# Patient Record
Sex: MALE | Race: BLACK OR AFRICAN AMERICAN | NOT HISPANIC OR LATINO | Employment: FULL TIME | URBAN - METROPOLITAN AREA
[De-identification: names, ages, dates, MRNs, and addresses within clinical notes are randomized per-mention and may not be internally consistent; named-entity substitution may affect disease eponyms.]

---

## 2018-01-01 ENCOUNTER — HOSPITAL ENCOUNTER (INPATIENT)
Facility: HOSPITAL | Age: 63
LOS: 16 days | DRG: 207 | End: 2018-06-26
Attending: INTERNAL MEDICINE | Admitting: INTERNAL MEDICINE
Payer: COMMERCIAL

## 2018-01-01 VITALS
WEIGHT: 171.31 LBS | HEART RATE: 93 BPM | TEMPERATURE: 98 F | BODY MASS INDEX: 25.96 KG/M2 | DIASTOLIC BLOOD PRESSURE: 59 MMHG | HEIGHT: 68 IN | RESPIRATION RATE: 9 BRPM | SYSTOLIC BLOOD PRESSURE: 115 MMHG | OXYGEN SATURATION: 76 %

## 2018-01-01 DIAGNOSIS — A41.9 SEPTIC SHOCK: ICD-10-CM

## 2018-01-01 DIAGNOSIS — R04.89 DIFFUSE PULMONARY ALVEOLAR HEMORRHAGE: ICD-10-CM

## 2018-01-01 DIAGNOSIS — N17.8 ACUTE RENAL FAILURE WITH PATHOLOGICAL LESION IN KIDNEY: ICD-10-CM

## 2018-01-01 DIAGNOSIS — R00.1 BRADYCARDIA: ICD-10-CM

## 2018-01-01 DIAGNOSIS — R07.9 CHEST PAIN: ICD-10-CM

## 2018-01-01 DIAGNOSIS — I77.82 ANCA-ASSOCIATED VASCULITIS: ICD-10-CM

## 2018-01-01 DIAGNOSIS — R65.21 SEVERE SEPSIS WITH SEPTIC SHOCK (CODE): ICD-10-CM

## 2018-01-01 DIAGNOSIS — J96.01 ACUTE HYPOXEMIC RESPIRATORY FAILURE: Primary | ICD-10-CM

## 2018-01-01 DIAGNOSIS — N17.9 AKI (ACUTE KIDNEY INJURY): ICD-10-CM

## 2018-01-01 DIAGNOSIS — M31.0: ICD-10-CM

## 2018-01-01 DIAGNOSIS — J96.90 RESPIRATORY FAILURE: ICD-10-CM

## 2018-01-01 DIAGNOSIS — J80 ARDS (ADULT RESPIRATORY DISTRESS SYNDROME): ICD-10-CM

## 2018-01-01 DIAGNOSIS — R65.21 SEPTIC SHOCK: ICD-10-CM

## 2018-01-01 DIAGNOSIS — J98.8 AIRWAY OBSTRUCTION: ICD-10-CM

## 2018-01-01 DIAGNOSIS — E11.8 TYPE 2 DIABETES MELLITUS WITH COMPLICATION, WITHOUT LONG-TERM CURRENT USE OF INSULIN: ICD-10-CM

## 2018-01-01 DIAGNOSIS — R94.31 QT PROLONGATION: ICD-10-CM

## 2018-01-01 DIAGNOSIS — Z71.89 GOALS OF CARE, COUNSELING/DISCUSSION: ICD-10-CM

## 2018-01-01 LAB
ABO + RH BLD: NORMAL
ACE SERPL-CCNC: 19 U/L
ALBUMIN SERPL BCP-MCNC: 1.8 G/DL
ALBUMIN SERPL BCP-MCNC: 1.9 G/DL
ALBUMIN SERPL BCP-MCNC: 1.9 G/DL
ALBUMIN SERPL BCP-MCNC: 2.1 G/DL
ALBUMIN SERPL BCP-MCNC: 2.2 G/DL
ALBUMIN SERPL BCP-MCNC: 2.3 G/DL
ALBUMIN SERPL BCP-MCNC: 2.3 G/DL
ALBUMIN SERPL BCP-MCNC: 2.4 G/DL
ALBUMIN SERPL BCP-MCNC: 2.5 G/DL
ALBUMIN SERPL BCP-MCNC: 2.6 G/DL
ALBUMIN SERPL BCP-MCNC: 2.7 G/DL
ALBUMIN SERPL BCP-MCNC: 2.8 G/DL
ALBUMIN SERPL BCP-MCNC: 2.9 G/DL
ALBUMIN SERPL BCP-MCNC: 3.1 G/DL
ALBUMIN SERPL BCP-MCNC: 3.1 G/DL
ALBUMIN SERPL BCP-MCNC: 3.2 G/DL
ALBUMIN SERPL BCP-MCNC: 3.3 G/DL
ALBUMIN SERPL BCP-MCNC: 3.4 G/DL
ALBUMIN SERPL BCP-MCNC: 3.5 G/DL
ALBUMIN SERPL BCP-MCNC: 3.5 G/DL
ALBUMIN SERPL BCP-MCNC: 3.6 G/DL
ALBUMIN SERPL BCP-MCNC: 3.6 G/DL
ALBUMIN SERPL BCP-MCNC: 3.8 G/DL
ALBUMIN SERPL BCP-MCNC: 4 G/DL
ALBUMIN SERPL BCP-MCNC: 4.2 G/DL
ALBUMIN SERPL BCP-MCNC: 4.3 G/DL
ALBUMIN SERPL BCP-MCNC: 4.3 G/DL
ALBUMIN SERPL BCP-MCNC: 4.4 G/DL
ALBUMIN SERPL BCP-MCNC: 4.4 G/DL
ALBUMIN SERPL ELPH-MCNC: 2.16 G/DL
ALLENS TEST: ABNORMAL
ALLENS TEST: NORMAL
ALLENS TEST: NORMAL
ALP SERPL-CCNC: 121 U/L
ALP SERPL-CCNC: 169 U/L
ALP SERPL-CCNC: 200 U/L
ALP SERPL-CCNC: 26 U/L
ALP SERPL-CCNC: 37 U/L
ALP SERPL-CCNC: 39 U/L
ALP SERPL-CCNC: 45 U/L
ALP SERPL-CCNC: 47 U/L
ALP SERPL-CCNC: 54 U/L
ALP SERPL-CCNC: 54 U/L
ALP SERPL-CCNC: 56 U/L
ALP SERPL-CCNC: 62 U/L
ALP SERPL-CCNC: 63 U/L
ALP SERPL-CCNC: 64 U/L
ALP SERPL-CCNC: 67 U/L
ALP SERPL-CCNC: 67 U/L
ALP SERPL-CCNC: 86 U/L
ALP SERPL-CCNC: 88 U/L
ALPHA1 GLOB SERPL ELPH-MCNC: 0.63 G/DL
ALPHA2 GLOB SERPL ELPH-MCNC: 0.71 G/DL
ALT SERPL W/O P-5'-P-CCNC: 14 U/L
ALT SERPL W/O P-5'-P-CCNC: 16 U/L
ALT SERPL W/O P-5'-P-CCNC: 21 U/L
ALT SERPL W/O P-5'-P-CCNC: 21 U/L
ALT SERPL W/O P-5'-P-CCNC: 22 U/L
ALT SERPL W/O P-5'-P-CCNC: 24 U/L
ALT SERPL W/O P-5'-P-CCNC: 25 U/L
ALT SERPL W/O P-5'-P-CCNC: 26 U/L
ALT SERPL W/O P-5'-P-CCNC: 26 U/L
ALT SERPL W/O P-5'-P-CCNC: 30 U/L
ALT SERPL W/O P-5'-P-CCNC: 33 U/L
ALT SERPL W/O P-5'-P-CCNC: 35 U/L
ALT SERPL W/O P-5'-P-CCNC: 39 U/L
ALT SERPL W/O P-5'-P-CCNC: 40 U/L
ALT SERPL W/O P-5'-P-CCNC: 45 U/L
ALT SERPL W/O P-5'-P-CCNC: 45 U/L
ALT SERPL W/O P-5'-P-CCNC: 56 U/L
ALT SERPL W/O P-5'-P-CCNC: 56 U/L
AMPHET+METHAMPHET UR QL: NEGATIVE
AMPHETAMINES SERPL QL: NEGATIVE
ANA SER QL IF: POSITIVE
ANA TITR SER IF: NORMAL {TITER}
ANCA AB TITR SER IF: ABNORMAL TITER
ANCA AB TITR SER IF: ABNORMAL TITER
ANION GAP SERPL CALC-SCNC: 10 MMOL/L
ANION GAP SERPL CALC-SCNC: 11 MMOL/L
ANION GAP SERPL CALC-SCNC: 11 MMOL/L
ANION GAP SERPL CALC-SCNC: 15 MMOL/L
ANION GAP SERPL CALC-SCNC: 17 MMOL/L
ANION GAP SERPL CALC-SCNC: 18 MMOL/L
ANION GAP SERPL CALC-SCNC: 18 MMOL/L
ANION GAP SERPL CALC-SCNC: 4 MMOL/L
ANION GAP SERPL CALC-SCNC: 5 MMOL/L
ANION GAP SERPL CALC-SCNC: 6 MMOL/L
ANION GAP SERPL CALC-SCNC: 7 MMOL/L
ANION GAP SERPL CALC-SCNC: 8 MMOL/L
ANION GAP SERPL CALC-SCNC: 9 MMOL/L
ANISOCYTOSIS BLD QL SMEAR: SLIGHT
ANTI SM ANTIBODY: 0.6 EU
ANTI SM/RNP ANTIBODY: 2.83 EU
ANTI-SM INTERPRETATION: NEGATIVE
ANTI-SM/RNP INTERPRETATION: NEGATIVE
ANTI-SSA ANTIBODY: 178.4 EU
ANTI-SSA INTERPRETATION: POSITIVE
ANTI-SSB ANTIBODY: 0.87 EU
ANTI-SSB INTERPRETATION: NEGATIVE
APTT BLDCRRT: 21.1 SEC
APTT BLDCRRT: 22.5 SEC
APTT BLDCRRT: 22.8 SEC
APTT BLDCRRT: 24.2 SEC
APTT BLDCRRT: 24.8 SEC
APTT BLDCRRT: 26.1 SEC
APTT BLDCRRT: 26.1 SEC
APTT BLDCRRT: 26.2 SEC
APTT BLDCRRT: 28.1 SEC
APTT BLDCRRT: 29.3 SEC
APTT BLDCRRT: 30.1 SEC
APTT BLDCRRT: 30.2 SEC
APTT BLDCRRT: 30.2 SEC
APTT BLDCRRT: 33.1 SEC
APTT BLDCRRT: 34.1 SEC
APTT BLDCRRT: 36.1 SEC
APTT BLDCRRT: 58.4 SEC
APTT BLDCRRT: <21 SEC
APTT BLDCRRT: >150 SEC
AST SERPL-CCNC: 17 U/L
AST SERPL-CCNC: 19 U/L
AST SERPL-CCNC: 21 U/L
AST SERPL-CCNC: 25 U/L
AST SERPL-CCNC: 26 U/L
AST SERPL-CCNC: 26 U/L
AST SERPL-CCNC: 29 U/L
AST SERPL-CCNC: 33 U/L
AST SERPL-CCNC: 40 U/L
AST SERPL-CCNC: 44 U/L
AST SERPL-CCNC: 54 U/L
AST SERPL-CCNC: 60 U/L
AST SERPL-CCNC: 63 U/L
AST SERPL-CCNC: 65 U/L
AST SERPL-CCNC: 74 U/L
AST SERPL-CCNC: 84 U/L
AST SERPL-CCNC: 95 U/L
AST SERPL-CCNC: 95 U/L
B-GLOBULIN SERPL ELPH-MCNC: 0.6 G/DL
B2 GLYCOPROT1 IGA SER QL: <9 SAU
B2 GLYCOPROT1 IGG SER QL: <9 SGU
B2 GLYCOPROT1 IGM SER QL: <9 SMU
BACTERIA #/AREA URNS AUTO: ABNORMAL /HPF
BACTERIA BLD CULT: NORMAL
BACTERIA SPEC AEROBE CULT: NO GROWTH
BACTERIA SPEC AEROBE CULT: NORMAL
BACTERIA UR CULT: NO GROWTH
BARBITURATES SERPL QL SCN: NEGATIVE
BARBITURATES UR QL SCN>200 NG/ML: NEGATIVE
BASO STIPL BLD QL SMEAR: ABNORMAL
BASO STIPL BLD QL SMEAR: ABNORMAL
BASOPHILS # BLD AUTO: 0 K/UL
BASOPHILS # BLD AUTO: 0.01 K/UL
BASOPHILS # BLD AUTO: 0.02 K/UL
BASOPHILS # BLD AUTO: 0.03 K/UL
BASOPHILS # BLD AUTO: 0.04 K/UL
BASOPHILS # BLD AUTO: ABNORMAL K/UL
BASOPHILS NFR BLD: 0 %
BASOPHILS NFR BLD: 0.1 %
BASOPHILS NFR BLD: 0.2 %
BENZODIAZ SERPL QL: NEGATIVE
BENZODIAZ UR QL SCN>200 NG/ML: NEGATIVE
BILIRUB SERPL-MCNC: 0.6 MG/DL
BILIRUB SERPL-MCNC: 0.7 MG/DL
BILIRUB SERPL-MCNC: 0.9 MG/DL
BILIRUB SERPL-MCNC: 1 MG/DL
BILIRUB SERPL-MCNC: 1 MG/DL
BILIRUB SERPL-MCNC: 1.1 MG/DL
BILIRUB SERPL-MCNC: 1.2 MG/DL
BILIRUB SERPL-MCNC: 1.2 MG/DL
BILIRUB SERPL-MCNC: 1.3 MG/DL
BILIRUB SERPL-MCNC: 1.5 MG/DL
BILIRUB SERPL-MCNC: 1.6 MG/DL
BILIRUB SERPL-MCNC: 1.7 MG/DL
BILIRUB SERPL-MCNC: 1.9 MG/DL
BILIRUB SERPL-MCNC: 2.1 MG/DL
BILIRUB SERPL-MCNC: 2.2 MG/DL
BILIRUB SERPL-MCNC: 2.6 MG/DL
BILIRUB UR QL STRIP: NEGATIVE
BLASTOMYCES AG INTERP: NEGATIVE
BLASTOMYCES AG RESULT: NORMAL NG/ML
BLASTOMYCES AG SPECIMEN: NORMAL
BLD GP AB SCN CELLS X3 SERPL QL: NORMAL
BLD PROD TYP BPU: NORMAL
BLOOD UNIT EXPIRATION DATE: NORMAL
BLOOD UNIT TYPE CODE: 5100
BLOOD UNIT TYPE CODE: 7300
BLOOD UNIT TYPE CODE: 9500
BLOOD UNIT TYPE: NORMAL
BM IGG SER-ACNC: <0.2 U
BNP SERPL-MCNC: 245 PG/ML
BNP SERPL-MCNC: 94 PG/ML
BUN SERPL-MCNC: 10 MG/DL
BUN SERPL-MCNC: 11 MG/DL
BUN SERPL-MCNC: 12 MG/DL
BUN SERPL-MCNC: 13 MG/DL
BUN SERPL-MCNC: 14 MG/DL
BUN SERPL-MCNC: 15 MG/DL
BUN SERPL-MCNC: 16 MG/DL
BUN SERPL-MCNC: 16 MG/DL
BUN SERPL-MCNC: 17 MG/DL
BUN SERPL-MCNC: 20 MG/DL
BUN SERPL-MCNC: 21 MG/DL
BUN SERPL-MCNC: 22 MG/DL
BUN SERPL-MCNC: 22 MG/DL
BUN SERPL-MCNC: 25 MG/DL
BUN SERPL-MCNC: 29 MG/DL
BUN SERPL-MCNC: 29 MG/DL
BUN SERPL-MCNC: 32 MG/DL
BUN SERPL-MCNC: 32 MG/DL
BUN SERPL-MCNC: 4 MG/DL
BUN SERPL-MCNC: 4 MG/DL
BUN SERPL-MCNC: 5 MG/DL
BUN SERPL-MCNC: 57 MG/DL
BUN SERPL-MCNC: 7 MG/DL
BUN SERPL-MCNC: 7 MG/DL
BUN SERPL-MCNC: 8 MG/DL
BUN SERPL-MCNC: 9 MG/DL
BUN SERPL-MCNC: 91 MG/DL
BUN SERPL-MCNC: 92 MG/DL
BUN SERPL-MCNC: 94 MG/DL
BUN SERPL-MCNC: 94 MG/DL
BUN SERPL-MCNC: <2 MG/DL
BURR CELLS BLD QL SMEAR: ABNORMAL
BURR CELLS BLD QL SMEAR: ABNORMAL
BZE UR QL SCN: NEGATIVE
C IMMITIS AB TITR SER CF: NEGATIVE {TITER}
C IMMITIS IGG SER QL: NEGATIVE
C IMMITIS IGM SER QL: NEGATIVE
C3 SERPL-MCNC: 78 MG/DL
C4 SERPL-MCNC: 13 MG/DL
CA-I BLDV-SCNC: 0.82 MMOL/L
CA-I BLDV-SCNC: 0.85 MMOL/L
CA-I BLDV-SCNC: 0.88 MMOL/L
CA-I BLDV-SCNC: 0.92 MMOL/L
CA-I BLDV-SCNC: 0.94 MMOL/L
CA-I BLDV-SCNC: 0.94 MMOL/L
CA-I BLDV-SCNC: 0.95 MMOL/L
CA-I BLDV-SCNC: 0.95 MMOL/L
CA-I BLDV-SCNC: 0.96 MMOL/L
CA-I BLDV-SCNC: 0.99 MMOL/L
CA-I BLDV-SCNC: 1 MMOL/L
CA-I BLDV-SCNC: 1.02 MMOL/L
CA-I BLDV-SCNC: 1.03 MMOL/L
CA-I BLDV-SCNC: 1.03 MMOL/L
CA-I BLDV-SCNC: 1.04 MMOL/L
CA-I BLDV-SCNC: 1.06 MMOL/L
CA-I BLDV-SCNC: 1.07 MMOL/L
CA-I BLDV-SCNC: 1.07 MMOL/L
CA-I BLDV-SCNC: 1.08 MMOL/L
CA-I BLDV-SCNC: 1.1 MMOL/L
CA-I BLDV-SCNC: 1.13 MMOL/L
CA-I BLDV-SCNC: 1.13 MMOL/L
CA-I BLDV-SCNC: 1.21 MMOL/L
CA-I BLDV-SCNC: 1.23 MMOL/L
CALCIUM SERPL-MCNC: 6.7 MG/DL
CALCIUM SERPL-MCNC: 6.7 MG/DL
CALCIUM SERPL-MCNC: 7 MG/DL
CALCIUM SERPL-MCNC: 7.1 MG/DL
CALCIUM SERPL-MCNC: 7.2 MG/DL
CALCIUM SERPL-MCNC: 7.3 MG/DL
CALCIUM SERPL-MCNC: 7.3 MG/DL
CALCIUM SERPL-MCNC: 7.4 MG/DL
CALCIUM SERPL-MCNC: 7.5 MG/DL
CALCIUM SERPL-MCNC: 7.6 MG/DL
CALCIUM SERPL-MCNC: 7.7 MG/DL
CALCIUM SERPL-MCNC: 7.8 MG/DL
CALCIUM SERPL-MCNC: 7.9 MG/DL
CALCIUM SERPL-MCNC: 8 MG/DL
CALCIUM SERPL-MCNC: 8.1 MG/DL
CANNABINOIDS SERPL QL: NEGATIVE
CANNABINOIDS UR QL SCN: NEGATIVE
CARDIOLIPIN IGG SER IA-ACNC: <9.4 GPL
CARDIOLIPIN IGM SER IA-ACNC: <9.4 MPL
CH50 SERPL-ACNC: 60 U/ML
CHLORIDE SERPL-SCNC: 100 MMOL/L
CHLORIDE SERPL-SCNC: 101 MMOL/L
CHLORIDE SERPL-SCNC: 101 MMOL/L
CHLORIDE SERPL-SCNC: 102 MMOL/L
CHLORIDE SERPL-SCNC: 102 MMOL/L
CHLORIDE SERPL-SCNC: 103 MMOL/L
CHLORIDE SERPL-SCNC: 105 MMOL/L
CHLORIDE SERPL-SCNC: 106 MMOL/L
CHLORIDE SERPL-SCNC: 107 MMOL/L
CHLORIDE SERPL-SCNC: 108 MMOL/L
CHLORIDE SERPL-SCNC: 109 MMOL/L
CHLORIDE SERPL-SCNC: 110 MMOL/L
CHLORIDE SERPL-SCNC: 111 MMOL/L
CHLORIDE SERPL-SCNC: 111 MMOL/L
CHLORIDE SERPL-SCNC: 114 MMOL/L
CHLORIDE SERPL-SCNC: 115 MMOL/L
CHLORIDE SERPL-SCNC: 116 MMOL/L
CHLORIDE SERPL-SCNC: 92 MMOL/L
CHLORIDE SERPL-SCNC: 92 MMOL/L
CHLORIDE SERPL-SCNC: 94 MMOL/L
CHLORIDE SERPL-SCNC: 96 MMOL/L
CHLORIDE SERPL-SCNC: 98 MMOL/L
CHLORIDE SERPL-SCNC: 98 MMOL/L
CHLORIDE SERPL-SCNC: 99 MMOL/L
CHLORIDE SERPL-SCNC: 99 MMOL/L
CK SERPL-CCNC: 196 U/L
CLARITY UR REFRACT.AUTO: ABNORMAL
CO2 SERPL-SCNC: 18 MMOL/L
CO2 SERPL-SCNC: 19 MMOL/L
CO2 SERPL-SCNC: 20 MMOL/L
CO2 SERPL-SCNC: 21 MMOL/L
CO2 SERPL-SCNC: 22 MMOL/L
CO2 SERPL-SCNC: 23 MMOL/L
CO2 SERPL-SCNC: 24 MMOL/L
CO2 SERPL-SCNC: 25 MMOL/L
CO2 SERPL-SCNC: 26 MMOL/L
CO2 SERPL-SCNC: 27 MMOL/L
CO2 SERPL-SCNC: 28 MMOL/L
CO2 SERPL-SCNC: 28 MMOL/L
CO2 SERPL-SCNC: 33 MMOL/L
CO2 SERPL-SCNC: 33 MMOL/L
COCAINE, BLOOD: NEGATIVE
CODING SYSTEM: NORMAL
COLOR UR AUTO: YELLOW
CREAT SERPL-MCNC: 0.3 MG/DL
CREAT SERPL-MCNC: 0.6 MG/DL
CREAT SERPL-MCNC: 0.7 MG/DL
CREAT SERPL-MCNC: 0.8 MG/DL
CREAT SERPL-MCNC: 0.9 MG/DL
CREAT SERPL-MCNC: 1 MG/DL
CREAT SERPL-MCNC: 1.1 MG/DL
CREAT SERPL-MCNC: 1.2 MG/DL
CREAT SERPL-MCNC: 1.3 MG/DL
CREAT SERPL-MCNC: 1.4 MG/DL
CREAT SERPL-MCNC: 1.4 MG/DL
CREAT SERPL-MCNC: 1.6 MG/DL
CREAT SERPL-MCNC: 1.7 MG/DL
CREAT SERPL-MCNC: 1.8 MG/DL
CREAT SERPL-MCNC: 2.2 MG/DL
CREAT SERPL-MCNC: 3.5 MG/DL
CREAT SERPL-MCNC: 3.5 MG/DL
CREAT SERPL-MCNC: 5.6 MG/DL
CREAT SERPL-MCNC: 8.3 MG/DL
CREAT SERPL-MCNC: 8.8 MG/DL
CREAT UR-MCNC: 80 MG/DL
CREAT UR-MCNC: 83 MG/DL
CRP SERPL-MCNC: 151.3 MG/L
CRP SERPL-MCNC: 262.5 MG/L
CRYOGLOB SER QL: NORMAL
DACRYOCYTES BLD QL SMEAR: ABNORMAL
DAT IGG-SP REAG RBC-IMP: NORMAL
DELSYS: ABNORMAL
DELSYS: NORMAL
DIASTOLIC DYSFUNCTION: NO
DIFFERENTIAL METHOD: ABNORMAL
DISPENSE STATUS: NORMAL
DSDNA AB SER-ACNC: ABNORMAL [IU]/ML
ENTEROVIRUS: NOT DETECTED
EOSINOPHIL # BLD AUTO: 0 K/UL
EOSINOPHIL # BLD AUTO: 0.1 K/UL
EOSINOPHIL # BLD AUTO: 0.6 K/UL
EOSINOPHIL # BLD AUTO: ABNORMAL K/UL
EOSINOPHIL NFR BLD: 0 %
EOSINOPHIL NFR BLD: 0.1 %
EOSINOPHIL NFR BLD: 0.2 %
EOSINOPHIL NFR BLD: 0.3 %
EOSINOPHIL NFR BLD: 0.4 %
EOSINOPHIL NFR BLD: 0.5 %
EOSINOPHIL NFR BLD: 0.5 %
EOSINOPHIL NFR BLD: 4.3 %
ERYTHROCYTE [DISTWIDTH] IN BLOOD BY AUTOMATED COUNT: 15.4 %
ERYTHROCYTE [DISTWIDTH] IN BLOOD BY AUTOMATED COUNT: 15.5 %
ERYTHROCYTE [DISTWIDTH] IN BLOOD BY AUTOMATED COUNT: 15.5 %
ERYTHROCYTE [DISTWIDTH] IN BLOOD BY AUTOMATED COUNT: 15.6 %
ERYTHROCYTE [DISTWIDTH] IN BLOOD BY AUTOMATED COUNT: 15.7 %
ERYTHROCYTE [DISTWIDTH] IN BLOOD BY AUTOMATED COUNT: 15.7 %
ERYTHROCYTE [DISTWIDTH] IN BLOOD BY AUTOMATED COUNT: 15.8 %
ERYTHROCYTE [DISTWIDTH] IN BLOOD BY AUTOMATED COUNT: 15.9 %
ERYTHROCYTE [DISTWIDTH] IN BLOOD BY AUTOMATED COUNT: 16 %
ERYTHROCYTE [DISTWIDTH] IN BLOOD BY AUTOMATED COUNT: 16.1 %
ERYTHROCYTE [DISTWIDTH] IN BLOOD BY AUTOMATED COUNT: 16.2 %
ERYTHROCYTE [DISTWIDTH] IN BLOOD BY AUTOMATED COUNT: 16.3 %
ERYTHROCYTE [DISTWIDTH] IN BLOOD BY AUTOMATED COUNT: 16.4 %
ERYTHROCYTE [DISTWIDTH] IN BLOOD BY AUTOMATED COUNT: 16.5 %
ERYTHROCYTE [DISTWIDTH] IN BLOOD BY AUTOMATED COUNT: 16.7 %
ERYTHROCYTE [DISTWIDTH] IN BLOOD BY AUTOMATED COUNT: 16.8 %
ERYTHROCYTE [DISTWIDTH] IN BLOOD BY AUTOMATED COUNT: 16.9 %
ERYTHROCYTE [DISTWIDTH] IN BLOOD BY AUTOMATED COUNT: 17 %
ERYTHROCYTE [DISTWIDTH] IN BLOOD BY AUTOMATED COUNT: 17 %
ERYTHROCYTE [DISTWIDTH] IN BLOOD BY AUTOMATED COUNT: 17.1 %
ERYTHROCYTE [DISTWIDTH] IN BLOOD BY AUTOMATED COUNT: 17.1 %
ERYTHROCYTE [DISTWIDTH] IN BLOOD BY AUTOMATED COUNT: 17.2 %
ERYTHROCYTE [DISTWIDTH] IN BLOOD BY AUTOMATED COUNT: 17.4 %
ERYTHROCYTE [SEDIMENTATION RATE] IN BLOOD BY WESTERGREN METHOD: 18 MM/H
ERYTHROCYTE [SEDIMENTATION RATE] IN BLOOD BY WESTERGREN METHOD: 20 MM/H
ERYTHROCYTE [SEDIMENTATION RATE] IN BLOOD BY WESTERGREN METHOD: 21 MM/H
ERYTHROCYTE [SEDIMENTATION RATE] IN BLOOD BY WESTERGREN METHOD: 22 MM/H
ERYTHROCYTE [SEDIMENTATION RATE] IN BLOOD BY WESTERGREN METHOD: 23 MM/H
ERYTHROCYTE [SEDIMENTATION RATE] IN BLOOD BY WESTERGREN METHOD: 23 MM/H
ERYTHROCYTE [SEDIMENTATION RATE] IN BLOOD BY WESTERGREN METHOD: 25 MM/H
ERYTHROCYTE [SEDIMENTATION RATE] IN BLOOD BY WESTERGREN METHOD: 28 MM/H
ERYTHROCYTE [SEDIMENTATION RATE] IN BLOOD BY WESTERGREN METHOD: 29 MM/H
ERYTHROCYTE [SEDIMENTATION RATE] IN BLOOD BY WESTERGREN METHOD: 30 MM/H
ERYTHROCYTE [SEDIMENTATION RATE] IN BLOOD BY WESTERGREN METHOD: 30 MM/HR
ERYTHROCYTE [SEDIMENTATION RATE] IN BLOOD BY WESTERGREN METHOD: 32 MM/H
ERYTHROCYTE [SEDIMENTATION RATE] IN BLOOD BY WESTERGREN METHOD: 70 MM/HR
EST. GFR  (AFRICAN AMERICAN): 11.3 ML/MIN/1.73 M^2
EST. GFR  (AFRICAN AMERICAN): 20 ML/MIN/1.73 M^2
EST. GFR  (AFRICAN AMERICAN): 20 ML/MIN/1.73 M^2
EST. GFR  (AFRICAN AMERICAN): 35 ML/MIN/1.73 M^2
EST. GFR  (AFRICAN AMERICAN): 44.7 ML/MIN/1.73 M^2
EST. GFR  (AFRICAN AMERICAN): 47.9 ML/MIN/1.73 M^2
EST. GFR  (AFRICAN AMERICAN): 51.5 ML/MIN/1.73 M^2
EST. GFR  (AFRICAN AMERICAN): 6.6 ML/MIN/1.73 M^2
EST. GFR  (AFRICAN AMERICAN): 7 ML/MIN/1.73 M^2
EST. GFR  (AFRICAN AMERICAN): >60 ML/MIN/1.73 M^2
EST. GFR  (NON AFRICAN AMERICAN): 17.3 ML/MIN/1.73 M^2
EST. GFR  (NON AFRICAN AMERICAN): 17.3 ML/MIN/1.73 M^2
EST. GFR  (NON AFRICAN AMERICAN): 30.3 ML/MIN/1.73 M^2
EST. GFR  (NON AFRICAN AMERICAN): 38.6 ML/MIN/1.73 M^2
EST. GFR  (NON AFRICAN AMERICAN): 41.4 ML/MIN/1.73 M^2
EST. GFR  (NON AFRICAN AMERICAN): 44.5 ML/MIN/1.73 M^2
EST. GFR  (NON AFRICAN AMERICAN): 5.7 ML/MIN/1.73 M^2
EST. GFR  (NON AFRICAN AMERICAN): 52.4 ML/MIN/1.73 M^2
EST. GFR  (NON AFRICAN AMERICAN): 52.4 ML/MIN/1.73 M^2
EST. GFR  (NON AFRICAN AMERICAN): 57.3 ML/MIN/1.73 M^2
EST. GFR  (NON AFRICAN AMERICAN): 6.1 ML/MIN/1.73 M^2
EST. GFR  (NON AFRICAN AMERICAN): 9.8 ML/MIN/1.73 M^2
EST. GFR  (NON AFRICAN AMERICAN): >60 ML/MIN/1.73 M^2
ESTIMATED AVG GLUCOSE: 131 MG/DL
ETHANOL SERPL-MCNC: NEGATIVE MG/DL
ETHANOL UR-MCNC: <10 MG/DL
FIBRINOGEN PPP-MCNC: 108 MG/DL
FIBRINOGEN PPP-MCNC: 111 MG/DL
FIBRINOGEN PPP-MCNC: 138 MG/DL
FIBRINOGEN PPP-MCNC: 138 MG/DL
FIBRINOGEN PPP-MCNC: 149 MG/DL
FIBRINOGEN PPP-MCNC: 166 MG/DL
FIBRINOGEN PPP-MCNC: 167 MG/DL
FIBRINOGEN PPP-MCNC: 172 MG/DL
FIBRINOGEN PPP-MCNC: 177 MG/DL
FIBRINOGEN PPP-MCNC: 195 MG/DL
FIBRINOGEN PPP-MCNC: 199 MG/DL
FIBRINOGEN PPP-MCNC: 203 MG/DL
FIBRINOGEN PPP-MCNC: 204 MG/DL
FIBRINOGEN PPP-MCNC: 206 MG/DL
FIBRINOGEN PPP-MCNC: 228 MG/DL
FIBRINOGEN PPP-MCNC: 234 MG/DL
FIBRINOGEN PPP-MCNC: 234 MG/DL
FIBRINOGEN PPP-MCNC: 235 MG/DL
FIBRINOGEN PPP-MCNC: 261 MG/DL
FIBRINOGEN PPP-MCNC: 84 MG/DL
FIBRINOGEN PPP-MCNC: 96 MG/DL
FIBRINOGEN PPP-MCNC: <70 MG/DL
FIO2: 100
FIO2: 35
FIO2: 40
FIO2: 45
FIO2: 50
FIO2: 55
FIO2: 60
FIO2: 65
FIO2: 70
FIO2: 75
FIO2: 80
FIO2: 85
FIO2: 85
FLOW: 15
GAMMA GLOB SERPL ELPH-MCNC: 0.8 G/DL
GLOBAL PERICARDIAL EFFUSION: ABNORMAL
GLOBAL PERICARDIAL EFFUSION: NORMAL
GLUCOSE SERPL-MCNC: 120 MG/DL
GLUCOSE SERPL-MCNC: 127 MG/DL
GLUCOSE SERPL-MCNC: 128 MG/DL
GLUCOSE SERPL-MCNC: 136 MG/DL
GLUCOSE SERPL-MCNC: 139 MG/DL
GLUCOSE SERPL-MCNC: 147 MG/DL
GLUCOSE SERPL-MCNC: 148 MG/DL
GLUCOSE SERPL-MCNC: 152 MG/DL
GLUCOSE SERPL-MCNC: 155 MG/DL
GLUCOSE SERPL-MCNC: 155 MG/DL
GLUCOSE SERPL-MCNC: 158 MG/DL
GLUCOSE SERPL-MCNC: 158 MG/DL
GLUCOSE SERPL-MCNC: 159 MG/DL
GLUCOSE SERPL-MCNC: 160 MG/DL
GLUCOSE SERPL-MCNC: 160 MG/DL
GLUCOSE SERPL-MCNC: 161 MG/DL
GLUCOSE SERPL-MCNC: 165 MG/DL
GLUCOSE SERPL-MCNC: 165 MG/DL
GLUCOSE SERPL-MCNC: 166 MG/DL
GLUCOSE SERPL-MCNC: 167 MG/DL
GLUCOSE SERPL-MCNC: 167 MG/DL
GLUCOSE SERPL-MCNC: 168 MG/DL
GLUCOSE SERPL-MCNC: 169 MG/DL
GLUCOSE SERPL-MCNC: 170 MG/DL
GLUCOSE SERPL-MCNC: 170 MG/DL
GLUCOSE SERPL-MCNC: 174 MG/DL
GLUCOSE SERPL-MCNC: 178 MG/DL
GLUCOSE SERPL-MCNC: 179 MG/DL
GLUCOSE SERPL-MCNC: 179 MG/DL
GLUCOSE SERPL-MCNC: 180 MG/DL
GLUCOSE SERPL-MCNC: 180 MG/DL
GLUCOSE SERPL-MCNC: 183 MG/DL
GLUCOSE SERPL-MCNC: 183 MG/DL
GLUCOSE SERPL-MCNC: 187 MG/DL
GLUCOSE SERPL-MCNC: 190 MG/DL
GLUCOSE SERPL-MCNC: 190 MG/DL
GLUCOSE SERPL-MCNC: 191 MG/DL
GLUCOSE SERPL-MCNC: 191 MG/DL
GLUCOSE SERPL-MCNC: 194 MG/DL
GLUCOSE SERPL-MCNC: 194 MG/DL
GLUCOSE SERPL-MCNC: 195 MG/DL
GLUCOSE SERPL-MCNC: 195 MG/DL
GLUCOSE SERPL-MCNC: 198 MG/DL
GLUCOSE SERPL-MCNC: 198 MG/DL
GLUCOSE SERPL-MCNC: 204 MG/DL
GLUCOSE SERPL-MCNC: 204 MG/DL
GLUCOSE SERPL-MCNC: 207 MG/DL
GLUCOSE SERPL-MCNC: 207 MG/DL
GLUCOSE SERPL-MCNC: 216 MG/DL
GLUCOSE SERPL-MCNC: 223 MG/DL
GLUCOSE SERPL-MCNC: 223 MG/DL
GLUCOSE SERPL-MCNC: 226 MG/DL
GLUCOSE SERPL-MCNC: 226 MG/DL
GLUCOSE SERPL-MCNC: 233 MG/DL
GLUCOSE SERPL-MCNC: 233 MG/DL
GLUCOSE SERPL-MCNC: 235 MG/DL
GLUCOSE SERPL-MCNC: 240 MG/DL
GLUCOSE SERPL-MCNC: 249 MG/DL
GLUCOSE SERPL-MCNC: 250 MG/DL
GLUCOSE SERPL-MCNC: 288 MG/DL
GLUCOSE SERPL-MCNC: 314 MG/DL
GLUCOSE SERPL-MCNC: 342 MG/DL
GLUCOSE SERPL-MCNC: 352 MG/DL
GLUCOSE SERPL-MCNC: 352 MG/DL
GLUCOSE SERPL-MCNC: 90 MG/DL
GLUCOSE UR QL STRIP: ABNORMAL
GRAM STN SPEC: NORMAL
HAPTOGLOB SERPL-MCNC: <10 MG/DL
HAPTOGLOB SERPL-MCNC: <10 MG/DL
HBA1C MFR BLD HPLC: 6.2 %
HBV CORE AB SERPL QL IA: NEGATIVE
HBV SURFACE AB SER-ACNC: POSITIVE M[IU]/ML
HBV SURFACE AG SERPL QL IA: NEGATIVE
HCO3 UR-SCNC: 13.9 MMOL/L (ref 24–28)
HCO3 UR-SCNC: 16.9 MMOL/L (ref 24–28)
HCO3 UR-SCNC: 18.6 MMOL/L (ref 24–28)
HCO3 UR-SCNC: 18.9 MMOL/L (ref 24–28)
HCO3 UR-SCNC: 19.4 MMOL/L (ref 24–28)
HCO3 UR-SCNC: 19.4 MMOL/L (ref 24–28)
HCO3 UR-SCNC: 19.9 MMOL/L (ref 24–28)
HCO3 UR-SCNC: 20.1 MMOL/L (ref 24–28)
HCO3 UR-SCNC: 20.5 MMOL/L (ref 24–28)
HCO3 UR-SCNC: 20.6 MMOL/L (ref 24–28)
HCO3 UR-SCNC: 20.9 MMOL/L (ref 24–28)
HCO3 UR-SCNC: 21.1 MMOL/L (ref 24–28)
HCO3 UR-SCNC: 21.3 MMOL/L (ref 24–28)
HCO3 UR-SCNC: 21.3 MMOL/L (ref 24–28)
HCO3 UR-SCNC: 21.4 MMOL/L (ref 24–28)
HCO3 UR-SCNC: 21.5 MMOL/L (ref 24–28)
HCO3 UR-SCNC: 21.7 MMOL/L (ref 24–28)
HCO3 UR-SCNC: 21.7 MMOL/L (ref 24–28)
HCO3 UR-SCNC: 22 MMOL/L (ref 24–28)
HCO3 UR-SCNC: 22 MMOL/L (ref 24–28)
HCO3 UR-SCNC: 22.1 MMOL/L (ref 24–28)
HCO3 UR-SCNC: 22.2 MMOL/L (ref 24–28)
HCO3 UR-SCNC: 22.2 MMOL/L (ref 24–28)
HCO3 UR-SCNC: 22.3 MMOL/L (ref 24–28)
HCO3 UR-SCNC: 22.4 MMOL/L (ref 24–28)
HCO3 UR-SCNC: 22.5 MMOL/L (ref 24–28)
HCO3 UR-SCNC: 22.6 MMOL/L (ref 24–28)
HCO3 UR-SCNC: 22.8 MMOL/L (ref 24–28)
HCO3 UR-SCNC: 22.8 MMOL/L (ref 24–28)
HCO3 UR-SCNC: 22.9 MMOL/L (ref 24–28)
HCO3 UR-SCNC: 23 MMOL/L (ref 24–28)
HCO3 UR-SCNC: 23 MMOL/L (ref 24–28)
HCO3 UR-SCNC: 23.1 MMOL/L (ref 24–28)
HCO3 UR-SCNC: 23.2 MMOL/L (ref 24–28)
HCO3 UR-SCNC: 23.2 MMOL/L (ref 24–28)
HCO3 UR-SCNC: 23.3 MMOL/L (ref 24–28)
HCO3 UR-SCNC: 23.4 MMOL/L (ref 24–28)
HCO3 UR-SCNC: 23.4 MMOL/L (ref 24–28)
HCO3 UR-SCNC: 23.5 MMOL/L (ref 24–28)
HCO3 UR-SCNC: 23.5 MMOL/L (ref 24–28)
HCO3 UR-SCNC: 23.9 MMOL/L (ref 24–28)
HCO3 UR-SCNC: 23.9 MMOL/L (ref 24–28)
HCO3 UR-SCNC: 24 MMOL/L (ref 24–28)
HCO3 UR-SCNC: 24.1 MMOL/L (ref 24–28)
HCO3 UR-SCNC: 24.2 MMOL/L (ref 24–28)
HCO3 UR-SCNC: 24.3 MMOL/L (ref 24–28)
HCO3 UR-SCNC: 24.4 MMOL/L (ref 24–28)
HCO3 UR-SCNC: 24.4 MMOL/L (ref 24–28)
HCO3 UR-SCNC: 24.5 MMOL/L (ref 24–28)
HCO3 UR-SCNC: 24.6 MMOL/L (ref 24–28)
HCO3 UR-SCNC: 24.7 MMOL/L (ref 24–28)
HCO3 UR-SCNC: 24.9 MMOL/L (ref 24–28)
HCO3 UR-SCNC: 25.2 MMOL/L (ref 24–28)
HCO3 UR-SCNC: 25.5 MMOL/L (ref 24–28)
HCO3 UR-SCNC: 25.7 MMOL/L (ref 24–28)
HCO3 UR-SCNC: 25.7 MMOL/L (ref 24–28)
HCO3 UR-SCNC: 25.9 MMOL/L (ref 24–28)
HCO3 UR-SCNC: 26.5 MMOL/L (ref 24–28)
HCO3 UR-SCNC: 27.9 MMOL/L (ref 24–28)
HCO3 UR-SCNC: 28.3 MMOL/L (ref 24–28)
HCO3 UR-SCNC: 28.5 MMOL/L (ref 24–28)
HCO3 UR-SCNC: 28.6 MMOL/L (ref 24–28)
HCO3 UR-SCNC: 28.9 MMOL/L (ref 24–28)
HCO3 UR-SCNC: 29.2 MMOL/L (ref 24–28)
HCO3 UR-SCNC: 29.3 MMOL/L (ref 24–28)
HCO3 UR-SCNC: 29.4 MMOL/L (ref 24–28)
HCO3 UR-SCNC: 29.6 MMOL/L (ref 24–28)
HCO3 UR-SCNC: 29.7 MMOL/L (ref 24–28)
HCO3 UR-SCNC: 30.1 MMOL/L (ref 24–28)
HCO3 UR-SCNC: 30.3 MMOL/L (ref 24–28)
HCO3 UR-SCNC: 30.9 MMOL/L (ref 24–28)
HCO3 UR-SCNC: 31.1 MMOL/L (ref 24–28)
HCO3 UR-SCNC: 33.7 MMOL/L (ref 24–28)
HCO3 UR-SCNC: 34.7 MMOL/L (ref 24–28)
HCO3 UR-SCNC: 38.9 MMOL/L (ref 24–28)
HCT VFR BLD AUTO: 18.7 %
HCT VFR BLD AUTO: 19.4 %
HCT VFR BLD AUTO: 20 %
HCT VFR BLD AUTO: 20.3 %
HCT VFR BLD AUTO: 20.5 %
HCT VFR BLD AUTO: 20.9 %
HCT VFR BLD AUTO: 21.2 %
HCT VFR BLD AUTO: 21.4 %
HCT VFR BLD AUTO: 21.5 %
HCT VFR BLD AUTO: 21.7 %
HCT VFR BLD AUTO: 22.1 %
HCT VFR BLD AUTO: 22.2 %
HCT VFR BLD AUTO: 22.2 %
HCT VFR BLD AUTO: 22.4 %
HCT VFR BLD AUTO: 22.5 %
HCT VFR BLD AUTO: 22.7 %
HCT VFR BLD AUTO: 22.7 %
HCT VFR BLD AUTO: 22.8 %
HCT VFR BLD AUTO: 22.8 %
HCT VFR BLD AUTO: 23 %
HCT VFR BLD AUTO: 23.1 %
HCT VFR BLD AUTO: 23.2 %
HCT VFR BLD AUTO: 23.2 %
HCT VFR BLD AUTO: 23.3 %
HCT VFR BLD AUTO: 23.4 %
HCT VFR BLD AUTO: 23.4 %
HCT VFR BLD AUTO: 23.7 %
HCT VFR BLD AUTO: 23.7 %
HCT VFR BLD AUTO: 23.8 %
HCT VFR BLD AUTO: 23.9 %
HCT VFR BLD AUTO: 24 %
HCT VFR BLD AUTO: 24.1 %
HCT VFR BLD AUTO: 24.2 %
HCT VFR BLD AUTO: 24.3 %
HCT VFR BLD AUTO: 24.4 %
HCT VFR BLD AUTO: 24.5 %
HCT VFR BLD AUTO: 24.6 %
HCT VFR BLD AUTO: 24.7 %
HCT VFR BLD AUTO: 25 %
HCT VFR BLD AUTO: 25.1 %
HCT VFR BLD AUTO: 25.4 %
HCT VFR BLD AUTO: 25.4 %
HCT VFR BLD AUTO: 25.9 %
HCT VFR BLD AUTO: 26 %
HCT VFR BLD AUTO: 26.3 %
HCT VFR BLD AUTO: 27 %
HCT VFR BLD AUTO: 32.2 %
HCT VFR BLD CALC: 24 %PCV (ref 36–54)
HCV AB SERPL QL IA: NEGATIVE
HEPATITIS A ANTIBODY, IGG: POSITIVE
HETEROPH AB SERPL QL IA: NEGATIVE
HGB BLD-MCNC: 10.9 G/DL
HGB BLD-MCNC: 6.3 G/DL
HGB BLD-MCNC: 6.3 G/DL
HGB BLD-MCNC: 6.5 G/DL
HGB BLD-MCNC: 6.6 G/DL
HGB BLD-MCNC: 6.9 G/DL
HGB BLD-MCNC: 7.1 G/DL
HGB BLD-MCNC: 7.3 G/DL
HGB BLD-MCNC: 7.4 G/DL
HGB BLD-MCNC: 7.5 G/DL
HGB BLD-MCNC: 7.6 G/DL
HGB BLD-MCNC: 7.7 G/DL
HGB BLD-MCNC: 7.8 G/DL
HGB BLD-MCNC: 7.9 G/DL
HGB BLD-MCNC: 8 G/DL
HGB BLD-MCNC: 8 G/DL
HGB BLD-MCNC: 8.1 G/DL
HGB BLD-MCNC: 8.2 G/DL
HGB BLD-MCNC: 8.3 G/DL
HGB BLD-MCNC: 8.4 G/DL
HGB BLD-MCNC: 8.9 G/DL
HGB BLD-MCNC: 8.9 G/DL
HGB BLD-MCNC: 9.1 G/DL
HGB BLD-MCNC: 9.3 G/DL
HGB BLD-MCNC: 9.8 G/DL
HGB UR QL STRIP: ABNORMAL
HISTOPLASMA AG INTERP.: NEGATIVE
HISTOPLASMA AG VALUE: 0 NG/ML
HISTOPLASMA ANTIGEN URINE: NEGATIVE
HISTOPLASMA RESULT: NORMAL NG/ML
HIV 1+2 AB+HIV1 P24 AG SERPL QL IA: NEGATIVE
HUMAN BOCAVIRUS: NOT DETECTED
HUMAN CORONAVIRUS, COMMON COLD VIRUS: NOT DETECTED
HYALINE CASTS UR QL AUTO: 7 /LPF
HYPOCHROMIA BLD QL SMEAR: ABNORMAL
IGA SERPL-MCNC: 154 MG/DL
IGG SERPL-MCNC: 972 MG/DL
IGM SERPL-MCNC: 57 MG/DL
IMM GRANULOCYTES # BLD AUTO: 0.04 K/UL
IMM GRANULOCYTES # BLD AUTO: 0.05 K/UL
IMM GRANULOCYTES # BLD AUTO: 0.06 K/UL
IMM GRANULOCYTES # BLD AUTO: 0.07 K/UL
IMM GRANULOCYTES # BLD AUTO: 0.07 K/UL
IMM GRANULOCYTES # BLD AUTO: 0.08 K/UL
IMM GRANULOCYTES # BLD AUTO: 0.09 K/UL
IMM GRANULOCYTES # BLD AUTO: 0.11 K/UL
IMM GRANULOCYTES # BLD AUTO: 0.12 K/UL
IMM GRANULOCYTES # BLD AUTO: 0.13 K/UL
IMM GRANULOCYTES # BLD AUTO: 0.14 K/UL
IMM GRANULOCYTES # BLD AUTO: 0.16 K/UL
IMM GRANULOCYTES # BLD AUTO: 0.17 K/UL
IMM GRANULOCYTES # BLD AUTO: 0.19 K/UL
IMM GRANULOCYTES # BLD AUTO: 0.19 K/UL
IMM GRANULOCYTES # BLD AUTO: 0.2 K/UL
IMM GRANULOCYTES # BLD AUTO: 0.2 K/UL
IMM GRANULOCYTES # BLD AUTO: 0.21 K/UL
IMM GRANULOCYTES # BLD AUTO: 0.22 K/UL
IMM GRANULOCYTES # BLD AUTO: 0.25 K/UL
IMM GRANULOCYTES # BLD AUTO: 0.26 K/UL
IMM GRANULOCYTES # BLD AUTO: 0.26 K/UL
IMM GRANULOCYTES # BLD AUTO: 0.27 K/UL
IMM GRANULOCYTES # BLD AUTO: 0.27 K/UL
IMM GRANULOCYTES # BLD AUTO: 0.28 K/UL
IMM GRANULOCYTES # BLD AUTO: 0.31 K/UL
IMM GRANULOCYTES # BLD AUTO: 0.31 K/UL
IMM GRANULOCYTES # BLD AUTO: 0.32 K/UL
IMM GRANULOCYTES # BLD AUTO: 0.33 K/UL
IMM GRANULOCYTES # BLD AUTO: 0.35 K/UL
IMM GRANULOCYTES # BLD AUTO: 0.67 K/UL
IMM GRANULOCYTES # BLD AUTO: 0.81 K/UL
IMM GRANULOCYTES # BLD AUTO: ABNORMAL K/UL
IMM GRANULOCYTES NFR BLD AUTO: 0.4 %
IMM GRANULOCYTES NFR BLD AUTO: 0.5 %
IMM GRANULOCYTES NFR BLD AUTO: 0.6 %
IMM GRANULOCYTES NFR BLD AUTO: 0.7 %
IMM GRANULOCYTES NFR BLD AUTO: 0.8 %
IMM GRANULOCYTES NFR BLD AUTO: 0.9 %
IMM GRANULOCYTES NFR BLD AUTO: 1 %
IMM GRANULOCYTES NFR BLD AUTO: 1.1 %
IMM GRANULOCYTES NFR BLD AUTO: 1.2 %
IMM GRANULOCYTES NFR BLD AUTO: 1.3 %
IMM GRANULOCYTES NFR BLD AUTO: 1.3 %
IMM GRANULOCYTES NFR BLD AUTO: 1.4 %
IMM GRANULOCYTES NFR BLD AUTO: 1.4 %
IMM GRANULOCYTES NFR BLD AUTO: 1.6 %
IMM GRANULOCYTES NFR BLD AUTO: 1.7 %
IMM GRANULOCYTES NFR BLD AUTO: 2 %
IMM GRANULOCYTES NFR BLD AUTO: 2.1 %
IMM GRANULOCYTES NFR BLD AUTO: 2.2 %
IMM GRANULOCYTES NFR BLD AUTO: 3.1 %
IMM GRANULOCYTES NFR BLD AUTO: 3.4 %
IMM GRANULOCYTES NFR BLD AUTO: ABNORMAL %
INFLUENZA A - H1N1-09: NOT DETECTED
INR PPP: 1
INR PPP: 1.1
INR PPP: 1.2
INR PPP: 1.3
INR PPP: 1.3
INR PPP: 1.5
INR PPP: 1.6
INR PPP: 1.7
INR PPP: 2.5
INR PPP: 3.6
INR PPP: 3.8
INTERPRETATION SERPL IFE-IMP: NORMAL
IP: 10
IP: 14
IP: 14
IP: 8
IT: 1
IT: 8
KETONES UR QL STRIP: NEGATIVE
LA PPP-IMP: NEGATIVE
LACTATE SERPL-SCNC: 0.6 MMOL/L
LACTATE SERPL-SCNC: 1.1 MMOL/L
LACTATE SERPL-SCNC: 1.8 MMOL/L
LACTATE SERPL-SCNC: 2.7 MMOL/L
LACTATE SERPL-SCNC: 2.7 MMOL/L
LDH SERPL L TO P-CCNC: 435 U/L
LDH SERPL L TO P-CCNC: 683 U/L
LEUKOCYTE ESTERASE UR QL STRIP: ABNORMAL
LIPASE SERPL-CCNC: 87 U/L
LYMPHOCYTES # BLD AUTO: 0.2 K/UL
LYMPHOCYTES # BLD AUTO: 0.3 K/UL
LYMPHOCYTES # BLD AUTO: 0.4 K/UL
LYMPHOCYTES # BLD AUTO: 0.5 K/UL
LYMPHOCYTES # BLD AUTO: 0.6 K/UL
LYMPHOCYTES # BLD AUTO: 0.7 K/UL
LYMPHOCYTES # BLD AUTO: 0.8 K/UL
LYMPHOCYTES # BLD AUTO: 0.9 K/UL
LYMPHOCYTES # BLD AUTO: ABNORMAL K/UL
LYMPHOCYTES NFR BLD: 1.4 %
LYMPHOCYTES NFR BLD: 1.5 %
LYMPHOCYTES NFR BLD: 1.6 %
LYMPHOCYTES NFR BLD: 1.7 %
LYMPHOCYTES NFR BLD: 1.7 %
LYMPHOCYTES NFR BLD: 1.8 %
LYMPHOCYTES NFR BLD: 1.8 %
LYMPHOCYTES NFR BLD: 1.9 %
LYMPHOCYTES NFR BLD: 2.1 %
LYMPHOCYTES NFR BLD: 2.2 %
LYMPHOCYTES NFR BLD: 2.2 %
LYMPHOCYTES NFR BLD: 2.3 %
LYMPHOCYTES NFR BLD: 2.3 %
LYMPHOCYTES NFR BLD: 2.4 %
LYMPHOCYTES NFR BLD: 2.5 %
LYMPHOCYTES NFR BLD: 2.5 %
LYMPHOCYTES NFR BLD: 2.6 %
LYMPHOCYTES NFR BLD: 2.7 %
LYMPHOCYTES NFR BLD: 2.7 %
LYMPHOCYTES NFR BLD: 2.9 %
LYMPHOCYTES NFR BLD: 3 %
LYMPHOCYTES NFR BLD: 3.3 %
LYMPHOCYTES NFR BLD: 3.4 %
LYMPHOCYTES NFR BLD: 3.4 %
LYMPHOCYTES NFR BLD: 3.6 %
LYMPHOCYTES NFR BLD: 3.6 %
LYMPHOCYTES NFR BLD: 3.7 %
LYMPHOCYTES NFR BLD: 3.9 %
LYMPHOCYTES NFR BLD: 4.1 %
LYMPHOCYTES NFR BLD: 4.2 %
LYMPHOCYTES NFR BLD: 4.4 %
LYMPHOCYTES NFR BLD: 4.5 %
LYMPHOCYTES NFR BLD: 4.5 %
LYMPHOCYTES NFR BLD: 4.7 %
LYMPHOCYTES NFR BLD: 4.9 %
LYMPHOCYTES NFR BLD: 5 %
LYMPHOCYTES NFR BLD: 6.8 %
MAGNESIUM SERPL-MCNC: 1.6 MG/DL
MAGNESIUM SERPL-MCNC: 1.7 MG/DL
MAGNESIUM SERPL-MCNC: 1.8 MG/DL
MAGNESIUM SERPL-MCNC: 1.9 MG/DL
MAGNESIUM SERPL-MCNC: 2 MG/DL
MAGNESIUM SERPL-MCNC: 2.1 MG/DL
MAGNESIUM SERPL-MCNC: 2.2 MG/DL
MAGNESIUM SERPL-MCNC: 2.3 MG/DL
MAGNESIUM SERPL-MCNC: 2.4 MG/DL
MAGNESIUM SERPL-MCNC: 2.5 MG/DL
MAGNESIUM SERPL-MCNC: 2.5 MG/DL
MAGNESIUM SERPL-MCNC: 2.6 MG/DL
MAGNESIUM SERPL-MCNC: 2.7 MG/DL
MAP: 19
MAP: 20
MCH RBC QN AUTO: 27.7 PG
MCH RBC QN AUTO: 27.7 PG
MCH RBC QN AUTO: 27.8 PG
MCH RBC QN AUTO: 27.9 PG
MCH RBC QN AUTO: 27.9 PG
MCH RBC QN AUTO: 28.1 PG
MCH RBC QN AUTO: 28.1 PG
MCH RBC QN AUTO: 28.3 PG
MCH RBC QN AUTO: 28.4 PG
MCH RBC QN AUTO: 28.6 PG
MCH RBC QN AUTO: 28.7 PG
MCH RBC QN AUTO: 28.8 PG
MCH RBC QN AUTO: 28.9 PG
MCH RBC QN AUTO: 29 PG
MCH RBC QN AUTO: 29.1 PG
MCH RBC QN AUTO: 29.2 PG
MCH RBC QN AUTO: 29.3 PG
MCH RBC QN AUTO: 29.4 PG
MCH RBC QN AUTO: 29.4 PG
MCH RBC QN AUTO: 29.5 PG
MCH RBC QN AUTO: 29.6 PG
MCH RBC QN AUTO: 30.2 PG
MCH RBC QN AUTO: 31.2 PG
MCH RBC QN AUTO: 31.3 PG
MCH RBC QN AUTO: 31.8 PG
MCHC RBC AUTO-ENTMCNC: 31.9 G/DL
MCHC RBC AUTO-ENTMCNC: 31.9 G/DL
MCHC RBC AUTO-ENTMCNC: 32 G/DL
MCHC RBC AUTO-ENTMCNC: 32.1 G/DL
MCHC RBC AUTO-ENTMCNC: 32.2 G/DL
MCHC RBC AUTO-ENTMCNC: 32.2 G/DL
MCHC RBC AUTO-ENTMCNC: 32.5 G/DL
MCHC RBC AUTO-ENTMCNC: 32.6 G/DL
MCHC RBC AUTO-ENTMCNC: 32.9 G/DL
MCHC RBC AUTO-ENTMCNC: 33 G/DL
MCHC RBC AUTO-ENTMCNC: 33 G/DL
MCHC RBC AUTO-ENTMCNC: 33.1 G/DL
MCHC RBC AUTO-ENTMCNC: 33.2 G/DL
MCHC RBC AUTO-ENTMCNC: 33.3 G/DL
MCHC RBC AUTO-ENTMCNC: 33.3 G/DL
MCHC RBC AUTO-ENTMCNC: 33.5 G/DL
MCHC RBC AUTO-ENTMCNC: 33.6 G/DL
MCHC RBC AUTO-ENTMCNC: 33.7 G/DL
MCHC RBC AUTO-ENTMCNC: 33.8 G/DL
MCHC RBC AUTO-ENTMCNC: 33.9 G/DL
MCHC RBC AUTO-ENTMCNC: 33.9 G/DL
MCHC RBC AUTO-ENTMCNC: 34 G/DL
MCHC RBC AUTO-ENTMCNC: 34.1 G/DL
MCHC RBC AUTO-ENTMCNC: 34.1 G/DL
MCHC RBC AUTO-ENTMCNC: 34.2 G/DL
MCHC RBC AUTO-ENTMCNC: 34.2 G/DL
MCHC RBC AUTO-ENTMCNC: 34.4 G/DL
MCHC RBC AUTO-ENTMCNC: 34.4 G/DL
MCHC RBC AUTO-ENTMCNC: 34.6 G/DL
MCHC RBC AUTO-ENTMCNC: 34.7 G/DL
MCHC RBC AUTO-ENTMCNC: 34.9 G/DL
MCHC RBC AUTO-ENTMCNC: 35 G/DL
MCHC RBC AUTO-ENTMCNC: 35.1 G/DL
MCHC RBC AUTO-ENTMCNC: 35.2 G/DL
MCHC RBC AUTO-ENTMCNC: 35.4 G/DL
MCHC RBC AUTO-ENTMCNC: 36.3 G/DL
MCV RBC AUTO: 83 FL
MCV RBC AUTO: 84 FL
MCV RBC AUTO: 85 FL
MCV RBC AUTO: 86 FL
MCV RBC AUTO: 87 FL
MCV RBC AUTO: 88 FL
MCV RBC AUTO: 89 FL
MCV RBC AUTO: 90 FL
METHADONE SERPL QL SCN: NEGATIVE
METHADONE UR QL SCN>300 NG/ML: NEGATIVE
METHEMOGLOBIN: 1 % (ref 0–3)
METHEMOGLOBIN: 1.2 % (ref 0–3)
METHEMOGLOBIN: 1.2 % (ref 0–3)
METHEMOGLOBIN: 1.3 % (ref 0–3)
METHEMOGLOBIN: 1.3 % (ref 0–3)
METHEMOGLOBIN: 1.4 % (ref 0–3)
METHEMOGLOBIN: 1.7 % (ref 0–3)
MICROSCOPIC COMMENT: ABNORMAL
MIN VOL: 11
MIN VOL: 11.2
MIN VOL: 11.7
MIN VOL: 11.8
MIN VOL: 11.9
MIN VOL: 12
MIN VOL: 12.1
MIN VOL: 12.1
MIN VOL: 12.3
MIN VOL: 13
MIN VOL: 13.5
MIN VOL: 13.5
MIN VOL: 14
MIN VOL: 14
MIN VOL: 15.3
MIN VOL: 16
MIN VOL: 16
MIN VOL: 17
MIN VOL: 19
MIN VOL: 9.1
MIN VOL: 9.72
MITOGEN NIL: 0.01 IU/ML
MITRAL VALVE MOBILITY: NORMAL
MODE: ABNORMAL
MODE: NORMAL
MONOCYTES # BLD AUTO: 0.1 K/UL
MONOCYTES # BLD AUTO: 0.2 K/UL
MONOCYTES # BLD AUTO: 0.3 K/UL
MONOCYTES # BLD AUTO: 0.4 K/UL
MONOCYTES # BLD AUTO: 0.4 K/UL
MONOCYTES # BLD AUTO: 0.5 K/UL
MONOCYTES # BLD AUTO: 0.6 K/UL
MONOCYTES # BLD AUTO: 0.7 K/UL
MONOCYTES # BLD AUTO: 0.8 K/UL
MONOCYTES # BLD AUTO: 0.9 K/UL
MONOCYTES # BLD AUTO: 1 K/UL
MONOCYTES # BLD AUTO: 1.1 K/UL
MONOCYTES # BLD AUTO: 1.2 K/UL
MONOCYTES # BLD AUTO: 1.3 K/UL
MONOCYTES # BLD AUTO: ABNORMAL K/UL
MONOCYTES NFR BLD: 0.8 %
MONOCYTES NFR BLD: 1 %
MONOCYTES NFR BLD: 1 %
MONOCYTES NFR BLD: 1.2 %
MONOCYTES NFR BLD: 1.3 %
MONOCYTES NFR BLD: 1.4 %
MONOCYTES NFR BLD: 1.5 %
MONOCYTES NFR BLD: 1.6 %
MONOCYTES NFR BLD: 2 %
MONOCYTES NFR BLD: 2.3 %
MONOCYTES NFR BLD: 2.3 %
MONOCYTES NFR BLD: 2.5 %
MONOCYTES NFR BLD: 3.1 %
MONOCYTES NFR BLD: 3.1 %
MONOCYTES NFR BLD: 3.5 %
MONOCYTES NFR BLD: 3.8 %
MONOCYTES NFR BLD: 4 %
MONOCYTES NFR BLD: 4 %
MONOCYTES NFR BLD: 4.3 %
MONOCYTES NFR BLD: 4.5 %
MONOCYTES NFR BLD: 4.7 %
MONOCYTES NFR BLD: 4.9 %
MONOCYTES NFR BLD: 5 %
MONOCYTES NFR BLD: 5.1 %
MONOCYTES NFR BLD: 5.2 %
MONOCYTES NFR BLD: 5.4 %
MONOCYTES NFR BLD: 5.6 %
MONOCYTES NFR BLD: 5.7 %
MONOCYTES NFR BLD: 6 %
MONOCYTES NFR BLD: 6 %
MONOCYTES NFR BLD: 6.1 %
MONOCYTES NFR BLD: 6.2 %
MONOCYTES NFR BLD: 6.6 %
MONOCYTES NFR BLD: 6.8 %
MONOCYTES NFR BLD: 7 %
MONOCYTES NFR BLD: 8.2 %
MYELOPEROXIDASE AB SER-ACNC: 82 UNITS
NEUTROPHILS # BLD AUTO: 10.1 K/UL
NEUTROPHILS # BLD AUTO: 10.3 K/UL
NEUTROPHILS # BLD AUTO: 10.9 K/UL
NEUTROPHILS # BLD AUTO: 11 K/UL
NEUTROPHILS # BLD AUTO: 11.2 K/UL
NEUTROPHILS # BLD AUTO: 11.5 K/UL
NEUTROPHILS # BLD AUTO: 11.5 K/UL
NEUTROPHILS # BLD AUTO: 11.8 K/UL
NEUTROPHILS # BLD AUTO: 11.9 K/UL
NEUTROPHILS # BLD AUTO: 11.9 K/UL
NEUTROPHILS # BLD AUTO: 12.4 K/UL
NEUTROPHILS # BLD AUTO: 12.6 K/UL
NEUTROPHILS # BLD AUTO: 12.7 K/UL
NEUTROPHILS # BLD AUTO: 13.2 K/UL
NEUTROPHILS # BLD AUTO: 13.3 K/UL
NEUTROPHILS # BLD AUTO: 13.5 K/UL
NEUTROPHILS # BLD AUTO: 13.7 K/UL
NEUTROPHILS # BLD AUTO: 13.8 K/UL
NEUTROPHILS # BLD AUTO: 14.3 K/UL
NEUTROPHILS # BLD AUTO: 14.8 K/UL
NEUTROPHILS # BLD AUTO: 14.9 K/UL
NEUTROPHILS # BLD AUTO: 15.2 K/UL
NEUTROPHILS # BLD AUTO: 15.3 K/UL
NEUTROPHILS # BLD AUTO: 15.6 K/UL
NEUTROPHILS # BLD AUTO: 16.2 K/UL
NEUTROPHILS # BLD AUTO: 16.4 K/UL
NEUTROPHILS # BLD AUTO: 16.8 K/UL
NEUTROPHILS # BLD AUTO: 17.3 K/UL
NEUTROPHILS # BLD AUTO: 17.3 K/UL
NEUTROPHILS # BLD AUTO: 17.4 K/UL
NEUTROPHILS # BLD AUTO: 17.7 K/UL
NEUTROPHILS # BLD AUTO: 18.9 K/UL
NEUTROPHILS # BLD AUTO: 19.1 K/UL
NEUTROPHILS # BLD AUTO: 19.2 K/UL
NEUTROPHILS # BLD AUTO: 20 K/UL
NEUTROPHILS # BLD AUTO: 20.5 K/UL
NEUTROPHILS # BLD AUTO: 20.7 K/UL
NEUTROPHILS # BLD AUTO: 20.9 K/UL
NEUTROPHILS # BLD AUTO: 21.1 K/UL
NEUTROPHILS # BLD AUTO: 22.4 K/UL
NEUTROPHILS # BLD AUTO: 24.5 K/UL
NEUTROPHILS # BLD AUTO: 24.6 K/UL
NEUTROPHILS # BLD AUTO: 25.1 K/UL
NEUTROPHILS # BLD AUTO: 6.3 K/UL
NEUTROPHILS # BLD AUTO: 6.5 K/UL
NEUTROPHILS # BLD AUTO: 7.3 K/UL
NEUTROPHILS # BLD AUTO: 7.7 K/UL
NEUTROPHILS # BLD AUTO: 8 K/UL
NEUTROPHILS # BLD AUTO: 8.9 K/UL
NEUTROPHILS # BLD AUTO: 9.7 K/UL
NEUTROPHILS # BLD AUTO: 9.8 K/UL
NEUTROPHILS NFR BLD: 84 %
NEUTROPHILS NFR BLD: 86.4 %
NEUTROPHILS NFR BLD: 88.1 %
NEUTROPHILS NFR BLD: 88.2 %
NEUTROPHILS NFR BLD: 88.2 %
NEUTROPHILS NFR BLD: 88.7 %
NEUTROPHILS NFR BLD: 88.7 %
NEUTROPHILS NFR BLD: 88.9 %
NEUTROPHILS NFR BLD: 88.9 %
NEUTROPHILS NFR BLD: 89 %
NEUTROPHILS NFR BLD: 89.3 %
NEUTROPHILS NFR BLD: 89.3 %
NEUTROPHILS NFR BLD: 89.4 %
NEUTROPHILS NFR BLD: 89.6 %
NEUTROPHILS NFR BLD: 89.6 %
NEUTROPHILS NFR BLD: 89.7 %
NEUTROPHILS NFR BLD: 90 %
NEUTROPHILS NFR BLD: 90.2 %
NEUTROPHILS NFR BLD: 90.3 %
NEUTROPHILS NFR BLD: 90.3 %
NEUTROPHILS NFR BLD: 90.5 %
NEUTROPHILS NFR BLD: 90.5 %
NEUTROPHILS NFR BLD: 90.6 %
NEUTROPHILS NFR BLD: 91.6 %
NEUTROPHILS NFR BLD: 91.9 %
NEUTROPHILS NFR BLD: 92 %
NEUTROPHILS NFR BLD: 92 %
NEUTROPHILS NFR BLD: 92.2 %
NEUTROPHILS NFR BLD: 92.6 %
NEUTROPHILS NFR BLD: 92.7 %
NEUTROPHILS NFR BLD: 92.9 %
NEUTROPHILS NFR BLD: 93.7 %
NEUTROPHILS NFR BLD: 94 %
NEUTROPHILS NFR BLD: 94.1 %
NEUTROPHILS NFR BLD: 94.1 %
NEUTROPHILS NFR BLD: 94.6 %
NEUTROPHILS NFR BLD: 94.7 %
NEUTROPHILS NFR BLD: 94.8 %
NEUTROPHILS NFR BLD: 95.1 %
NEUTROPHILS NFR BLD: 95.2 %
NEUTROPHILS NFR BLD: 95.4 %
NEUTROPHILS NFR BLD: 95.7 %
NEUTROPHILS NFR BLD: 96.1 %
NEUTROPHILS NFR BLD: 96.1 %
NEUTROPHILS NFR BLD: 96.2 %
NEUTROPHILS NFR BLD: 96.4 %
NEUTROPHILS NFR BLD: 96.5 %
NEUTROPHILS NFR BLD: 96.6 %
NIL: 0.03 IU/ML
NITRITE UR QL STRIP: NEGATIVE
NRBC BLD-RTO: 0 /100 WBC
NUDT15 GENOTYPE: NORMAL
NUDT15 PHENOTYPE: NORMAL
NUM UNITS TRANS FFP: NORMAL
NUM UNITS TRANS PACKED RBC: NORMAL
OPIATES SERPL QL SCN: NEGATIVE
OPIATES UR QL SCN: NEGATIVE
OVALOCYTES BLD QL SMEAR: ABNORMAL
P-ANCA TITR SER IF: ABNORMAL TITER
P-ANCA TITR SER IF: ABNORMAL TITER
PARAINFLUENZA: NOT DETECTED
PATH REV BLD -IMP: NORMAL
PATH REV BLD -IMP: NORMAL
PATHOLOGIST INTERPRETATION IFE: NORMAL
PATHOLOGIST INTERPRETATION SPE: NORMAL
PCO2 BLDA: 21.4 MMHG (ref 35–45)
PCO2 BLDA: 26.8 MMHG (ref 35–45)
PCO2 BLDA: 27.8 MMHG (ref 35–45)
PCO2 BLDA: 28.3 MMHG (ref 35–45)
PCO2 BLDA: 28.4 MMHG (ref 35–45)
PCO2 BLDA: 28.5 MMHG (ref 35–45)
PCO2 BLDA: 29.1 MMHG (ref 35–45)
PCO2 BLDA: 30.5 MMHG (ref 35–45)
PCO2 BLDA: 30.8 MMHG (ref 35–45)
PCO2 BLDA: 31.2 MMHG (ref 35–45)
PCO2 BLDA: 31.7 MMHG (ref 35–45)
PCO2 BLDA: 31.8 MMHG (ref 35–45)
PCO2 BLDA: 32 MMHG (ref 35–45)
PCO2 BLDA: 32.3 MMHG (ref 35–45)
PCO2 BLDA: 32.6 MMHG (ref 35–45)
PCO2 BLDA: 32.7 MMHG (ref 35–45)
PCO2 BLDA: 32.7 MMHG (ref 35–45)
PCO2 BLDA: 32.9 MMHG (ref 35–45)
PCO2 BLDA: 33.1 MMHG (ref 35–45)
PCO2 BLDA: 33.5 MMHG (ref 35–45)
PCO2 BLDA: 33.6 MMHG (ref 35–45)
PCO2 BLDA: 33.9 MMHG (ref 35–45)
PCO2 BLDA: 33.9 MMHG (ref 35–45)
PCO2 BLDA: 34.1 MMHG (ref 35–45)
PCO2 BLDA: 34.6 MMHG (ref 35–45)
PCO2 BLDA: 34.6 MMHG (ref 35–45)
PCO2 BLDA: 34.7 MMHG (ref 35–45)
PCO2 BLDA: 34.8 MMHG (ref 35–45)
PCO2 BLDA: 34.9 MMHG (ref 35–45)
PCO2 BLDA: 35.2 MMHG (ref 35–45)
PCO2 BLDA: 35.5 MMHG (ref 35–45)
PCO2 BLDA: 35.7 MMHG (ref 35–45)
PCO2 BLDA: 35.7 MMHG (ref 35–45)
PCO2 BLDA: 35.8 MMHG (ref 35–45)
PCO2 BLDA: 35.9 MMHG (ref 35–45)
PCO2 BLDA: 36 MMHG (ref 35–45)
PCO2 BLDA: 36.1 MMHG (ref 35–45)
PCO2 BLDA: 36.1 MMHG (ref 35–45)
PCO2 BLDA: 36.2 MMHG (ref 35–45)
PCO2 BLDA: 36.3 MMHG (ref 35–45)
PCO2 BLDA: 36.6 MMHG (ref 35–45)
PCO2 BLDA: 36.7 MMHG (ref 35–45)
PCO2 BLDA: 36.7 MMHG (ref 35–45)
PCO2 BLDA: 37 MMHG (ref 35–45)
PCO2 BLDA: 37.5 MMHG (ref 35–45)
PCO2 BLDA: 38.1 MMHG (ref 35–45)
PCO2 BLDA: 38.3 MMHG (ref 35–45)
PCO2 BLDA: 38.4 MMHG (ref 35–45)
PCO2 BLDA: 39 MMHG (ref 35–45)
PCO2 BLDA: 39.1 MMHG (ref 35–45)
PCO2 BLDA: 39.3 MMHG (ref 35–45)
PCO2 BLDA: 39.4 MMHG (ref 35–45)
PCO2 BLDA: 39.4 MMHG (ref 35–45)
PCO2 BLDA: 40.1 MMHG (ref 35–45)
PCO2 BLDA: 41 MMHG (ref 35–45)
PCO2 BLDA: 41.1 MMHG (ref 35–45)
PCO2 BLDA: 41.5 MMHG (ref 35–45)
PCO2 BLDA: 41.5 MMHG (ref 35–45)
PCO2 BLDA: 41.7 MMHG (ref 35–45)
PCO2 BLDA: 41.8 MMHG (ref 35–45)
PCO2 BLDA: 42.3 MMHG (ref 35–45)
PCO2 BLDA: 42.3 MMHG (ref 35–45)
PCO2 BLDA: 42.7 MMHG (ref 35–45)
PCO2 BLDA: 43.2 MMHG (ref 35–45)
PCO2 BLDA: 44.4 MMHG (ref 35–45)
PCO2 BLDA: 44.4 MMHG (ref 35–45)
PCO2 BLDA: 45 MMHG (ref 35–45)
PCO2 BLDA: 45.2 MMHG (ref 35–45)
PCO2 BLDA: 46.5 MMHG (ref 35–45)
PCO2 BLDA: 47 MMHG (ref 35–45)
PCO2 BLDA: 47 MMHG (ref 35–45)
PCO2 BLDA: 47.8 MMHG (ref 35–45)
PCO2 BLDA: 47.8 MMHG (ref 35–45)
PCO2 BLDA: 48.5 MMHG (ref 35–45)
PCO2 BLDA: 48.7 MMHG (ref 35–45)
PCO2 BLDA: 48.8 MMHG (ref 35–45)
PCO2 BLDA: 49.8 MMHG (ref 35–45)
PCO2 BLDA: 50 MMHG (ref 35–45)
PCO2 BLDA: 50.2 MMHG (ref 35–45)
PCO2 BLDA: 51.2 MMHG (ref 35–45)
PCO2 BLDA: 51.8 MMHG (ref 35–45)
PCO2 BLDA: 54.7 MMHG (ref 35–45)
PCO2 BLDA: 54.9 MMHG (ref 35–45)
PCO2 BLDA: 55.1 MMHG (ref 35–45)
PCO2 BLDA: 56.7 MMHG (ref 35–45)
PCO2 BLDA: 58.3 MMHG (ref 35–45)
PCO2 BLDA: 61.1 MMHG (ref 35–45)
PCO2 BLDA: 62.5 MMHG (ref 35–45)
PCO2 BLDA: 62.6 MMHG (ref 35–45)
PCO2 BLDA: 66.2 MMHG (ref 35–45)
PCO2 BLDA: 69.3 MMHG (ref 35–45)
PCO2 BLDA: 69.6 MMHG (ref 35–45)
PCP SERPL QL SCN: NEGATIVE
PCP UR QL SCN>25 NG/ML: NEGATIVE
PEEP: 10
PEEP: 12
PEEP: 13
PEEP: 14
PEEP: 15
PEEP: 16
PEEP: 18
PEEP: 18
PEEP: 70
PEEP: 8
PH SMN: 7.19 [PH] (ref 7.35–7.45)
PH SMN: 7.2 [PH] (ref 7.35–7.45)
PH SMN: 7.21 [PH] (ref 7.35–7.45)
PH SMN: 7.22 [PH] (ref 7.35–7.45)
PH SMN: 7.22 [PH] (ref 7.35–7.45)
PH SMN: 7.24 [PH] (ref 7.35–7.45)
PH SMN: 7.24 [PH] (ref 7.35–7.45)
PH SMN: 7.25 [PH] (ref 7.35–7.45)
PH SMN: 7.26 [PH] (ref 7.35–7.45)
PH SMN: 7.26 [PH] (ref 7.35–7.45)
PH SMN: 7.28 [PH] (ref 7.35–7.45)
PH SMN: 7.28 [PH] (ref 7.35–7.45)
PH SMN: 7.3 [PH] (ref 7.35–7.45)
PH SMN: 7.3 [PH] (ref 7.35–7.45)
PH SMN: 7.31 [PH] (ref 7.35–7.45)
PH SMN: 7.32 [PH] (ref 7.35–7.45)
PH SMN: 7.33 [PH] (ref 7.35–7.45)
PH SMN: 7.33 [PH] (ref 7.35–7.45)
PH SMN: 7.34 [PH] (ref 7.35–7.45)
PH SMN: 7.36 [PH] (ref 7.35–7.45)
PH SMN: 7.37 [PH] (ref 7.35–7.45)
PH SMN: 7.38 [PH] (ref 7.35–7.45)
PH SMN: 7.39 [PH] (ref 7.35–7.45)
PH SMN: 7.4 [PH] (ref 7.35–7.45)
PH SMN: 7.41 [PH] (ref 7.35–7.45)
PH SMN: 7.42 [PH] (ref 7.35–7.45)
PH SMN: 7.43 [PH] (ref 7.35–7.45)
PH SMN: 7.44 [PH] (ref 7.35–7.45)
PH SMN: 7.45 [PH] (ref 7.35–7.45)
PH SMN: 7.45 [PH] (ref 7.35–7.45)
PH SMN: 7.46 [PH] (ref 7.35–7.45)
PH SMN: 7.47 [PH] (ref 7.35–7.45)
PH SMN: 7.48 [PH] (ref 7.35–7.45)
PH SMN: 7.49 [PH] (ref 7.35–7.45)
PH SMN: 7.49 [PH] (ref 7.35–7.45)
PH SMN: 7.5 [PH] (ref 7.35–7.45)
PH SMN: 7.52 [PH] (ref 7.35–7.45)
PH SMN: 7.54 [PH] (ref 7.35–7.45)
PH SMN: 7.54 [PH] (ref 7.35–7.45)
PH UR STRIP: 5 [PH] (ref 5–8)
PHOSPHATE SERPL-MCNC: 1.4 MG/DL
PHOSPHATE SERPL-MCNC: 1.5 MG/DL
PHOSPHATE SERPL-MCNC: 1.5 MG/DL
PHOSPHATE SERPL-MCNC: 1.6 MG/DL
PHOSPHATE SERPL-MCNC: 1.7 MG/DL
PHOSPHATE SERPL-MCNC: 1.8 MG/DL
PHOSPHATE SERPL-MCNC: 10.3 MG/DL
PHOSPHATE SERPL-MCNC: 11 MG/DL
PHOSPHATE SERPL-MCNC: 2 MG/DL
PHOSPHATE SERPL-MCNC: 2 MG/DL
PHOSPHATE SERPL-MCNC: 2.1 MG/DL
PHOSPHATE SERPL-MCNC: 2.2 MG/DL
PHOSPHATE SERPL-MCNC: 2.4 MG/DL
PHOSPHATE SERPL-MCNC: 2.5 MG/DL
PHOSPHATE SERPL-MCNC: 2.6 MG/DL
PHOSPHATE SERPL-MCNC: 2.8 MG/DL
PHOSPHATE SERPL-MCNC: 3 MG/DL
PHOSPHATE SERPL-MCNC: 3.1 MG/DL
PHOSPHATE SERPL-MCNC: 3.3 MG/DL
PHOSPHATE SERPL-MCNC: 3.4 MG/DL
PHOSPHATE SERPL-MCNC: 3.4 MG/DL
PHOSPHATE SERPL-MCNC: 3.5 MG/DL
PHOSPHATE SERPL-MCNC: 3.8 MG/DL
PHOSPHATE SERPL-MCNC: 3.9 MG/DL
PHOSPHATE SERPL-MCNC: 4 MG/DL
PHOSPHATE SERPL-MCNC: 4 MG/DL
PHOSPHATE SERPL-MCNC: 4.2 MG/DL
PHOSPHATE SERPL-MCNC: 4.3 MG/DL
PHOSPHATE SERPL-MCNC: 4.4 MG/DL
PHOSPHATE SERPL-MCNC: 4.5 MG/DL
PHOSPHATE SERPL-MCNC: 5 MG/DL
PHOSPHATE SERPL-MCNC: 5.1 MG/DL
PHOSPHATE SERPL-MCNC: 5.1 MG/DL
PHOSPHATE SERPL-MCNC: 7.5 MG/DL
PHOSPHATE SERPL-MCNC: 9.5 MG/DL
PHOSPHATE SERPL-MCNC: 9.5 MG/DL
PHOSPHATE SERPL-MCNC: <1 MG/DL
PIP: 17
PIP: 19
PIP: 21
PIP: 21
PIP: 22
PIP: 23
PIP: 24
PIP: 25
PIP: 26
PIP: 26
PIP: 28
PIP: 31
PIP: 32
PIP: 32
PIP: 33
PIP: 34
PIP: 35
PIP: 35
PIP: 36
PIP: 37
PIP: 40
PIP: 44
PIP: 48
PIP: 53
PIP: 56
PIP: 60
PLATELET # BLD AUTO: 103 K/UL
PLATELET # BLD AUTO: 104 K/UL
PLATELET # BLD AUTO: 108 K/UL
PLATELET # BLD AUTO: 108 K/UL
PLATELET # BLD AUTO: 111 K/UL
PLATELET # BLD AUTO: 112 K/UL
PLATELET # BLD AUTO: 112 K/UL
PLATELET # BLD AUTO: 113 K/UL
PLATELET # BLD AUTO: 113 K/UL
PLATELET # BLD AUTO: 117 K/UL
PLATELET # BLD AUTO: 117 K/UL
PLATELET # BLD AUTO: 119 K/UL
PLATELET # BLD AUTO: 125 K/UL
PLATELET # BLD AUTO: 126 K/UL
PLATELET # BLD AUTO: 127 K/UL
PLATELET # BLD AUTO: 128 K/UL
PLATELET # BLD AUTO: 129 K/UL
PLATELET # BLD AUTO: 132 K/UL
PLATELET # BLD AUTO: 133 K/UL
PLATELET # BLD AUTO: 133 K/UL
PLATELET # BLD AUTO: 136 K/UL
PLATELET # BLD AUTO: 140 K/UL
PLATELET # BLD AUTO: 149 K/UL
PLATELET # BLD AUTO: 161 K/UL
PLATELET # BLD AUTO: 162 K/UL
PLATELET # BLD AUTO: 163 K/UL
PLATELET # BLD AUTO: 175 K/UL
PLATELET # BLD AUTO: 178 K/UL
PLATELET # BLD AUTO: 182 K/UL
PLATELET # BLD AUTO: 189 K/UL
PLATELET # BLD AUTO: 194 K/UL
PLATELET # BLD AUTO: 194 K/UL
PLATELET # BLD AUTO: 58 K/UL
PLATELET # BLD AUTO: 64 K/UL
PLATELET # BLD AUTO: 71 K/UL
PLATELET # BLD AUTO: 73 K/UL
PLATELET # BLD AUTO: 74 K/UL
PLATELET # BLD AUTO: 77 K/UL
PLATELET # BLD AUTO: 77 K/UL
PLATELET # BLD AUTO: 82 K/UL
PLATELET # BLD AUTO: 83 K/UL
PLATELET # BLD AUTO: 83 K/UL
PLATELET # BLD AUTO: 84 K/UL
PLATELET # BLD AUTO: 90 K/UL
PLATELET # BLD AUTO: 91 K/UL
PLATELET # BLD AUTO: 96 K/UL
PLATELET # BLD AUTO: 96 K/UL
PLATELET # BLD AUTO: 97 K/UL
PLATELET # BLD AUTO: 98 K/UL
PLATELET # BLD AUTO: 99 K/UL
PLATELET BLD QL SMEAR: ABNORMAL
PMV BLD AUTO: 10.2 FL
PMV BLD AUTO: 10.2 FL
PMV BLD AUTO: 10.4 FL
PMV BLD AUTO: 10.7 FL
PMV BLD AUTO: 10.8 FL
PMV BLD AUTO: 11.2 FL
PMV BLD AUTO: 11.3 FL
PMV BLD AUTO: 11.4 FL
PMV BLD AUTO: 11.6 FL
PMV BLD AUTO: 11.7 FL
PMV BLD AUTO: 11.8 FL
PMV BLD AUTO: 11.9 FL
PMV BLD AUTO: 12 FL
PMV BLD AUTO: 12.1 FL
PMV BLD AUTO: 12.2 FL
PMV BLD AUTO: 12.3 FL
PMV BLD AUTO: 12.3 FL
PMV BLD AUTO: 12.4 FL
PMV BLD AUTO: 12.6 FL
PMV BLD AUTO: 12.8 FL
PMV BLD AUTO: 8.9 FL
PMV BLD AUTO: 9.3 FL
PMV BLD AUTO: 9.4 FL
PMV BLD AUTO: 9.5 FL
PMV BLD AUTO: 9.6 FL
PMV BLD AUTO: 9.7 FL
PMV BLD AUTO: 9.8 FL
PMV BLD AUTO: 9.8 FL
PMV BLD AUTO: 9.9 FL
PO2 BLDA: 100 MMHG (ref 80–100)
PO2 BLDA: 117 MMHG (ref 80–100)
PO2 BLDA: 124 MMHG (ref 80–100)
PO2 BLDA: 131 MMHG (ref 80–100)
PO2 BLDA: 189 MMHG (ref 80–100)
PO2 BLDA: 260 MMHG (ref 80–100)
PO2 BLDA: 35 MMHG (ref 80–100)
PO2 BLDA: 42 MMHG (ref 80–100)
PO2 BLDA: 45 MMHG (ref 80–100)
PO2 BLDA: 48 MMHG (ref 80–100)
PO2 BLDA: 49 MMHG (ref 80–100)
PO2 BLDA: 50 MMHG (ref 80–100)
PO2 BLDA: 51 MMHG (ref 80–100)
PO2 BLDA: 51 MMHG (ref 80–100)
PO2 BLDA: 52 MMHG (ref 80–100)
PO2 BLDA: 52 MMHG (ref 80–100)
PO2 BLDA: 54 MMHG (ref 80–100)
PO2 BLDA: 54 MMHG (ref 80–100)
PO2 BLDA: 55 MMHG (ref 80–100)
PO2 BLDA: 56 MMHG (ref 80–100)
PO2 BLDA: 56 MMHG (ref 80–100)
PO2 BLDA: 57 MMHG (ref 80–100)
PO2 BLDA: 58 MMHG (ref 80–100)
PO2 BLDA: 59 MMHG (ref 80–100)
PO2 BLDA: 59 MMHG (ref 80–100)
PO2 BLDA: 60 MMHG (ref 80–100)
PO2 BLDA: 61 MMHG (ref 80–100)
PO2 BLDA: 62 MMHG (ref 80–100)
PO2 BLDA: 63 MMHG (ref 80–100)
PO2 BLDA: 64 MMHG (ref 80–100)
PO2 BLDA: 64 MMHG (ref 80–100)
PO2 BLDA: 65 MMHG (ref 80–100)
PO2 BLDA: 65 MMHG (ref 80–100)
PO2 BLDA: 66 MMHG (ref 80–100)
PO2 BLDA: 67 MMHG (ref 80–100)
PO2 BLDA: 67 MMHG (ref 80–100)
PO2 BLDA: 68 MMHG (ref 80–100)
PO2 BLDA: 69 MMHG (ref 80–100)
PO2 BLDA: 70 MMHG (ref 80–100)
PO2 BLDA: 70 MMHG (ref 80–100)
PO2 BLDA: 71 MMHG (ref 80–100)
PO2 BLDA: 72 MMHG (ref 80–100)
PO2 BLDA: 73 MMHG (ref 80–100)
PO2 BLDA: 74 MMHG (ref 80–100)
PO2 BLDA: 75 MMHG (ref 80–100)
PO2 BLDA: 75 MMHG (ref 80–100)
PO2 BLDA: 76 MMHG (ref 80–100)
PO2 BLDA: 76 MMHG (ref 80–100)
PO2 BLDA: 77 MMHG (ref 80–100)
PO2 BLDA: 79 MMHG (ref 80–100)
PO2 BLDA: 80 MMHG (ref 80–100)
PO2 BLDA: 82 MMHG (ref 80–100)
PO2 BLDA: 83 MMHG (ref 80–100)
PO2 BLDA: 85 MMHG (ref 80–100)
PO2 BLDA: 86 MMHG (ref 80–100)
PO2 BLDA: 86 MMHG (ref 80–100)
PO2 BLDA: 87 MMHG (ref 80–100)
PO2 BLDA: 87 MMHG (ref 80–100)
PO2 BLDA: 88 MMHG (ref 80–100)
PO2 BLDA: 89 MMHG (ref 80–100)
PO2 BLDA: 94 MMHG (ref 80–100)
PO2 BLDA: 95 MMHG (ref 80–100)
PO2 BLDA: 95 MMHG (ref 80–100)
PO2 BLDA: 96 MMHG (ref 80–100)
PO2 BLDA: 99 MMHG (ref 80–100)
POC BE: -1 MMOL/L
POC BE: -11 MMOL/L
POC BE: -2 MMOL/L
POC BE: -3 MMOL/L
POC BE: -4 MMOL/L
POC BE: -5 MMOL/L
POC BE: -6 MMOL/L
POC BE: -7 MMOL/L
POC BE: -7 MMOL/L
POC BE: -8 MMOL/L
POC BE: 0 MMOL/L
POC BE: 1 MMOL/L
POC BE: 10 MMOL/L
POC BE: 13 MMOL/L
POC BE: 3 MMOL/L
POC BE: 4 MMOL/L
POC BE: 5 MMOL/L
POC BE: 6 MMOL/L
POC BE: 7 MMOL/L
POC BE: 8 MMOL/L
POC BE: 8 MMOL/L
POC IONIZED CALCIUM: 1.09 MMOL/L (ref 1.06–1.42)
POC SATURATED O2: 100 % (ref 95–100)
POC SATURATED O2: 67 % (ref 95–100)
POC SATURATED O2: 78 % (ref 95–100)
POC SATURATED O2: 82 % (ref 95–100)
POC SATURATED O2: 83 % (ref 95–100)
POC SATURATED O2: 84 % (ref 95–100)
POC SATURATED O2: 86 % (ref 95–100)
POC SATURATED O2: 87 % (ref 95–100)
POC SATURATED O2: 87 % (ref 95–100)
POC SATURATED O2: 88 % (ref 95–100)
POC SATURATED O2: 89 % (ref 95–100)
POC SATURATED O2: 90 % (ref 95–100)
POC SATURATED O2: 91 % (ref 95–100)
POC SATURATED O2: 92 % (ref 95–100)
POC SATURATED O2: 93 % (ref 95–100)
POC SATURATED O2: 94 % (ref 95–100)
POC SATURATED O2: 95 % (ref 95–100)
POC SATURATED O2: 96 % (ref 95–100)
POC SATURATED O2: 97 % (ref 95–100)
POC SATURATED O2: 98 % (ref 95–100)
POC SATURATED O2: 98 % (ref 95–100)
POC SATURATED O2: 99 % (ref 95–100)
POC TCO2: 15 MMOL/L (ref 23–27)
POC TCO2: 18 MMOL/L (ref 23–27)
POC TCO2: 20 MMOL/L (ref 23–27)
POC TCO2: 21 MMOL/L (ref 23–27)
POC TCO2: 22 MMOL/L (ref 23–27)
POC TCO2: 23 MMOL/L (ref 23–27)
POC TCO2: 24 MMOL/L (ref 23–27)
POC TCO2: 25 MMOL/L (ref 23–27)
POC TCO2: 26 MMOL/L (ref 23–27)
POC TCO2: 27 MMOL/L (ref 23–27)
POC TCO2: 28 MMOL/L (ref 23–27)
POC TCO2: 29 MMOL/L (ref 23–27)
POC TCO2: 30 MMOL/L (ref 23–27)
POC TCO2: 31 MMOL/L (ref 23–27)
POC TCO2: 32 MMOL/L (ref 23–27)
POC TCO2: 32 MMOL/L (ref 23–27)
POC TCO2: 33 MMOL/L (ref 23–27)
POC TCO2: 36 MMOL/L (ref 23–27)
POC TCO2: 36 MMOL/L (ref 23–27)
POC TCO2: 41 MMOL/L (ref 23–27)
POCT GLUCOSE: 107 MG/DL (ref 70–110)
POCT GLUCOSE: 109 MG/DL (ref 70–110)
POCT GLUCOSE: 112 MG/DL (ref 70–110)
POCT GLUCOSE: 113 MG/DL (ref 70–110)
POCT GLUCOSE: 117 MG/DL (ref 70–110)
POCT GLUCOSE: 118 MG/DL (ref 70–110)
POCT GLUCOSE: 119 MG/DL (ref 70–110)
POCT GLUCOSE: 126 MG/DL (ref 70–110)
POCT GLUCOSE: 128 MG/DL (ref 70–110)
POCT GLUCOSE: 128 MG/DL (ref 70–110)
POCT GLUCOSE: 129 MG/DL (ref 70–110)
POCT GLUCOSE: 132 MG/DL (ref 70–110)
POCT GLUCOSE: 135 MG/DL (ref 70–110)
POCT GLUCOSE: 138 MG/DL (ref 70–110)
POCT GLUCOSE: 139 MG/DL (ref 70–110)
POCT GLUCOSE: 141 MG/DL (ref 70–110)
POCT GLUCOSE: 142 MG/DL (ref 70–110)
POCT GLUCOSE: 143 MG/DL (ref 70–110)
POCT GLUCOSE: 143 MG/DL (ref 70–110)
POCT GLUCOSE: 144 MG/DL (ref 70–110)
POCT GLUCOSE: 145 MG/DL (ref 70–110)
POCT GLUCOSE: 145 MG/DL (ref 70–110)
POCT GLUCOSE: 146 MG/DL (ref 70–110)
POCT GLUCOSE: 147 MG/DL (ref 70–110)
POCT GLUCOSE: 148 MG/DL (ref 70–110)
POCT GLUCOSE: 149 MG/DL (ref 70–110)
POCT GLUCOSE: 149 MG/DL (ref 70–110)
POCT GLUCOSE: 150 MG/DL (ref 70–110)
POCT GLUCOSE: 151 MG/DL (ref 70–110)
POCT GLUCOSE: 152 MG/DL (ref 70–110)
POCT GLUCOSE: 153 MG/DL (ref 70–110)
POCT GLUCOSE: 153 MG/DL (ref 70–110)
POCT GLUCOSE: 154 MG/DL (ref 70–110)
POCT GLUCOSE: 155 MG/DL (ref 70–110)
POCT GLUCOSE: 156 MG/DL (ref 70–110)
POCT GLUCOSE: 157 MG/DL (ref 70–110)
POCT GLUCOSE: 158 MG/DL (ref 70–110)
POCT GLUCOSE: 159 MG/DL (ref 70–110)
POCT GLUCOSE: 159 MG/DL (ref 70–110)
POCT GLUCOSE: 160 MG/DL (ref 70–110)
POCT GLUCOSE: 160 MG/DL (ref 70–110)
POCT GLUCOSE: 161 MG/DL (ref 70–110)
POCT GLUCOSE: 162 MG/DL (ref 70–110)
POCT GLUCOSE: 163 MG/DL (ref 70–110)
POCT GLUCOSE: 164 MG/DL (ref 70–110)
POCT GLUCOSE: 165 MG/DL (ref 70–110)
POCT GLUCOSE: 166 MG/DL (ref 70–110)
POCT GLUCOSE: 167 MG/DL (ref 70–110)
POCT GLUCOSE: 168 MG/DL (ref 70–110)
POCT GLUCOSE: 169 MG/DL (ref 70–110)
POCT GLUCOSE: 170 MG/DL (ref 70–110)
POCT GLUCOSE: 170 MG/DL (ref 70–110)
POCT GLUCOSE: 171 MG/DL (ref 70–110)
POCT GLUCOSE: 172 MG/DL (ref 70–110)
POCT GLUCOSE: 172 MG/DL (ref 70–110)
POCT GLUCOSE: 173 MG/DL (ref 70–110)
POCT GLUCOSE: 174 MG/DL (ref 70–110)
POCT GLUCOSE: 174 MG/DL (ref 70–110)
POCT GLUCOSE: 175 MG/DL (ref 70–110)
POCT GLUCOSE: 175 MG/DL (ref 70–110)
POCT GLUCOSE: 176 MG/DL (ref 70–110)
POCT GLUCOSE: 176 MG/DL (ref 70–110)
POCT GLUCOSE: 177 MG/DL (ref 70–110)
POCT GLUCOSE: 178 MG/DL (ref 70–110)
POCT GLUCOSE: 179 MG/DL (ref 70–110)
POCT GLUCOSE: 180 MG/DL (ref 70–110)
POCT GLUCOSE: 182 MG/DL (ref 70–110)
POCT GLUCOSE: 182 MG/DL (ref 70–110)
POCT GLUCOSE: 183 MG/DL (ref 70–110)
POCT GLUCOSE: 184 MG/DL (ref 70–110)
POCT GLUCOSE: 185 MG/DL (ref 70–110)
POCT GLUCOSE: 185 MG/DL (ref 70–110)
POCT GLUCOSE: 186 MG/DL (ref 70–110)
POCT GLUCOSE: 187 MG/DL (ref 70–110)
POCT GLUCOSE: 187 MG/DL (ref 70–110)
POCT GLUCOSE: 188 MG/DL (ref 70–110)
POCT GLUCOSE: 192 MG/DL (ref 70–110)
POCT GLUCOSE: 192 MG/DL (ref 70–110)
POCT GLUCOSE: 193 MG/DL (ref 70–110)
POCT GLUCOSE: 195 MG/DL (ref 70–110)
POCT GLUCOSE: 195 MG/DL (ref 70–110)
POCT GLUCOSE: 196 MG/DL (ref 70–110)
POCT GLUCOSE: 197 MG/DL (ref 70–110)
POCT GLUCOSE: 199 MG/DL (ref 70–110)
POCT GLUCOSE: 200 MG/DL (ref 70–110)
POCT GLUCOSE: 201 MG/DL (ref 70–110)
POCT GLUCOSE: 204 MG/DL (ref 70–110)
POCT GLUCOSE: 206 MG/DL (ref 70–110)
POCT GLUCOSE: 207 MG/DL (ref 70–110)
POCT GLUCOSE: 210 MG/DL (ref 70–110)
POCT GLUCOSE: 210 MG/DL (ref 70–110)
POCT GLUCOSE: 212 MG/DL (ref 70–110)
POCT GLUCOSE: 212 MG/DL (ref 70–110)
POCT GLUCOSE: 214 MG/DL (ref 70–110)
POCT GLUCOSE: 216 MG/DL (ref 70–110)
POCT GLUCOSE: 219 MG/DL (ref 70–110)
POCT GLUCOSE: 223 MG/DL (ref 70–110)
POCT GLUCOSE: 224 MG/DL (ref 70–110)
POCT GLUCOSE: 224 MG/DL (ref 70–110)
POCT GLUCOSE: 226 MG/DL (ref 70–110)
POCT GLUCOSE: 226 MG/DL (ref 70–110)
POCT GLUCOSE: 227 MG/DL (ref 70–110)
POCT GLUCOSE: 228 MG/DL (ref 70–110)
POCT GLUCOSE: 231 MG/DL (ref 70–110)
POCT GLUCOSE: 232 MG/DL (ref 70–110)
POCT GLUCOSE: 232 MG/DL (ref 70–110)
POCT GLUCOSE: 235 MG/DL (ref 70–110)
POCT GLUCOSE: 239 MG/DL (ref 70–110)
POCT GLUCOSE: 241 MG/DL (ref 70–110)
POCT GLUCOSE: 242 MG/DL (ref 70–110)
POCT GLUCOSE: 243 MG/DL (ref 70–110)
POCT GLUCOSE: 249 MG/DL (ref 70–110)
POCT GLUCOSE: 254 MG/DL (ref 70–110)
POCT GLUCOSE: 268 MG/DL (ref 70–110)
POCT GLUCOSE: 276 MG/DL (ref 70–110)
POCT GLUCOSE: 284 MG/DL (ref 70–110)
POCT GLUCOSE: 284 MG/DL (ref 70–110)
POCT GLUCOSE: 286 MG/DL (ref 70–110)
POCT GLUCOSE: 293 MG/DL (ref 70–110)
POCT GLUCOSE: 300 MG/DL (ref 70–110)
POCT GLUCOSE: 308 MG/DL (ref 70–110)
POCT GLUCOSE: 315 MG/DL (ref 70–110)
POCT GLUCOSE: 329 MG/DL (ref 70–110)
POCT GLUCOSE: 335 MG/DL (ref 70–110)
POCT GLUCOSE: 83 MG/DL (ref 70–110)
POCT GLUCOSE: 85 MG/DL (ref 70–110)
POCT GLUCOSE: 87 MG/DL (ref 70–110)
POCT GLUCOSE: 95 MG/DL (ref 70–110)
POCT GLUCOSE: 96 MG/DL (ref 70–110)
POIKILOCYTOSIS BLD QL SMEAR: SLIGHT
POLYCHROMASIA BLD QL SMEAR: ABNORMAL
POOLED CRYOPPT GVN BPU: NORMAL
POTASSIUM BLD-SCNC: 4.6 MMOL/L (ref 3.5–5.1)
POTASSIUM SERPL-SCNC: 3.7 MMOL/L
POTASSIUM SERPL-SCNC: 3.8 MMOL/L
POTASSIUM SERPL-SCNC: 4 MMOL/L
POTASSIUM SERPL-SCNC: 4 MMOL/L
POTASSIUM SERPL-SCNC: 4.1 MMOL/L
POTASSIUM SERPL-SCNC: 4.2 MMOL/L
POTASSIUM SERPL-SCNC: 4.3 MMOL/L
POTASSIUM SERPL-SCNC: 4.4 MMOL/L
POTASSIUM SERPL-SCNC: 4.5 MMOL/L
POTASSIUM SERPL-SCNC: 4.5 MMOL/L
POTASSIUM SERPL-SCNC: 4.6 MMOL/L
POTASSIUM SERPL-SCNC: 4.7 MMOL/L
POTASSIUM SERPL-SCNC: 4.8 MMOL/L
POTASSIUM SERPL-SCNC: 4.9 MMOL/L
POTASSIUM SERPL-SCNC: 5 MMOL/L
POTASSIUM SERPL-SCNC: 5.1 MMOL/L
POTASSIUM SERPL-SCNC: 5.2 MMOL/L
PROCALCITONIN SERPL IA-MCNC: >100 NG/ML
PROPOXYPH SERPL QL: NEGATIVE
PROT SERPL-MCNC: 4.5 G/DL
PROT SERPL-MCNC: 4.5 G/DL
PROT SERPL-MCNC: 4.7 G/DL
PROT SERPL-MCNC: 4.7 G/DL
PROT SERPL-MCNC: 4.8 G/DL
PROT SERPL-MCNC: 4.9 G/DL
PROT SERPL-MCNC: 5 G/DL
PROT SERPL-MCNC: 5.1 G/DL
PROT SERPL-MCNC: 5.1 G/DL
PROT SERPL-MCNC: 5.4 G/DL
PROT SERPL-MCNC: 5.5 G/DL
PROT SERPL-MCNC: 5.8 G/DL
PROT SERPL-MCNC: 6.4 G/DL
PROT UR QL STRIP: ABNORMAL
PROT UR-MCNC: 106 MG/DL
PROT/CREAT RATIO, UR: 1.28
PROTEINASE3 IGG SER-ACNC: <0.2 U
PROTHROMBIN TIME: 10.1 SEC
PROTHROMBIN TIME: 10.4 SEC
PROTHROMBIN TIME: 10.4 SEC
PROTHROMBIN TIME: 10.7 SEC
PROTHROMBIN TIME: 10.8 SEC
PROTHROMBIN TIME: 10.8 SEC
PROTHROMBIN TIME: 11 SEC
PROTHROMBIN TIME: 11.1 SEC
PROTHROMBIN TIME: 11.2 SEC
PROTHROMBIN TIME: 11.2 SEC
PROTHROMBIN TIME: 11.5 SEC
PROTHROMBIN TIME: 11.5 SEC
PROTHROMBIN TIME: 11.6 SEC
PROTHROMBIN TIME: 11.7 SEC
PROTHROMBIN TIME: 11.7 SEC
PROTHROMBIN TIME: 11.8 SEC
PROTHROMBIN TIME: 11.9 SEC
PROTHROMBIN TIME: 11.9 SEC
PROTHROMBIN TIME: 12 SEC
PROTHROMBIN TIME: 12.1 SEC
PROTHROMBIN TIME: 12.3 SEC
PROTHROMBIN TIME: 12.5 SEC
PROTHROMBIN TIME: 12.6 SEC
PROTHROMBIN TIME: 12.9 SEC
PROTHROMBIN TIME: 13.3 SEC
PROTHROMBIN TIME: 14.8 SEC
PROTHROMBIN TIME: 16.2 SEC
PROTHROMBIN TIME: 17 SEC
PROTHROMBIN TIME: 24.1 SEC
PROTHROMBIN TIME: 34.5 SEC
PROTHROMBIN TIME: 36.9 SEC
PROVIDER CREDENTIALS: ABNORMAL
PROVIDER NOTIFIED: ABNORMAL
PS: 10
PS: 12
RBC # BLD AUTO: 2.18 M/UL
RBC # BLD AUTO: 2.19 M/UL
RBC # BLD AUTO: 2.33 M/UL
RBC # BLD AUTO: 2.35 M/UL
RBC # BLD AUTO: 2.38 M/UL
RBC # BLD AUTO: 2.41 M/UL
RBC # BLD AUTO: 2.47 M/UL
RBC # BLD AUTO: 2.52 M/UL
RBC # BLD AUTO: 2.55 M/UL
RBC # BLD AUTO: 2.59 M/UL
RBC # BLD AUTO: 2.59 M/UL
RBC # BLD AUTO: 2.6 M/UL
RBC # BLD AUTO: 2.6 M/UL
RBC # BLD AUTO: 2.62 M/UL
RBC # BLD AUTO: 2.63 M/UL
RBC # BLD AUTO: 2.65 M/UL
RBC # BLD AUTO: 2.67 M/UL
RBC # BLD AUTO: 2.68 M/UL
RBC # BLD AUTO: 2.69 M/UL
RBC # BLD AUTO: 2.72 M/UL
RBC # BLD AUTO: 2.72 M/UL
RBC # BLD AUTO: 2.74 M/UL
RBC # BLD AUTO: 2.74 M/UL
RBC # BLD AUTO: 2.77 M/UL
RBC # BLD AUTO: 2.78 M/UL
RBC # BLD AUTO: 2.79 M/UL
RBC # BLD AUTO: 2.8 M/UL
RBC # BLD AUTO: 2.8 M/UL
RBC # BLD AUTO: 2.83 M/UL
RBC # BLD AUTO: 2.83 M/UL
RBC # BLD AUTO: 2.84 M/UL
RBC # BLD AUTO: 2.84 M/UL
RBC # BLD AUTO: 2.86 M/UL
RBC # BLD AUTO: 2.87 M/UL
RBC # BLD AUTO: 2.89 M/UL
RBC # BLD AUTO: 2.91 M/UL
RBC # BLD AUTO: 2.93 M/UL
RBC # BLD AUTO: 2.95 M/UL
RBC # BLD AUTO: 2.98 M/UL
RBC # BLD AUTO: 3.08 M/UL
RBC # BLD AUTO: 3.61 M/UL
RBC #/AREA URNS AUTO: 41 /HPF (ref 0–4)
RETICS/RBC NFR AUTO: 3.7 %
RETIRED EF AND QEF - SEE NOTES: 75 (ref 55–65)
RPR SER QL: NORMAL
RVP - ADENOVIRUS: NOT DETECTED
RVP - HUMAN METAPNEUMOVIRUS (HMPV): NOT DETECTED
RVP - INFLUENZA A: NOT DETECTED
RVP - INFLUENZA B: NOT DETECTED
RVP - RESPIRATORY SYNCTIAL VIRUS (RSV) A: NOT DETECTED
RVP - RESPIRATORY VIRAL PANEL, SOURCE: NORMAL
RVP - RHINOVIRUS: NOT DETECTED
SAMPLE: ABNORMAL
SAMPLE: NORMAL
SAMPLE: NORMAL
SITE: ABNORMAL
SITE: NORMAL
SITE: NORMAL
SODIUM BLD-SCNC: 138 MMOL/L (ref 136–145)
SODIUM SERPL-SCNC: 131 MMOL/L
SODIUM SERPL-SCNC: 131 MMOL/L
SODIUM SERPL-SCNC: 134 MMOL/L
SODIUM SERPL-SCNC: 135 MMOL/L
SODIUM SERPL-SCNC: 136 MMOL/L
SODIUM SERPL-SCNC: 137 MMOL/L
SODIUM SERPL-SCNC: 138 MMOL/L
SODIUM SERPL-SCNC: 139 MMOL/L
SODIUM SERPL-SCNC: 140 MMOL/L
SP GR UR STRIP: 1.01 (ref 1–1.03)
SP02: 100
SP02: 51
SP02: 88
SP02: 89
SP02: 90
SP02: 91
SP02: 92
SP02: 93
SP02: 94
SP02: 95
SP02: 96
SP02: 97
SP02: 98
SP02: 98
SP02: 99
SPHEROCYTES BLD QL SMEAR: ABNORMAL
SPONT RATE: 16
SPONT RATE: 17
SPONT RATE: 21
SPONT RATE: 24
SPONT RATE: 30
SQUAMOUS #/AREA URNS AUTO: 5 /HPF
STRONGYLOIDES ANTIBODY IGG: NEGATIVE
T-SPOT TB SCREENING TEST: NORMAL
T4 FREE SERPL-MCNC: 0.67 NG/DL
TARGETS BLD QL SMEAR: ABNORMAL
TB ANTIGEN NIL: -0.01 IU/ML
TB ANTIGEN: 0.02 IU/ML
TB GOLD: ABNORMAL
TIME NOTIFIED: 1537
TIME NOTIFIED: 2055
TIME NOTIFIED: 712
TIME NOTIFIED: 730
TIME NOTIFIED: 955
TOXICOLOGY INFORMATION: NORMAL
TPMT ADDITIONAL INFORMATION: NORMAL
TPMT DISCLAIMER: NORMAL
TPMT GENOTYPE RESULT: NORMAL
TPMT INTERPRETATION: NORMAL
TPMT METHOD: NORMAL
TPMT PHENOTYPE: NORMAL
TPMT REVIEWED BY: NORMAL
TRIGL SERPL-MCNC: 1099 MG/DL
TRIGL SERPL-MCNC: 162 MG/DL
TRIGL SERPL-MCNC: 180 MG/DL
TRIGL SERPL-MCNC: 215 MG/DL
TRIGL SERPL-MCNC: 438 MG/DL
TRIGL SERPL-MCNC: 523 MG/DL
TRIGL SERPL-MCNC: 608 MG/DL
TRIGL SERPL-MCNC: 900 MG/DL
TSH SERPL DL<=0.005 MIU/L-ACNC: 0.08 UIU/ML
UNIT NUMBER: NORMAL
UNIT NUMBER: NORMAL
URN SPEC COLLECT METH UR: ABNORMAL
UROBILINOGEN UR STRIP-ACNC: NEGATIVE EU/DL
VANCOMYCIN SERPL-MCNC: 11.2 UG/ML
VANCOMYCIN SERPL-MCNC: 12.2 UG/ML
VANCOMYCIN SERPL-MCNC: 12.4 UG/ML
VANCOMYCIN SERPL-MCNC: 12.9 UG/ML
VANCOMYCIN SERPL-MCNC: 13 UG/ML
VANCOMYCIN SERPL-MCNC: 13.3 UG/ML
VANCOMYCIN SERPL-MCNC: 14.4 UG/ML
VANCOMYCIN SERPL-MCNC: 14.7 UG/ML
VANCOMYCIN SERPL-MCNC: 15.2 UG/ML
VANCOMYCIN SERPL-MCNC: 15.5 UG/ML
VANCOMYCIN SERPL-MCNC: 15.9 UG/ML
VANCOMYCIN SERPL-MCNC: 20.5 UG/ML
VANCOMYCIN SERPL-MCNC: 40.2 UG/ML
VARICELLA INTERPRETATION: POSITIVE
VARICELLA ZOSTER IGG: 3.35 ISR
VERBAL RESULT READBACK PERFORMED: YES
VT: 350
VT: 380
VT: 400
VT: 400
VT: 410
VT: 420
VT: 437
VT: 440
VT: 440
VT: 450
VT: 480
VT: 500
VT: 507
VT: 508
VT: 510
VT: 514
VT: 528
VT: 560
VT: 580
VT: 587
VT: 610
VT: 664
VT: 700
VT: 776
VT: 776
VT: 784
VT: 800
VT: 874
VT: 911
WBC # BLD AUTO: 10.56 K/UL
WBC # BLD AUTO: 10.6 K/UL
WBC # BLD AUTO: 10.81 K/UL
WBC # BLD AUTO: 10.97 K/UL
WBC # BLD AUTO: 12.2 K/UL
WBC # BLD AUTO: 12.23 K/UL
WBC # BLD AUTO: 12.42 K/UL
WBC # BLD AUTO: 12.67 K/UL
WBC # BLD AUTO: 13 K/UL
WBC # BLD AUTO: 13.25 K/UL
WBC # BLD AUTO: 13.25 K/UL
WBC # BLD AUTO: 13.28 K/UL
WBC # BLD AUTO: 13.66 K/UL
WBC # BLD AUTO: 13.73 K/UL
WBC # BLD AUTO: 13.88 K/UL
WBC # BLD AUTO: 14.28 K/UL
WBC # BLD AUTO: 14.85 K/UL
WBC # BLD AUTO: 14.98 K/UL
WBC # BLD AUTO: 15.24 K/UL
WBC # BLD AUTO: 15.38 K/UL
WBC # BLD AUTO: 15.42 K/UL
WBC # BLD AUTO: 15.45 K/UL
WBC # BLD AUTO: 15.71 K/UL
WBC # BLD AUTO: 16.07 K/UL
WBC # BLD AUTO: 16.37 K/UL
WBC # BLD AUTO: 16.96 K/UL
WBC # BLD AUTO: 17.35 K/UL
WBC # BLD AUTO: 17.57 K/UL
WBC # BLD AUTO: 18.14 K/UL
WBC # BLD AUTO: 18.28 K/UL
WBC # BLD AUTO: 19.18 K/UL
WBC # BLD AUTO: 19.52 K/UL
WBC # BLD AUTO: 19.85 K/UL
WBC # BLD AUTO: 20.73 K/UL
WBC # BLD AUTO: 20.99 K/UL
WBC # BLD AUTO: 21.12 K/UL
WBC # BLD AUTO: 21.54 K/UL
WBC # BLD AUTO: 21.82 K/UL
WBC # BLD AUTO: 22.25 K/UL
WBC # BLD AUTO: 22.86 K/UL
WBC # BLD AUTO: 23.63 K/UL
WBC # BLD AUTO: 24.24 K/UL
WBC # BLD AUTO: 24.72 K/UL
WBC # BLD AUTO: 26.48 K/UL
WBC # BLD AUTO: 26.51 K/UL
WBC # BLD AUTO: 26.82 K/UL
WBC # BLD AUTO: 6.64 K/UL
WBC # BLD AUTO: 6.81 K/UL
WBC # BLD AUTO: 7.63 K/UL
WBC # BLD AUTO: 8.08 K/UL
WBC # BLD AUTO: 8.45 K/UL
WBC # BLD AUTO: 9.31 K/UL
WBC #/AREA URNS AUTO: 38 /HPF (ref 0–5)
YEAST UR QL AUTO: ABNORMAL

## 2018-01-01 PROCEDURE — 83605 ASSAY OF LACTIC ACID: CPT

## 2018-01-01 PROCEDURE — 80069 RENAL FUNCTION PANEL: CPT | Mod: 91

## 2018-01-01 PROCEDURE — 25000003 PHARM REV CODE 250

## 2018-01-01 PROCEDURE — 83615 LACTATE (LD) (LDH) ENZYME: CPT

## 2018-01-01 PROCEDURE — 85730 THROMBOPLASTIN TIME PARTIAL: CPT

## 2018-01-01 PROCEDURE — 84145 PROCALCITONIN (PCT): CPT

## 2018-01-01 PROCEDURE — 25000003 PHARM REV CODE 250: Performed by: STUDENT IN AN ORGANIZED HEALTH CARE EDUCATION/TRAINING PROGRAM

## 2018-01-01 PROCEDURE — 85025 COMPLETE CBC W/AUTO DIFF WBC: CPT | Mod: 91

## 2018-01-01 PROCEDURE — 99233 SBSQ HOSP IP/OBS HIGH 50: CPT | Mod: ,,, | Performed by: INTERNAL MEDICINE

## 2018-01-01 PROCEDURE — 37799 UNLISTED PX VASCULAR SURGERY: CPT

## 2018-01-01 PROCEDURE — 27200966 HC CLOSED SUCTION SYSTEM

## 2018-01-01 PROCEDURE — 90945 DIALYSIS ONE EVALUATION: CPT | Mod: ,,, | Performed by: NURSE PRACTITIONER

## 2018-01-01 PROCEDURE — 25000003 PHARM REV CODE 250: Performed by: INTERNAL MEDICINE

## 2018-01-01 PROCEDURE — 63600175 PHARM REV CODE 636 W HCPCS: Performed by: INTERNAL MEDICINE

## 2018-01-01 PROCEDURE — 90471 IMMUNIZATION ADMIN: CPT | Performed by: NURSE PRACTITIONER

## 2018-01-01 PROCEDURE — 63600175 PHARM REV CODE 636 W HCPCS: Performed by: STUDENT IN AN ORGANIZED HEALTH CARE EDUCATION/TRAINING PROGRAM

## 2018-01-01 PROCEDURE — 99291 CRITICAL CARE FIRST HOUR: CPT | Mod: ,,, | Performed by: INTERNAL MEDICINE

## 2018-01-01 PROCEDURE — 82803 BLOOD GASES ANY COMBINATION: CPT

## 2018-01-01 PROCEDURE — 86704 HEP B CORE ANTIBODY TOTAL: CPT

## 2018-01-01 PROCEDURE — 20000000 HC ICU ROOM

## 2018-01-01 PROCEDURE — 99900026 HC AIRWAY MAINTENANCE (STAT)

## 2018-01-01 PROCEDURE — 36514 APHERESIS PLASMA: CPT | Mod: ,,, | Performed by: PATHOLOGY

## 2018-01-01 PROCEDURE — P9016 RBC LEUKOCYTES REDUCED: HCPCS

## 2018-01-01 PROCEDURE — 86162 COMPLEMENT TOTAL (CH50): CPT

## 2018-01-01 PROCEDURE — 82330 ASSAY OF CALCIUM: CPT

## 2018-01-01 PROCEDURE — 27000221 HC OXYGEN, UP TO 24 HOURS

## 2018-01-01 PROCEDURE — 83735 ASSAY OF MAGNESIUM: CPT | Mod: 91

## 2018-01-01 PROCEDURE — 85045 AUTOMATED RETICULOCYTE COUNT: CPT

## 2018-01-01 PROCEDURE — 87385 HISTOPLASMA CAPSUL AG IA: CPT | Mod: 91

## 2018-01-01 PROCEDURE — 27100092 HC HIGH FLOW DELIVERY CANNULA

## 2018-01-01 PROCEDURE — 90945 DIALYSIS ONE EVALUATION: CPT

## 2018-01-01 PROCEDURE — 82550 ASSAY OF CK (CPK): CPT

## 2018-01-01 PROCEDURE — 94003 VENT MGMT INPAT SUBQ DAY: CPT

## 2018-01-01 PROCEDURE — 25000242 PHARM REV CODE 250 ALT 637 W/ HCPCS: Performed by: INTERNAL MEDICINE

## 2018-01-01 PROCEDURE — 86920 COMPATIBILITY TEST SPIN: CPT

## 2018-01-01 PROCEDURE — 36514 APHERESIS PLASMA: CPT

## 2018-01-01 PROCEDURE — 84478 ASSAY OF TRIGLYCERIDES: CPT

## 2018-01-01 PROCEDURE — 99232 SBSQ HOSP IP/OBS MODERATE 35: CPT | Mod: ,,, | Performed by: INTERNAL MEDICINE

## 2018-01-01 PROCEDURE — 86146 BETA-2 GLYCOPROTEIN ANTIBODY: CPT

## 2018-01-01 PROCEDURE — 84165 PROTEIN E-PHORESIS SERUM: CPT

## 2018-01-01 PROCEDURE — 87632 RESP VIRUS 6-11 TARGETS: CPT

## 2018-01-01 PROCEDURE — 93005 ELECTROCARDIOGRAM TRACING: CPT

## 2018-01-01 PROCEDURE — 87070 CULTURE OTHR SPECIMN AEROBIC: CPT

## 2018-01-01 PROCEDURE — 83735 ASSAY OF MAGNESIUM: CPT

## 2018-01-01 PROCEDURE — 63600175 PHARM REV CODE 636 W HCPCS: Performed by: NURSE PRACTITIONER

## 2018-01-01 PROCEDURE — S0028 INJECTION, FAMOTIDINE, 20 MG: HCPCS | Performed by: INTERNAL MEDICINE

## 2018-01-01 PROCEDURE — 80202 ASSAY OF VANCOMYCIN: CPT

## 2018-01-01 PROCEDURE — 84156 ASSAY OF PROTEIN URINE: CPT

## 2018-01-01 PROCEDURE — 86235 NUCLEAR ANTIGEN ANTIBODY: CPT | Mod: 59

## 2018-01-01 PROCEDURE — P9012 CRYOPRECIPITATE EACH UNIT: HCPCS

## 2018-01-01 PROCEDURE — 80053 COMPREHEN METABOLIC PANEL: CPT

## 2018-01-01 PROCEDURE — 27100171 HC OXYGEN HIGH FLOW UP TO 24 HOURS

## 2018-01-01 PROCEDURE — P9017 PLASMA 1 DONOR FRZ W/IN 8 HR: HCPCS

## 2018-01-01 PROCEDURE — 99900035 HC TECH TIME PER 15 MIN (STAT)

## 2018-01-01 PROCEDURE — 86308 HETEROPHILE ANTIBODY SCREEN: CPT

## 2018-01-01 PROCEDURE — 63600367 HC NITRIC OXIDE PER HOUR

## 2018-01-01 PROCEDURE — 80069 RENAL FUNCTION PANEL: CPT

## 2018-01-01 PROCEDURE — 83690 ASSAY OF LIPASE: CPT

## 2018-01-01 PROCEDURE — 85060 BLOOD SMEAR INTERPRETATION: CPT | Mod: ,,, | Performed by: PATHOLOGY

## 2018-01-01 PROCEDURE — S0028 INJECTION, FAMOTIDINE, 20 MG: HCPCS | Performed by: STUDENT IN AN ORGANIZED HEALTH CARE EDUCATION/TRAINING PROGRAM

## 2018-01-01 PROCEDURE — 80100008 HC CRRT DAILY MAINTENANCE

## 2018-01-01 PROCEDURE — 99900022

## 2018-01-01 PROCEDURE — 36415 COLL VENOUS BLD VENIPUNCTURE: CPT

## 2018-01-01 PROCEDURE — 86140 C-REACTIVE PROTEIN: CPT

## 2018-01-01 PROCEDURE — 82330 ASSAY OF CALCIUM: CPT | Mod: 91

## 2018-01-01 PROCEDURE — 93010 ELECTROCARDIOGRAM REPORT: CPT | Mod: ,,, | Performed by: INTERNAL MEDICINE

## 2018-01-01 PROCEDURE — 25000003 PHARM REV CODE 250: Performed by: PATHOLOGY

## 2018-01-01 PROCEDURE — 94761 N-INVAS EAR/PLS OXIMETRY MLT: CPT

## 2018-01-01 PROCEDURE — 85610 PROTHROMBIN TIME: CPT

## 2018-01-01 PROCEDURE — 86682 HELMINTH ANTIBODY: CPT

## 2018-01-01 PROCEDURE — 99223 1ST HOSP IP/OBS HIGH 75: CPT | Mod: ,,, | Performed by: INTERNAL MEDICINE

## 2018-01-01 PROCEDURE — 99255 IP/OBS CONSLTJ NEW/EST HI 80: CPT | Mod: ,,, | Performed by: INTERNAL MEDICINE

## 2018-01-01 PROCEDURE — 63600175 PHARM REV CODE 636 W HCPCS: Mod: JG | Performed by: PATHOLOGY

## 2018-01-01 PROCEDURE — 94640 AIRWAY INHALATION TREATMENT: CPT

## 2018-01-01 PROCEDURE — 82595 ASSAY OF CRYOGLOBULIN: CPT

## 2018-01-01 PROCEDURE — 83010 ASSAY OF HAPTOGLOBIN QUANT: CPT

## 2018-01-01 PROCEDURE — 83520 IMMUNOASSAY QUANT NOS NONAB: CPT

## 2018-01-01 PROCEDURE — 63600175 PHARM REV CODE 636 W HCPCS: Mod: JG | Performed by: INTERNAL MEDICINE

## 2018-01-01 PROCEDURE — 0B21XFZ CHANGE TRACHEOSTOMY DEVICE IN TRACHEA, EXTERNAL APPROACH: ICD-10-PCS | Performed by: INTERNAL MEDICINE

## 2018-01-01 PROCEDURE — 85610 PROTHROMBIN TIME: CPT | Mod: 91

## 2018-01-01 PROCEDURE — 63600175 PHARM REV CODE 636 W HCPCS: Performed by: PATHOLOGY

## 2018-01-01 PROCEDURE — 90945 DIALYSIS ONE EVALUATION: CPT | Mod: ,,, | Performed by: INTERNAL MEDICINE

## 2018-01-01 PROCEDURE — 83880 ASSAY OF NATRIURETIC PEPTIDE: CPT

## 2018-01-01 PROCEDURE — 25000003 PHARM REV CODE 250: Performed by: HOSPITALIST

## 2018-01-01 PROCEDURE — 93306 TTE W/DOPPLER COMPLETE: CPT | Mod: 26,,, | Performed by: INTERNAL MEDICINE

## 2018-01-01 PROCEDURE — 90472 IMMUNIZATION ADMIN EACH ADD: CPT | Performed by: NURSE PRACTITIONER

## 2018-01-01 PROCEDURE — 94002 VENT MGMT INPAT INIT DAY: CPT

## 2018-01-01 PROCEDURE — 86255 FLUORESCENT ANTIBODY SCREEN: CPT

## 2018-01-01 PROCEDURE — 87340 HEPATITIS B SURFACE AG IA: CPT

## 2018-01-01 PROCEDURE — 85025 COMPLETE CBC W/AUTO DIFF WBC: CPT

## 2018-01-01 PROCEDURE — 0BJ08ZZ INSPECTION OF TRACHEOBRONCHIAL TREE, VIA NATURAL OR ARTIFICIAL OPENING ENDOSCOPIC: ICD-10-PCS | Performed by: INTERNAL MEDICINE

## 2018-01-01 PROCEDURE — 99291 CRITICAL CARE FIRST HOUR: CPT | Mod: 25,,, | Performed by: INTERNAL MEDICINE

## 2018-01-01 PROCEDURE — 36430 TRANSFUSION BLD/BLD COMPNT: CPT

## 2018-01-01 PROCEDURE — 63600175 PHARM REV CODE 636 W HCPCS

## 2018-01-01 PROCEDURE — 86334 IMMUNOFIX E-PHORESIS SERUM: CPT

## 2018-01-01 PROCEDURE — 86787 VARICELLA-ZOSTER ANTIBODY: CPT

## 2018-01-01 PROCEDURE — 25000003 PHARM REV CODE 250: Performed by: NURSE PRACTITIONER

## 2018-01-01 PROCEDURE — 87040 BLOOD CULTURE FOR BACTERIA: CPT

## 2018-01-01 PROCEDURE — 83050 HGB METHEMOGLOBIN QUAN: CPT

## 2018-01-01 PROCEDURE — 87102 FUNGUS ISOLATION CULTURE: CPT

## 2018-01-01 PROCEDURE — 86901 BLOOD TYPING SEROLOGIC RH(D): CPT

## 2018-01-01 PROCEDURE — 85384 FIBRINOGEN ACTIVITY: CPT | Mod: 91

## 2018-01-01 PROCEDURE — 87206 SMEAR FLUORESCENT/ACID STAI: CPT

## 2018-01-01 PROCEDURE — 85384 FIBRINOGEN ACTIVITY: CPT

## 2018-01-01 PROCEDURE — 86038 ANTINUCLEAR ANTIBODIES: CPT

## 2018-01-01 PROCEDURE — 86160 COMPLEMENT ANTIGEN: CPT | Mod: 59

## 2018-01-01 PROCEDURE — 93306 TTE W/DOPPLER COMPLETE: CPT

## 2018-01-01 PROCEDURE — 31622 DX BRONCHOSCOPE/WASH: CPT | Mod: ,,, | Performed by: INTERNAL MEDICINE

## 2018-01-01 PROCEDURE — 83516 IMMUNOASSAY NONANTIBODY: CPT | Mod: 59

## 2018-01-01 PROCEDURE — 86235 NUCLEAR ANTIGEN ANTIBODY: CPT

## 2018-01-01 PROCEDURE — 83520 IMMUNOASSAY QUANT NOS NONAB: CPT | Mod: 59

## 2018-01-01 PROCEDURE — 90715 TDAP VACCINE 7 YRS/> IM: CPT | Performed by: NURSE PRACTITIONER

## 2018-01-01 PROCEDURE — 86703 HIV-1/HIV-2 1 RESULT ANTBDY: CPT

## 2018-01-01 PROCEDURE — 86850 RBC ANTIBODY SCREEN: CPT

## 2018-01-01 PROCEDURE — P9045 ALBUMIN (HUMAN), 5%, 250 ML: HCPCS | Mod: JG | Performed by: PATHOLOGY

## 2018-01-01 PROCEDURE — 5A1945Z RESPIRATORY VENTILATION, 24-96 CONSECUTIVE HOURS: ICD-10-PCS | Performed by: INTERNAL MEDICINE

## 2018-01-01 PROCEDURE — 99900025 HC BRONCHOSCOPY-ASST (STAT)

## 2018-01-01 PROCEDURE — 31500 INSERT EMERGENCY AIRWAY: CPT | Mod: 59,,, | Performed by: INTERNAL MEDICINE

## 2018-01-01 PROCEDURE — 80307 DRUG TEST PRSMV CHEM ANLYZR: CPT

## 2018-01-01 PROCEDURE — 81479 UNLISTED MOLECULAR PATHOLOGY: CPT

## 2018-01-01 PROCEDURE — 86039 ANTINUCLEAR ANTIBODIES (ANA): CPT

## 2018-01-01 PROCEDURE — 85730 THROMBOPLASTIN TIME PARTIAL: CPT | Mod: 91

## 2018-01-01 PROCEDURE — 84165 PROTEIN E-PHORESIS SERUM: CPT | Mod: 26,,, | Performed by: PATHOLOGY

## 2018-01-01 PROCEDURE — 99222 1ST HOSP IP/OBS MODERATE 55: CPT | Mod: ,,, | Performed by: INTERNAL MEDICINE

## 2018-01-01 PROCEDURE — 86160 COMPLEMENT ANTIGEN: CPT

## 2018-01-01 PROCEDURE — 84100 ASSAY OF PHOSPHORUS: CPT

## 2018-01-01 PROCEDURE — 27202055 HC BRONCHOSCOPE, DISP

## 2018-01-01 PROCEDURE — 87449 NOS EACH ORGANISM AG IA: CPT

## 2018-01-01 PROCEDURE — 6A551Z3 PHERESIS OF PLASMA, MULTIPLE: ICD-10-PCS | Performed by: PATHOLOGY

## 2018-01-01 PROCEDURE — 87015 SPECIMEN INFECT AGNT CONCNTJ: CPT

## 2018-01-01 PROCEDURE — 87205 SMEAR GRAM STAIN: CPT

## 2018-01-01 PROCEDURE — 86803 HEPATITIS C AB TEST: CPT

## 2018-01-01 PROCEDURE — 80500 PR  LAB PATHOLOGY CONSULT-LTD: CPT | Mod: ,,, | Performed by: PATHOLOGY

## 2018-01-01 PROCEDURE — 82085 ASSAY OF ALDOLASE: CPT

## 2018-01-01 PROCEDURE — 86635 COCCIDIOIDES ANTIBODY: CPT | Mod: 91

## 2018-01-01 PROCEDURE — 85014 HEMATOCRIT: CPT

## 2018-01-01 PROCEDURE — 90670 PCV13 VACCINE IM: CPT | Performed by: NURSE PRACTITIONER

## 2018-01-01 PROCEDURE — 99292 CRITICAL CARE ADDL 30 MIN: CPT | Mod: 25,,, | Performed by: NURSE PRACTITIONER

## 2018-01-01 PROCEDURE — 81001 URINALYSIS AUTO W/SCOPE: CPT

## 2018-01-01 PROCEDURE — 86480 TB TEST CELL IMMUN MEASURE: CPT

## 2018-01-01 PROCEDURE — 85613 RUSSELL VIPER VENOM DILUTED: CPT

## 2018-01-01 PROCEDURE — 82784 ASSAY IGA/IGD/IGG/IGM EACH: CPT | Mod: 59

## 2018-01-01 PROCEDURE — 27200188 HC TRANSDUCER, ART ADULT/PEDS

## 2018-01-01 PROCEDURE — 83010 ASSAY OF HAPTOGLOBIN QUANT: CPT | Mod: 91

## 2018-01-01 PROCEDURE — 87385 HISTOPLASMA CAPSUL AG IA: CPT

## 2018-01-01 PROCEDURE — 82800 BLOOD PH: CPT

## 2018-01-01 PROCEDURE — 82164 ANGIOTENSIN I ENZYME TEST: CPT

## 2018-01-01 PROCEDURE — 86965 POOLING BLOOD PLATELETS: CPT

## 2018-01-01 PROCEDURE — 83516 IMMUNOASSAY NONANTIBODY: CPT

## 2018-01-01 PROCEDURE — 85651 RBC SED RATE NONAUTOMATED: CPT

## 2018-01-01 PROCEDURE — 86147 CARDIOLIPIN ANTIBODY EA IG: CPT | Mod: 59

## 2018-01-01 PROCEDURE — 5A1955Z RESPIRATORY VENTILATION, GREATER THAN 96 CONSECUTIVE HOURS: ICD-10-PCS | Performed by: INTERNAL MEDICINE

## 2018-01-01 PROCEDURE — 87040 BLOOD CULTURE FOR BACTERIA: CPT | Mod: 59

## 2018-01-01 PROCEDURE — 86706 HEP B SURFACE ANTIBODY: CPT

## 2018-01-01 PROCEDURE — 86790 VIRUS ANTIBODY NOS: CPT

## 2018-01-01 PROCEDURE — 25000242 PHARM REV CODE 250 ALT 637 W/ HCPCS: Performed by: NURSE PRACTITIONER

## 2018-01-01 PROCEDURE — 86880 COOMBS TEST DIRECT: CPT

## 2018-01-01 PROCEDURE — 86334 IMMUNOFIX E-PHORESIS SERUM: CPT | Mod: 26,,, | Performed by: PATHOLOGY

## 2018-01-01 PROCEDURE — 25500020 PHARM REV CODE 255: Performed by: INTERNAL MEDICINE

## 2018-01-01 PROCEDURE — 84439 ASSAY OF FREE THYROXINE: CPT

## 2018-01-01 PROCEDURE — 87086 URINE CULTURE/COLONY COUNT: CPT

## 2018-01-01 PROCEDURE — 86592 SYPHILIS TEST NON-TREP QUAL: CPT

## 2018-01-01 PROCEDURE — 87116 MYCOBACTERIA CULTURE: CPT

## 2018-01-01 PROCEDURE — 86481 TB AG RESPONSE T-CELL SUSP: CPT

## 2018-01-01 PROCEDURE — 31622 DX BRONCHOSCOPE/WASH: CPT

## 2018-01-01 PROCEDURE — 84443 ASSAY THYROID STIM HORMONE: CPT

## 2018-01-01 PROCEDURE — 87106 FUNGI IDENTIFICATION YEAST: CPT

## 2018-01-01 PROCEDURE — 83036 HEMOGLOBIN GLYCOSYLATED A1C: CPT

## 2018-01-01 PROCEDURE — 84295 ASSAY OF SERUM SODIUM: CPT

## 2018-01-01 RX ORDER — HEPARIN SODIUM 5000 [USP'U]/ML
5000 INJECTION, SOLUTION INTRAVENOUS; SUBCUTANEOUS EVERY 8 HOURS
Status: DISCONTINUED | OUTPATIENT
Start: 2018-01-01 | End: 2018-01-01

## 2018-01-01 RX ORDER — HEPARIN 100 UNIT/ML
500 SYRINGE INTRAVENOUS
Status: DISCONTINUED | OUTPATIENT
Start: 2018-01-01 | End: 2018-01-01

## 2018-01-01 RX ORDER — LORAZEPAM 2 MG/ML
1 INJECTION INTRAMUSCULAR EVERY 30 MIN PRN
Status: DISCONTINUED | OUTPATIENT
Start: 2018-01-01 | End: 2018-01-01

## 2018-01-01 RX ORDER — INSULIN ASPART 100 [IU]/ML
1-10 INJECTION, SOLUTION INTRAVENOUS; SUBCUTANEOUS EVERY 4 HOURS PRN
Status: DISCONTINUED | OUTPATIENT
Start: 2018-01-01 | End: 2018-01-01

## 2018-01-01 RX ORDER — HYDROCODONE BITARTRATE AND ACETAMINOPHEN 500; 5 MG/1; MG/1
TABLET ORAL CONTINUOUS
Status: DISCONTINUED | OUTPATIENT
Start: 2018-01-01 | End: 2018-01-01

## 2018-01-01 RX ORDER — HEPARIN SODIUM 1000 [USP'U]/ML
2200 INJECTION, SOLUTION INTRAVENOUS; SUBCUTANEOUS ONCE
Status: DISCONTINUED | OUTPATIENT
Start: 2018-01-01 | End: 2018-01-01

## 2018-01-01 RX ORDER — PROPOFOL 10 MG/ML
5 INJECTION, EMULSION INTRAVENOUS CONTINUOUS
Status: DISCONTINUED | OUTPATIENT
Start: 2018-01-01 | End: 2018-01-01

## 2018-01-01 RX ORDER — MAGNESIUM SULFATE HEPTAHYDRATE 40 MG/ML
2 INJECTION, SOLUTION INTRAVENOUS
Status: DISPENSED | OUTPATIENT
Start: 2018-01-01 | End: 2018-01-01

## 2018-01-01 RX ORDER — FAMOTIDINE 10 MG/ML
20 INJECTION INTRAVENOUS DAILY
Status: DISCONTINUED | OUTPATIENT
Start: 2018-01-01 | End: 2018-01-01

## 2018-01-01 RX ORDER — MAGNESIUM SULFATE HEPTAHYDRATE 40 MG/ML
2 INJECTION, SOLUTION INTRAVENOUS
Status: DISCONTINUED | OUTPATIENT
Start: 2018-01-01 | End: 2018-01-01 | Stop reason: SDUPTHER

## 2018-01-01 RX ORDER — HYDROCODONE BITARTRATE AND ACETAMINOPHEN 500; 5 MG/1; MG/1
TABLET ORAL CONTINUOUS
Status: ACTIVE | OUTPATIENT
Start: 2018-01-01 | End: 2018-01-01

## 2018-01-01 RX ORDER — ALBUMIN HUMAN 50 G/1000ML
225 SOLUTION INTRAVENOUS ONCE
Status: COMPLETED | OUTPATIENT
Start: 2018-01-01 | End: 2018-01-01

## 2018-01-01 RX ORDER — FAMOTIDINE 20 MG/1
20 TABLET, FILM COATED ORAL 2 TIMES DAILY
Status: DISCONTINUED | OUTPATIENT
Start: 2018-01-01 | End: 2018-01-01

## 2018-01-01 RX ORDER — FAMOTIDINE 10 MG/ML
20 INJECTION INTRAVENOUS 2 TIMES DAILY
Status: DISCONTINUED | OUTPATIENT
Start: 2018-01-01 | End: 2018-01-01

## 2018-01-01 RX ORDER — CHLORHEXIDINE GLUCONATE ORAL RINSE 1.2 MG/ML
15 SOLUTION DENTAL 2 TIMES DAILY
Status: DISCONTINUED | OUTPATIENT
Start: 2018-01-01 | End: 2018-01-01

## 2018-01-01 RX ORDER — HEPARIN SODIUM 1000 [USP'U]/ML
4000 INJECTION, SOLUTION INTRAVENOUS; SUBCUTANEOUS ONCE
Status: DISCONTINUED | OUTPATIENT
Start: 2018-01-01 | End: 2018-01-01

## 2018-01-01 RX ORDER — HYDROCODONE BITARTRATE AND ACETAMINOPHEN 500; 5 MG/1; MG/1
TABLET ORAL
Status: DISCONTINUED | OUTPATIENT
Start: 2018-01-01 | End: 2018-01-01

## 2018-01-01 RX ORDER — DEXMEDETOMIDINE HYDROCHLORIDE 4 UG/ML
0.2 INJECTION, SOLUTION INTRAVENOUS CONTINUOUS
Status: DISCONTINUED | OUTPATIENT
Start: 2018-01-01 | End: 2018-01-01

## 2018-01-01 RX ORDER — VANCOMYCIN 1.75 GRAM/500 ML IN 0.9 % SODIUM CHLORIDE INTRAVENOUS
20 ONCE
Status: COMPLETED | OUTPATIENT
Start: 2018-01-01 | End: 2018-01-01

## 2018-01-01 RX ORDER — IPRATROPIUM BROMIDE AND ALBUTEROL SULFATE 2.5; .5 MG/3ML; MG/3ML
3 SOLUTION RESPIRATORY (INHALATION) EVERY 4 HOURS
Status: DISCONTINUED | OUTPATIENT
Start: 2018-01-01 | End: 2018-01-01

## 2018-01-01 RX ORDER — FLUCONAZOLE 2 MG/ML
400 INJECTION, SOLUTION INTRAVENOUS
Status: DISCONTINUED | OUTPATIENT
Start: 2018-06-27 | End: 2018-01-01

## 2018-01-01 RX ORDER — SCOLOPAMINE TRANSDERMAL SYSTEM 1 MG/1
1 PATCH, EXTENDED RELEASE TRANSDERMAL
Status: DISCONTINUED | OUTPATIENT
Start: 2018-01-01 | End: 2018-06-27 | Stop reason: HOSPADM

## 2018-01-01 RX ORDER — ACETAMINOPHEN 325 MG/1
650 TABLET ORAL
Status: CANCELLED | OUTPATIENT
Start: 2018-01-01

## 2018-01-01 RX ORDER — VANCOMYCIN 1.75 GRAM/500 ML IN 0.9 % SODIUM CHLORIDE INTRAVENOUS
1750
Status: DISCONTINUED | OUTPATIENT
Start: 2018-01-01 | End: 2018-01-01

## 2018-01-01 RX ORDER — ALBUMIN HUMAN 50 G/1000ML
SOLUTION INTRAVENOUS
Status: COMPLETED
Start: 2018-01-01 | End: 2018-01-01

## 2018-01-01 RX ORDER — FAMOTIDINE 10 MG/ML
20 INJECTION INTRAVENOUS
Status: CANCELLED | OUTPATIENT
Start: 2018-01-01

## 2018-01-01 RX ORDER — MAGNESIUM SULFATE HEPTAHYDRATE 40 MG/ML
2 INJECTION, SOLUTION INTRAVENOUS
Status: DISCONTINUED | OUTPATIENT
Start: 2018-01-01 | End: 2018-01-01

## 2018-01-01 RX ORDER — CALCIUM CHLORIDE INJECTION 100 MG/ML
1 INJECTION, SOLUTION INTRAVENOUS ONCE
Status: DISCONTINUED | OUTPATIENT
Start: 2018-01-01 | End: 2018-01-01

## 2018-01-01 RX ORDER — GLYCOPYRROLATE 0.2 MG/ML
0.1 INJECTION INTRAMUSCULAR; INTRAVENOUS EVERY 4 HOURS PRN
Status: DISCONTINUED | OUTPATIENT
Start: 2018-01-01 | End: 2018-06-27 | Stop reason: HOSPADM

## 2018-01-01 RX ORDER — ATOVAQUONE 750 MG/5ML
750 SUSPENSION ORAL EVERY 12 HOURS
Status: DISCONTINUED | OUTPATIENT
Start: 2018-01-01 | End: 2018-01-01

## 2018-01-01 RX ORDER — GLUCAGON 1 MG
1 KIT INJECTION
Status: DISCONTINUED | OUTPATIENT
Start: 2018-01-01 | End: 2018-01-01

## 2018-01-01 RX ORDER — SODIUM CHLORIDE 0.9 % (FLUSH) 0.9 %
10 SYRINGE (ML) INJECTION
Status: DISCONTINUED | OUTPATIENT
Start: 2018-01-01 | End: 2018-06-27 | Stop reason: HOSPADM

## 2018-01-01 RX ORDER — LABETALOL HYDROCHLORIDE 5 MG/ML
20 INJECTION, SOLUTION INTRAVENOUS EVERY 4 HOURS PRN
Status: DISCONTINUED | OUTPATIENT
Start: 2018-01-01 | End: 2018-01-01

## 2018-01-01 RX ORDER — SYRING-NEEDL,DISP,INSUL,0.3 ML 29 G X1/2"
296 SYRINGE, EMPTY DISPOSABLE MISCELLANEOUS
Status: COMPLETED | OUTPATIENT
Start: 2018-01-01 | End: 2018-01-01

## 2018-01-01 RX ORDER — ACETAMINOPHEN 325 MG/1
650 TABLET ORAL EVERY 6 HOURS PRN
Status: DISCONTINUED | OUTPATIENT
Start: 2018-01-01 | End: 2018-01-01

## 2018-01-01 RX ORDER — HEPARIN SODIUM 5000 [USP'U]/ML
5000 INJECTION, SOLUTION INTRAVENOUS; SUBCUTANEOUS EVERY 12 HOURS
Status: DISCONTINUED | OUTPATIENT
Start: 2018-01-01 | End: 2018-01-01

## 2018-01-01 RX ORDER — MIDAZOLAM HYDROCHLORIDE 1 MG/ML
2 INJECTION INTRAMUSCULAR; INTRAVENOUS ONCE
Status: COMPLETED | OUTPATIENT
Start: 2018-01-01 | End: 2018-01-01

## 2018-01-01 RX ORDER — METHYLPREDNISOLONE SOD SUCC 125 MG
125 VIAL (EA) INJECTION EVERY 6 HOURS
Status: DISCONTINUED | OUTPATIENT
Start: 2018-01-01 | End: 2018-01-01

## 2018-01-01 RX ORDER — PROPOFOL 10 MG/ML
40 VIAL (ML) INTRAVENOUS ONCE
Status: COMPLETED | OUTPATIENT
Start: 2018-01-01 | End: 2018-01-01

## 2018-01-01 RX ORDER — HEPARIN 100 UNIT/ML
500 SYRINGE INTRAVENOUS
Status: CANCELLED | OUTPATIENT
Start: 2018-01-01

## 2018-01-01 RX ORDER — ACETAMINOPHEN 325 MG/1
650 TABLET ORAL ONCE
Status: COMPLETED | OUTPATIENT
Start: 2018-01-01 | End: 2018-01-01

## 2018-01-01 RX ORDER — MAGNESIUM SULFATE HEPTAHYDRATE 40 MG/ML
2 INJECTION, SOLUTION INTRAVENOUS
Status: ACTIVE | OUTPATIENT
Start: 2018-01-01 | End: 2018-01-01

## 2018-01-01 RX ORDER — SODIUM CHLORIDE 0.9 % (FLUSH) 0.9 %
10 SYRINGE (ML) INJECTION
Status: CANCELLED | OUTPATIENT
Start: 2018-01-01

## 2018-01-01 RX ORDER — CEFEPIME HYDROCHLORIDE 1 G/1
1 INJECTION, POWDER, FOR SOLUTION INTRAMUSCULAR; INTRAVENOUS
Status: DISCONTINUED | OUTPATIENT
Start: 2018-01-01 | End: 2018-01-01

## 2018-01-01 RX ORDER — IPRATROPIUM BROMIDE AND ALBUTEROL SULFATE 2.5; .5 MG/3ML; MG/3ML
3 SOLUTION RESPIRATORY (INHALATION) ONCE
Status: COMPLETED | OUTPATIENT
Start: 2018-01-01 | End: 2018-01-01

## 2018-01-01 RX ORDER — ACETAMINOPHEN 325 MG/1
650 TABLET ORAL
Status: COMPLETED | OUTPATIENT
Start: 2018-01-01 | End: 2018-01-01

## 2018-01-01 RX ORDER — DEXMEDETOMIDINE HYDROCHLORIDE 4 UG/ML
0.2 INJECTION, SOLUTION INTRAVENOUS CONTINUOUS
Status: DISCONTINUED | OUTPATIENT
Start: 2018-01-01 | End: 2018-06-27 | Stop reason: HOSPADM

## 2018-01-01 RX ORDER — NICARDIPINE HYDROCHLORIDE 0.2 MG/ML
1 INJECTION INTRAVENOUS CONTINUOUS
Status: DISCONTINUED | OUTPATIENT
Start: 2018-01-01 | End: 2018-01-01

## 2018-01-01 RX ORDER — FAMOTIDINE 10 MG/ML
20 INJECTION INTRAVENOUS
Status: COMPLETED | OUTPATIENT
Start: 2018-01-01 | End: 2018-01-01

## 2018-01-01 RX ORDER — POTASSIUM CHLORIDE 7.45 MG/ML
10 INJECTION INTRAVENOUS
Status: DISCONTINUED | OUTPATIENT
Start: 2018-01-01 | End: 2018-01-01

## 2018-01-01 RX ORDER — SODIUM,POTASSIUM PHOSPHATES 280-250MG
1 POWDER IN PACKET (EA) ORAL 3 TIMES DAILY
Status: ACTIVE | OUTPATIENT
Start: 2018-01-01 | End: 2018-01-01

## 2018-01-01 RX ORDER — METOCLOPRAMIDE 5 MG/1
5 TABLET ORAL EVERY 6 HOURS
Status: DISCONTINUED | OUTPATIENT
Start: 2018-01-01 | End: 2018-01-01

## 2018-01-01 RX ORDER — MAGNESIUM SULFATE HEPTAHYDRATE 40 MG/ML
2 INJECTION, SOLUTION INTRAVENOUS ONCE
Status: COMPLETED | OUTPATIENT
Start: 2018-01-01 | End: 2018-01-01

## 2018-01-01 RX ORDER — FLUCONAZOLE 2 MG/ML
400 INJECTION, SOLUTION INTRAVENOUS
Status: DISCONTINUED | OUTPATIENT
Start: 2018-01-01 | End: 2018-01-01

## 2018-01-01 RX ORDER — VANCOMYCIN 1.75 GRAM/500 ML IN 0.9 % SODIUM CHLORIDE INTRAVENOUS
1750 ONCE
Status: COMPLETED | OUTPATIENT
Start: 2018-01-01 | End: 2018-01-01

## 2018-01-01 RX ORDER — INSULIN ASPART 100 [IU]/ML
0-5 INJECTION, SOLUTION INTRAVENOUS; SUBCUTANEOUS EVERY 6 HOURS PRN
Status: DISCONTINUED | OUTPATIENT
Start: 2018-01-01 | End: 2018-01-01

## 2018-01-01 RX ORDER — VANCOMYCIN 2 GRAM/500 ML IN 0.9 % SODIUM CHLORIDE INTRAVENOUS
2000 ONCE
Status: COMPLETED | OUTPATIENT
Start: 2018-01-01 | End: 2018-01-01

## 2018-01-01 RX ORDER — NOREPINEPHRINE BITARTRATE/D5W 4MG/250ML
PLASTIC BAG, INJECTION (ML) INTRAVENOUS
Status: COMPLETED
Start: 2018-01-01 | End: 2018-01-01

## 2018-01-01 RX ORDER — POTASSIUM CHLORIDE 29.8 MG/ML
40 INJECTION INTRAVENOUS
Status: DISCONTINUED | OUTPATIENT
Start: 2018-01-01 | End: 2018-01-01

## 2018-01-01 RX ORDER — FENTANYL CITRATE-0.9 % NACL/PF 10 MCG/ML
PLASTIC BAG, INJECTION (ML) INTRAVENOUS CONTINUOUS
Status: DISCONTINUED | OUTPATIENT
Start: 2018-01-01 | End: 2018-06-27 | Stop reason: HOSPADM

## 2018-01-01 RX ORDER — PSEUDOEPHEDRINE/ACETAMINOPHEN 30MG-500MG
100 TABLET ORAL
Status: COMPLETED | OUTPATIENT
Start: 2018-01-01 | End: 2018-01-01

## 2018-01-01 RX ORDER — HYDRALAZINE HYDROCHLORIDE 20 MG/ML
10 INJECTION INTRAMUSCULAR; INTRAVENOUS EVERY 4 HOURS PRN
Status: DISCONTINUED | OUTPATIENT
Start: 2018-01-01 | End: 2018-01-01

## 2018-01-01 RX ORDER — PROPOFOL 10 MG/ML
INJECTION, EMULSION INTRAVENOUS
Status: COMPLETED
Start: 2018-01-01 | End: 2018-01-01

## 2018-01-01 RX ORDER — CEFEPIME HYDROCHLORIDE 2 G/1
2 INJECTION, POWDER, FOR SOLUTION INTRAVENOUS
Status: DISCONTINUED | OUTPATIENT
Start: 2018-01-01 | End: 2018-01-01

## 2018-01-01 RX ORDER — NICARDIPINE HYDROCHLORIDE 0.2 MG/ML
INJECTION INTRAVENOUS
Status: COMPLETED
Start: 2018-01-01 | End: 2018-01-01

## 2018-01-01 RX ORDER — DIPHENHYDRAMINE HYDROCHLORIDE 50 MG/ML
50 INJECTION INTRAMUSCULAR; INTRAVENOUS ONCE
Status: COMPLETED | OUTPATIENT
Start: 2018-01-01 | End: 2018-01-01

## 2018-01-01 RX ORDER — LABETALOL HYDROCHLORIDE 5 MG/ML
INJECTION, SOLUTION INTRAVENOUS
Status: COMPLETED
Start: 2018-01-01 | End: 2018-01-01

## 2018-01-01 RX ORDER — PROPOFOL 10 MG/ML
5 INJECTION, EMULSION INTRAVENOUS CONTINUOUS
Status: DISCONTINUED | OUTPATIENT
Start: 2018-01-01 | End: 2018-06-27 | Stop reason: HOSPADM

## 2018-01-01 RX ORDER — METHYLPREDNISOLONE SOD SUCC 125 MG
125 VIAL (EA) INJECTION EVERY 8 HOURS
Status: DISCONTINUED | OUTPATIENT
Start: 2018-01-01 | End: 2018-01-01

## 2018-01-01 RX ORDER — NOREPINEPHRINE BITARTRATE/D5W 4MG/250ML
0.02 PLASTIC BAG, INJECTION (ML) INTRAVENOUS CONTINUOUS
Status: DISCONTINUED | OUTPATIENT
Start: 2018-01-01 | End: 2018-01-01

## 2018-01-01 RX ORDER — VANCOMYCIN 2 GRAM/500 ML IN 0.9 % SODIUM CHLORIDE INTRAVENOUS
2000 ONCE
Status: DISCONTINUED | OUTPATIENT
Start: 2018-01-01 | End: 2018-01-01

## 2018-01-01 RX ORDER — LORAZEPAM 2 MG/ML
4 INJECTION INTRAMUSCULAR
Status: DISCONTINUED | OUTPATIENT
Start: 2018-01-01 | End: 2018-06-27 | Stop reason: HOSPADM

## 2018-01-01 RX ORDER — SODIUM CHLORIDE 0.9 % (FLUSH) 0.9 %
3 SYRINGE (ML) INJECTION
Status: DISCONTINUED | OUTPATIENT
Start: 2018-01-01 | End: 2018-01-01

## 2018-01-01 RX ADMIN — INSULIN ASPART 2 UNITS: 100 INJECTION, SOLUTION INTRAVENOUS; SUBCUTANEOUS at 04:06

## 2018-01-01 RX ADMIN — ATOVAQUONE 750 MG: 750 SUSPENSION ORAL at 09:06

## 2018-01-01 RX ADMIN — SODIUM PHOSPHATE, MONOBASIC, MONOHYDRATE 39.99 MMOL: 276; 142 INJECTION, SOLUTION INTRAVENOUS at 06:06

## 2018-01-01 RX ADMIN — METHYLPREDNISOLONE SODIUM SUCCINATE 125 MG: 125 INJECTION, POWDER, FOR SOLUTION INTRAMUSCULAR; INTRAVENOUS at 10:06

## 2018-01-01 RX ADMIN — PIPERACILLIN AND TAZOBACTAM 4.5 G: 4; .5 INJECTION, POWDER, LYOPHILIZED, FOR SOLUTION INTRAVENOUS; PARENTERAL at 03:06

## 2018-01-01 RX ADMIN — Medication 0.1 MCG/KG/MIN: at 09:06

## 2018-01-01 RX ADMIN — SODIUM PHOSPHATE, MONOBASIC, MONOHYDRATE 30 MMOL: 276; 142 INJECTION, SOLUTION INTRAVENOUS at 12:06

## 2018-01-01 RX ADMIN — PROPOFOL 35 MCG/KG/MIN: 10 INJECTION, EMULSION INTRAVENOUS at 03:06

## 2018-01-01 RX ADMIN — CEFEPIME 1 G: 1 INJECTION, POWDER, FOR SOLUTION INTRAMUSCULAR; INTRAVENOUS at 01:06

## 2018-01-01 RX ADMIN — PROPOFOL 50 MCG/KG/MIN: 10 INJECTION, EMULSION INTRAVENOUS at 11:06

## 2018-01-01 RX ADMIN — Medication 300 MCG/HR: at 01:06

## 2018-01-01 RX ADMIN — DEXMEDETOMIDINE HYDROCHLORIDE 1.4 MCG/KG/HR: 100 INJECTION, SOLUTION, CONCENTRATE INTRAVENOUS at 07:06

## 2018-01-01 RX ADMIN — FAMOTIDINE 20 MG: 10 INJECTION, SOLUTION INTRAVENOUS at 08:06

## 2018-01-01 RX ADMIN — VANCOMYCIN HYDROCHLORIDE 1250 MG: 1 INJECTION, POWDER, LYOPHILIZED, FOR SOLUTION INTRAVENOUS at 08:06

## 2018-01-01 RX ADMIN — FAMOTIDINE 20 MG: 20 TABLET ORAL at 08:06

## 2018-01-01 RX ADMIN — METHYLPREDNISOLONE SODIUM SUCCINATE 125 MG: 125 INJECTION, POWDER, FOR SOLUTION INTRAMUSCULAR; INTRAVENOUS at 05:06

## 2018-01-01 RX ADMIN — CALCIUM CHLORIDE 1 G: 100 INJECTION INTRAVENOUS; INTRAVENTRICULAR at 07:06

## 2018-01-01 RX ADMIN — ALBUMIN HUMAN 225 G: 0.05 INJECTION, SOLUTION INTRAVENOUS at 10:06

## 2018-01-01 RX ADMIN — CISATRACURIUM BESYLATE 2.5 MCG/KG/MIN: 10 INJECTION INTRAVENOUS at 05:06

## 2018-01-01 RX ADMIN — HEPARIN SODIUM 5000 UNITS: 5000 INJECTION, SOLUTION INTRAVENOUS; SUBCUTANEOUS at 01:06

## 2018-01-01 RX ADMIN — CHLORHEXIDINE GLUCONATE 0.12% ORAL RINSE 15 ML: 1.2 LIQUID ORAL at 08:06

## 2018-01-01 RX ADMIN — DEXMEDETOMIDINE HYDROCHLORIDE 1.4 MCG/KG/HR: 100 INJECTION, SOLUTION, CONCENTRATE INTRAVENOUS at 11:06

## 2018-01-01 RX ADMIN — DEXMEDETOMIDINE HYDROCHLORIDE 1.4 MCG/KG/HR: 100 INJECTION, SOLUTION, CONCENTRATE INTRAVENOUS at 06:06

## 2018-01-01 RX ADMIN — PROPOFOL 40 MCG/KG/MIN: 10 INJECTION, EMULSION INTRAVENOUS at 05:06

## 2018-01-01 RX ADMIN — CHLORHEXIDINE GLUCONATE 0.12% ORAL RINSE 15 ML: 1.2 LIQUID ORAL at 09:06

## 2018-01-01 RX ADMIN — INSULIN ASPART 4 UNITS: 100 INJECTION, SOLUTION INTRAVENOUS; SUBCUTANEOUS at 12:06

## 2018-01-01 RX ADMIN — INSULIN ASPART 2 UNITS: 100 INJECTION, SOLUTION INTRAVENOUS; SUBCUTANEOUS at 05:06

## 2018-01-01 RX ADMIN — MAGNESIUM SULFATE IN WATER 2 G: 40 INJECTION, SOLUTION INTRAVENOUS at 03:06

## 2018-01-01 RX ADMIN — MINERAL OIL AND WHITE PETROLATUM: 150; 830 OINTMENT OPHTHALMIC at 09:06

## 2018-01-01 RX ADMIN — MIDAZOLAM HYDROCHLORIDE 2 MG/HR: 5 INJECTION, SOLUTION INTRAMUSCULAR; INTRAVENOUS at 09:06

## 2018-01-01 RX ADMIN — CEFEPIME HYDROCHLORIDE 2 G: 2 INJECTION, POWDER, FOR SOLUTION INTRAVENOUS at 01:06

## 2018-01-01 RX ADMIN — METHYLPREDNISOLONE SODIUM SUCCINATE 125 MG: 125 INJECTION, POWDER, FOR SOLUTION INTRAMUSCULAR; INTRAVENOUS at 12:06

## 2018-01-01 RX ADMIN — LORAZEPAM 4 MG: 2 INJECTION INTRAMUSCULAR; INTRAVENOUS at 09:06

## 2018-01-01 RX ADMIN — VANCOMYCIN 1.75 GRAM/500 ML IN 0.9 % SODIUM CHLORIDE INTRAVENOUS 1750 MG: at 02:06

## 2018-01-01 RX ADMIN — HEPARIN SODIUM 5000 UNITS: 5000 INJECTION, SOLUTION INTRAVENOUS; SUBCUTANEOUS at 10:06

## 2018-01-01 RX ADMIN — MINERAL OIL AND WHITE PETROLATUM: 150; 830 OINTMENT OPHTHALMIC at 05:06

## 2018-01-01 RX ADMIN — Medication 200 MCG/HR: at 05:06

## 2018-01-01 RX ADMIN — LORAZEPAM 4 MG: 2 INJECTION INTRAMUSCULAR; INTRAVENOUS at 01:06

## 2018-01-01 RX ADMIN — Medication 0.14 MCG/KG/MIN: at 09:06

## 2018-01-01 RX ADMIN — ATOVAQUONE 750 MG: 750 SUSPENSION ORAL at 08:06

## 2018-01-01 RX ADMIN — PIPERACILLIN AND TAZOBACTAM 4.5 G: 4; .5 INJECTION, POWDER, LYOPHILIZED, FOR SOLUTION INTRAVENOUS; PARENTERAL at 09:06

## 2018-01-01 RX ADMIN — DEXMEDETOMIDINE HYDROCHLORIDE 1.4 MCG/KG/HR: 100 INJECTION, SOLUTION, CONCENTRATE INTRAVENOUS at 05:06

## 2018-01-01 RX ADMIN — MINERAL OIL AND WHITE PETROLATUM: 150; 830 OINTMENT OPHTHALMIC at 10:06

## 2018-01-01 RX ADMIN — CEFEPIME 1 G: 1 INJECTION, POWDER, FOR SOLUTION INTRAMUSCULAR; INTRAVENOUS at 05:06

## 2018-01-01 RX ADMIN — DIPHENHYDRAMINE HYDROCHLORIDE 25 MG: 50 INJECTION, SOLUTION INTRAMUSCULAR; INTRAVENOUS at 03:06

## 2018-01-01 RX ADMIN — PIPERACILLIN AND TAZOBACTAM 4.5 G: 4; .5 INJECTION, POWDER, LYOPHILIZED, FOR SOLUTION INTRAVENOUS; PARENTERAL at 08:06

## 2018-01-01 RX ADMIN — DEXMEDETOMIDINE HYDROCHLORIDE 0.2 MCG/KG/HR: 100 INJECTION, SOLUTION, CONCENTRATE INTRAVENOUS at 09:06

## 2018-01-01 RX ADMIN — LORAZEPAM 4 MG: 2 INJECTION INTRAMUSCULAR; INTRAVENOUS at 08:06

## 2018-01-01 RX ADMIN — PROPOFOL 50 MCG/KG/MIN: 10 INJECTION, EMULSION INTRAVENOUS at 07:06

## 2018-01-01 RX ADMIN — METHYLPREDNISOLONE SODIUM SUCCINATE 125 MG: 125 INJECTION, POWDER, FOR SOLUTION INTRAMUSCULAR; INTRAVENOUS at 11:06

## 2018-01-01 RX ADMIN — IPRATROPIUM BROMIDE AND ALBUTEROL SULFATE 3 ML: .5; 3 SOLUTION RESPIRATORY (INHALATION) at 11:06

## 2018-01-01 RX ADMIN — SODIUM PHOSPHATE, MONOBASIC, MONOHYDRATE 20.01 MMOL: 276; 142 INJECTION, SOLUTION INTRAVENOUS at 07:06

## 2018-01-01 RX ADMIN — VANCOMYCIN HYDROCHLORIDE 2500 MG: 1 INJECTION, POWDER, LYOPHILIZED, FOR SOLUTION INTRAVENOUS at 09:06

## 2018-01-01 RX ADMIN — PROPOFOL 50 MCG/KG/MIN: 10 INJECTION, EMULSION INTRAVENOUS at 08:06

## 2018-01-01 RX ADMIN — DEXTROSE MONOHYDRATE, SODIUM CITRATE, AND CITRIC ACID MONOHYDRATE: 2.45; 2.2; .8 INJECTION, SOLUTION INTRAVENOUS at 05:06

## 2018-01-01 RX ADMIN — HEPARIN SODIUM 5000 UNITS: 5000 INJECTION, SOLUTION INTRAVENOUS; SUBCUTANEOUS at 02:06

## 2018-01-01 RX ADMIN — CISATRACURIUM BESYLATE 3 MCG/KG/MIN: 10 INJECTION INTRAVENOUS at 09:06

## 2018-01-01 RX ADMIN — DEXMEDETOMIDINE HYDROCHLORIDE 1.2 MCG/KG/HR: 100 INJECTION, SOLUTION, CONCENTRATE INTRAVENOUS at 02:06

## 2018-01-01 RX ADMIN — CALCIUM GLUCONATE 2000 MG: 98 INJECTION, SOLUTION INTRAVENOUS at 11:06

## 2018-01-01 RX ADMIN — CALCIUM CHLORIDE 1 G: 100 INJECTION INTRAVENOUS; INTRAVENTRICULAR at 12:06

## 2018-01-01 RX ADMIN — HEPARIN SODIUM 5000 UNITS: 5000 INJECTION, SOLUTION INTRAVENOUS; SUBCUTANEOUS at 05:06

## 2018-01-01 RX ADMIN — CALCIUM CHLORIDE 1 G: 100 INJECTION INTRAVENOUS; INTRAVENTRICULAR at 05:06

## 2018-01-01 RX ADMIN — SODIUM PHOSPHATE, MONOBASIC, MONOHYDRATE 20.01 MMOL: 276; 142 INJECTION, SOLUTION INTRAVENOUS at 02:06

## 2018-01-01 RX ADMIN — PROPOFOL 40 MCG/KG/MIN: 10 INJECTION, EMULSION INTRAVENOUS at 10:06

## 2018-01-01 RX ADMIN — CALCIUM GLUCONATE 3000 MG: 98 INJECTION, SOLUTION INTRAVENOUS at 12:06

## 2018-01-01 RX ADMIN — CALCIUM CHLORIDE 1 G: 100 INJECTION INTRAVENOUS; INTRAVENTRICULAR at 08:06

## 2018-01-01 RX ADMIN — CALCIUM GLUCONATE 3000 MG: 98 INJECTION, SOLUTION INTRAVENOUS at 04:06

## 2018-01-01 RX ADMIN — METHYLPREDNISOLONE SODIUM SUCCINATE: 1 INJECTION, POWDER, LYOPHILIZED, FOR SOLUTION INTRAMUSCULAR; INTRAVENOUS at 01:06

## 2018-01-01 RX ADMIN — Medication 0.04 MCG/KG/MIN: at 08:06

## 2018-01-01 RX ADMIN — MAGNESIUM SULFATE IN WATER 2 G: 40 INJECTION, SOLUTION INTRAVENOUS at 11:06

## 2018-01-01 RX ADMIN — IPRATROPIUM BROMIDE AND ALBUTEROL SULFATE 3 ML: .5; 3 SOLUTION RESPIRATORY (INHALATION) at 07:06

## 2018-01-01 RX ADMIN — CALCIUM GLUCONATE 2000 MG: 98 INJECTION, SOLUTION INTRAVENOUS at 05:06

## 2018-01-01 RX ADMIN — Medication 300 MCG/HR: at 09:06

## 2018-01-01 RX ADMIN — CISATRACURIUM BESYLATE 9 MCG/KG/MIN: 10 INJECTION INTRAVENOUS at 12:06

## 2018-01-01 RX ADMIN — CISATRACURIUM BESYLATE 4.5 MCG/KG/MIN: 10 INJECTION INTRAVENOUS at 07:06

## 2018-01-01 RX ADMIN — IPRATROPIUM BROMIDE AND ALBUTEROL SULFATE 3 ML: .5; 3 SOLUTION RESPIRATORY (INHALATION) at 03:06

## 2018-01-01 RX ADMIN — MIDAZOLAM HYDROCHLORIDE 2 MG: 1 INJECTION, SOLUTION INTRAMUSCULAR; INTRAVENOUS at 09:06

## 2018-01-01 RX ADMIN — PROPOFOL 30 MCG/KG/MIN: 10 INJECTION, EMULSION INTRAVENOUS at 01:06

## 2018-01-01 RX ADMIN — METHYLPREDNISOLONE SODIUM SUCCINATE 125 MG: 125 INJECTION, POWDER, FOR SOLUTION INTRAMUSCULAR; INTRAVENOUS at 02:06

## 2018-01-01 RX ADMIN — DIPHENHYDRAMINE HYDROCHLORIDE 50 MG: 50 INJECTION, SOLUTION INTRAMUSCULAR; INTRAVENOUS at 11:06

## 2018-01-01 RX ADMIN — MAGNESIUM SULFATE IN WATER 2 G: 40 INJECTION, SOLUTION INTRAVENOUS at 05:06

## 2018-01-01 RX ADMIN — INSULIN ASPART 2 UNITS: 100 INJECTION, SOLUTION INTRAVENOUS; SUBCUTANEOUS at 12:06

## 2018-01-01 RX ADMIN — SODIUM PHOSPHATE, MONOBASIC, MONOHYDRATE 20.01 MMOL: 276; 142 INJECTION, SOLUTION INTRAVENOUS at 09:06

## 2018-01-01 RX ADMIN — Medication 300 MCG/HR: at 07:06

## 2018-01-01 RX ADMIN — DEXTROSE MONOHYDRATE, SODIUM CITRATE, AND CITRIC ACID MONOHYDRATE: 2.45; 2.2; .8 INJECTION, SOLUTION INTRAVENOUS at 11:06

## 2018-01-01 RX ADMIN — CALCIUM CHLORIDE 1 G: 100 INJECTION INTRAVENOUS; INTRAVENTRICULAR at 06:06

## 2018-01-01 RX ADMIN — CEFEPIME 1 G: 1 INJECTION, POWDER, FOR SOLUTION INTRAMUSCULAR; INTRAVENOUS at 10:06

## 2018-01-01 RX ADMIN — CALCIUM CHLORIDE 1 G: 100 INJECTION INTRAVENOUS; INTRAVENTRICULAR at 02:06

## 2018-01-01 RX ADMIN — CEFEPIME 1 G: 1 INJECTION, POWDER, FOR SOLUTION INTRAMUSCULAR; INTRAVENOUS at 09:06

## 2018-01-01 RX ADMIN — Medication 0.08 MCG/KG/MIN: at 03:06

## 2018-01-01 RX ADMIN — PROPOFOL 45 MCG/KG/MIN: 10 INJECTION, EMULSION INTRAVENOUS at 03:06

## 2018-01-01 RX ADMIN — METHYLPREDNISOLONE SODIUM SUCCINATE 125 MG: 125 INJECTION, POWDER, FOR SOLUTION INTRAMUSCULAR; INTRAVENOUS at 06:06

## 2018-01-01 RX ADMIN — METOCLOPRAMIDE HYDROCHLORIDE 5 MG: 5 TABLET ORAL at 07:06

## 2018-01-01 RX ADMIN — METOCLOPRAMIDE HYDROCHLORIDE 5 MG: 5 TABLET ORAL at 01:06

## 2018-01-01 RX ADMIN — DEXMEDETOMIDINE HYDROCHLORIDE 1.2 MCG/KG/HR: 100 INJECTION, SOLUTION, CONCENTRATE INTRAVENOUS at 10:06

## 2018-01-01 RX ADMIN — INSULIN ASPART 3 UNITS: 100 INJECTION, SOLUTION INTRAVENOUS; SUBCUTANEOUS at 05:06

## 2018-01-01 RX ADMIN — PROPOFOL 50 MCG/KG/MIN: 10 INJECTION, EMULSION INTRAVENOUS at 12:06

## 2018-01-01 RX ADMIN — METHYLPREDNISOLONE SODIUM SUCCINATE 125 MG: 125 INJECTION, POWDER, FOR SOLUTION INTRAMUSCULAR; INTRAVENOUS at 01:06

## 2018-01-01 RX ADMIN — NICARDIPINE HYDROCHLORIDE 5 MG/HR: 0.2 INJECTION, SOLUTION INTRAVENOUS at 03:06

## 2018-01-01 RX ADMIN — PROPOFOL 40 MG: 10 INJECTION, EMULSION INTRAVENOUS at 02:06

## 2018-01-01 RX ADMIN — CALCIUM GLUCONATE 1 G: 94 INJECTION, SOLUTION INTRAVENOUS at 02:06

## 2018-01-01 RX ADMIN — MINERAL OIL AND WHITE PETROLATUM: 150; 830 OINTMENT OPHTHALMIC at 06:06

## 2018-01-01 RX ADMIN — FAMOTIDINE 20 MG: 10 INJECTION, SOLUTION INTRAVENOUS at 09:06

## 2018-01-01 RX ADMIN — CALCIUM CHLORIDE 1 G: 100 INJECTION INTRAVENOUS; INTRAVENTRICULAR at 04:06

## 2018-01-01 RX ADMIN — CALCIUM CHLORIDE 1 G: 100 INJECTION INTRAVENOUS; INTRAVENTRICULAR at 09:06

## 2018-01-01 RX ADMIN — CISATRACURIUM BESYLATE 3 MCG/KG/MIN: 10 INJECTION INTRAVENOUS at 05:06

## 2018-01-01 RX ADMIN — INSULIN ASPART 2 UNITS: 100 INJECTION, SOLUTION INTRAVENOUS; SUBCUTANEOUS at 08:06

## 2018-01-01 RX ADMIN — METOCLOPRAMIDE HYDROCHLORIDE 5 MG: 5 TABLET ORAL at 12:06

## 2018-01-01 RX ADMIN — METHYLPREDNISOLONE SODIUM SUCCINATE 125 MG: 125 INJECTION, POWDER, FOR SOLUTION INTRAMUSCULAR; INTRAVENOUS at 07:06

## 2018-01-01 RX ADMIN — PIPERACILLIN AND TAZOBACTAM 4.5 G: 4; .5 INJECTION, POWDER, LYOPHILIZED, FOR SOLUTION INTRAVENOUS; PARENTERAL at 05:06

## 2018-01-01 RX ADMIN — PROPOFOL 40 MCG/KG/MIN: 10 INJECTION, EMULSION INTRAVENOUS at 03:06

## 2018-01-01 RX ADMIN — METOCLOPRAMIDE HYDROCHLORIDE 5 MG: 5 TABLET ORAL at 05:06

## 2018-01-01 RX ADMIN — Medication 2500 MCG: at 09:06

## 2018-01-01 RX ADMIN — HEPARIN SODIUM 5000 UNITS: 5000 INJECTION, SOLUTION INTRAVENOUS; SUBCUTANEOUS at 09:06

## 2018-01-01 RX ADMIN — Medication 0.12 MCG/KG/MIN: at 11:06

## 2018-01-01 RX ADMIN — Medication 300 MCG/HR: at 08:06

## 2018-01-01 RX ADMIN — DEXTROSE 1 MCG/KG/MIN: 50 INJECTION, SOLUTION INTRAVENOUS at 03:06

## 2018-01-01 RX ADMIN — CEFEPIME 1 G: 1 INJECTION, POWDER, FOR SOLUTION INTRAMUSCULAR; INTRAVENOUS at 06:06

## 2018-01-01 RX ADMIN — PNEUMOCOCCAL 13-VALENT CONJUGATE VACCINE 0.5 ML: 2.2; 2.2; 2.2; 2.2; 2.2; 4.4; 2.2; 2.2; 2.2; 2.2; 2.2; 2.2; 2.2 INJECTION, SUSPENSION INTRAMUSCULAR at 11:06

## 2018-01-01 RX ADMIN — MAGNESIUM SULFATE IN WATER 2 G: 40 INJECTION, SOLUTION INTRAVENOUS at 02:06

## 2018-01-01 RX ADMIN — FAMOTIDINE 20 MG: 20 TABLET ORAL at 09:06

## 2018-01-01 RX ADMIN — MIDAZOLAM HYDROCHLORIDE 2 MG/HR: 5 INJECTION, SOLUTION INTRAMUSCULAR; INTRAVENOUS at 12:06

## 2018-01-01 RX ADMIN — INSULIN ASPART 3 UNITS: 100 INJECTION, SOLUTION INTRAVENOUS; SUBCUTANEOUS at 12:06

## 2018-01-01 RX ADMIN — CALCIUM GLUCONATE 2000 MG: 98 INJECTION, SOLUTION INTRAVENOUS at 10:06

## 2018-01-01 RX ADMIN — LORAZEPAM 2 MG: 2 INJECTION INTRAMUSCULAR; INTRAVENOUS at 05:06

## 2018-01-01 RX ADMIN — DEXMEDETOMIDINE HYDROCHLORIDE 1.4 MCG/KG/HR: 100 INJECTION, SOLUTION, CONCENTRATE INTRAVENOUS at 08:06

## 2018-01-01 RX ADMIN — LORAZEPAM 1 MG: 2 INJECTION INTRAMUSCULAR at 10:06

## 2018-01-01 RX ADMIN — VASOPRESSIN 0.04 UNITS/MIN: 20 INJECTION INTRAVENOUS at 04:06

## 2018-01-01 RX ADMIN — CALCIUM CHLORIDE 1 G: 100 INJECTION INTRAVENOUS; INTRAVENTRICULAR at 01:06

## 2018-01-01 RX ADMIN — METOCLOPRAMIDE HYDROCHLORIDE 5 MG: 5 TABLET ORAL at 06:06

## 2018-01-01 RX ADMIN — PROPOFOL 50 MCG/KG/MIN: 10 INJECTION, EMULSION INTRAVENOUS at 02:06

## 2018-01-01 RX ADMIN — LORAZEPAM 1 MG: 2 INJECTION INTRAMUSCULAR at 08:06

## 2018-01-01 RX ADMIN — METOCLOPRAMIDE HYDROCHLORIDE 5 MG: 5 TABLET ORAL at 11:06

## 2018-01-01 RX ADMIN — Medication 0.06 MCG/KG/MIN: at 09:06

## 2018-01-01 RX ADMIN — CHLORHEXIDINE GLUCONATE 0.12% ORAL RINSE 15 ML: 1.2 LIQUID ORAL at 10:06

## 2018-01-01 RX ADMIN — INSULIN ASPART 8 UNITS: 100 INJECTION, SOLUTION INTRAVENOUS; SUBCUTANEOUS at 08:06

## 2018-01-01 RX ADMIN — Medication 225 MCG/HR: at 03:06

## 2018-01-01 RX ADMIN — Medication 2500 MCG: at 05:06

## 2018-01-01 RX ADMIN — Medication 300 MCG/HR: at 04:06

## 2018-01-01 RX ADMIN — LORAZEPAM 4 MG: 2 INJECTION INTRAMUSCULAR; INTRAVENOUS at 02:06

## 2018-01-01 RX ADMIN — INSULIN ASPART 1 UNITS: 100 INJECTION, SOLUTION INTRAVENOUS; SUBCUTANEOUS at 11:06

## 2018-01-01 RX ADMIN — MINERAL OIL AND WHITE PETROLATUM: 150; 830 OINTMENT OPHTHALMIC at 02:06

## 2018-01-01 RX ADMIN — IOHEXOL 100 ML: 350 INJECTION, SOLUTION INTRAVENOUS at 05:06

## 2018-01-01 RX ADMIN — DEXTROSE MONOHYDRATE, SODIUM CITRATE, AND CITRIC ACID MONOHYDRATE: 2.45; 2.2; .8 INJECTION, SOLUTION INTRAVENOUS at 10:06

## 2018-01-01 RX ADMIN — SODIUM PHOSPHATE, MONOBASIC, MONOHYDRATE 39.99 MMOL: 276; 142 INJECTION, SOLUTION INTRAVENOUS at 10:06

## 2018-01-01 RX ADMIN — LORAZEPAM 1 MG: 2 INJECTION INTRAMUSCULAR at 03:06

## 2018-01-01 RX ADMIN — PROPOFOL 40 MCG/KG/MIN: 10 INJECTION, EMULSION INTRAVENOUS at 02:06

## 2018-01-01 RX ADMIN — LABETALOL HYDROCHLORIDE 20 MG: 5 INJECTION, SOLUTION INTRAVENOUS at 07:06

## 2018-01-01 RX ADMIN — INSULIN ASPART 4 UNITS: 100 INJECTION, SOLUTION INTRAVENOUS; SUBCUTANEOUS at 07:06

## 2018-01-01 RX ADMIN — INSULIN ASPART 2 UNITS: 100 INJECTION, SOLUTION INTRAVENOUS; SUBCUTANEOUS at 07:06

## 2018-01-01 RX ADMIN — PROPOFOL 20 MCG/KG/MIN: 10 INJECTION, EMULSION INTRAVENOUS at 01:06

## 2018-01-01 RX ADMIN — CALCIUM CHLORIDE 1 G: 100 INJECTION INTRAVENOUS; INTRAVENTRICULAR at 11:06

## 2018-01-01 RX ADMIN — SODIUM CHLORIDE: 0.9 INJECTION, SOLUTION INTRAVENOUS at 06:06

## 2018-01-01 RX ADMIN — DEXMEDETOMIDINE HYDROCHLORIDE 0.2 MCG/KG/HR: 100 INJECTION, SOLUTION, CONCENTRATE INTRAVENOUS at 02:06

## 2018-01-01 RX ADMIN — SODIUM CHLORIDE 4 UNITS/HR: 9 INJECTION, SOLUTION INTRAVENOUS at 05:06

## 2018-01-01 RX ADMIN — PROPOFOL 50 MCG/KG/MIN: 10 INJECTION, EMULSION INTRAVENOUS at 06:06

## 2018-01-01 RX ADMIN — Medication 2500 MCG: at 12:06

## 2018-01-01 RX ADMIN — INSULIN ASPART 6 UNITS: 100 INJECTION, SOLUTION INTRAVENOUS; SUBCUTANEOUS at 12:06

## 2018-01-01 RX ADMIN — NICARDIPINE HYDROCHLORIDE 2.5 MG/HR: 0.2 INJECTION, SOLUTION INTRAVENOUS at 03:06

## 2018-01-01 RX ADMIN — IPRATROPIUM BROMIDE AND ALBUTEROL SULFATE 3 ML: .5; 3 SOLUTION RESPIRATORY (INHALATION) at 04:06

## 2018-01-01 RX ADMIN — SODIUM CHLORIDE: 0.9 INJECTION, SOLUTION INTRAVENOUS at 08:06

## 2018-01-01 RX ADMIN — PROPOFOL 35 MCG/KG/MIN: 10 INJECTION, EMULSION INTRAVENOUS at 08:06

## 2018-01-01 RX ADMIN — PROPOFOL 10 MCG/KG/MIN: 10 INJECTION, EMULSION INTRAVENOUS at 05:06

## 2018-01-01 RX ADMIN — PROPOFOL 30 MCG/KG/MIN: 10 INJECTION, EMULSION INTRAVENOUS at 02:06

## 2018-01-01 RX ADMIN — PIPERACILLIN AND TAZOBACTAM 4.5 G: 4; .5 INJECTION, POWDER, LYOPHILIZED, FOR SOLUTION INTRAVENOUS; PARENTERAL at 10:06

## 2018-01-01 RX ADMIN — INSULIN ASPART 2 UNITS: 100 INJECTION, SOLUTION INTRAVENOUS; SUBCUTANEOUS at 11:06

## 2018-01-01 RX ADMIN — CHLORHEXIDINE GLUCONATE 0.12% ORAL RINSE 15 ML: 1.2 LIQUID ORAL at 11:06

## 2018-01-01 RX ADMIN — Medication 300 MCG/HR: at 03:06

## 2018-01-01 RX ADMIN — SODIUM CHLORIDE: 0.9 INJECTION, SOLUTION INTRAVENOUS at 04:06

## 2018-01-01 RX ADMIN — CISATRACURIUM BESYLATE 2 MCG/KG/MIN: 10 INJECTION INTRAVENOUS at 11:06

## 2018-01-01 RX ADMIN — INSULIN ASPART 4 UNITS: 100 INJECTION, SOLUTION INTRAVENOUS; SUBCUTANEOUS at 04:06

## 2018-01-01 RX ADMIN — ACETAMINOPHEN 650 MG: 325 TABLET ORAL at 02:06

## 2018-01-01 RX ADMIN — LORAZEPAM 4 MG: 2 INJECTION INTRAMUSCULAR; INTRAVENOUS at 03:06

## 2018-01-01 RX ADMIN — MAGNESIUM SULFATE IN WATER 2 G: 40 INJECTION, SOLUTION INTRAVENOUS at 01:06

## 2018-01-01 RX ADMIN — Medication 100 MCG/HR: at 04:06

## 2018-01-01 RX ADMIN — DEXTROSE MONOHYDRATE, SODIUM CITRATE, AND CITRIC ACID MONOHYDRATE: 2.45; 2.2; .8 INJECTION, SOLUTION INTRAVENOUS at 03:06

## 2018-01-01 RX ADMIN — LORAZEPAM 2 MG: 2 INJECTION INTRAMUSCULAR; INTRAVENOUS at 08:06

## 2018-01-01 RX ADMIN — PROPOFOL 30 MCG/KG/MIN: 10 INJECTION, EMULSION INTRAVENOUS at 05:06

## 2018-01-01 RX ADMIN — PROPOFOL 45 MCG/KG/MIN: 10 INJECTION, EMULSION INTRAVENOUS at 01:06

## 2018-01-01 RX ADMIN — METHYLPREDNISOLONE SODIUM SUCCINATE: 1 INJECTION, POWDER, LYOPHILIZED, FOR SOLUTION INTRAMUSCULAR; INTRAVENOUS at 08:06

## 2018-01-01 RX ADMIN — DEXMEDETOMIDINE HYDROCHLORIDE 1 MCG/KG/HR: 100 INJECTION, SOLUTION, CONCENTRATE INTRAVENOUS at 07:06

## 2018-01-01 RX ADMIN — SODIUM CHLORIDE 500 ML: 0.9 INJECTION, SOLUTION INTRAVENOUS at 04:06

## 2018-01-01 RX ADMIN — DEXMEDETOMIDINE HYDROCHLORIDE 1 MCG/KG/HR: 100 INJECTION, SOLUTION, CONCENTRATE INTRAVENOUS at 11:06

## 2018-01-01 RX ADMIN — SODIUM PHOSPHATE, MONOBASIC, MONOHYDRATE 30 MMOL: 276; 142 INJECTION, SOLUTION INTRAVENOUS at 03:06

## 2018-01-01 RX ADMIN — LABETALOL HYDROCHLORIDE 10 MG: 5 INJECTION, SOLUTION INTRAVENOUS at 08:06

## 2018-01-01 RX ADMIN — HEPARIN SODIUM 5000 UNITS: 5000 INJECTION, SOLUTION INTRAVENOUS; SUBCUTANEOUS at 06:06

## 2018-01-01 RX ADMIN — DEXMEDETOMIDINE HYDROCHLORIDE 1.4 MCG/KG/HR: 100 INJECTION, SOLUTION, CONCENTRATE INTRAVENOUS at 10:06

## 2018-01-01 RX ADMIN — CISATRACURIUM BESYLATE 3.5 MCG/KG/MIN: 10 INJECTION INTRAVENOUS at 09:06

## 2018-01-01 RX ADMIN — Medication 300 MCG/HR: at 05:06

## 2018-01-01 RX ADMIN — SODIUM PHOSPHATE, MONOBASIC, MONOHYDRATE 30 MMOL: 276; 142 INJECTION, SOLUTION INTRAVENOUS at 05:06

## 2018-01-01 RX ADMIN — CALCIUM CHLORIDE 1 G: 100 INJECTION INTRAVENOUS; INTRAVENTRICULAR at 03:06

## 2018-01-01 RX ADMIN — METHYLPREDNISOLONE SODIUM SUCCINATE: 1 INJECTION, POWDER, LYOPHILIZED, FOR SOLUTION INTRAMUSCULAR; INTRAVENOUS at 09:06

## 2018-01-01 RX ADMIN — DEXMEDETOMIDINE HYDROCHLORIDE 0.7 MCG/KG/HR: 100 INJECTION, SOLUTION, CONCENTRATE INTRAVENOUS at 07:06

## 2018-01-01 RX ADMIN — DEXMEDETOMIDINE HYDROCHLORIDE 1.4 MCG/KG/HR: 100 INJECTION, SOLUTION, CONCENTRATE INTRAVENOUS at 12:06

## 2018-01-01 RX ADMIN — LABETALOL HYDROCHLORIDE 20 MG: 5 INJECTION, SOLUTION INTRAVENOUS at 03:06

## 2018-01-01 RX ADMIN — Medication 0.02 MCG/KG/MIN: at 04:06

## 2018-01-01 RX ADMIN — PROPOFOL 50 MCG/KG/MIN: 10 INJECTION, EMULSION INTRAVENOUS at 04:06

## 2018-01-01 RX ADMIN — Medication 150 MCG/HR: at 01:06

## 2018-01-01 RX ADMIN — MINERAL OIL AND WHITE PETROLATUM: 150; 830 OINTMENT OPHTHALMIC at 01:06

## 2018-01-01 RX ADMIN — NICARDIPINE HYDROCHLORIDE 1 MG/HR: 0.2 INJECTION, SOLUTION INTRAVENOUS at 05:06

## 2018-01-01 RX ADMIN — SODIUM PHOSPHATE, MONOBASIC, MONOHYDRATE 30 MMOL: 276; 142 INJECTION, SOLUTION INTRAVENOUS at 06:06

## 2018-01-01 RX ADMIN — Medication 300 MCG/HR: at 02:06

## 2018-01-01 RX ADMIN — PROPOFOL 50 MCG/KG/MIN: 10 INJECTION, EMULSION INTRAVENOUS at 03:06

## 2018-01-01 RX ADMIN — SCOPALAMINE 1 PATCH: 1 PATCH, EXTENDED RELEASE TRANSDERMAL at 02:06

## 2018-01-01 RX ADMIN — DEXMEDETOMIDINE HYDROCHLORIDE 1 MCG/KG/HR: 100 INJECTION, SOLUTION, CONCENTRATE INTRAVENOUS at 02:06

## 2018-01-01 RX ADMIN — Medication 250 MCG/HR: at 12:06

## 2018-01-01 RX ADMIN — DEXTROSE MONOHYDRATE, SODIUM CITRATE, AND CITRIC ACID MONOHYDRATE: 2.45; 2.2; .8 INJECTION, SOLUTION INTRAVENOUS at 06:06

## 2018-01-01 RX ADMIN — CISATRACURIUM BESYLATE 3 MCG/KG/MIN: 10 INJECTION INTRAVENOUS at 12:06

## 2018-01-01 RX ADMIN — ATOVAQUONE 750 MG: 750 SUSPENSION ORAL at 10:06

## 2018-01-01 RX ADMIN — MAGNESIUM SULFATE IN WATER 2 G: 40 INJECTION, SOLUTION INTRAVENOUS at 04:06

## 2018-01-01 RX ADMIN — SODIUM CHLORIDE 3.8 UNITS/HR: 9 INJECTION, SOLUTION INTRAVENOUS at 12:06

## 2018-01-01 RX ADMIN — FLUCONAZOLE IN SODIUM CHLORIDE 800 MG: 2 INJECTION, SOLUTION INTRAVENOUS at 12:06

## 2018-01-01 RX ADMIN — Medication 300 MCG/HR: at 12:06

## 2018-01-01 RX ADMIN — NICARDIPINE HYDROCHLORIDE 5 MG/HR: 0.2 INJECTION, SOLUTION INTRAVENOUS at 12:06

## 2018-01-01 RX ADMIN — PROPOFOL 40 MCG/KG/MIN: 10 INJECTION, EMULSION INTRAVENOUS at 01:06

## 2018-01-01 RX ADMIN — GLYCOPYRROLATE 0.1 MG: 0.2 INJECTION INTRAMUSCULAR; INTRAVENOUS at 02:06

## 2018-01-01 RX ADMIN — Medication 0.02 MCG/KG/MIN: at 01:06

## 2018-01-01 RX ADMIN — CLOSTRIDIUM TETANI TOXOID ANTIGEN (FORMALDEHYDE INACTIVATED), CORYNEBACTERIUM DIPHTHERIAE TOXOID ANTIGEN (FORMALDEHYDE INACTIVATED), BORDETELLA PERTUSSIS TOXOID ANTIGEN (GLUTARALDEHYDE INACTIVATED), BORDETELLA PERTUSSIS FILAMENTOUS HEMAGGLUTININ ANTIGEN (FORMALDEHYDE INACTIVATED), BORDETELLA PERTUSSIS PERTACTIN ANTIGEN, AND BORDETELLA PERTUSSIS FIMBRIAE 2/3 ANTIGEN 0.5 ML: 5; 2; 2.5; 5; 3; 5 INJECTION, SUSPENSION INTRAMUSCULAR at 04:06

## 2018-01-01 RX ADMIN — CISATRACURIUM BESYLATE 3 MCG/KG/MIN: 10 INJECTION INTRAVENOUS at 06:06

## 2018-01-01 RX ADMIN — HYDRALAZINE HYDROCHLORIDE 10 MG: 20 INJECTION INTRAMUSCULAR; INTRAVENOUS at 06:06

## 2018-01-01 RX ADMIN — Medication 2500 MCG: at 08:06

## 2018-01-01 RX ADMIN — DEXMEDETOMIDINE HYDROCHLORIDE 1.4 MCG/KG/HR: 100 INJECTION, SOLUTION, CONCENTRATE INTRAVENOUS at 02:06

## 2018-01-01 RX ADMIN — NOREPINEPHRINE BITARTRATE 0.1 MCG/KG/MIN: 1 INJECTION, SOLUTION, CONCENTRATE INTRAVENOUS at 04:06

## 2018-01-01 RX ADMIN — Medication 2500 MCG: at 01:06

## 2018-01-01 RX ADMIN — INSULIN ASPART 2 UNITS: 100 INJECTION, SOLUTION INTRAVENOUS; SUBCUTANEOUS at 09:06

## 2018-01-01 RX ADMIN — SODIUM PHOSPHATE, MONOBASIC, MONOHYDRATE 20.01 MMOL: 276; 142 INJECTION, SOLUTION INTRAVENOUS at 11:06

## 2018-01-01 RX ADMIN — PROPOFOL 15 MCG/KG/MIN: 10 INJECTION, EMULSION INTRAVENOUS at 07:06

## 2018-01-01 RX ADMIN — FAMOTIDINE 20 MG: 10 INJECTION INTRAVENOUS at 02:06

## 2018-01-01 RX ADMIN — SODIUM PHOSPHATE, MONOBASIC, MONOHYDRATE AND SODIUM PHOSPHATE, DIBASIC, ANHYDROUS 30 MMOL: 276; 142 INJECTION, SOLUTION INTRAVENOUS at 05:06

## 2018-01-01 RX ADMIN — PROPOFOL 40 MCG/KG/MIN: 10 INJECTION, EMULSION INTRAVENOUS at 08:06

## 2018-01-01 RX ADMIN — Medication 150 MCG: at 04:06

## 2018-01-01 RX ADMIN — CISATRACURIUM BESYLATE 3 MCG/KG/MIN: 10 INJECTION INTRAVENOUS at 10:06

## 2018-01-01 RX ADMIN — VANCOMYCIN HYDROCHLORIDE 3000 MG: 1 INJECTION, POWDER, LYOPHILIZED, FOR SOLUTION INTRAVENOUS at 12:06

## 2018-01-01 RX ADMIN — CISATRACURIUM BESYLATE 1 MCG/KG/MIN: 10 INJECTION INTRAVENOUS at 11:06

## 2018-01-01 RX ADMIN — LORAZEPAM 4 MG: 2 INJECTION INTRAMUSCULAR; INTRAVENOUS at 12:06

## 2018-01-01 RX ADMIN — PIPERACILLIN AND TAZOBACTAM 4.5 G: 4; .5 INJECTION, POWDER, LYOPHILIZED, FOR SOLUTION INTRAVENOUS; PARENTERAL at 04:06

## 2018-01-01 RX ADMIN — INSULIN ASPART 2 UNITS: 100 INJECTION, SOLUTION INTRAVENOUS; SUBCUTANEOUS at 03:06

## 2018-01-01 RX ADMIN — DEXMEDETOMIDINE HYDROCHLORIDE 1.4 MCG/KG/HR: 100 INJECTION, SOLUTION, CONCENTRATE INTRAVENOUS at 03:06

## 2018-01-01 RX ADMIN — Medication 300 MCG/HR: at 06:06

## 2018-01-01 RX ADMIN — PIPERACILLIN AND TAZOBACTAM 4.5 G: 4; .5 INJECTION, POWDER, LYOPHILIZED, FOR SOLUTION INTRAVENOUS; PARENTERAL at 01:06

## 2018-01-01 RX ADMIN — PROPOFOL 40 MCG/KG/MIN: 10 INJECTION, EMULSION INTRAVENOUS at 04:06

## 2018-01-01 RX ADMIN — LABETALOL HYDROCHLORIDE 20 MG: 5 INJECTION, SOLUTION INTRAVENOUS at 02:06

## 2018-01-01 RX ADMIN — VANCOMYCIN HYDROCHLORIDE 3250 MG: 1 INJECTION, POWDER, LYOPHILIZED, FOR SOLUTION INTRAVENOUS at 12:06

## 2018-01-01 RX ADMIN — LORAZEPAM 1 MG: 2 INJECTION INTRAMUSCULAR at 12:06

## 2018-01-01 RX ADMIN — PIPERACILLIN AND TAZOBACTAM 4.5 G: 4; .5 INJECTION, POWDER, LYOPHILIZED, FOR SOLUTION INTRAVENOUS; PARENTERAL at 02:06

## 2018-01-01 RX ADMIN — Medication 0.02 MCG/KG/MIN: at 09:06

## 2018-01-01 RX ADMIN — DEXMEDETOMIDINE HYDROCHLORIDE 1.4 MCG/KG/HR: 100 INJECTION, SOLUTION, CONCENTRATE INTRAVENOUS at 09:06

## 2018-01-01 RX ADMIN — INSULIN ASPART 4 UNITS: 100 INJECTION, SOLUTION INTRAVENOUS; SUBCUTANEOUS at 08:06

## 2018-01-01 RX ADMIN — SODIUM PHOSPHATE, MONOBASIC, MONOHYDRATE AND SODIUM PHOSPHATE, DIBASIC, ANHYDROUS 20.01 MMOL: 276; 142 INJECTION, SOLUTION INTRAVENOUS at 01:06

## 2018-01-01 RX ADMIN — CEFEPIME 1 G: 1 INJECTION, POWDER, FOR SOLUTION INTRAMUSCULAR; INTRAVENOUS at 02:06

## 2018-01-01 RX ADMIN — FAMOTIDINE 20 MG: 20 TABLET ORAL at 10:06

## 2018-01-01 RX ADMIN — Medication 300 MCG/HR: at 10:06

## 2018-01-01 RX ADMIN — Medication 125 MCG/HR: at 02:06

## 2018-01-01 RX ADMIN — IPRATROPIUM BROMIDE AND ALBUTEROL SULFATE 3 ML: .5; 3 SOLUTION RESPIRATORY (INHALATION) at 12:06

## 2018-01-01 RX ADMIN — CALCIUM GLUCONATE 3000 MG: 98 INJECTION, SOLUTION INTRAVENOUS at 11:06

## 2018-01-01 RX ADMIN — MORPHINE SULFATE 10 MG/HR: 10 INJECTION INTRAMUSCULAR; INTRAVENOUS; SUBCUTANEOUS at 02:06

## 2018-01-01 RX ADMIN — PROPOFOL 50 MCG/KG/MIN: 10 INJECTION, EMULSION INTRAVENOUS at 01:06

## 2018-01-01 RX ADMIN — INSULIN ASPART 3 UNITS: 100 INJECTION, SOLUTION INTRAVENOUS; SUBCUTANEOUS at 06:06

## 2018-01-01 RX ADMIN — CALCIUM GLUCONATE 1 G: 94 INJECTION, SOLUTION INTRAVENOUS at 03:06

## 2018-01-01 RX ADMIN — PROPOFOL 50 MCG/KG/MIN: 10 INJECTION, EMULSION INTRAVENOUS at 10:06

## 2018-01-01 RX ADMIN — PROPOFOL 50 MCG/KG/MIN: 10 INJECTION, EMULSION INTRAVENOUS at 09:06

## 2018-01-01 RX ADMIN — DEXTROSE: 50 INJECTION, SOLUTION INTRAVENOUS at 03:06

## 2018-01-01 RX ADMIN — PIPERACILLIN AND TAZOBACTAM 4.5 G: 4; .5 INJECTION, POWDER, LYOPHILIZED, FOR SOLUTION INTRAVENOUS; PARENTERAL at 12:06

## 2018-01-01 RX ADMIN — SODIUM CHLORIDE 6 UNITS/HR: 9 INJECTION, SOLUTION INTRAVENOUS at 08:06

## 2018-01-01 RX ADMIN — MIDAZOLAM 0.5 MG/HR: 5 INJECTION INTRAMUSCULAR; INTRAVENOUS at 12:06

## 2018-01-01 RX ADMIN — Medication 100 ML: at 04:06

## 2018-01-01 RX ADMIN — DIPHENHYDRAMINE HYDROCHLORIDE 50 MG: 50 INJECTION, SOLUTION INTRAMUSCULAR; INTRAVENOUS at 09:06

## 2018-01-01 RX ADMIN — PROPOFOL 30 MCG/KG/MIN: 10 INJECTION, EMULSION INTRAVENOUS at 06:06

## 2018-01-01 RX ADMIN — DEXMEDETOMIDINE HYDROCHLORIDE 0.4 MCG/KG/HR: 100 INJECTION, SOLUTION, CONCENTRATE INTRAVENOUS at 08:06

## 2018-01-01 RX ADMIN — METHYLPREDNISOLONE SODIUM SUCCINATE 125 MG: 125 INJECTION, POWDER, FOR SOLUTION INTRAMUSCULAR; INTRAVENOUS at 09:06

## 2018-01-01 RX ADMIN — HYDRALAZINE HYDROCHLORIDE 10 MG: 20 INJECTION INTRAMUSCULAR; INTRAVENOUS at 07:06

## 2018-01-01 RX ADMIN — CALCIUM CHLORIDE 1 G: 100 INJECTION INTRAVENOUS; INTRAVENTRICULAR at 10:06

## 2018-01-01 RX ADMIN — MINERAL OIL AND WHITE PETROLATUM: 150; 830 OINTMENT OPHTHALMIC at 03:06

## 2018-01-01 RX ADMIN — DEXMEDETOMIDINE HYDROCHLORIDE 1.4 MCG/KG/HR: 100 INJECTION, SOLUTION, CONCENTRATE INTRAVENOUS at 01:06

## 2018-01-01 RX ADMIN — CISATRACURIUM BESYLATE 7.5 MCG/KG/MIN: 10 INJECTION INTRAVENOUS at 10:06

## 2018-01-01 RX ADMIN — SODIUM CHLORIDE 1.7 UNITS/HR: 9 INJECTION, SOLUTION INTRAVENOUS at 06:06

## 2018-01-01 RX ADMIN — ALTEPLASE 2 MG: 2.2 INJECTION, POWDER, LYOPHILIZED, FOR SOLUTION INTRAVENOUS at 11:06

## 2018-01-01 RX ADMIN — NICARDIPINE HYDROCHLORIDE 5 MG/HR: 0.2 INJECTION, SOLUTION INTRAVENOUS at 11:06

## 2018-01-01 RX ADMIN — DEXMEDETOMIDINE HYDROCHLORIDE 1 MCG/KG/HR: 100 INJECTION, SOLUTION, CONCENTRATE INTRAVENOUS at 03:06

## 2018-01-01 RX ADMIN — SODIUM PHOSPHATE, MONOBASIC, MONOHYDRATE 39.99 MMOL: 276; 142 INJECTION, SOLUTION INTRAVENOUS at 04:06

## 2018-01-01 RX ADMIN — LABETALOL HYDROCHLORIDE 20 MG: 5 INJECTION, SOLUTION INTRAVENOUS at 10:06

## 2018-01-01 RX ADMIN — CALCIUM GLUCONATE 1000 MG: 98 INJECTION, SOLUTION INTRAVENOUS at 04:06

## 2018-01-01 RX ADMIN — PIPERACILLIN AND TAZOBACTAM 4.5 G: 4; .5 INJECTION, POWDER, LYOPHILIZED, FOR SOLUTION INTRAVENOUS; PARENTERAL at 06:06

## 2018-01-01 RX ADMIN — VANCOMYCIN HYDROCHLORIDE 1750 MG: 10 INJECTION, POWDER, LYOPHILIZED, FOR SOLUTION INTRAVENOUS at 12:06

## 2018-01-01 RX ADMIN — ACETAMINOPHEN 650 MG: 325 TABLET ORAL at 04:06

## 2018-01-01 RX ADMIN — Medication 40 MG: at 02:06

## 2018-01-01 RX ADMIN — DEXTROSE MONOHYDRATE, SODIUM CITRATE, AND CITRIC ACID MONOHYDRATE: 2.45; 2.2; .8 INJECTION, SOLUTION INTRAVENOUS at 02:06

## 2018-01-01 RX ADMIN — LORAZEPAM 1 MG: 2 INJECTION INTRAMUSCULAR at 11:06

## 2018-01-01 RX ADMIN — NICARDIPINE HYDROCHLORIDE 1 MG/HR: 0.2 INJECTION, SOLUTION INTRAVENOUS at 07:06

## 2018-01-01 RX ADMIN — VANCOMYCIN 1.75 GRAM/500 ML IN 0.9 % SODIUM CHLORIDE INTRAVENOUS 1750 MG: at 10:06

## 2018-01-01 RX ADMIN — MAGNESIUM SULFATE IN WATER 2 G: 40 INJECTION, SOLUTION INTRAVENOUS at 06:06

## 2018-01-01 RX ADMIN — HEPARIN SODIUM 5000 UNITS: 5000 INJECTION, SOLUTION INTRAVENOUS; SUBCUTANEOUS at 08:06

## 2018-01-01 RX ADMIN — LORAZEPAM 1 MG: 2 INJECTION INTRAMUSCULAR at 09:06

## 2018-01-01 RX ADMIN — ALBUMIN HUMAN 225 G: 0.05 INJECTION, SOLUTION INTRAVENOUS at 05:06

## 2018-01-01 RX ADMIN — INSULIN ASPART 1 UNITS: 100 INJECTION, SOLUTION INTRAVENOUS; SUBCUTANEOUS at 12:06

## 2018-01-01 RX ADMIN — DEXMEDETOMIDINE HYDROCHLORIDE 1.4 MCG/KG/HR: 100 INJECTION, SOLUTION, CONCENTRATE INTRAVENOUS at 04:06

## 2018-01-01 RX ADMIN — ACETAMINOPHEN 650 MG: 325 TABLET ORAL at 09:06

## 2018-01-01 RX ADMIN — DEXMEDETOMIDINE HYDROCHLORIDE 0.7 MCG/KG/HR: 100 INJECTION, SOLUTION, CONCENTRATE INTRAVENOUS at 02:06

## 2018-01-01 RX ADMIN — Medication 2000 MG: at 08:06

## 2018-01-01 RX ADMIN — PROPOFOL 40 MCG/KG/MIN: 10 INJECTION, EMULSION INTRAVENOUS at 09:06

## 2018-01-01 RX ADMIN — FAMOTIDINE 20 MG: 10 INJECTION INTRAVENOUS at 11:06

## 2018-01-01 RX ADMIN — DEXMEDETOMIDINE HYDROCHLORIDE 1 MCG/KG/HR: 100 INJECTION, SOLUTION, CONCENTRATE INTRAVENOUS at 06:06

## 2018-01-01 RX ADMIN — PROPOFOL 30 MCG/KG/MIN: 10 INJECTION, EMULSION INTRAVENOUS at 10:06

## 2018-01-01 RX ADMIN — PROPOFOL 35 MCG/KG/MIN: 10 INJECTION, EMULSION INTRAVENOUS at 09:06

## 2018-01-01 RX ADMIN — SODIUM CHLORIDE 500 ML: 0.9 INJECTION, SOLUTION INTRAVENOUS at 09:06

## 2018-01-01 RX ADMIN — IPRATROPIUM BROMIDE AND ALBUTEROL SULFATE 3 ML: .5; 3 SOLUTION RESPIRATORY (INHALATION) at 09:06

## 2018-01-01 RX ADMIN — VANCOMYCIN HYDROCHLORIDE 2500 MG: 1 INJECTION, POWDER, LYOPHILIZED, FOR SOLUTION INTRAVENOUS at 10:06

## 2018-01-01 RX ADMIN — Medication 250 MCG/HR: at 08:06

## 2018-01-01 RX ADMIN — CALCIUM GLUCONATE 3000 MG: 98 INJECTION, SOLUTION INTRAVENOUS at 10:06

## 2018-01-01 RX ADMIN — CALCIUM GLUCONATE 1 G: 94 INJECTION, SOLUTION INTRAVENOUS at 04:06

## 2018-01-01 RX ADMIN — VASOPRESSIN 0.04 UNITS/MIN: 20 INJECTION INTRAVENOUS at 09:06

## 2018-01-01 RX ADMIN — DEXTROSE MONOHYDRATE, SODIUM CITRATE, AND CITRIC ACID MONOHYDRATE: 2.45; 2.2; .8 INJECTION, SOLUTION INTRAVENOUS at 04:06

## 2018-01-01 RX ADMIN — Medication 0.02 MCG/KG/MIN: at 12:06

## 2018-01-01 RX ADMIN — PROPOFOL 45 MCG/KG/MIN: 10 INJECTION, EMULSION INTRAVENOUS at 04:06

## 2018-01-01 RX ADMIN — RITUXIMAB 1000 MG: 10 INJECTION, SOLUTION INTRAVENOUS at 04:06

## 2018-01-01 RX ADMIN — VANCOMYCIN 1.75 GRAM/500 ML IN 0.9 % SODIUM CHLORIDE INTRAVENOUS 1750 MG: at 11:06

## 2018-01-01 RX ADMIN — DEXTROSE 2 MCG/KG/MIN: 50 INJECTION, SOLUTION INTRAVENOUS at 10:06

## 2018-01-01 RX ADMIN — MAGESIUM CITRATE 296 ML: 1.75 LIQUID ORAL at 04:06

## 2018-01-01 RX ADMIN — ATOVAQUONE 750 MG: 750 SUSPENSION ORAL at 04:06

## 2018-01-01 RX ADMIN — SODIUM PHOSPHATE, MONOBASIC, MONOHYDRATE 20.01 MMOL: 276; 142 INJECTION, SOLUTION INTRAVENOUS at 01:06

## 2018-01-01 RX ADMIN — VASOPRESSIN 0.04 UNITS/MIN: 20 INJECTION INTRAVENOUS at 01:06

## 2018-01-01 RX ADMIN — Medication 300 MCG: at 03:06

## 2018-01-01 RX ADMIN — NICARDIPINE HYDROCHLORIDE 2.5 MG/HR: 0.2 INJECTION, SOLUTION INTRAVENOUS at 08:06

## 2018-01-01 RX ADMIN — MAGNESIUM SULFATE IN WATER 2 G: 40 INJECTION, SOLUTION INTRAVENOUS at 10:06

## 2018-01-01 RX ADMIN — CISATRACURIUM BESYLATE 3 MCG/KG/MIN: 10 INJECTION INTRAVENOUS at 01:06

## 2018-01-01 RX ADMIN — IPRATROPIUM BROMIDE AND ALBUTEROL SULFATE 3 ML: .5; 3 SOLUTION RESPIRATORY (INHALATION) at 08:06

## 2018-01-01 RX ADMIN — LORAZEPAM 1 MG: 2 INJECTION INTRAMUSCULAR at 07:06

## 2018-01-01 RX ADMIN — Medication 2500 MCG: at 11:06

## 2018-01-01 RX ADMIN — Medication 1250 MG: at 03:06

## 2018-01-01 RX ADMIN — CISATRACURIUM BESYLATE 4 MCG/KG/MIN: 10 INJECTION INTRAVENOUS at 02:06

## 2018-01-01 RX ADMIN — DEXMEDETOMIDINE HYDROCHLORIDE 0.9 MCG/KG/HR: 100 INJECTION, SOLUTION, CONCENTRATE INTRAVENOUS at 08:06

## 2018-01-01 RX ADMIN — Medication 0.22 MCG/KG/MIN: at 04:06

## 2018-01-01 RX ADMIN — SODIUM PHOSPHATE, MONOBASIC, MONOHYDRATE 39.99 MMOL: 276; 142 INJECTION, SOLUTION INTRAVENOUS at 08:06

## 2018-06-10 PROBLEM — J80 ARDS (ADULT RESPIRATORY DISTRESS SYNDROME): Status: ACTIVE | Noted: 2018-01-01

## 2018-06-10 PROBLEM — N17.9 AKI (ACUTE KIDNEY INJURY): Status: ACTIVE | Noted: 2018-01-01

## 2018-06-10 PROBLEM — R65.21 SEPTIC SHOCK: Status: ACTIVE | Noted: 2018-01-01

## 2018-06-10 PROBLEM — A41.9 SEPTIC SHOCK: Status: ACTIVE | Noted: 2018-01-01

## 2018-06-10 PROBLEM — E11.8 DIABETES MELLITUS TYPE 2 WITH COMPLICATIONS: Status: ACTIVE | Noted: 2018-01-01

## 2018-06-10 PROBLEM — M31.0: Status: ACTIVE | Noted: 2018-01-01

## 2018-06-10 PROBLEM — J96.01 ACUTE HYPOXEMIC RESPIRATORY FAILURE: Status: ACTIVE | Noted: 2018-01-01

## 2018-06-10 PROBLEM — R04.89 DIFFUSE PULMONARY ALVEOLAR HEMORRHAGE: Status: ACTIVE | Noted: 2018-01-01

## 2018-06-11 PROBLEM — D62 ACUTE POSTHEMORRHAGIC ANEMIA: Status: ACTIVE | Noted: 2018-01-01

## 2018-06-11 PROBLEM — J96.01 ACUTE HYPOXEMIC RESPIRATORY FAILURE: Status: ACTIVE | Noted: 2018-01-01

## 2018-06-11 NOTE — CONSULTS
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 ANCA-associated RPGN  Assessment/Recommendations: Daily to every other day TPE with FFP until resolution of DAH, treatment continuation afterwards dependant on immunologic/biopsy results.        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 The patient was  transferred to [facility] for therapeutic plasma exchange and is currently intubated for respiratory failure. No ANCA or anti-GBM antibody results available for review.       PMH and PSH reviewed 06/11/2018 and relevant items addressed in HPI.    Review of patient's allergies indicates:   Allergen Reactions    Sulfa (sulfonamide antibiotics)        All medications reviewed 06/11/2018 and ace inhibitors not identified.    Family History     None        Social History Main Topics    Smoking status: Not on file    Smokeless tobacco: Not on file    Alcohol use Not on file    Drug use: Unknown    Sexual activity: Not on file     Review of Systems  Objective:     Vital Signs (Most Recent):  Temp: 97.7 °F (36.5 °C) (06/11/18 0705)  Pulse: 95 (06/11/18 0800)  Resp: (!) 32 (06/11/18 0800)  BP: 133/62 (06/11/18 0800)  SpO2: 97 % (06/11/18 0800) Vital Signs (24h Range):  Temp:  [97.5 °F (36.4 °C)-98.3 °F (36.8 °C)] 97.7 °F (36.5 °C)  Pulse:  [] 95  Resp:   [8-34] 32  SpO2:  [93 %-100 %] 97 %  BP: ()/(50-77) 133/62  Arterial Line BP: ()/(48-90) 123/56     Weight: 86.4 kg (190 lb 7.6 oz)    Physical Exam   Nursing note and vitals reviewed.      Significant Labs:   BMP:   Recent Labs  Lab 06/11/18  0351   *   *   K 3.7   CL 94*   CO2 23   BUN 92*   CREATININE 8.8*   CALCIUM 7.0*   MG 2.0     CBC:   Recent Labs  Lab 06/11/18  0012 06/11/18  0351 06/11/18  0601   WBC 12.20 10.81 10.56   HGB 8.1* 8.1* 7.9*   HCT 23.8* 23.0* 23.1*    175 178     All pertinent labs within the past 24 hours have been reviewed.    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 Journal of Clinical Apheresis 2016; 31:149-162.) This diagnosis supports daily to every other day TPE with FFP until resolution of DAH, then TPE with albumin replacement every 2-3 days for a total of 6-9 procedures.    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    The TPE plan is as follows:     * Diffuse pulmonary alveolar hemorrhage    Access: RIJ   Number of Procedures: tbd based upon lab results.  Schedule: today (6/11) then tbd  Volume: 4.5L  Replacement Fluid: Fresh Frozen Plasma  Recommended Laboratory Studies: Complete Blood Count            Yeni Whaley MD, PhD  Section of Transfusion Medicine & Histocompatibility  Department of Pathology and Laboratory Medicine  [facility]  [phone] (HLA & Blood Bank  Offices)  06/11/2018

## 2018-06-11 NOTE — CONSULTS
[facility]  Rheumatology  Consult Note    Patient Name: Jasmeet Raymond  MRN: [id number]  Admission Date: 6/10/2018  Hospital Length of Stay: 1 days  Code Status: Full Code   Attending Provider: Everette Hdz*  Primary Care Physician: Provider Notinsystem  Principal Problem:Diffuse pulmonary alveolar hemorrhage    Inpatient consult to Rheumatology  Consult performed by: SHANNA OTTO  Consult ordered by: SNOW ARRIAZA        Subjective:     HPI:  Mr. Jasmeet Raymond, 63 year old male with PMHx significant for HTN, DM and arthirtis presented to [facility] and [facility] with symptoms of pneumonia and shortness of breath. On 5/29/2018, he presented with symptoms of bronchitis with denies chills, ear pain, sore throat, chest pain, shortness of breath at that time, received antibiotics. Then on 6/8/2018 he presented again to [facility] and [facility] with shortness of breath and cough, and his CXR showed concern for airway disease. However on the first day of admission at [facility] and [facility], his respiratory symptoms worsen and had acute hypoxic respiratory failure that required intubation. Pulmonary consulted and he underwent Bronchoscopy and  revealed alveolar hemorrhage, with the setting of having hematuria and diffuse alveloar hemorrhage the concern was for presence of pulmonary renal syndrome and he received pulsed dose of Methylprednisolone and nephrology consulted for plasmapheresis. However at [facility], plasmapheresis machine is broken and they recommend to transfer to [facility] [place] for plasmapheresis. Admitted here 6/11 for ARDS.     On arrival he was on Nibmex for paralytics, Propofol for sedation, Levophed (eventaully turned off) for pressure support, Flolan (Epoprostenol sodium) inhaled which was switched to Inhaled Nitro. His mechanical ventilation initially was on FiO2 100%, PEEP  16, then weaned his FiO2 with high requirement of oxygenation. He had an signifiacnt drop in his Hb (baseline ~ 12) dated one week prior presentation to 9 and 8, required one pRBC prior arrival. Repeated CXR showed extensive bilateral infiltrates. Important laboratory investigation showed His , , pro calcitonin 4.4, C3 and C4 complement normal., Trop negative, C3 and C4 showed normal , Hep test is negative, Lactic acid 1.8 > 1.0, HIV negative, Procal 4.4, UA shoowed heamturia and trace leukocytes, Urine culture from 5/29 showed almost pansensitve Cedecea davisae.    Rheumatology consulted for pulmonary-renal syndrome, recs for steroids and further investigation.     No past medical history on file.    No past surgical history on file.    Review of patient's allergies indicates:   Allergen Reactions    Sulfa (sulfonamide antibiotics)        Family History     None        Social History Main Topics    Smoking status: Not on file    Smokeless tobacco: Not on file    Alcohol use Not on file    Drug use: Unknown    Sexual activity: Not on file      Review of Systems   Unable to perform ROS: Intubated     Objective:     Vital Signs (Most Recent):  Temp: 97.6 °F (36.4 °C) (06/11/18 0915)  Pulse: 92 (06/11/18 0915)  Resp: (!) 32 (06/11/18 0915)  BP: 133/62 (06/11/18 0800)  SpO2: 96 % (06/11/18 0915) Vital Signs (24h Range):  Temp:  [97.5 °F (36.4 °C)-98.3 °F (36.8 °C)] 97.6 °F (36.4 °C)  Pulse:  [] 92  Resp:  [8-34] 32  SpO2:  [93 %-100 %] 96 %  BP: ()/(50-77) 133/62  Arterial Line BP: ()/(48-90) 130/57   Weight: 86.4 kg (190 lb 7.6 oz)  Body mass index is 28.96 kg/m².      Intake/Output Summary (Last 24 hours) at 06/11/18 0923  Last data filed at 06/11/18 0800   Gross per 24 hour   Intake             1139 ml   Output              135 ml   Net             1004 ml       Physical Exam   Constitutional: He appears well-developed. No distress.   HENT:   Head: Normocephalic and  atraumatic.   Right Ear: External ear normal.   Left Ear: External ear normal.   Eyes: Pupils are equal, round, and reactive to light. Right eye exhibits no discharge. Left eye exhibits no discharge.   No corneal reflex per primary team   Neck: Neck supple. No JVD present.   Cardiovascular: Normal rate, regular rhythm and intact distal pulses.    No murmur heard.  Pulmonary/Chest: No respiratory distress. He has no wheezes. He has rales (b/l).   Intubated. Vent Mode: A/C  Oxygen Concentration (%):  () 51  Resp Rate Total:  (28 br/min-34 br/min) 32 br/min  Vt Set:  (350 mL-430 mL) 350 mL  PEEP/CPAP:  (12 tuG44-07 cmH20) 15 cmH20  Mean Airway Pressure:  (21 qoD45-85 cmH20) 22 cmH20   Abdominal: Soft. He exhibits no distension. There is no tenderness.   no BS appreciated   Musculoskeletal: He exhibits edema (2+ b/l).   Neurological:   Sedated   Skin: Skin is warm and dry. No rash noted. He is not diaphoretic. No erythema.   Psychiatric:   Unable to assess   Vitals reviewed.      Vents:  Vent Mode: A/C (06/11/18 0745)  Ventilator Initiated: Yes (06/10/18 2217)  Set Rate: 32 bmp (06/11/18 0745)  Vt Set: 350 mL (06/11/18 0745)  PEEP/CPAP: 16 cmH20 (06/11/18 0745)  Oxygen Concentration (%): 50 (06/11/18 0915)  Peak Airway Pressure: 40 cmH2O (06/11/18 0745)  Plateau Pressure: 31 cmH20 (06/11/18 0745)  Total Ve: 12.2 mL (06/11/18 0745)  F/VT Ratio<105 (RSBI): (!) 83.55 (06/11/18 0745)  Lines/Drains/Airways     Central Venous Catheter Line                 Hemodialysis Catheter 06/10/18 2307 right internal jugular less than 1 day         Percutaneous Central Line Insertion/Assessment - triple lumen  06/10/18 2305 left internal jugular less than 1 day          Drain                 NG/OG Tube 06/10/18 2309 [place] sump 18 Fr. Left nostril less than 1 day         Urethral Catheter 06/10/18 2737 Latex 16 Fr. less than 1 day          Airway                 Airway - Non-Surgical Endotracheal Tube -- days          Arterial Line                  Arterial Line 06/10/18 2304 Right Radial less than 1 day          Peripheral Intravenous Line                 Peripheral IV - Single Lumen 06/10/18 2308 Right Hand less than 1 day              Significant Labs:    CBC/Anemia Profile:    Recent Labs  Lab 06/11/18  0012 06/11/18  0351 06/11/18  0601   WBC 12.20 10.81 10.56   HGB 8.1* 8.1* 7.9*   HCT 23.8* 23.0* 23.1*    175 178   MCV 84 83 84   RDW 15.6* 15.4* 15.8*        Chemistries:    Recent Labs  Lab 06/10/18  2226 06/11/18  0351   *  134* 134*   K 3.8  3.8 3.7   CL 92*  92* 94*   CO2 24  24 23   BUN 94*  94* 92*   CREATININE 8.8*  8.8* 8.8*   CALCIUM 7.4*  7.4* 7.0*   ALBUMIN 1.9*  1.9* 1.8*   PROT 5.5*  5.5* 5.0*   BILITOT 0.6  0.6 0.6   ALKPHOS 67  67 62   ALT 45*  45* 39   AST 26  26 25   MG 2.2  2.2 2.0   PHOS 9.5*  9.5* 10.3*     Coagulation:     Recent Labs  Lab 06/11/18  0351   INR 1.1   APTT 26.2     Lactic Acid:     Recent Labs  Lab 06/10/18  2226 06/11/18  0351   LACTATE 2.7*  2.7* 1.8     Significant Imaging: I have reviewed all pertinent imaging results/findings within the past 24 hours.  I have reviewed and interpreted all pertinent imaging results/findings within the past 24 hours.    Assessment/Plan:     Pulmonary-renal syndrome    64 yo w/PMH of HTN and NIDDM, arthritis with a 2 wk history of SOB being treated for URI and UTI, admitted for acute hypoxic respiratory failure, ALEX with serum Cr 8.8 (prior Cr 2.5 on 5/29). He received one dose solumedrol and RRT X 1 at OSH. Diffuse bilateral pulmonary infiltrates seen on CXR, requiring intubation. UA with proteinuria, hematuria and wbc's, Bronchoscopy revealed hemorrahage. Transferred  to [facility] for plasmapheresis.  Rheumatology consulted for pulmonary renal syndrome, recs on steroids and further investigation.   - In the setting of diffuse alveolar hemorrhage and glomerulonephritis possible etiology is underlying autoimmune disorder  - unable to perform ROS  2/2 intubation/sedation. Per chart review, family hx negative for autoimmune disorders. Family reports left leg numbness and shuffling gait. Pets-bulldog  - Given methylprednisolone 1000 mg IV daily x 2 doses (x1 at OSH x1 here)   - Transfusion medicine was consulted for plasma exchange  - Nephrology consulted: they agree with plasmapheresis, pulse steroids, and rheumatology for consideration of cytoxan/immunosupressant. Would like a tissue diagnosis. If/when hemodynamcially stable would like a renal biopsy. Repeat Renal U/S. CRRT to begin after plasmapheresis for clearance/  and volume management     Plan:   - OSH labs: C3 wnl, C4 wnl, elevated UPCR 1.14, Hep B neg, Hep C neg, HIV neg  - elevated ESR 70, .5, procal >100, lactate 2.7 > 1.8  - Will f/u pending rheum labs: DENIZ, Antiglomerular basement membrane, dsDNA, ANCA, MPO, PR3, protein electrophoresis, immunofixation electrophoresis, immunoglobulins quant (IgG, A, M), TB quant gold, Hep B, Hep C, HIV, UPCR, cryo, APL labs, ACE, thyroid fxn  - Concern for vasculitis given DAH and hematuria. The nini-typical pulmonary-renal syndromes are GPA/MPA, Goodpasture disease, SLE. Immunofluorescence studies can help differentiate  - agree with nephrology for renal bx  - transfusion medicine: Daily to every other day TPE with FFP until resolution of DAH, treatment continuation afterwards dependant on immunologic/biopsy results  - f/u infectious work up (blood, urine, resp cx in process). Recommend ID consult for clearance from infection stand point prior to starting immunosuppressant therapy  - Cont pulse dose steroids IV methylprednisolone 1 g x 5 days (day 2/3). Cont PLEX  - Will consider steroid-sparing therapies including cyclophosphamide or rituximab, will discuss with staff about starting immunosuppressants with active infection. PCP prophylaxis will be needed if immunosuppression initiated.          Discussed with fellow. Staff attestation to follow.    Thank  you for your consult. I will follow-up with patient. Please contact us if you have any additional questions.    Kaelyn Birch MD  Rheumatology  [facility]    Patient seen and examined with resident.  All elements of history, physical exam and medical decision making independently confirmed by me.  63-year-old male with past medical history of diabetes, hypertension and osteoarthritis transferred for management of diffuse alveolar hemorrhage as well as renal failure.  Patient initially thought to have had pneumonia after developing shortness of breath.  He was treated with antibiotics and discharge.  He traveled to [place] but symptoms worsened.  He presented back to his local hospital with worsening shortness of breath and was found to have pulmonary infiltrates.  He developed acute hypoxic respiratory failure was intubated.  Bronchoscopy showed alveolar hemorrhage.  He also was found to have an elevated creatinine and hematuria.  Rheumatology consult for possible rheumatologic cause of pulmonary renal disorder.  Await laboratory studies.  Recommend continuing pulse dose steroids for a total of 5 days.  Recommend ordering above-listed labs if not already done.  Renal biopsy when possible.  Will consider other treatment options based on laboratory results.  Agree with the use of PLEX for diffuse alveolar hemorrhage.  Will follow with you.  See note for details.

## 2018-06-11 NOTE — HPI
This is Mr. Jasmeet Raymond, 63 year old male with PMHx significant for HTN, DM and arthirtis presented to [facility] and [facility] with symptoms of pneumonia and shortness of breath. On 5/29/2018, he presented with symptoms of bronchitis with denies chills, ear pain, sore throat, chest pain, shortness of breath at that time, received antibiotics. Then on 6/8/2018 he presented again to [facility] and [facility] with shortness of breath and cough, and his CXR showed concern for airway disease. However on the first day of admission at [facility] and [facility], his respiratory symptoms worsen and had acute hypoxic respiratory failure that required intubation. Pulmonary consulted and he underwent Bronchoscopy and  revealed alveolar hemorrhage, with the setting of having hematuria and diffuse alveloar hemorrhage the concern was for presence of pulmonary renal syndrome and he received pulsed dose of Methylprednisolone and nephrology consulted for plasmapheresis. However at [facility], plasmapheresis machine is broken and they recommend to transfer to [facility] for plasmapheresis.     On arrival he was on Nibmex for paralytics, Propofol for sedation, Levophed (eventaully turned off) for pressure support, Flolan (Epoprostenol sodium) inhaled which was switched to Inhaled Nitro. His mechanical ventilation initially was on FiO2 100%, PEEP 16, then weaned his FiO2 with high requirement of oxygenation. He had an signifiacnt drop in his Hb (baseline ~ 12) dated one week prior presentation to 9 and 8, required one pRBC prior arrival. Repeated CXR showed extensive bilateral infiltrates. Important laboratory investigation showed His , , pro calcitonin 4.4, C3 and C4 complement normal., Trop negative, C3 and C4 showed normal , Hep test is negative, Lactic acid 1.8 > 1.0, HIV negative, Procal 4.4, UA shoowed heamturia and trace  leukocytes, Urine culture from 5/29 showed almost pansensitve Cedecea davisae.

## 2018-06-11 NOTE — PROCEDURES
[facility]  Transfusion Medicine  Procedure Note    SUMMARY   Therapeutic Plasma Exchange (Apheresis)  Date/Time: 6/11/2018 12:47 PM  Performed by: YENI CERRATO  Authorized by: YENI CERRATO         Date of Procedure: 6/11/2018     Procedure: Plasma Exchange    Provider: Yeni Cerrato MD     Pre-Procedure Diagnosis: Diffuse pulmonary alveolar hemorrhage  Post-Procedure Diagnosis: Diffuse pulmonary alveolar hemorrhage    Follow-up Assessment: 63 year old male presents with bronchoscopy-proven diffuse alevolar hemorrhage with accompanying hematuria and renal failure (Cr 8.8) 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 The patient was  transferred to [facility] for therapeutic plasma exchange and is currently intubated for respiratory failure. No ANCA or anti-GBM antibody results are pending. Mr. Raymond's wife has signed the consent for treatment.    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 Journal of Clinical Apheresis 2016; 31:149-162.) This diagnosis supports daily to every other day TPE with FFP until resolution of DAH, then TPE with albumin replacement every 2-3 days for a total of 6-9 procedures.     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    Today's procedure was well tolerated and without complication. We will plan for the next TPE to tentatively occur on Wednesday with FFP, further procedures will require  re-evaluation of DAH symptoms/hemoptysis and lab findings. Recent xray findings support possible pneumonia or aspiration (6/10/18 @ 2238) rather than a DAH pattern.    Pertinent Laboratory Data:   Complete Blood Count:   Lab Results   Component Value Date    HGB 7.1 (L) 06/11/2018    HCT 20.3 (L) 06/11/2018     (L) 06/11/2018    WBC 10.60 06/11/2018     Basic Metabolic Panel:   Lab Results   Component Value Date     (L) 06/11/2018    K 3.7 06/11/2018    CL 94 (L) 06/11/2018    CO2 23 06/11/2018     (H) 06/11/2018    BUN 92 (H) 06/11/2018    CREATININE 8.8 (H) 06/11/2018    CALCIUM 7.0 (L) 06/11/2018    ANIONGAP 17 (H) 06/11/2018    ESTGFRAFRICA 6.6 (A) 06/11/2018    EGFRNONAA 5.7 (A) 06/11/2018       Pertinent Medications: None contraindicated    Review of patient's allergies indicates:   Allergen Reactions    Sulfa (sulfonamide antibiotics)        Anesthesia: None     Technical Procedures Used: Plasma Exchange: Volume exchanged - 4.5L; Replacement fluid - Fresh Frozen Plasma; Number of procedures 1 of tbd; Date of next procedure tentatively scheduled for 6/13/18 (Wed).    Description of the Findings of the Procedure:   Please see Apheresis Nurse flowsheet for details.    The patient was evaluated and all clinical and laboratory data relevant to the treatment was reviewed, and a decision was made to proceed with the Apheresis procedure.    I was available to the clinical staff throughout the procedure.    Significant Surgical Tasks Conducted by the Assistant(s): Not applicable  Complications: None  Estimated Blood Loss (EBL): None  Implants: None   Specimens: None      Yeni Whaley MD, PhD  Section of Transfusion Medicine & Histocompatibility  Department of Pathology and Laboratory Medicine  [facility]  [phone] (HLA & Blood Bank Offices)  06/11/2018

## 2018-06-11 NOTE — PLAN OF CARE
Patient admitted to SICU from [facility]. Connected to ICU monitor and ventilator. Dr Kuhn at bedside. Transitioned from inhaled Flolan to nitric. Propofol, fentanyl, nimbex, and levo infusing. Vitals stable. Family updated on plan of care. Will continue to monitor closely.

## 2018-06-11 NOTE — HPI
62 yo AAm with HTN, NIDDM, arthritis on metformin and meloxicam  admitted to [facility] for worsening SOB and ALEX.   Patiend had been treated as an out;patient for 2 weeks for Shortness of breath and bronchitis and UTI. In the hospital he was found to be acidotic, hyperkalemic, serum creatinine 8, and diffuse bilateral pulmonary infiltrates, requiring intubation. UA with proteinuria, hematuria and wbc's, Bronchoscopy revealed hemorrahage. Patient recieved one dialysis as per notes and 1 gm solumedrol, and transferred  to [facility] for plasmapheresis  And further evaluation.  Family is not available at the time of exam and patient is intubated.   HIV, Hepatitis B, C, complements  All within normal limits. DENIZ, ANCA and AntiGBM, results Nnot available

## 2018-06-11 NOTE — HPI
63 year old male presents with bronchoscopy-proven and xray imaging-supported diffuse alevolar hemorrhage with accompanying hematuria and renal failure (Cr 8.8) 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 The patient was  transferred to [facility] for therapeutic plasma exchange and is currently intubated for respiratory failure. No ANCA or anti-GBM antibody results available for review.

## 2018-06-11 NOTE — PLAN OF CARE
Problem: Patient Care Overview  Goal: Plan of Care Review  Outcome: Ongoing (interventions implemented as appropriate)  Pt remains on ventilator. Ventilator weaned throughout shift and currently on A/C 50% FiO2 and 13 PEEP, RR of 32. Nitric weaned from 40 PPM to 20 PPM. Plasmapheresis done this AM and pt tez well. CRRT started this afternoon and pt at goal UF of 300. Levo restarted this AM and is currently at 0.05 mcg/kg/min for goal of MAP >65. Pt remains sedated and paralyzed on Propofol, Fentanyl, and Nimbex. Insulin gtt titrated throughout shift with Q1h blood glucose checks. Insulin gtt currently off. UOP 70 ml total through shift. Pt family at bedside throughout shift and updated frequently on plan of care and pt condition by ICU RN, critical care team, rheumatology team, and nephrology team.

## 2018-06-11 NOTE — CONSULTS
"  [facility]  Adult Nutrition  Consult Note    SUMMARY     Recommendations  Recommendation/Intervention:   1. When medically able, recommend initiating TF of Peptamen Intense VHP at a goal rate of 50 mL/hr - to provide 1200 kcal/day (1746 kcal w/propofol), 110 g protein/day, and 1008 mL free fluid/day.   -If no longer on propofol, recommend Impact Peptide 1.5 at 50 mL/hr - to provide 1800 kcal/day, 113 g protein/day, and 924 mL free fluid/day.   2. If unable to start TF, recommend Custom TPN 115g AA and 275g Dextrose - to provide 1395 kcal/day (1941 kcal w/propofol) and 115 g protein/day.    -Recommend adding IV lipids daily if no longer on propofol.   RD to monitor.    Goals: Patient to receive nutrition by RD follow-up  Nutrition Goal Status: new  Communication of RD Recs:  (POC)    Reason for Assessment  Reason for Assessment: consult  Diagnosis:  (diffuse pulmonary alveolar hemorrhage)  Relevant Medical History: DM2, HTN  General Information Comments: Transferred from OSH. Patient intubated, sedated, paralyzed.  Nutrition Discharge Planning: Unable to determine at this time.    Nutrition/Diet History  Food Preferences: [facility]  Factors Affecting Nutritional Intake: NPO, on mechanical ventilation    Anthropometrics  Temp: 98.3 °F (36.8 °C)  Height: 5' 8" (172.7 cm)  Height (inches): 68 in  Weight Method: Bed Scale  Weight: 86.2 kg (190 lb)  Weight (lb): 190 lb  Ideal Body Weight (IBW), Male: 154 lb  % Ideal Body Weight, Male (lb): 123.38 lb  BMI (Calculated): 28.9  BMI Grade: 25 - 29.9 - overweight    Lab/Procedures/Meds  Pertinent Labs Reviewed: reviewed  Pertinent Labs Comments: Na 134, Cl 94, BUN 92, Creat 8.8, Glu 314, POCT Glu 268-315, HgbA1c 6.2, Ca 7.0, Phos 10.3, Alb 1.8  Pertinent Medications Reviewed: reviewed  Pertinent Medications Comments: famotidine, nimbex, fentanyl, insulin drip, levophed, propofol    Physical Findings/Assessment  Overall Physical Appearance: on ventilator " support  Tubes: nasogastric tube (LIWS)  Oral/Mouth Cavity: tooth/teeth missing  Skin: edema    Estimated/Assessed Needs  Weight Used For Calorie Calculations: 86.2 kg (190 lb 0.6 oz)  Energy Calorie Requirements (kcal): 1896 kcal/day  [facility]: [facility]  Protein Requirements: 104-130 g/day (1.2-1.5 g/kg)  Weight Used For Protein Calculations: 86.2 kg (190 lb 0.6 oz)  Fluid Requirements (mL): 1 mL/kcal or per MD  Fluid Need Method: RDA Method  RDA Method (mL): 1896    Nutrition Prescription Ordered  Current Diet Order: NPO    Evaluation of Received Nutrient/Fluid Intake  I/O: +1L since admit  Comments: No BM recorded  % Intake of Estimated Energy Needs: 0 - 25 %  % Meal Intake: NPO    Nutrition Risk  Level of Risk/Frequency of Follow-up: high (2x/week)     Assessment and Plan  * Diffuse pulmonary alveolar hemorrhage    Contributing Nutrition Diagnosis  Inadequate energy intake    Related to (etiology):  Intubated, NPO    Signs and Symptoms (as evidenced by):   Currently meeting < 85% EEN and EPN     Nutrition Diagnosis Status:   New          Monitor and Evaluation  Food and Nutrient Intake: energy intake  Food and Nutrient Adminstration: enteral and parenteral nutrition administration  Anthropometric Measurements: weight, weight change  Biochemical Data, Medical Tests and Procedures: electrolyte and renal panel, gastrointestinal profile, inflammatory profile  Nutrition-Focused Physical Findings: overall appearance     Nutrition Follow-Up  RD Follow-up?: Yes

## 2018-06-11 NOTE — ASSESSMENT & PLAN NOTE
- Most likely the diagnosis in the setting of diffuse alveolar hemorrhage and glomerulonephritis with most likely etiology is underlying autoimmune disorder  - Send for serum antibody testing: DENIZ, Antiglomerular basement membrane, Antibodies to double-stranded DNA, c-ANCA, p-ANCA  - Will proceed with methylprednisolone 1000 mg IV daily and Transfusion medicine consulted for plasma exchange  - Rheumatology consult for possible started Cyclophosphamide

## 2018-06-11 NOTE — ASSESSMENT & PLAN NOTE
- Baseline Creatinine is 2.5, 5/2018, current Creatinine is 8.8  - Differential Diagnoses include Pulmonary renal syndrome.  - Underwent one CRRT, Nephrology consulted for possible CRRT  - Underwent US of kidney and showed Right simple cyst (1.4 cm), large Left cyst (2.6 cm), No solid mass or nephrosis.  - CT abdomen/pelvis, mild interstitial changes in lung bases, 2 stones in the left kidney, 4 stones in the right kidney, no hydronephrosis, post cholecystectomy. No acute abnormality in the liver, spleen, pancreas or adrenals.   - Will continue trending urine output and renal function, avoid NSAIDs, contrast, nephrotoxins    - Monitor strict I/Os, daily weights, renally dose medications to current GFR

## 2018-06-11 NOTE — H&P
[facility]  Critical Care Medicine  History & Physical    Patient Name: Jasmeet Raymond  MRN: [id number]  Admission Date: 6/10/2018  Hospital Length of Stay: 1 days  Code Status: Full Code  Attending Physician: Susie Forde MD   Primary Care Provider: Provider Notinsystem   Principal Problem: Diffuse pulmonary alveolar hemorrhage    Subjective:     HPI:  This is Mr. Jasmeet Raymond, 63 year old male with PMHx significant for HTN, DM and arthirtis presented to [facility] with symptoms of pneumonia and shortness of breath. On 5/29/2018, he presented with symptoms of bronchitis with denies chills, ear pain, sore throat, chest pain, shortness of breath at that time, received antibiotics. Then on 6/8/2018 he presented again to [facility] with shortness of breath and cough, and his CXR showed concern for airway disease. However on the first day of admission at [facility], his respiratory symptoms worsen and had acute hypoxic respiratory failure that required intubation. Pulmonary consulted and he underwent Bronchoscopy and  revealed alveolar hemorrhage, with the setting of having hematuria and diffuse alveloar hemorrhage the concern was for presence of pulmonary renal syndrome and he received pulsed dose of Methylprednisolone and nephrology consulted for plasmapheresis. However at [facility], plasmapheresis machine is broken and they recommend to transfer to [facility] main [place] for plasmapheresis.     On arrival he was on Nibmex for paralytics, Propofol for sedation, Levophed (eventaully turned off) for pressure support, Flolan (Epoprostenol sodium) inhaled which was switched to Inhaled Nitro. His mechanical ventilation initially was on FiO2 100%, PEEP 16, then weaned his FiO2 with high requirement of oxygenation. He had an signifiacnt drop in his Hb (baseline ~ 12) dated one week  prior presentation to 9 and 8, required one pRBC prior arrival. Repeated CXR showed extensive bilateral infiltrates. Important laboratory investigation showed His , , pro calcitonin 4.4, C3 and C4 complement normal., Trop negative, C3 and C4 showed normal , Hep test is negative, Lactic acid 1.8 > 1.0, HIV negative, Procal 4.4, UA shoowed heamturia and trace leukocytes, Urine culture from 5/29 showed almost pansensitve Cedecea davisae.     Hospital/ICU Course:  06/11/2018 admitted to MICU from [facility] with ARDS     No past medical history on file.    No past surgical history on file.    Review of patient's allergies indicates:   Allergen Reactions    Sulfa (sulfonamide antibiotics)        Family History     None        Social History Main Topics    Smoking status: Not on file    Smokeless tobacco: Not on file    Alcohol use Not on file    Drug use: Unknown    Sexual activity: Not on file      Review of Systems   Unable to perform ROS: Intubated     Objective:     Vital Signs (Most Recent):  Temp: 98.3 °F (36.8 °C) (06/10/18 2300)  Pulse: 96 (06/11/18 0000)  Resp: (!) 30 (06/11/18 0000)  BP: (!) 103/57 (06/11/18 0000)  SpO2: 96 % (06/11/18 0000) Vital Signs (24h Range):  Temp:  [97.7 °F (36.5 °C)-98.3 °F (36.8 °C)] 98.3 °F (36.8 °C)  Pulse:  [] 96  Resp:  [8-34] 30  SpO2:  [94 %-100 %] 96 %  BP: ()/(50-77) 103/57  Arterial Line BP: ()/(51-90) 98/51   Weight: 86.4 kg (190 lb 7.6 oz)  Body mass index is 28.96 kg/m².    No intake or output data in the 24 hours ending 06/11/18 0029    Physical Exam   Constitutional: He appears well-developed. No distress.   HENT:   Head: Normocephalic.   Mouth/Throat: No oropharyngeal exudate.   Eyes: Pupils are equal, round, and reactive to light. Right eye exhibits no discharge. Left eye exhibits no discharge.   No corneal reflex   Neck: Normal range of motion. No JVD present.   Cardiovascular: Normal  rate and regular rhythm.    No murmur heard.  Pulmonary/Chest: Effort normal. No respiratory distress. He has no wheezes. He has rales.   Abdominal: Soft. He exhibits no distension. There is no tenderness.   Genitourinary: Rectal exam shows guaiac negative stool.   Musculoskeletal: He exhibits no edema.   Neurological:   Sedated RASS -2    Skin: Skin is warm. No rash noted. No erythema.       Vents:  Vent Mode: A/C (06/10/18 2337)  Ventilator Initiated: Yes (06/10/18 2217)  Set Rate: 34 bmp (06/10/18 2337)  Vt Set: 350 mL (06/10/18 2337)  PEEP/CPAP: 16 cmH20 (06/10/18 2337)  Oxygen Concentration (%): 72 (06/11/18 0000)  Peak Airway Pressure: 38 cmH2O (06/10/18 2337)  Plateau Pressure: 31 cmH20 (06/10/18 2337)  Total Ve: 13 mL (06/10/18 2337)  F/VT Ratio<105 (RSBI): (!) 21.23 (06/10/18 2337)  Lines/Drains/Airways     Central Venous Catheter Line                 Hemodialysis Catheter 06/10/18 2307 right internal jugular less than 1 day         Percutaneous Central Line Insertion/Assessment - triple lumen  06/10/18 2305 left internal jugular less than 1 day          Drain                 NG/OG Tube 06/10/18 2309 Speer sump 18 Fr. Left nostril less than 1 day         Urethral Catheter 06/10/18 2302 Latex 16 Fr. less than 1 day          Airway                 Airway - Non-Surgical Endotracheal Tube -- days          Arterial Line                 Arterial Line 06/10/18 2304 Right Radial less than 1 day          Peripheral Intravenous Line                 Peripheral IV - Single Lumen 06/10/18 2308 Right Hand less than 1 day              Significant Labs:    CBC/Anemia Profile:    Recent Labs  Lab 06/10/18  2226   WBC 13.25*  13.25*   HGB 8.9*  8.9*   HCT 25.4*  25.4*     194   MCV 83  83   RDW 15.5*  15.5*        Chemistries:    Recent Labs  Lab 06/10/18  2226   *  134*   K 3.8  3.8   CL 92*  92*   CO2 24  24   BUN 94*  94*   CREATININE 8.8*  8.8*   CALCIUM 7.4*  7.4*   ALBUMIN 1.9*  1.9*   PROT  5.5*  5.5*   BILITOT 0.6  0.6   ALKPHOS 67  67   ALT 45*  45*   AST 26  26   MG 2.2  2.2   PHOS 9.5*  9.5*     Coagulation:   Recent Labs  Lab 06/10/18  2226   INR 1.1  1.1   APTT 26.1  26.1     Lactic Acid:   Recent Labs  Lab 06/10/18  2226   LACTATE 2.7*  2.7*     Significant Imaging: I have reviewed all pertinent imaging results/findings within the past 24 hours.  I have reviewed and interpreted all pertinent imaging results/findings within the past 24 hours.    Assessment/Plan:     Pulmonary   * Diffuse pulmonary alveolar hemorrhage    - Given his presentation with two weeks of progressive worsening of his SOB and cough, and associated with hematuria and CXR finding of bilateral infiltrate raised the concern for DAH  - On 6/10 he underwent Flexible Bronchoscopy and showed no endobronchial lesion, significant signs of hemorrhage on all of the airways, confirming findings of pulmonary hemorrhage.   - On high oxygenation setting with FiO2 of 60% (was on FiO2 100%) and PEEP 16, and his PO2 is improving to 72   - Typed and matched, frequent CBC and transfuse pRBC PRN         ARDS (adult respiratory distress syndrome)    - Most likely etiology is Diffuse pulmonary alveolar hemorrhage, however severe pneumonia in the setting of leukocytosis and fever and presentation of cough and SOB can not be excluded  - On conservative tidal volume (6 ml/kg predicted body weight), balanced respiratory rate, and high oxygenation setting in the setting of DAH PEEP is 16 and FiO2 is 60%  - On continue to assess his volume status and evaluate the need of diuresis        Renal/   ALEX (acute kidney injury)    - Baseline Creatinine is 2.5, 5/2018, current Creatinine is 8.8  - Differential Diagnoses include Pulmonary renal syndrome.  - Underwent one CRRT, Nephrology consulted for possible CRRT  - Underwent US of kidney and showed Right simple cyst (1.4 cm), large Left cyst (2.6 cm), No solid mass or nephrosis.  - CT  abdomen/pelvis, mild interstitial changes in lung bases, 2 stones in the left kidney, 4 stones in the right kidney, no hydronephrosis, post cholecystectomy. No acute abnormality in the liver, spleen, pancreas or adrenals.   - Will continue trending urine output and renal function, avoid NSAIDs, contrast, nephrotoxins    - Monitor strict I/Os, daily weights, renally dose medications to current GFR        ID   Septic shock    - See principal problem for more elaboration.   - Currently on low need for pressors, will continue to monitor and ensure MAP > 65 mmHg  - On broad spectrum antibiotics with Cefepime (renally dose) and Vancomycin         Immunology/Multi System   Pulmonary-renal syndrome    - Most likely the diagnosis in the setting of diffuse alveolar hemorrhage and glomerulonephritis with most likely etiology is underlying autoimmune disorder  - Send for serum antibody testing: DENIZ, Antiglomerular basement membrane, Antibodies to double-stranded DNA, c-ANCA, p-ANCA  - Will proceed with methylprednisolone 1000 mg IV daily and Transfusion medicine consulted for plasma exchange  - Rheumatology consult for possible started Cyclophosphamide        Oncology   Acute posthemorrhagic anemia    - Most likely is diffuse pulmonary alveolar hemorrhage  - Frequent check in his CBC, transfuse when Hb < 7        Endocrine   Diabetes mellitus type 2 with complications    - Will keep his blood glucose on the target 140-180            Critical Care Daily Checklist:    A: Awake: RASS Goal/Actual Goal:    Actual: Conteh Agitation Sedation Scale (RASS): Unarousable   B: Spontaneous Breathing Trial Performed?     C: SAT & SBT Coordinated?  No                      D: Delirium: CAM-ICU Overall CAM-ICU: Negative   E: Early Mobility Performed? No   F: Feeding Goal:    Status:     Current Diet Order   Procedures    Diet NPO      AS: Analgesia/Sedation Yes   T: Thromboembolic Prophylaxis No   H: HOB > 300 Yes   U: Stress Ulcer  Prophylaxis (if needed) Yes   G: Glucose Control Yes   B: Bowel Function     I: Indwelling Catheter (Lines & Liu) Necessity Yes   D: De-escalation of Antimicrobials/Pharmacotherapies Yes    Plan for the day/ETD No    Code Status:  Family/Goals of Care: Full Code  Ongoing       Critical secondary to Patient has a condition that poses threat to life and bodily function: Severe Respiratory Distress     Critical care was time spent personally by me on the following activities: development of treatment plan with patient or surrogate and bedside caregivers, discussions with consultants, evaluation of patient's response to treatment, examination of patient, ordering and performing treatments and interventions, ordering and review of laboratory studies, ordering and review of radiographic studies, pulse oximetry, re-evaluation of patient's condition. This critical care time did not overlap with that of any other provider or involve time for any procedures.     Tommie Kuhn MD  Critical Care Medicine  [facility]

## 2018-06-11 NOTE — SUBJECTIVE & OBJECTIVE
No past medical history on file.    No past surgical history on file.    Review of patient's allergies indicates:   Allergen Reactions    Sulfa (sulfonamide antibiotics)      Current Facility-Administered Medications   Medication Frequency    ceFEPIme injection 2 g Q24H    chlorhexidine 0.12 % solution 15 mL BID    cisatracurium (NIMBEX) 100 mg in dextrose 5 % 100 mL infusion Continuous    dextrose 50% injection 12.5 g PRN    diphenhydrAMINE injection 50 mg Once    famotidine (PF) injection 20 mg Daily    fentaNYL 2500 mcg in 0.9% sodium chloride 250 mL infusion premix (titrating) Continuous    glucagon (human recombinant) injection 1 mg PRN    heparin (porcine) injection 4,000 Units Once    insulin aspart U-100 pen 0-5 Units Q6H PRN    insulin regular (Humulin R) 100 Units in sodium chloride 0.9% 100 mL infusion Continuous    nitric oxide gas Gas 40 ppm Continuous    norepinephrine 4 mg in dextrose 5% 250 mL infusion (premix) (titrating) Continuous    potassium chloride 10 mEq in 100 mL IVPB PRN    potassium chloride 40 mEq in 100 mL IVPB (FOR CENTRAL LINE ADMINISTRATION ONLY) PRN    propofol (DIPRIVAN) 10 mg/mL infusion Continuous    sodium chloride 0.9% flush 3 mL PRN    white petrolatum-mineral oil (LUBIFRESH P.M.) ophthalmic ointment Q8H     Family History     None        Social History Main Topics    Smoking status: Not on file    Smokeless tobacco: Not on file    Alcohol use Not on file    Drug use: Unknown    Sexual activity: Not on file     Review of Systems   Constitutional: Positive for fatigue and fever. Negative for activity change, appetite change, chills, diaphoresis and unexpected weight change.   HENT: Negative for congestion, ear discharge, ear pain, facial swelling, hearing loss, nosebleeds, sinus pressure, sore throat and trouble swallowing.    Eyes: Negative for photophobia, pain, discharge, redness, itching and visual disturbance.   Respiratory: Positive for shortness of  breath. Negative for apnea, cough, chest tightness and wheezing.    Cardiovascular: Negative for chest pain, palpitations and leg swelling.   Gastrointestinal: Negative for abdominal distention, abdominal pain, constipation, diarrhea and vomiting.   Endocrine: Negative for cold intolerance, heat intolerance, polydipsia and polyuria.   Genitourinary: Positive for hematuria. Negative for decreased urine volume, difficulty urinating, dysuria, flank pain, frequency, scrotal swelling, testicular pain and urgency.   Musculoskeletal: Positive for arthralgias. Negative for back pain, gait problem, joint swelling, myalgias, neck pain and neck stiffness.   Skin: Negative for color change, pallor, rash and wound.   Allergic/Immunologic: Negative for environmental allergies and food allergies.   Neurological: Negative for dizziness, tremors, seizures, syncope, facial asymmetry, speech difficulty, weakness, light-headedness, numbness and headaches.   Hematological: Negative for adenopathy. Does not bruise/bleed easily.   Psychiatric/Behavioral: Negative for agitation, behavioral problems, dysphoric mood and sleep disturbance. The patient is not nervous/anxious.      Objective:     Vital Signs (Most Recent):  Temp: 97.7 °F (36.5 °C) (06/11/18 0705)  Pulse: 95 (06/11/18 0800)  Resp: (!) 32 (06/11/18 0800)  BP: 133/62 (06/11/18 0800)  SpO2: 97 % (06/11/18 0800)  O2 Device (Oxygen Therapy): ventilator (06/11/18 0745) Vital Signs (24h Range):  Temp:  [97.5 °F (36.4 °C)-98.3 °F (36.8 °C)] 97.7 °F (36.5 °C)  Pulse:  [] 95  Resp:  [8-34] 32  SpO2:  [93 %-100 %] 97 %  BP: ()/(50-77) 133/62  Arterial Line BP: ()/(48-90) 123/56     Weight: 86.4 kg (190 lb 7.6 oz) (06/10/18 2300)  Body mass index is 28.96 kg/m².  Body surface area is 2.04 meters squared.    I/O last 3 completed shifts:  In: 1139 [I.V.:1119; NG/GT:20]  Out: 120 [Urine:70; Drains:50]    Physical Exam   Constitutional: He appears well-developed and  well-nourished. No distress.   Intubated FI)2 50%, epe 15, NO, sedated, currently off levophed   HENT:   Head: Normocephalic and atraumatic.   Mouth/Throat: Oropharynx is clear and moist. No oropharyngeal exudate.   wnwd    Eyes: Conjunctivae are normal. Right eye exhibits no discharge. Left eye exhibits no discharge. No scleral icterus.   Pupils fixed, no EOm   Neck: No JVD present. No tracheal deviation present. No thyromegaly present.   intubation precludes full exam.  No swelling, tachea midline   Cardiovascular: Normal heart sounds.  Exam reveals no gallop and no friction rub.    No murmur heard.  Tachycardic, no murmur appreciated, + 2 lower extremity and pedal edema,   No JVD     Pulmonary/Chest: No stridor. No respiratory distress. He has no wheezes. He has no rales. He exhibits no tenderness.   On respirator, decreased bs at bases, crackels bilateral anterior   Abdominal: He exhibits distension. He exhibits no mass. There is no tenderness. There is no rebound and no guarding. No hernia.   No Bowel sounds,    Musculoskeletal: Normal range of motion. He exhibits no edema or tenderness.   Lymphadenopathy:     He has no cervical adenopathy.   Neurological: No cranial nerve deficit. He exhibits normal muscle tone. Coordination normal.   Sedated on respirator   Skin: Skin is warm and dry. No rash noted. He is not diaphoretic. No erythema. No pallor.   Psychiatric:   unable to assess   Nursing note and vitals reviewed.      Significant Labs:  ABGs:   Recent Labs  Lab 06/11/18  0752   PH 7.327*   PCO2 48.8*   HCO3 25.5   POCSATURATED 94*   BE 0     BMP:   Recent Labs  Lab 06/11/18  0351   *   CL 94*   CO2 23   BUN 92*   CREATININE 8.8*   CALCIUM 7.0*   MG 2.0     CBC:   Recent Labs  Lab 06/11/18  0601   WBC 10.56   RBC 2.74*   HGB 7.9*   HCT 23.1*      MCV 84   MCH 28.8   MCHC 34.2     CMP:   Recent Labs  Lab 06/11/18  0351   *   CALCIUM 7.0*   ALBUMIN 1.8*   PROT 5.0*   *   K 3.7   CO2  23   CL 94*   BUN 92*   CREATININE 8.8*   ALKPHOS 62   ALT 39   AST 25   BILITOT 0.6     Microbiology Results (last 7 days)     Procedure Component Value Units Date/Time    Blood Culture #1 **CANNOT BE ORDERED STAT** [[id number]] Collected:  06/10/18 2300    Order Status:  Completed Specimen:  Blood from Peripheral, Antecubital, Right Updated:  06/11/18 0915     Blood Culture, Routine No Growth to date    Blood Culture #1 **CANNOT BE ORDERED STAT** [[id number]] Collected:  06/10/18 2249    Order Status:  Completed Specimen:  Blood from Peripheral, Hand, Left Updated:  06/11/18 0915     Blood Culture, Routine No Growth to date    Culture, Respiratory with Gram Stain [[id number]] Collected:  06/10/18 2345    Order Status:  Completed Specimen:  Respiratory from Endotracheal Aspirate Updated:  06/11/18 0623     Gram Stain (Respiratory) <10 epithelial cells per low power field.     Gram Stain (Respiratory) No WBC's or organisms seen    Urine culture [[id number]] Collected:  06/11/18 0101    Order Status:  Sent Specimen:  Urine from Urine, Catheterized Updated:  06/11/18 0329          Recent Labs  Lab 06/11/18  0100   COLORU Yellow   SPECGRAV 1.010   PHUR 5.0   PROTEINUA 2+*   BACTERIA Many*   NITRITE Negative   LEUKOCYTESUR 3+*   UROBILINOGEN Negative   HYALINECASTS 7*     All labs within the past 24 hours have been reviewed.    Significant Imaging:  Labs: Reviewed  X-Ray: Reviewed  US: Reviewed  CT: Reviewed   All hospital notes reviewed from outside hospital

## 2018-06-11 NOTE — PROGRESS NOTES
Pt arrived to room intubated with a 8.0 ETT secured at 26 at the lips. Pt was placed on vent on documented settings, and on nitric at 40ppm. Will continue to monitor.

## 2018-06-11 NOTE — SUBJECTIVE & OBJECTIVE
No past medical history on file.    No past surgical history on file.    Review of patient's allergies indicates:   Allergen Reactions    Sulfa (sulfonamide antibiotics)        Family History     None        Social History Main Topics    Smoking status: Not on file    Smokeless tobacco: Not on file    Alcohol use Not on file    Drug use: Unknown    Sexual activity: Not on file      Review of Systems   Unable to perform ROS: Intubated     Objective:     Vital Signs (Most Recent):  Temp: 98.3 °F (36.8 °C) (06/10/18 2300)  Pulse: 96 (06/11/18 0000)  Resp: (!) 30 (06/11/18 0000)  BP: (!) 103/57 (06/11/18 0000)  SpO2: 96 % (06/11/18 0000) Vital Signs (24h Range):  Temp:  [97.7 °F (36.5 °C)-98.3 °F (36.8 °C)] 98.3 °F (36.8 °C)  Pulse:  [] 96  Resp:  [8-34] 30  SpO2:  [94 %-100 %] 96 %  BP: ()/(50-77) 103/57  Arterial Line BP: ()/(51-90) 98/51   Weight: 86.4 kg (190 lb 7.6 oz)  Body mass index is 28.96 kg/m².    No intake or output data in the 24 hours ending 06/11/18 0029    Physical Exam   Constitutional: He appears well-developed. No distress.   HENT:   Head: Normocephalic.   Mouth/Throat: No oropharyngeal exudate.   Eyes: Pupils are equal, round, and reactive to light. Right eye exhibits no discharge. Left eye exhibits no discharge.   No corneal reflex   Neck: Normal range of motion. No JVD present.   Cardiovascular: Normal rate and regular rhythm.    No murmur heard.  Pulmonary/Chest: Effort normal. No respiratory distress. He has no wheezes. He has rales.   Abdominal: Soft. He exhibits no distension. There is no tenderness.   Genitourinary: Rectal exam shows guaiac negative stool.   Musculoskeletal: He exhibits no edema.   Neurological:   Sedated RASS -2    Skin: Skin is warm. No rash noted. No erythema.       Vents:  Vent Mode: A/C (06/10/18 2337)  Ventilator Initiated: Yes (06/10/18 2217)  Set Rate: 34 bmp (06/10/18 2337)  Vt Set: 350 mL (06/10/18 2337)  PEEP/CPAP: 16 cmH20 (06/10/18  2337)  Oxygen Concentration (%): 72 (06/11/18 0000)  Peak Airway Pressure: 38 cmH2O (06/10/18 2337)  Plateau Pressure: 31 cmH20 (06/10/18 2337)  Total Ve: 13 mL (06/10/18 2337)  F/VT Ratio<105 (RSBI): (!) 21.23 (06/10/18 2337)  Lines/Drains/Airways     Central Venous Catheter Line                 Hemodialysis Catheter 06/10/18 2307 right internal jugular less than 1 day         Percutaneous Central Line Insertion/Assessment - triple lumen  06/10/18 2305 left internal jugular less than 1 day          Drain                 NG/OG Tube 06/10/18 2309 [place] sump 18 Fr. Left nostril less than 1 day         Urethral Catheter 06/10/18 2302 [place] 16 Fr. less than 1 day          Airway                 Airway - Non-Surgical Endotracheal Tube -- days          Arterial Line                 Arterial Line 06/10/18 2304 Right Radial less than 1 day          Peripheral Intravenous Line                 Peripheral IV - Single Lumen 06/10/18 2308 Right Hand less than 1 day              Significant Labs:    CBC/Anemia Profile:    Recent Labs  Lab 06/10/18  2226   WBC 13.25*  13.25*   HGB 8.9*  8.9*   HCT 25.4*  25.4*     194   MCV 83  83   RDW 15.5*  15.5*        Chemistries:    Recent Labs  Lab 06/10/18  2226   *  134*   K 3.8  3.8   CL 92*  92*   CO2 24  24   BUN 94*  94*   CREATININE 8.8*  8.8*   CALCIUM 7.4*  7.4*   ALBUMIN 1.9*  1.9*   PROT 5.5*  5.5*   BILITOT 0.6  0.6   ALKPHOS 67  67   ALT 45*  45*   AST 26  26   MG 2.2  2.2   PHOS 9.5*  9.5*     Coagulation:   Recent Labs  Lab 06/10/18  2226   INR 1.1  1.1   APTT 26.1  26.1     Lactic Acid:   Recent Labs  Lab 06/10/18  2226   LACTATE 2.7*  2.7*     Significant Imaging: I have reviewed all pertinent imaging results/findings within the past 24 hours.  I have reviewed and interpreted all pertinent imaging results/findings within the past 24 hours.

## 2018-06-11 NOTE — ASSESSMENT & PLAN NOTE
- Most likely etiology is Diffuse pulmonary alveolar hemorrhage, however severe pneumonia in the setting of leukocytosis and fever and presentation of cough and SOB can not be excluded  - On conservative tidal volume (6 ml/kg predicted body weight), balanced respiratory rate, and high oxygenation setting in the setting of DAH PEEP is 16 and FiO2 is 60%  - On continue to assess his volume status and evaluate the need of diuresis

## 2018-06-11 NOTE — ASSESSMENT & PLAN NOTE
Contributing Nutrition Diagnosis  Inadequate energy intake    Related to (etiology):  Intubated, NPO    Signs and Symptoms (as evidenced by):   Currently meeting < 85% EEN and EPN     Nutrition Diagnosis Status:   New

## 2018-06-11 NOTE — PLAN OF CARE
Payor: [place] [employer] / Plan: [facility] CHOICE PLUS / Product Type: Commercial /      Provider Notinsystem     No Pharmacies Listed     Extended Emergency Contact Information  Primary Emergency Contact: Katiuska Raymond  Address: [address] [place] [address] [facility]  Home Phone: [phone]  Mobile Phone: [phone]  Relation: Spouse  Preferred language: English  Secondary Emergency Contact: Prabhjot Raymond   [facility]  Home Phone: [phone]  Mobile Phone: [phone]  Relation: Son  Preferred language: English        06/11/18 1113   Discharge Assessment   Assessment Type Discharge Planning Assessment   Confirmed/corrected address and phone number on facesheet? Yes   Assessment information obtained from? Caregiver  (spouse at bedside)   Expected Length of Stay (days) 5   Communicated expected length of stay with patient/caregiver yes   Prior to hospitilization cognitive status: Alert/Oriented   Prior to hospitalization functional status: Independent   Current cognitive status: Coma/Sedated/Intubated   Current Functional Status: Completely Dependent   Lives With spouse   Able to Return to Prior Arrangements yes   Is patient able to care for self after discharge? Unable to determine at this time (comments)   Who are your caregiver(s) and their phone number(s)? spouse Katiuska [phone]   Readmission Within The Last 30 Days no previous admission in last 30 days   Patient currently being followed by outpatient case management? No   Patient currently receives any other outside agency services? No   Equipment Currently Used at Home none   Do you have any problems affording any of your prescribed medications? No   Is the patient taking medications as prescribed? yes   Does the patient have transportation home? Yes   Transportation Available family or friend will provide   Discharge Plan A Home;Home Health   Discharge Plan B Home;Home with family   Patient/Family In  Agreement With Plan yes

## 2018-06-11 NOTE — HOSPITAL COURSE
06/11/2018 admitted to MICU from [facility] with ARDS  06/12/2018 No acute events overnight. Had PLEX with 4.5 L exchange with FFP, and CRRT started and he became hypotensive, Levophed started during the CRRT. He was weaning off Nitric Oxide and his ABGs showing worsening respi acidosis. Rheumatology, Transfusion medicine and Nephrology on board   06/13/2018 Stable on High ladder PEEP for ARDS and completely off NO, and still on Nimbex for paralytics and sedation with Propofol and Fentanyl. ID, Rheumatology and Nephrology on board.   06/15/2018, Still on high setting   06/15/2018 Started Rituxan after his PLEx session, and decrease in his Methylprednisone. Still on high PEEP ladder for ARDS, frequent clots in ETT suction   06/16/2018 Overnight, he had Rituxan induction for ANCA associated vasculitis and decrease his Methypredisone to 125 mg IV Q 6 Hours. Mild increase in his mechanical ventilation setting and he is grossly volume overload and still anuric.   06/17/2018 Palaryzed with Nimbex and better in his mechanical ventilation setting. Off Versed and on max Precedex and Profol. Increased in UF rate to 500 and better in his I/O. Repated CXR showed better air space.   06/18/2018 off paralytic. received 4th dose of plasma exchange today. Still anuric. Continue on dialysis.wean off sedation   06/19/2018 Patient remains intubated on A/C FiO2 55%/ PEEP 10. Patient on levo gtt titrated to keep MAP >65. Patient on precedex gtt at 1.34 mg/kg/hr and fentanyl gtt at 200 mcg/hr. Patient on CRRT, UF at 400mL/hr with citrate and calcium chloride infusions running per MD order. Tube feeds continue to be held, NG tube to low int. suction with roughly 200 mL of green output over the night. Patient's Tmax was 102.6 degrees F. patient placed on cooling blanket. Patient's H/H this morning is 6.5/20. One unit of pRBCs ordered.   06/20-06/22 : continues to wean vent and pressor. Continues CRRT  with increase fluid removal. He is on 500 cc/h UF. Start to follow commands and moves his head.  06/23/2018 spiked fever 102. Continues on vanc and Zosyn. Bcx NGTD. Increased Oxygen requirement to FiO2 80% and Levo increase to 0.12 mcg/kg/min. Continues on aggressive CRRT   06/24/2018 continues on Vent on same setting. Started on Propofol gtt. Continue on Fentanyl and try to wean off Precedex. Changed on pressure control last night   06/25/2018 pt desated last night. Audible cuff leak. ETT adjusted. Sat improved. Continue CRRT. Family updated about the plan. Pt started on NIMBEX. Continue Propofol, verset, precedex and Fentanyl gtt   06/26/2018 pt had eventful night with desating and increase in pressor requirement. Added vaso after max Levo. Continue NIMBEX, Propofol, verset, and Fentanyl gtt

## 2018-06-11 NOTE — CONSULTS
[facility]  Nephrology  Consult Note    Patient Name: Jasmeet Raymond  MRN: [id number]  Admission Date: 6/10/2018  Hospital Length of Stay: 1 days  Attending Provider: Everette Hdz*   Primary Care Physician: Provider Notinsystem  Principal Problem:Diffuse pulmonary alveolar hemorrhage    Consults  Subjective:     HPI: 62 yo AAm with HTN, NIDDM, arthritis on metformin and meloxicam  admitted to [facility] for worsening SOB and ALEX.   Patiend had been treated as an out;patient for 2 weeks for Shortness of breath and bronchitis and UTI. In the hospital he was found to be acidotic, hyperkalemic, serum creatinine 8, and diffuse bilateral pulmonary infiltrates, requiring intubation. UA with proteinuria, hematuria and wbc's, Bronchoscopy revealed hemorrahage. Patient recieved one dialysis as per notes and 1 gm solumedrol, and transferred  to [facility] for plasmapheresis  And further evaluation.  Family is not available at the time of exam and patient is intubated.   HIV, Hepatitis B, C, complements  All within normal limits. DENIZ, ANCA and AntiGBM, results Nnot available    No past medical history on file.    No past surgical history on file.    Review of patient's allergies indicates:   Allergen Reactions    Sulfa (sulfonamide antibiotics)      Current Facility-Administered Medications   Medication Frequency    ceFEPIme injection 2 g Q24H    chlorhexidine 0.12 % solution 15 mL BID    cisatracurium (NIMBEX) 100 mg in dextrose 5 % 100 mL infusion Continuous    dextrose 50% injection 12.5 g PRN    diphenhydrAMINE injection 50 mg Once    famotidine (PF) injection 20 mg Daily    fentaNYL 2500 mcg in 0.9% sodium chloride 250 mL infusion premix (titrating) Continuous    glucagon (human recombinant) injection 1 mg PRN    heparin (porcine) injection 4,000 Units Once    insulin aspart U-100 pen 0-5 Units Q6H PRN    insulin regular (Humulin R) 100 Units in sodium chloride 0.9% 100 mL infusion  Continuous    nitric oxide gas Gas 40 ppm Continuous    norepinephrine 4 mg in dextrose 5% 250 mL infusion (premix) (titrating) Continuous    potassium chloride 10 mEq in 100 mL IVPB PRN    potassium chloride 40 mEq in 100 mL IVPB (FOR CENTRAL LINE ADMINISTRATION ONLY) PRN    propofol (DIPRIVAN) 10 mg/mL infusion Continuous    sodium chloride 0.9% flush 3 mL PRN    white petrolatum-mineral oil (LUBIFRESH P.M.) ophthalmic ointment Q8H     Family History     None        Social History Main Topics    Smoking status: Not on file    Smokeless tobacco: Not on file    Alcohol use Not on file    Drug use: Unknown    Sexual activity: Not on file     Review of Systems   Constitutional: Positive for fatigue and fever. Negative for activity change, appetite change, chills, diaphoresis and unexpected weight change.   HENT: Negative for congestion, ear discharge, ear pain, facial swelling, hearing loss, nosebleeds, sinus pressure, sore throat and trouble swallowing.    Eyes: Negative for photophobia, pain, discharge, redness, itching and visual disturbance.   Respiratory: Positive for shortness of breath. Negative for apnea, cough, chest tightness and wheezing.    Cardiovascular: Negative for chest pain, palpitations and leg swelling.   Gastrointestinal: Negative for abdominal distention, abdominal pain, constipation, diarrhea and vomiting.   Endocrine: Negative for cold intolerance, heat intolerance, polydipsia and polyuria.   Genitourinary: Positive for hematuria. Negative for decreased urine volume, difficulty urinating, dysuria, flank pain, frequency, scrotal swelling, testicular pain and urgency.   Musculoskeletal: Positive for arthralgias. Negative for back pain, gait problem, joint swelling, myalgias, neck pain and neck stiffness.   Skin: Negative for color change, pallor, rash and wound.   Allergic/Immunologic: Negative for environmental allergies and food allergies.   Neurological: Negative for dizziness,  tremors, seizures, syncope, facial asymmetry, speech difficulty, weakness, light-headedness, numbness and headaches.   Hematological: Negative for adenopathy. Does not bruise/bleed easily.   Psychiatric/Behavioral: Negative for agitation, behavioral problems, dysphoric mood and sleep disturbance. The patient is not nervous/anxious.      Objective:     Vital Signs (Most Recent):  Temp: 97.7 °F (36.5 °C) (06/11/18 0705)  Pulse: 95 (06/11/18 0800)  Resp: (!) 32 (06/11/18 0800)  BP: 133/62 (06/11/18 0800)  SpO2: 97 % (06/11/18 0800)  O2 Device (Oxygen Therapy): ventilator (06/11/18 0745) Vital Signs (24h Range):  Temp:  [97.5 °F (36.4 °C)-98.3 °F (36.8 °C)] 97.7 °F (36.5 °C)  Pulse:  [] 95  Resp:  [8-34] 32  SpO2:  [93 %-100 %] 97 %  BP: ()/(50-77) 133/62  Arterial Line BP: ()/(48-90) 123/56     Weight: 86.4 kg (190 lb 7.6 oz) (06/10/18 2300)  Body mass index is 28.96 kg/m².  Body surface area is 2.04 meters squared.    I/O last 3 completed shifts:  In: 1139 [I.V.:1119; NG/GT:20]  Out: 120 [Urine:70; Drains:50]    Physical Exam   Constitutional: He appears well-developed and well-nourished. No distress.   Intubated FI)2 50%, epe 15, NO, sedated, currently off levophed   HENT:   Head: Normocephalic and atraumatic.   Mouth/Throat: Oropharynx is clear and moist. No oropharyngeal exudate.   wnwd    Eyes: Conjunctivae are normal. Right eye exhibits no discharge. Left eye exhibits no discharge. No scleral icterus.   Pupils fixed, no EOm   Neck: No JVD present. No tracheal deviation present. No thyromegaly present.   intubation precludes full exam.  No swelling, tachea midline   Cardiovascular: Normal heart sounds.  Exam reveals no gallop and no friction rub.    No murmur heard.  Tachycardic, no murmur appreciated, + 2 lower extremity and pedal edema,   No JVD     Pulmonary/Chest: No stridor. No respiratory distress. He has no wheezes. He has no rales. He exhibits no tenderness.   On respirator, decreased bs  at bases, crackels bilateral anterior   Abdominal: He exhibits distension. He exhibits no mass. There is no tenderness. There is no rebound and no guarding. No hernia.   No Bowel sounds,    Musculoskeletal: Normal range of motion. He exhibits no edema or tenderness.   Lymphadenopathy:     He has no cervical adenopathy.   Neurological: No cranial nerve deficit. He exhibits normal muscle tone. Coordination normal.   Sedated on respirator   Skin: Skin is warm and dry. No rash noted. He is not diaphoretic. No erythema. No pallor.   Psychiatric:   unable to assess   Nursing note and vitals reviewed.      Significant Labs:  ABGs:   Recent Labs  Lab 06/11/18  0752   PH 7.327*   PCO2 48.8*   HCO3 25.5   POCSATURATED 94*   BE 0     BMP:   Recent Labs  Lab 06/11/18  0351   *   CL 94*   CO2 23   BUN 92*   CREATININE 8.8*   CALCIUM 7.0*   MG 2.0     CBC:   Recent Labs  Lab 06/11/18  0601   WBC 10.56   RBC 2.74*   HGB 7.9*   HCT 23.1*      MCV 84   MCH 28.8   MCHC 34.2     CMP:   Recent Labs  Lab 06/11/18  0351   *   CALCIUM 7.0*   ALBUMIN 1.8*   PROT 5.0*   *   K 3.7   CO2 23   CL 94*   BUN 92*   CREATININE 8.8*   ALKPHOS 62   ALT 39   AST 25   BILITOT 0.6     Microbiology Results (last 7 days)     Procedure Component Value Units Date/Time    Blood Culture #1 **CANNOT BE ORDERED STAT** [[id number]] Collected:  06/10/18 2300    Order Status:  Completed Specimen:  Blood from Peripheral, Antecubital, Right Updated:  06/11/18 0915     Blood Culture, Routine No Growth to date    Blood Culture #1 **CANNOT BE ORDERED STAT** [[id number]] Collected:  06/10/18 2249    Order Status:  Completed Specimen:  Blood from Peripheral, Hand, Left Updated:  06/11/18 0915     Blood Culture, Routine No Growth to date    Culture, Respiratory with Gram Stain [[id number]] Collected:  06/10/18 2345    Order Status:  Completed Specimen:  Respiratory from Endotracheal Aspirate Updated:  06/11/18 0623     Gram Stain (Respiratory) <10  epithelial cells per low power field.     Gram Stain (Respiratory) No WBC's or organisms seen    Urine culture [[id number]] Collected:  06/11/18 0101    Order Status:  Sent Specimen:  Urine from Urine, Catheterized Updated:  06/11/18 0329          Recent Labs  Lab 06/11/18  0100   COLORU Yellow   SPECGRAV 1.010   PHUR 5.0   PROTEINUA 2+*   BACTERIA Many*   NITRITE Negative   LEUKOCYTESUR 3+*   UROBILINOGEN Negative   HYALINECASTS 7*     All labs within the past 24 hours have been reviewed.    Significant Imaging:  Labs: Reviewed  X-Ray: Reviewed  US: Reviewed  CT: Reviewed   All hospital notes reviewed from outside hospital    Assessment/Plan:     ALEX (acute kidney injury)    64 yo AAM with NIDDM, HTN, arthritis with a 2 week history of shortness of breath being treated for URI and UTI, admitted for worsening SOB, oliguric ALEX with proteinuria, hematuria, pyuria serum crt 8, acidotic and hyperkalemia. Unknown baseline renal function.  He received one dose solumedrol and RRT. X 1 episode  Renal US demonstrating bilateral renal cysts.    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 Unclear what his cardiac status may be.    Plan:  Agree with plasmapheresis, pulse steroids, and rheumatology for consideration of cytoxan/immunosupressant  Would like a tissue diagnosis. If/when hemodynamcially stable would like a renal biopsy.  serologies re-sent including ANCA and antiGBM  Would repeat Renal US   CRRT to begin after plasmapheresis for clearance/  and volume management      will review urine and echo when available                Thank you for your consult.     Dian Gramajo MD  Nephrology  [facility]

## 2018-06-11 NOTE — ASSESSMENT & PLAN NOTE
- See principal problem for more elaboration.   - Currently on low need for pressors, will continue to monitor and ensure MAP > 65 mmHg  - On broad spectrum antibiotics with Cefepime (renally dose) and Vancomycin

## 2018-06-11 NOTE — SUBJECTIVE & OBJECTIVE
No past medical history on file.    No past surgical history on file.    Review of patient's allergies indicates:   Allergen Reactions    Sulfa (sulfonamide antibiotics)        Family History     None        Social History Main Topics    Smoking status: Not on file    Smokeless tobacco: Not on file    Alcohol use Not on file    Drug use: Unknown    Sexual activity: Not on file      Review of Systems   Unable to perform ROS: Intubated     Objective:     Vital Signs (Most Recent):  Temp: 97.6 °F (36.4 °C) (06/11/18 0915)  Pulse: 92 (06/11/18 0915)  Resp: (!) 32 (06/11/18 0915)  BP: 133/62 (06/11/18 0800)  SpO2: 96 % (06/11/18 0915) Vital Signs (24h Range):  Temp:  [97.5 °F (36.4 °C)-98.3 °F (36.8 °C)] 97.6 °F (36.4 °C)  Pulse:  [] 92  Resp:  [8-34] 32  SpO2:  [93 %-100 %] 96 %  BP: ()/(50-77) 133/62  Arterial Line BP: ()/(48-90) 130/57   Weight: 86.4 kg (190 lb 7.6 oz)  Body mass index is 28.96 kg/m².      Intake/Output Summary (Last 24 hours) at 06/11/18 0923  Last data filed at 06/11/18 0800   Gross per 24 hour   Intake             1139 ml   Output              135 ml   Net             1004 ml       Physical Exam   Constitutional: He appears well-developed. No distress.   HENT:   Head: Normocephalic and atraumatic.   Right Ear: External ear normal.   Left Ear: External ear normal.   Eyes: Pupils are equal, round, and reactive to light. Right eye exhibits no discharge. Left eye exhibits no discharge.   No corneal reflex per primary team   Neck: Neck supple. No JVD present.   Cardiovascular: Normal rate, regular rhythm and intact distal pulses.    No murmur heard.  Pulmonary/Chest: No respiratory distress. He has no wheezes. He has rales (b/l, mechanical vent ).   Intubated. Vent Mode: A/C  Oxygen Concentration (%):  () 51  Resp Rate Total:  (28 br/min-34 br/min) 32 br/min  Vt Set:  (350 mL-430 mL) 350 mL  PEEP/CPAP:  (12 vfZ82-04 cmH20) 15 cmH20  Mean Airway Pressure:  (21 leR51-51  cmH20) 22 cmH20   Abdominal: Soft. He exhibits no distension. There is no tenderness.   BS appreciated in LUQ only   Musculoskeletal: He exhibits edema (trace pedal).   Neurological:   Sedated   Skin: Skin is warm and dry. No rash noted. He is not diaphoretic. No erythema.   Psychiatric:   Unable to assess   Vitals reviewed.      Vents:  Vent Mode: A/C (06/11/18 0745)  Ventilator Initiated: Yes (06/10/18 2217)  Set Rate: 32 bmp (06/11/18 0745)  Vt Set: 350 mL (06/11/18 0745)  PEEP/CPAP: 16 cmH20 (06/11/18 0745)  Oxygen Concentration (%): 50 (06/11/18 0915)  Peak Airway Pressure: 40 cmH2O (06/11/18 0745)  Plateau Pressure: 31 cmH20 (06/11/18 0745)  Total Ve: 12.2 mL (06/11/18 0745)  F/VT Ratio<105 (RSBI): (!) 83.55 (06/11/18 0745)  Lines/Drains/Airways     Central Venous Catheter Line                 Hemodialysis Catheter 06/10/18 2307 right internal jugular less than 1 day         Percutaneous Central Line Insertion/Assessment - triple lumen  06/10/18 2305 left internal jugular less than 1 day          Drain                 NG/OG Tube 06/10/18 2309 [place] sump 18 Fr. Left nostril less than 1 day         Urethral Catheter 06/10/18 2302 Latex 16 Fr. less than 1 day          Airway                 Airway - Non-Surgical Endotracheal Tube -- days          Arterial Line                 Arterial Line 06/10/18 2304 Right Radial less than 1 day          Peripheral Intravenous Line                 Peripheral IV - Single Lumen 06/10/18 2308 Right Hand less than 1 day              Significant Labs:    CBC/Anemia Profile:    Recent Labs  Lab 06/11/18  0012 06/11/18  0351 06/11/18  0601   WBC 12.20 10.81 10.56   HGB 8.1* 8.1* 7.9*   HCT 23.8* 23.0* 23.1*    175 178   MCV 84 83 84   RDW 15.6* 15.4* 15.8*        Chemistries:    Recent Labs  Lab 06/10/18  2226 06/11/18  0351   *  134* 134*   K 3.8  3.8 3.7   CL 92*  92* 94*   CO2 24  24 23   BUN 94*  94* 92*   CREATININE 8.8*  8.8* 8.8*   CALCIUM 7.4*  7.4* 7.0*    ALBUMIN 1.9*  1.9* 1.8*   PROT 5.5*  5.5* 5.0*   BILITOT 0.6  0.6 0.6   ALKPHOS 67  67 62   ALT 45*  45* 39   AST 26  26 25   MG 2.2  2.2 2.0   PHOS 9.5*  9.5* 10.3*     Coagulation:     Recent Labs  Lab 06/11/18  0351   INR 1.1   APTT 26.2     Lactic Acid:     Recent Labs  Lab 06/10/18  2226 06/11/18  0351   LACTATE 2.7*  2.7* 1.8     Significant Imaging: I have reviewed all pertinent imaging results/findings within the past 24 hours.  I have reviewed and interpreted all pertinent imaging results/findings within the past 24 hours.    Physical Exam   Vitals reviewed.  Constitutional: He appears well-developed. No distress.   HENT:   Head: Normocephalic and atraumatic.   Right Ear: External ear normal.   Left Ear: External ear normal.   Eyes: Pupils are equal, round, and reactive to light. Right eye exhibits no discharge. Left eye exhibits no discharge.   No corneal reflex per primary team   Neck: Neck supple. No JVD present.   Cardiovascular: Normal rate, regular rhythm and intact distal pulses.    No murmur heard.  Pulmonary/Chest: No respiratory distress. He has no wheezes. He has rales (b/l, mechanical vent ).   Intubated. Vent Mode: A/C  Oxygen Concentration (%):  () 51  Resp Rate Total:  (28 br/min-34 br/min) 32 br/min  Vt Set:  (350 mL-430 mL) 350 mL  PEEP/CPAP:  (12 ydA25-67 cmH20) 15 cmH20  Mean Airway Pressure:  (21 wwA88-78 cmH20) 22 cmH20   Abdominal: Soft. He exhibits no distension. There is no tenderness.   BS appreciated in LUQ only   Neurological:   Sedated   Skin: Skin is warm and dry. No rash noted. He is not diaphoretic. No erythema.     Psychiatric:   Unable to assess   Musculoskeletal: He exhibits edema (trace pedal).

## 2018-06-11 NOTE — ASSESSMENT & PLAN NOTE
- Given his presentation with two weeks of progressive worsening of his SOB and cough, and associated with hematuria and CXR finding of bilateral infiltrate raised the concern for DAH  - On 6/10 he underwent Flexible Bronchoscopy and showed no endobronchial lesion, significant signs of hemorrhage on all of the airways, confirming findings of pulmonary hemorrhage.   - On high oxygenation setting with FiO2 of 60% (was on FiO2 100%) and PEEP 16, and his PO2 is improving to 72   - Typed and matched, frequent CBC and transfuse pRBC PRN

## 2018-06-11 NOTE — PLAN OF CARE
Problem: Patient Care Overview  Goal: Plan of Care Review  Recommendations  Recommendation/Intervention:   1. When medically able, recommend initiating TF of Peptamen Intense VHP at a goal rate of 50 mL/hr - to provide 1200 kcal/day (1746 kcal w/propofol), 110 g protein/day, and 1008 mL free fluid/day.   -If no longer on propofol, recommend Impact Peptide 1.5 at 50 mL/hr - to provide 1800 kcal/day, 113 g protein/day, and 924 mL free fluid/day.   2. If unable to start TF, recommend Custom TPN 115g AA and 275g Dextrose - to provide 1395 kcal/day (1941 kcal w/propofol) and 115 g protein/day.    -Recommend adding IV lipids daily if no longer on propofol.   RD to monitor.    Goals: Patient to receive nutrition by RD follow-up  Nutrition Goal Status: new    Full assessment completed, see RD Note 6/11/2018.

## 2018-06-11 NOTE — ASSESSMENT & PLAN NOTE
Access: [facility]   Number of Procedures: tbd based upon lab results.  Schedule: today (6/11) then tbd  Volume: 4.5L  Replacement Fluid: Fresh Frozen Plasma  Recommended Laboratory Studies: Complete Blood Count

## 2018-06-11 NOTE — ASSESSMENT & PLAN NOTE
62 yo AAM with NIDDM, HTN, arthritis with a 2 week history of shortness of breath being treated for URI and UTI, admitted for worsening SOB, oliguric ALEX with proteinuria, hematuria, pyuria serum crt 8, acidotic and hyperkalemia. Unknown baseline renal function.  He received one dose solumedrol and RRT. X 1 episode  Renal US demonstrating bilateral renal cysts.    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 Unclear what his cardiac status may be.    Plan:  Agree with plasmapheresis, pulse steroids, and rheumatology for consideration of cytoxan/immunosupressant  Would like a tissue diagnosis. If/when hemodynamcially stable would like a renal biopsy.  serologies re-sent including ANCA and antiGBM  Would repeat Renal US   CRRT to begin after plasmapheresis for clearance/  and volume management      will review urine and echo when available

## 2018-06-11 NOTE — ASSESSMENT & PLAN NOTE
64 yo w/PMH of HTN and NIDDM, arthritis with a 2 wk history of SOB being treated for URI and UTI, admitted for acute hypoxic respiratory failure, ALEX with serum Cr 8.8 (prior Cr 2.5 on 5/29). He received one dose solumedrol and RRT X 1 at OSH. Diffuse bilateral pulmonary infiltrates seen on CXR, requiring intubation. UA with proteinuria, hematuria and wbc's, Bronchoscopy revealed hemorrahage. Transferred  to [facility] for plasmapheresis.  Rheumatology consulted for pulmonary renal syndrome, recs on steroids and further investigation.   - In the setting of diffuse alveolar hemorrhage and glomerulonephritis possible etiology is underlying autoimmune disorder  - unable to perform ROS 2/2 intubation/sedation. Per chart review, family hx negative for autoimmune disorders  - Given methylprednisolone 1000 mg IV daily x 2 doses (x1 at OSH x1 here)   - Transfusion medicine was consulted for plasma exchange  - Nephrology consulted: they agree with plasmapheresis, pulse steroids, and rheumatology for consideration of cytoxan/immunosupressant. Would like a tissue diagnosis. If/when hemodynamcially stable would like a renal biopsy. Repeat Renal U/S. CRRT to begin after plasmapheresis for clearance/  and volume management     Plan:   - OSH labs: C3 wnl, C4 wnl, elevated UPCR 1.14, Hep B neg, Hep C neg, HIV neg  - elevated ESR 70, .5, procal >100, lactate 2.7 > 1.8  - Will f/u pending rheum labs: DENIZ, Antiglomerular basement membrane, dsDNA, ANCA, MPO, PR3, protein electrophoresis, immunofixation electrophoresis, immunoglobulins quant (IgG, A, M), TB quant gold, Hep B, Hep C, HIV, UPCR  - Concern for vasculitis given DAH and hematuria. The nini-typical pulmonary-renal syndromes are GPA/MPA, Goodpasture disease, SLE. Immunofluorescence studies can help differentiate  - agree with nephrology for renal bx  - transfusion medicine: Daily to every other day TPE with FFP until resolution of DAH, treatment continuation afterwards  dependant on immunologic/biopsy results  - f/u infectious work up (blood, urine, resp cx in process). Recommend ID consult for clearance from infection stand point prior to starting immunosuppressant therapy  - Cont pulse dose steroids IV methylprednisolone 1 g x 3 days (day 2/3)  - Will consider steroid-sparing therapies including cyclophosphamide or rituximab, will discuss with staff about starting immunosuppressants with active infection. PCP prophylaxis will be needed if immunosuppression initiated.

## 2018-06-11 NOTE — SUBJECTIVE & OBJECTIVE
PMH and PSH reviewed 06/11/2018 and relevant items addressed in HPI.    Review of patient's allergies indicates:   Allergen Reactions    Sulfa (sulfonamide antibiotics)        All medications reviewed 06/11/2018 and ace inhibitors not identified.    Family History     None        Social History Main Topics    Smoking status: Not on file    Smokeless tobacco: Not on file    Alcohol use Not on file    Drug use: Unknown    Sexual activity: Not on file     Review of Systems  Objective:     Vital Signs (Most Recent):  Temp: 97.7 °F (36.5 °C) (06/11/18 0705)  Pulse: 95 (06/11/18 0800)  Resp: (!) 32 (06/11/18 0800)  BP: 133/62 (06/11/18 0800)  SpO2: 97 % (06/11/18 0800) Vital Signs (24h Range):  Temp:  [97.5 °F (36.4 °C)-98.3 °F (36.8 °C)] 97.7 °F (36.5 °C)  Pulse:  [] 95  Resp:  [8-34] 32  SpO2:  [93 %-100 %] 97 %  BP: ()/(50-77) 133/62  Arterial Line BP: ()/(48-90) 123/56     Weight: 86.4 kg (190 lb 7.6 oz)    Physical Exam   Nursing note and vitals reviewed.      Significant Labs:   BMP:   Recent Labs  Lab 06/11/18  0351   *   *   K 3.7   CL 94*   CO2 23   BUN 92*   CREATININE 8.8*   CALCIUM 7.0*   MG 2.0     CBC:   Recent Labs  Lab 06/11/18  0012 06/11/18  0351 06/11/18  0601   WBC 12.20 10.81 10.56   HGB 8.1* 8.1* 7.9*   HCT 23.8* 23.0* 23.1*    175 178     All pertinent labs within the past 24 hours have been reviewed.

## 2018-06-11 NOTE — PROGRESS NOTES
Apheresis tx #1 started at 0933 with out difficulty.  Pt intubated and sedated, therefore unable to assess pain and orientation.  4.5 Liter exchange.  Replacement fluids 4.5 Liter FFP via RIJ cath, patent, dressing CDI.  50 mg benadryl IVP prior to start. Tx ended at 1155.  Cath flushed with NS only per ICU nurse Aline.  Pt tolerated tx well.  Next tx TBD.

## 2018-06-11 NOTE — HPI
Mr. Jasmeet Raymond, 63 year old male with PMHx significant for HTN, DM and osteoarthirtis presented to [facility] with symptoms of pneumonia and shortness of breath. On 5/29/2018, he presented with symptoms of bronchitis with denies chills, ear pain, sore throat, chest pain, shortness of breath at that time, received antibiotics. Then on 6/8/2018 he presented again to [facility] with shortness of breath and cough, and his CXR showed concern for airway disease. However on the first day of admission at [facility], his respiratory symptoms worsen and had acute hypoxic respiratory failure that required intubation. Pulmonary consulted and he underwent Bronchoscopy and  revealed alveolar hemorrhage, with the setting of having hematuria and diffuse alveloar hemorrhage the concern was for presence of pulmonary renal syndrome and he received pulsed dose of Methylprednisolone and nephrology consulted for plasmapheresis. However at [facility], plasmapheresis machine is broken and they recommend to transfer to [facility] [place] for plasmapheresis. Admitted here 6/11 for ARDS.     On arrival he was on Nibmex for paralytics, Propofol for sedation, Levophed (eventaully turned off) for pressure support, Flolan (Epoprostenol sodium) inhaled which was switched to Inhaled Nitro. His mechanical ventilation initially was on FiO2 100%, PEEP 16, then weaned his FiO2 with high requirement of oxygenation. He had an signifiacnt drop in his Hb (baseline ~ 12) dated one week prior presentation to 9 and 8, required one pRBC prior arrival. Repeated CXR showed extensive bilateral infiltrates. Important laboratory investigation showed His , , pro calcitonin 4.4, C3 and C4 complement normal., Trop negative, C3 and C4 showed normal , Hep test is negative, Lactic acid 1.8 > 1.0, HIV negative, Procal 4.4, UA  shoowed heamturia and trace leukocytes, Urine culture from 5/29 showed almost pansensitve Cedecea davisae.    Rheumatology consulted for pulmonary-renal syndrome, recs for steroids and further investigation.

## 2018-06-11 NOTE — PLAN OF CARE
No acute events overnight  Vent settings weaned to 50%, 16 PEEP, 40ppm nitric, sats >95%  Currently off pressors, MAP >65  CVP 10  NSR 70s-80s  Fentanyl and propofol infusing for sedation, BIS maintained 40-60  Nimbex infusing with goal TOF 2/4  Liu exchanged, only 20 cc U/O overnight  Insulin gtt started this AM with q1 hr ACCU  CBCs monitored q 6  Plan for possible plasmapheresis and CT scan this AM  Plan of care reviewed with patient's wife, son, and daughter-in-law at bedside  Will continue to monitor

## 2018-06-12 NOTE — SUBJECTIVE & OBJECTIVE
No past medical history on file.    No past surgical history on file.    Review of patient's allergies indicates:   Allergen Reactions    Sulfa (sulfonamide antibiotics)        Medications:  No prescriptions prior to admission.     Antibiotics     Start     Stop Route Frequency Ordered    06/11/18 1000  ceFEPIme injection 1 g      -- IV Every 8 hours (non-standard times) 06/11/18 0933        Antifungals     None        Antivirals     None             There is no immunization history on file for this patient.    Family History     None        Social History     Social History    Marital status:      Spouse name: N/A    Number of children: N/A    Years of education: N/A     Social History Main Topics    Smoking status: Not on file    Smokeless tobacco: Not on file    Alcohol use Not on file    Drug use: Unknown    Sexual activity: Not on file     Other Topics Concern    Not on file     Social History Narrative    No narrative on file     Review of Systems   Unable to perform ROS: Intubated     Objective:     Vital Signs (Most Recent):  Temp: 98.7 °F (37.1 °C) (06/12/18 1600)  Pulse: 62 (06/12/18 1800)  Resp: (!) 32 (06/12/18 1800)  BP: (!) 116/56 (06/12/18 1500)  SpO2: 96 % (06/12/18 1800) Vital Signs (24h Range):  Temp:  [97.8 °F (36.6 °C)-98.9 °F (37.2 °C)] 98.7 °F (37.1 °C)  Pulse:  [62-94] 62  Resp:  [6-32] 32  SpO2:  [86 %-100 %] 96 %  BP: (101-186)/(51-84) 116/56  Arterial Line BP: ()/(44-93) 142/57     Weight: 86.2 kg (190 lb)  Body mass index is 28.89 kg/m².    Estimated Creatinine Clearance: 43.7 mL/min (A) (based on SCr of 1.8 mg/dL (H)).    Physical Exam   Constitutional:   Intubated and sedated     HENT:   Head: Normocephalic and atraumatic.   Oral ET tube   NG tube  Unable to fully assess dentition      Eyes: No scleral icterus.   Cardiovascular: Normal rate and regular rhythm.  Exam reveals no gallop and no friction rub.    No murmur heard.  Pulmonary/Chest:   Ventilated   Clear  anteriorly, decreased at bases, crackles heard    Abdominal: Soft. He exhibits no distension.   No bowel sounds appreciated     Genitourinary:   Genitourinary Comments: Liu to gravity    Musculoskeletal: He exhibits edema (mild bilateral LE edema).   Lymphadenopathy:     He has no cervical adenopathy.   Neurological:   Sedated   Skin: No rash noted.   Right IJ trialysis cath - site clear   Left triple lumen - site clear  Right radial a-line - site clear   PIV right hand - site clear   Vitals reviewed.      Significant Labs:   Blood Culture:   Recent Labs  Lab 06/10/18  2249 06/10/18  2300   LABBLOO No Growth to date  No Growth to date No Growth to date  No Growth to date     CBC:   Recent Labs  Lab 06/11/18  2323 06/12/18  0400 06/12/18  1221   WBC 18.14* 22.86* 13.88*   HGB 7.6* 7.6* 6.9*   HCT 22.2* 22.8* 20.9*    189 149*     CMP:   Recent Labs  Lab 06/10/18  2226 06/11/18  0351  06/11/18  2152 06/12/18  0400 06/12/18  1221   *  134* 134*  < > 137 138  138 137   K 3.8  3.8 3.7  < > 4.8 5.2*  5.2*  5.2* 5.0   CL 92*  92* 94*  < > 102 107  107 108   CO2 24  24 23  < > 25 21*  21* 22*   *  352* 314*  < > 159* 155*  155* 139*   BUN 94*  94* 92*  < > 57* 32*  32* 17   CREATININE 8.8*  8.8* 8.8*  < > 5.6* 3.5*  3.5* 1.8*   CALCIUM 7.4*  7.4* 7.0*  < > 7.6* 7.5*  7.5* 7.2*   PROT 5.5*  5.5* 5.0*  --   --  5.5*  --    ALBUMIN 1.9*  1.9* 1.8*  < > 2.2* 2.7*  2.7* 2.4*   BILITOT 0.6  0.6 0.6  --   --  0.7  --    ALKPHOS 67  67 62  --   --  63  --    AST 26  26 25  --   --  19  --    ALT 45*  45* 39  --   --  24  --    ANIONGAP 18*  18* 17*  < > 10 10  10 7*   EGFRNONAA 5.7*  5.7* 5.7*  < > 9.8* 17.3*  17.3* 38.6*   < > = values in this interval not displayed.  Fungus Culture (Blood or Bone Marrow): No results for input(s): FUNGUSCULTUR in the last 4320 hours.  HIV 1/2 Antibodies:   Recent Labs  Lab 06/11/18  0810   [id number] Negative     Lactic Acid:   Recent Labs  Lab  06/10/18  2226 06/11/18  0351 06/12/18  0400   LACTATE 2.7*  2.7* 1.8 0.6     Procalcitonin:   Recent Labs  Lab 06/10/18  2249   PROCAL >100.00*     Quantiferon: No results for input(s): NIL, TBAG, TBAGNIL, MITOGENNIL, TBGOLD in the last 48 hours.  Respiratory Culture:   Recent Labs  Lab 06/10/18  2345   GSRESP <10 epithelial cells per low power field.  No WBC's or organisms seen   RESPIRATORYC No Growth     Urine Culture:   Recent Labs  Lab 06/11/18  0101   LABURIN No growth     Urine Studies:   Recent Labs  Lab 06/11/18  0100   COLORU Yellow   APPEARANCEUA Cloudy*   PHUR 5.0   SPECGRAV 1.010   PROTEINUA 2+*   GLUCUA 3+*   KETONESU Negative   BILIRUBINUA Negative   OCCULTUA 3+*   NITRITE Negative   UROBILINOGEN Negative   LEUKOCYTESUR 3+*   RBCUA 41*   WBCUA 38*   BACTERIA Many*   SQUAMEPITHEL 5   HYALINECASTS 7*     Wound Culture: No results for input(s): LABAERO in the last 4320 hours.    Significant Imaging: I have reviewed all pertinent imaging results/findings within the past 24 hours.

## 2018-06-12 NOTE — PLAN OF CARE
"Problem: Patient Care Overview  Goal: Plan of Care Review  Outcome: Ongoing (interventions implemented as appropriate)  Dx: Diffuse pulmonary alveolar hemorrhage    Shift Events: 1 unit PRBC given, NO weaned off    Neuro: Unresponsive, pt remains on nimbex gtt    Vital Signs: BP (!) 116/56   Pulse 77   Temp 98.7 °F (37.1 °C) (Oral)   Resp (!) 32   Ht 5' 8" (1.727 m)   Wt 86.2 kg (190 lb)   SpO2 98%   BMI 28.89 kg/m²     Intake/Gtts/Diet: Gtts: Nimbex, Propofol, Fentanyl    Output: NGT to LIWS, no output claimed during shift. UOP 25 cc/shift. Pt remains on CRRT, UF at goal of 350 cc/hr    Pain Management: Fentanyl gtt maintained      Labs: Accuchecks hourly, pt not requiring insulin gtt. Labs q6hr, lytes replaced as ordered, 1 unit PRBC given to keep hgb > 7. ABG q4hr, vent support titrated as tolerated.    Skin: CDI - heels, elbows, and sacrum w/o redness or breakdown.          "

## 2018-06-12 NOTE — ASSESSMENT & PLAN NOTE
- Most likely the diagnosis in the setting of diffuse alveolar hemorrhage and glomerulonephritis with most likely etiology is underlying autoimmune disorder  - Send for serum antibody testing: DENIZ, Antiglomerular basement membrane, Antibodies to double-stranded DNA, c-ANCA, p-ANCA  - Will proceed with methylprednisolone 1000 mg IV daily and Transfusion medicine consulted for plasma exchange  - Rheumatology consulted and they recommend Pulsed steroids for 5 days, and consult ID for clearance prior starting Immunosuppressive agent (Cyclophosphamide)

## 2018-06-12 NOTE — ASSESSMENT & PLAN NOTE
- Most likely is diffuse pulmonary alveolar hemorrhage  - Frequent check in his CBC, transfuse when Hb < 7

## 2018-06-12 NOTE — PROGRESS NOTES
[facility]  Rheumatology  Progress Note    Patient Name: Jasmeet Raymond  MRN: [id number]  Admission Date: 6/10/2018  Hospital Length of Stay: 2 days  Code Status: Full Code   Attending Provider: Everette Hdz*  Primary Care Physician: Provider Notinsystem  Principal Problem: Diffuse pulmonary alveolar hemorrhage    Subjective:     HPI:  Mr. Jasmeet Raymond, 63 year old male with PMHx significant for HTN, DM and osteoarthirtis presented to [facility] and [facility] with symptoms of pneumonia and shortness of breath. On 5/29/2018, he presented with symptoms of bronchitis with denies chills, ear pain, sore throat, chest pain, shortness of breath at that time, received antibiotics. Then on 6/8/2018 he presented again to [facility] and [facility] with shortness of breath and cough, and his CXR showed concern for airway disease. However on the first day of admission at [facility] and [facility], his respiratory symptoms worsen and had acute hypoxic respiratory failure that required intubation. Pulmonary consulted and he underwent Bronchoscopy and  revealed alveolar hemorrhage, with the setting of having hematuria and diffuse alveloar hemorrhage the concern was for presence of pulmonary renal syndrome and he received pulsed dose of Methylprednisolone and nephrology consulted for plasmapheresis. However at [facility], plasmapheresis machine is broken and they recommend [facility]. Admitted here 6/11 for ARDS.     On arrival he was on Nibmex for paralytics, Propofol for sedation, Levophed (eventaully turned off) for pressure support, Flolan (Epoprostenol sodium) inhaled which was switched to Inhaled Nitro. His mechanical ventilation initially was on FiO2 100%, PEEP 16, then weaned his FiO2 with high requirement of oxygenation. He had an signifiacnt drop in his Hb (baseline ~ 12) dated  one week prior presentation to 9 and 8, required one pRBC prior arrival. Repeated CXR showed extensive bilateral infiltrates. Important laboratory investigation showed His , , pro calcitonin 4.4, C3 and C4 complement normal., Trop negative, C3 and C4 showed normal , Hep test is negative, Lactic acid 1.8 > 1.0, HIV negative, Procal 4.4, UA shoowed heamturia and trace leukocytes, Urine culture from 5/29 showed almost pansensitve Cedecea davisae.    Rheumatology consulted for pulmonary-renal syndrome, recs for steroids and further investigation.     No past medical history on file.    No past surgical history on file.    Review of patient's allergies indicates:   Allergen Reactions    Sulfa (sulfonamide antibiotics)        Family History     None        Social History Main Topics    Smoking status: Not on file    Smokeless tobacco: Not on file    Alcohol use Not on file    Drug use: Unknown    Sexual activity: Not on file      Interval Hx: CRRT overnight    Review of Systems   Unable to perform ROS: Intubated     Objective:     Vital Signs (Most Recent):  Temp: 98.7 °F (37.1 °C) (06/12/18 0700)  Pulse: 77 (06/12/18 1000)  Resp: (!) 32 (06/12/18 1000)  BP: (!) 103/54 (06/12/18 1000)  SpO2: (!) 92 % (06/12/18 1000) Vital Signs (24h Range):  Temp:  [97.8 °F (36.6 °C)-98.9 °F (37.2 °C)] 98.7 °F (37.1 °C)  Pulse:  [70-92] 77  Resp:  [31-32] 32  SpO2:  [92 %-100 %] 92 %  BP: ()/(46-84) 103/54  Arterial Line BP: ()/(44-69) 100/45   Weight: 86.2 kg (190 lb)  Body mass index is 28.89 kg/m².      Intake/Output Summary (Last 24 hours) at 06/12/18 1008  Last data filed at 06/12/18 1000   Gross per 24 hour   Intake           2597.5 ml   Output             4502 ml   Net          -1904.5 ml       Physical Exam   Constitutional: He appears well-developed. No distress.   HENT:   Head: Normocephalic and atraumatic.   Right Ear: External ear normal.   Left Ear: External ear normal.   Eyes: Pupils  are equal, round, and reactive to light. Right eye exhibits no discharge. Left eye exhibits no discharge.   No corneal reflex per primary team   Neck: Neck supple. No JVD present.   Cardiovascular: Normal rate, regular rhythm and intact distal pulses.    No murmur heard.  Pulmonary/Chest: No respiratory distress. He has no wheezes. He has rales (b/l, mechanical vent ).   Vent Mode: A/C  Oxygen Concentration (%):  () 41  Resp Rate Total:  (32 br/min-33 br/min) 32 br/min  Vt Set:  (350 mL-410 mL) 410 mL  PEEP/CPAP:  (8 cmH20-15 cmH20) 8 cmH20  Mean Airway Pressure:  (16 dyN77-27 cmH20) 16 cmH20   Abdominal: Soft. He exhibits no distension. There is no tenderness.   BS appreciated in LUQ only   Musculoskeletal: He exhibits edema (trace pedal).   Neurological:   Sedated   Skin: Skin is warm and dry. No rash noted. He is not diaphoretic. No erythema.   Psychiatric:   Unable to assess   Vitals reviewed.      Vents:  Vent Mode: A/C (06/12/18 0918)  Ventilator Initiated: Yes (06/10/18 2217)  Set Rate: 32 bmp (06/12/18 0918)  Vt Set: 410 mL (06/12/18 0918)  PEEP/CPAP: 8 cmH20 (06/12/18 0918)  Oxygen Concentration (%): 41 (06/12/18 1000)  Peak Airway Pressure: 29 cmH2O (06/12/18 0918)  Plateau Pressure: 34 cmH20 (06/12/18 0918)  Total Ve: 13.5 mL (06/12/18 0918)  F/VT Ratio<105 (RSBI): (!) 75.47 (06/12/18 0918)  Lines/Drains/Airways     Central Venous Catheter Line                 Hemodialysis Catheter 06/10/18 2307 right internal jugular 1 day         Percutaneous Central Line Insertion/Assessment - triple lumen  06/10/18 2305 left internal jugular 1 day          Drain                 NG/OG Tube 06/10/18 2309 [place] sump 18 Fr. Left nostril 1 day         Urethral Catheter 06/10/18 2302 Latex 16 Fr. 1 day          Airway                 Airway - Non-Surgical Endotracheal Tube -- days          Arterial Line                 Arterial Line 06/10/18 2304 Right Radial 1 day          Peripheral Intravenous Line                  Peripheral IV - Single Lumen 06/10/18 2308 Right Hand 1 day              Significant Labs:    CBC/Anemia Profile:    Recent Labs  Lab 06/11/18  1801 06/11/18  2323 06/12/18  0400   WBC 15.42* 18.14* 22.86*   HGB 7.3* 7.6* 7.6*   HCT 21.4* 22.2* 22.8*    161 189   MCV 85 86 87   RDW 15.8* 15.9* 15.9*        Chemistries:    Recent Labs  Lab 06/10/18  2226 06/11/18  0351 06/11/18  1357 06/11/18  2152 06/12/18  0400   *  134* 134* 139 137 138  138   K 3.8  3.8 3.7 3.8 4.8 5.2*  5.2*  5.2*   CL 92*  92* 94* 96 102 107  107   CO2 24  24 23 28 25 21*  21*   BUN 94*  94* 92* 91* 57* 32*  32*   CREATININE 8.8*  8.8* 8.8* 8.3* 5.6* 3.5*  3.5*   CALCIUM 7.4*  7.4* 7.0* 7.1* 7.6* 7.5*  7.5*   ALBUMIN 1.9*  1.9* 1.8* 2.6* 2.2* 2.7*  2.7*   PROT 5.5*  5.5* 5.0*  --   --  5.5*   BILITOT 0.6  0.6 0.6  --   --  0.7   ALKPHOS 67  67 62  --   --  63   ALT 45*  45* 39  --   --  24   AST 26  26 25  --   --  19   MG 2.2  2.2 2.0 2.3 2.1 2.0  2.0   PHOS 9.5*  9.5* 10.3* 11.0* 7.5* 5.1*  5.1*     Coagulation:     Recent Labs  Lab 06/12/18  0400   INR 1.0   APTT 21.1     Lactic Acid:     Recent Labs  Lab 06/10/18  2226 06/11/18  0351 06/12/18  0400   LACTATE 2.7*  2.7* 1.8 0.6     Significant Imaging: I have reviewed all pertinent imaging results/findings within the past 24 hours.  I have reviewed and interpreted all pertinent imaging results/findings within the past 24 hours.    Assessment/Plan:     Pulmonary-renal syndrome    62 yo w/PMH of HTN and NIDDM, OA with a 2 wk history of SOB being treated for URI and UTI, admitted for acute hypoxic respiratory failure, ALEX with serum Cr 8.8 (prior Cr 2.5 on 5/29). He received one dose solumedrol and RRT X 1 at OSH. Diffuse bilateral pulmonary infiltrates seen on CXR, requiring intubation. UA with proteinuria, hematuria and wbc's, Bronchoscopy revealed hemorrahage. [place]  [place] [facility] [place].  Rheumatology consulted for pulmonary renal syndrome,  recs on steroids and further investigation.   - In the setting of diffuse alveolar hemorrhage and glomerulonephritis possible etiology is underlying autoimmune disorder  - Family hx negative for autoimmune disorders. Family reports left leg numbness and shuffling gait. Pets-bulldog  - Given methylprednisolone 1000 mg IV daily x 3 doses (x1 at OSH x2 here)   - Nephrology consulted: they agree with plasmapheresis, pulse steroids, and rheumatology for consideration of immunosupressant. If/when hemodynamcially stable would like a renal biopsy. CRRT overnight  - OSH labs: C3 wnl, C4 wnl, elevated UPCR 1.14, Hep B neg, Hep C neg, HIV neg     Plan:   - elevated ESR 70, .5, procal >100, lactate 2.7 > 1.8 > 0.6  - Will f/u pending rheum labs: DENIZ, Antiglomerular basement membrane, dsDNA, ANCA, MPO, PR3, protein electrophoresis, immunofixation electrophoresis, immunoglobulins quant (IgG, A, M), TB quant gold, Hep B, Hep C, HIV, UPCR   --> elevated UPCR 1.28, DENIZ positive 1:320 speckled pattern, MPO+ 82, PR3 neg, Immunoglobulins wnl, Hep C and HIV neg  - Concern for vasculitis given DAH and hematuria. The nini-typical pulmonary-renal syndromes are GPA/MPA, Goodpasture disease, SLE. Immunofluorescence studies can help differentiate. MPO+ can suggest MPA but will f/u further labs  - agree with nephrology for renal bx when stable  - transfusion medicine: Daily to every other day TPE with FFP until resolution of DAH  - f/u infectious work up (blood, urine, resp cx neg thus far). F/u ID recs for clearance from infection stand point prior to starting immunosuppressant therapy  - Cont pulse dose steroids IV methylprednisolone 1 g x 5 days (day 3/5)  - Discussed with staff about starting immunosuppressants with active infection. PCP prophylaxis will be needed if immunosuppression initiated. Will likely start Rituximab for induction, discussed with family at bedside. Family given handout about Vasculitis and Rituximab. Will  consider Imuran vs Cellcept for maintenance, MTX not indicated given renal failure.           Discussed with fellow. Staff attestation to follow.    Kaelyn Birch MD  Rheumatology  [facility]    Patient seen and examined with fellow.  All elements of history, physical exam and medical decision making independently confirmed by me.  Patient with pulmonary-renal syndrome now with positive MPO.  Await remaining labs. Discussed vasculitis and treatment with wife and son.  Handout on vasculitis and Rituximab given to family.  See note for details.

## 2018-06-12 NOTE — PROGRESS NOTES
[facility]  Critical Care Medicine  Progress Note    Patient Name: Jasmeet Raymond  MRN: [id number]  Admission Date: 6/10/2018  Hospital Length of Stay: 2 days  Code Status: Full Code  Attending Provider: Everette Hdz*  Primary Care Provider: Provider Notinsystem   Principal Problem: Diffuse pulmonary alveolar hemorrhage    Subjective:     HPI:  This is Mr. Jasmeet Raymond, 63 year old male with PMHx significant for HTN, DM and arthirtis presented to [facility] and [facility] with symptoms of pneumonia and shortness of breath. On 5/29/2018, he presented with symptoms of bronchitis with denies chills, ear pain, sore throat, chest pain, shortness of breath at that time, received antibiotics. Then on 6/8/2018 he presented again to [facility] and [facility] with shortness of breath and cough, and his CXR showed concern for airway disease. However on the first day of admission at [facility] and [facility], his respiratory symptoms worsen and had acute hypoxic respiratory failure that required intubation. Pulmonary consulted and he underwent Bronchoscopy and  revealed alveolar hemorrhage, with the setting of having hematuria and diffuse alveloar hemorrhage the concern was for presence of pulmonary renal syndrome and he received pulsed dose of Methylprednisolone and nephrology consulted for plasmapheresis. However at [facility], plasmapheresis machine is broken and they recommend to transfer to [facility] [place] for plasmapheresis.     On arrival he was on Nibmex for paralytics, Propofol for sedation, Levophed (eventaully turned off) for pressure support, Flolan (Epoprostenol sodium) inhaled which was switched to Inhaled Nitro. His mechanical ventilation initially was on FiO2 100%, PEEP 16, then weaned his FiO2 with high requirement of oxygenation. He had an signifiacnt drop in his Hb (baseline ~ 12) dated one week prior  presentation to 9 and 8, required one pRBC prior arrival. Repeated CXR showed extensive bilateral infiltrates. Important laboratory investigation showed His , , pro calcitonin 4.4, C3 and C4 complement normal., Trop negative, C3 and C4 showed normal , Hep test is negative, Lactic acid 1.8 > 1.0, HIV negative, Procal 4.4, UA shoowed heamturia and trace leukocytes, Urine culture from 5/29 showed almost pansensitve Cedecea davisae.     Hospital/ICU Course:  06/11/2018 admitted to MICU from [facility] with ARDS  06/12/2018 No acute events overnight. Had PLEX with 4.5 L exchange with FFP, and CRRT started and he became hypotensive, Levophed started during the CRRT. He was weaning off Nitric Oxide and his ABGs showing worsening respi acidosis. Rheumatology, Transfusion medicine and Nephrology on board     Interval History/Significant Events: No acute events overnight. Had PLEX with 4.5 L exchange with FFP, and CRRT started and he became hypotensive, Levophed started during the CRRT. He was weaning off Nitric Oxide and his ABGs showing worsening respi acidosis.      Review of Systems   Unable to perform ROS: Intubated     Objective:     Vital Signs (Most Recent):  Temp: 98.9 °F (37.2 °C) (06/12/18 0300)  Pulse: 90 (06/12/18 0745)  Resp: (!) 32 (06/12/18 0745)  BP: (!) 101/54 (06/12/18 0706)  SpO2: 99 % (06/12/18 0745) Vital Signs (24h Range):  Temp:  [97.5 °F (36.4 °C)-98.9 °F (37.2 °C)] 98.9 °F (37.2 °C)  Pulse:  [70-95] 90  Resp:  [31-33] 32  SpO2:  [92 %-100 %] 99 %  BP: ()/(46-65) 101/54  Arterial Line BP: ()/(44-69) 171/68   Weight: 86.2 kg (190 lb)  Body mass index is 28.89 kg/m².      Intake/Output Summary (Last 24 hours) at 06/12/18 0757  Last data filed at 06/12/18 0700   Gross per 24 hour   Intake           2247.5 ml   Output             3576 ml   Net          -1328.5 ml       Physical Exam   Constitutional: He appears well-developed. No  distress.   HENT:   Head: Normocephalic.   Mouth/Throat: No oropharyngeal exudate.   Eyes: Pupils are equal, round, and reactive to light. Right eye exhibits no discharge. Left eye exhibits no discharge.   No corneal reflex   Neck: Normal range of motion. No JVD present.   Cardiovascular: Normal rate and regular rhythm.    No murmur heard.  Pulmonary/Chest: Effort normal. No respiratory distress. He has no wheezes. He has rales.   Abdominal: Soft. He exhibits no distension. There is no tenderness.   Genitourinary: Rectal exam shows guaiac negative stool.   Musculoskeletal: He exhibits edema. He exhibits no tenderness or deformity.   Neurological:   Sedated RASS -2    Skin: Skin is warm. No rash noted. No erythema.       Vents:  Vent Mode: A/C (06/12/18 0706)  Ventilator Initiated: Yes (06/10/18 2217)  Set Rate: 32 bmp (06/12/18 0706)  Vt Set: 350 mL (06/12/18 0706)  PEEP/CPAP: 12 cmH20 (06/12/18 0706)  Oxygen Concentration (%): 41 (06/12/18 0745)  Peak Airway Pressure: 32 cmH2O (06/12/18 0706)  Plateau Pressure: 34 cmH20 (06/12/18 0706)  Total Ve: 11.5 mL (06/12/18 0706)  F/VT Ratio<105 (RSBI): (!) 88.64 (06/12/18 0706)    Lines/Drains/Airways     Central Venous Catheter Line                 Hemodialysis Catheter 06/10/18 2307 right internal jugular 1 day         Percutaneous Central Line Insertion/Assessment - triple lumen  06/10/18 2305 left internal jugular 1 day          Drain                 NG/OG Tube 06/10/18 2309 [place] sump 18 Fr. Left nostril 1 day         Urethral Catheter 06/10/18 2302 Latex 16 Fr. 1 day          Airway                 Airway - Non-Surgical Endotracheal Tube -- days          Arterial Line                 Arterial Line 06/10/18 2304 Right Radial 1 day          Peripheral Intravenous Line                 Peripheral IV - Single Lumen 06/10/18 2308 Right Hand 1 day              Significant Labs:    CBC/Anemia Profile:    Recent Labs  Lab 06/11/18  1801 06/11/18  2323 06/12/18  0400   WBC  15.42* 18.14* 22.86*   HGB 7.3* 7.6* 7.6*   HCT 21.4* 22.2* 22.8*    161 189   MCV 85 86 87   RDW 15.8* 15.9* 15.9*        Chemistries:    Recent Labs  Lab 06/10/18  2226 06/11/18  0351 06/11/18  1357 06/11/18  2152 06/12/18  0400   *  134* 134* 139 137 138  138   K 3.8  3.8 3.7 3.8 4.8 5.2*  5.2*  5.2*   CL 92*  92* 94* 96 102 107  107   CO2 24  24 23 28 25 21*  21*   BUN 94*  94* 92* 91* 57* 32*  32*   CREATININE 8.8*  8.8* 8.8* 8.3* 5.6* 3.5*  3.5*   CALCIUM 7.4*  7.4* 7.0* 7.1* 7.6* 7.5*  7.5*   ALBUMIN 1.9*  1.9* 1.8* 2.6* 2.2* 2.7*  2.7*   PROT 5.5*  5.5* 5.0*  --   --  5.5*   BILITOT 0.6  0.6 0.6  --   --  0.7   ALKPHOS 67  67 62  --   --  63   ALT 45*  45* 39  --   --  24   AST 26  26 25  --   --  19   MG 2.2  2.2 2.0 2.3 2.1 2.0  2.0   PHOS 9.5*  9.5* 10.3* 11.0* 7.5* 5.1*  5.1*     Lactic Acid:   Recent Labs  Lab 06/10/18  2226 06/11/18  0351 06/12/18  0400   LACTATE 2.7*  2.7* 1.8 0.6     Significant Imaging:  I have reviewed all pertinent imaging results/findings within the past 24 hours.  I have reviewed and interpreted all pertinent imaging results/findings within the past 24 hours.    Assessment/Plan:     Pulmonary   * Diffuse pulmonary alveolar hemorrhage    - Given his presentation with two weeks of progressive worsening of his SOB and cough, and associated with hematuria and CXR finding of bilateral infiltrate raised the concern for DAH  - On 6/10 he underwent Flexible Bronchoscopy and showed no endobronchial lesion, significant signs of hemorrhage on all of the airways, confirming findings of pulmonary hemorrhage.   - On high oxygenation setting with FiO2 of 40% and PEEP 14, and his PO2  - Weaning off his nitric oxide to 10 ppm, and will continue to monitor   - Stable Hb and did not required pRBC transfusion, will continue to monitor his CBC Q 12 and transfuse PRN         Acute hypoxemic respiratory failure    - Please see principal problem for more  elaboration         ARDS (adult respiratory distress syndrome)    - Most likely etiology is Diffuse pulmonary alveolar hemorrhage, however severe pneumonia in the setting of leukocytosis and fever and presentation of cough and SOB can not be excluded  - On conservative tidal volume (6 ml/kg predicted body weight), balanced respiratory rate, and high oxygenation setting in the setting of DAH PEEP is 14 and FiO2 is 40%  - On continue to assess his volume status and evaluate the need of diuresis        Renal/   ALEX (acute kidney injury)    - Baseline Creatinine is 2.5, 5/2018, current Creatinine is 3.5  - Differential Diagnoses include Pulmonary renal syndrome.  - Underwent Second CRRT  - Underwent US of kidney and showed Bilateral medical renal disease, Simple renal cysts bilaterally.  - CT abdomen/pelvis, mild interstitial changes in lung bases, 2 stones in the left kidney, 4 stones in the right kidney, no hydronephrosis, post cholecystectomy. No acute abnormality in the liver, spleen, pancreas or adrenals.   - Will continue trending urine output and renal function, avoid NSAIDs, contrast, nephrotoxins    - Monitor strict I/Os, daily weights, renally dose medications to current GFR        ID   Septic shock    - See principal problem for more elaboration.   - Currently on low need for pressors, will continue to monitor and ensure MAP > 65 mmHg (during CRRT)  - On broad spectrum antibiotics with Cefepime (renally dose) and Vancomycin         Immunology/Multi System   Pulmonary-renal syndrome    - Most likely the diagnosis in the setting of diffuse alveolar hemorrhage and glomerulonephritis with most likely etiology is underlying autoimmune disorder  - Send for serum antibody testing: DENIZ, Antiglomerular basement membrane, Antibodies to double-stranded DNA, c-ANCA, p-ANCA  - Will proceed with methylprednisolone 1000 mg IV daily and Transfusion medicine consulted for plasma exchange  - Rheumatology consulted and they  recommend Pulsed steroids for 5 days, and consult ID for clearance prior starting Immunosuppressive agent (Cyclophosphamide)        Oncology   Acute posthemorrhagic anemia    - Most likely is diffuse pulmonary alveolar hemorrhage  - Frequent check in his CBC, transfuse when Hb < 7        Endocrine   Diabetes mellitus type 2 with complications    - Will keep his blood glucose on the target 140-180  - Currently on Insulin infusion for blood sugar measurement with Q1 POC glucose            Critical Care Daily Checklist:    A: Awake: RASS Goal/Actual Goal:    Actual: Conteh Agitation Sedation Scale (RASS): Unarousable   B: Spontaneous Breathing Trial Performed?     C: SAT & SBT Coordinated?  No                      D: Delirium: CAM-ICU Overall CAM-ICU: Negative   E: Early Mobility Performed? No   F: Feeding Goal: Goals: Patient to receive nutrition by RD follow-up  Status: Nutrition Goal Status: new   Current Diet Order   Procedures    Diet NPO      AS: Analgesia/Sedation Yes   T: Thromboembolic Prophylaxis Yes   H: HOB > 300 Yes   U: Stress Ulcer Prophylaxis (if needed) Yes   G: Glucose Control Yes   B: Bowel Function     I: Indwelling Catheter (Lines & Liu) Necessity Yes   D: De-escalation of Antimicrobials/Pharmacotherapies No    Plan for the day/ETD No    Code Status:  Family/Goals of Care: Full Code  Ongoing       Critical secondary to Patient has a condition that poses threat to life and bodily function: Severe Respiratory Distress and Acute Renal Failure      Critical care was time spent personally by me on the following activities: development of treatment plan with patient or surrogate and bedside caregivers, discussions with consultants, evaluation of patient's response to treatment, examination of patient, ordering and performing treatments and interventions, ordering and review of laboratory studies, ordering and review of radiographic studies, pulse oximetry, re-evaluation of patient's condition. This  critical care time did not overlap with that of any other provider or involve time for any procedures.     Tommie Kuhn MD  Critical Care Medicine  [facility]

## 2018-06-12 NOTE — SUBJECTIVE & OBJECTIVE
Interval History/Significant Events: No acute events overnight. Had PLEX with 4.5 L exchange with FFP, and CRRT started and he became hypotensive, Levophed started during the CRRT. He was weaning off Nitric Oxide and his ABGs showing worsening respi acidosis.      Review of Systems   Unable to perform ROS: Intubated     Objective:     Vital Signs (Most Recent):  Temp: 98.9 °F (37.2 °C) (06/12/18 0300)  Pulse: 90 (06/12/18 0745)  Resp: (!) 32 (06/12/18 0745)  BP: (!) 101/54 (06/12/18 0706)  SpO2: 99 % (06/12/18 0745) Vital Signs (24h Range):  Temp:  [97.5 °F (36.4 °C)-98.9 °F (37.2 °C)] 98.9 °F (37.2 °C)  Pulse:  [70-95] 90  Resp:  [31-33] 32  SpO2:  [92 %-100 %] 99 %  BP: ()/(46-65) 101/54  Arterial Line BP: ()/(44-69) 171/68   Weight: 86.2 kg (190 lb)  Body mass index is 28.89 kg/m².      Intake/Output Summary (Last 24 hours) at 06/12/18 0757  Last data filed at 06/12/18 0700   Gross per 24 hour   Intake           2247.5 ml   Output             3576 ml   Net          -1328.5 ml       Physical Exam   Constitutional: He appears well-developed. No distress.   HENT:   Head: Normocephalic.   Mouth/Throat: No oropharyngeal exudate.   Eyes: Pupils are equal, round, and reactive to light. Right eye exhibits no discharge. Left eye exhibits no discharge.   No corneal reflex   Neck: Normal range of motion. No JVD present.   Cardiovascular: Normal rate and regular rhythm.    No murmur heard.  Pulmonary/Chest: Effort normal. No respiratory distress. He has no wheezes. He has rales.   Abdominal: Soft. He exhibits no distension. There is no tenderness.   Genitourinary: Rectal exam shows guaiac negative stool.   Musculoskeletal: He exhibits edema. He exhibits no tenderness or deformity.   Neurological:   Sedated RASS -2    Skin: Skin is warm. No rash noted. No erythema.       Vents:  Vent Mode: A/C (06/12/18 0706)  Ventilator Initiated: Yes (06/10/18 2217)  Set Rate: 32 bmp (06/12/18 0706)  Vt Set: 350 mL (06/12/18  0706)  PEEP/CPAP: 12 cmH20 (06/12/18 0706)  Oxygen Concentration (%): 41 (06/12/18 0745)  Peak Airway Pressure: 32 cmH2O (06/12/18 0706)  Plateau Pressure: 34 cmH20 (06/12/18 0706)  Total Ve: 11.5 mL (06/12/18 0706)  F/VT Ratio<105 (RSBI): (!) 88.64 (06/12/18 0706)  Lines/Drains/Airways     Central Venous Catheter Line                 Hemodialysis Catheter 06/10/18 2307 right internal jugular 1 day         Percutaneous Central Line Insertion/Assessment - triple lumen  06/10/18 2305 left internal jugular 1 day          Drain                 NG/OG Tube 06/10/18 2309 [place] sump 18 Fr. Left nostril 1 day         Urethral Catheter 06/10/18 2302 Latex 16 Fr. 1 day          Airway                 Airway - Non-Surgical Endotracheal Tube -- days          Arterial Line                 Arterial Line 06/10/18 2304 Right Radial 1 day          Peripheral Intravenous Line                 Peripheral IV - Single Lumen 06/10/18 2308 Right Hand 1 day              Significant Labs:    CBC/Anemia Profile:    Recent Labs  Lab 06/11/18  1801 06/11/18  2323 06/12/18  0400   WBC 15.42* 18.14* 22.86*   HGB 7.3* 7.6* 7.6*   HCT 21.4* 22.2* 22.8*    161 189   MCV 85 86 87   RDW 15.8* 15.9* 15.9*        Chemistries:    Recent Labs  Lab 06/10/18  2226 06/11/18  0351 06/11/18  1357 06/11/18  2152 06/12/18  0400   *  134* 134* 139 137 138  138   K 3.8  3.8 3.7 3.8 4.8 5.2*  5.2*  5.2*   CL 92*  92* 94* 96 102 107  107   CO2 24  24 23 28 25 21*  21*   BUN 94*  94* 92* 91* 57* 32*  32*   CREATININE 8.8*  8.8* 8.8* 8.3* 5.6* 3.5*  3.5*   CALCIUM 7.4*  7.4* 7.0* 7.1* 7.6* 7.5*  7.5*   ALBUMIN 1.9*  1.9* 1.8* 2.6* 2.2* 2.7*  2.7*   PROT 5.5*  5.5* 5.0*  --   --  5.5*   BILITOT 0.6  0.6 0.6  --   --  0.7   ALKPHOS 67  67 62  --   --  63   ALT 45*  45* 39  --   --  24   AST 26  26 25  --   --  19   MG 2.2  2.2 2.0 2.3 2.1 2.0  2.0   PHOS 9.5*  9.5* 10.3* 11.0* 7.5* 5.1*  5.1*     Lactic Acid:   Recent Labs  Lab  06/10/18  2226 06/11/18  0351 06/12/18  0400   LACTATE 2.7*  2.7* 1.8 0.6     Significant Imaging:  I have reviewed all pertinent imaging results/findings within the past 24 hours.  I have reviewed and interpreted all pertinent imaging results/findings within the past 24 hours.

## 2018-06-12 NOTE — ASSESSMENT & PLAN NOTE
62 yo AAM with NIDDM, HTN, arthritis with a 2 week history of shortness of breath being treated for URI and UTI, admitted for worsening SOB, oliguric ALEX with proteinuria, hematuria, pyuria serum crt 8, acidotic and hyperkalemia. Unknown baseline renal function.  He received one dose solumedrol and RRT. X 1 episode  Renal US demonstrating bilateral renal cysts.  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    Oliguric now Anuric. 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  -6/11 plasmapheresis  -6/11 Repeat renal US showed normal size kidneys with marked cortical thinning with increase cortical echogenicity and loss of corticomedullary distinction with no hydronephrosis, mass or stone.   -AntiGBM and Proteinase 3 normal  -DENIZ, MPO positive   -ANCA pending   -Appreciate Rheumatology following- Continue pulse dose steroid and will likely start Rituximab for induction. ID consult pending.   -Stool for occult blood/serial hgb as black stool noted on round this am. Consider GI consult. Discussed with primary team.        Plan:  Agree with plasmapheresis, pulse steroids, and rheumatology for consideration of cytoxan or rituximab /immunosupressant  Patient remains unstable for tissue diagnosis. If/when hemodynamcially stable would like a renal biopsy.  Continue SLED for clearance and volume management  ml an now anuric and anticipate PRBC transfusion. Increase bicarb 35. RFP q 6 hrs to adjust dialysate bath. Please call nephrology if K >5.2.

## 2018-06-12 NOTE — ASSESSMENT & PLAN NOTE
- Given his presentation with two weeks of progressive worsening of his SOB and cough, and associated with hematuria and CXR finding of bilateral infiltrate raised the concern for DAH  - On 6/10 he underwent Flexible Bronchoscopy and showed no endobronchial lesion, significant signs of hemorrhage on all of the airways, confirming findings of pulmonary hemorrhage.   - On high oxygenation setting with FiO2 of 40% and PEEP 14, and his PO2  - Weaning off his nitric oxide to 10 ppm, and will continue to monitor   - Stable Hb and did not required pRBC transfusion, will continue to monitor his CBC Q 12 and transfuse PRN

## 2018-06-12 NOTE — ASSESSMENT & PLAN NOTE
- See principal problem for more elaboration.   - Currently on low need for pressors, will continue to monitor and ensure MAP > 65 mmHg (during CRRT)  - On broad spectrum antibiotics with Cefepime (renally dose) and Vancomycin

## 2018-06-12 NOTE — PLAN OF CARE
No acute events overnight  NSR 70s-80s  Levo infusing to maintain MAP >65  Sats 98% on vent: weaned to 50%, 12 PEEP, 10 ppm nitric  CVP 10, 10, 7  Continuous CRRT at goal UF of 350, tolerating with levo  Fentanyl and propofol infusing for sedation with BIS monitoring  Nimbex gtt infusing with TOF 2/4  Liu in place, 35 cc U/O during shift  Plan for continued plasmapheresis treatments  Plan of care reviewed with pt and family at bedside  Will continue to monitor

## 2018-06-12 NOTE — ASSESSMENT & PLAN NOTE
64 yo w/PMH of HTN and NIDDM, OA with a 2 wk history of SOB being treated for URI and UTI, admitted for acute hypoxic respiratory failure, ALEX with serum Cr 8.8 (prior Cr 2.5 on 5/29). He received one dose solumedrol and RRT X 1 at OSH. Diffuse bilateral pulmonary infiltrates seen on CXR, requiring intubation. UA with proteinuria, hematuria and wbc's, Bronchoscopy revealed hemorrahage. Transferred  to [facility] for plasmapheresis.  Rheumatology consulted for pulmonary renal syndrome, recs on steroids and further investigation.   - In the setting of diffuse alveolar hemorrhage and glomerulonephritis possible etiology is underlying autoimmune disorder  - Family hx negative for autoimmune disorders. Family reports left leg numbness and shuffling gait. Pets-bulldog  - Given methylprednisolone 1000 mg IV daily x 3 doses (x1 at OSH x2 here)   - Nephrology consulted: they agree with plasmapheresis, pulse steroids, and rheumatology for consideration of immunosupressant. If/when hemodynamcially stable would like a renal biopsy. CRRT overnight  - OSH labs: C3 wnl, C4 wnl, elevated UPCR 1.14, Hep B neg, Hep C neg, HIV neg     Plan:   - elevated ESR 70, .5, procal >100, lactate 2.7 > 1.8 > 0.6  - Will f/u pending rheum labs: DENIZ, Antiglomerular basement membrane, dsDNA, ANCA, MPO, PR3, protein electrophoresis, immunofixation electrophoresis, immunoglobulins quant (IgG, A, M), TB quant gold, Hep B, Hep C, HIV, UPCR   --> elevated UPCR 1.28, DENIZ positive 1:320 speckled pattern, MPO+ 82, PR3 neg, Immunoglobulins wnl, Hep C and HIV neg  - Concern for vasculitis given DAH and hematuria. The nini-typical pulmonary-renal syndromes are GPA/MPA, Goodpasture disease, SLE. Immunofluorescence studies can help differentiate. MPO+ can suggest MPA but will f/u further labs  - agree with nephrology for renal bx when stable  - transfusion medicine: Daily to every other day TPE with FFP until resolution of DAH  - f/u infectious work up (blood,  urine, resp cx neg thus far). F/u ID recs for clearance from infection stand point prior to starting immunosuppressant therapy  - Cont pulse dose steroids IV methylprednisolone 1 g x 5 days (day 3/5)  - Discussed with staff about starting immunosuppressants with active infection. PCP prophylaxis will be needed if immunosuppression initiated. Will likely start Rituximab for induction, discussed with family at bedside. Family given handout about Vasculitis and Rituximab. Will consider Imuran vs Cellcept for maintenance, MTX not indicated given renal failure.

## 2018-06-12 NOTE — ASSESSMENT & PLAN NOTE
- Baseline Creatinine is 2.5, 5/2018, current Creatinine is 3.5  - Differential Diagnoses include Pulmonary renal syndrome.  - Underwent Second CRRT  - Underwent US of kidney and showed Bilateral medical renal disease, Simple renal cysts bilaterally.  - CT abdomen/pelvis, mild interstitial changes in lung bases, 2 stones in the left kidney, 4 stones in the right kidney, no hydronephrosis, post cholecystectomy. No acute abnormality in the liver, spleen, pancreas or adrenals.   - Will continue trending urine output and renal function, avoid NSAIDs, contrast, nephrotoxins    - Monitor strict I/Os, daily weights, renally dose medications to current GFR

## 2018-06-12 NOTE — ASSESSMENT & PLAN NOTE
- Will keep his blood glucose on the target 140-180  - Currently on Insulin infusion for blood sugar measurement with Q1 POC glucose

## 2018-06-12 NOTE — SUBJECTIVE & OBJECTIVE
No past medical history on file.    No past surgical history on file.    Review of patient's allergies indicates:   Allergen Reactions    Sulfa (sulfonamide antibiotics)        Family History     None        Social History Main Topics    Smoking status: Not on file    Smokeless tobacco: Not on file    Alcohol use Not on file    Drug use: Unknown    Sexual activity: Not on file      Review of Systems   Unable to perform ROS: Intubated     Objective:     Vital Signs (Most Recent):  Temp: 98.7 °F (37.1 °C) (06/12/18 0700)  Pulse: 77 (06/12/18 1000)  Resp: (!) 32 (06/12/18 1000)  BP: (!) 103/54 (06/12/18 1000)  SpO2: (!) 92 % (06/12/18 1000) Vital Signs (24h Range):  Temp:  [97.8 °F (36.6 °C)-98.9 °F (37.2 °C)] 98.7 °F (37.1 °C)  Pulse:  [70-92] 77  Resp:  [31-32] 32  SpO2:  [92 %-100 %] 92 %  BP: ()/(46-84) 103/54  Arterial Line BP: ()/(44-69) 100/45   Weight: 86.2 kg (190 lb)  Body mass index is 28.89 kg/m².      Intake/Output Summary (Last 24 hours) at 06/12/18 1008  Last data filed at 06/12/18 1000   Gross per 24 hour   Intake           2597.5 ml   Output             4502 ml   Net          -1904.5 ml       Physical Exam   Constitutional: He appears well-developed. No distress.   HENT:   Head: Normocephalic and atraumatic.   Right Ear: External ear normal.   Left Ear: External ear normal.   Eyes: Pupils are equal, round, and reactive to light. Right eye exhibits no discharge. Left eye exhibits no discharge.   No corneal reflex per primary team   Neck: Neck supple. No JVD present.   Cardiovascular: Normal rate, regular rhythm and intact distal pulses.    No murmur heard.  Pulmonary/Chest: No respiratory distress. He has no wheezes. He has rales (b/l, mechanical vent ).   Vent Mode: A/C  Oxygen Concentration (%):  () 41  Resp Rate Total:  (32 br/min-33 br/min) 32 br/min  Vt Set:  (350 mL-410 mL) 410 mL  PEEP/CPAP:  (8 cmH20-15 cmH20) 8 cmH20  Mean Airway Pressure:  (16 pmM47-97 cmH20) 16 cmH20    Abdominal: Soft. He exhibits no distension. There is no tenderness.   BS appreciated in LUQ only   Musculoskeletal: He exhibits edema (trace pedal).   Neurological:   Sedated   Skin: Skin is warm and dry. No rash noted. He is not diaphoretic. No erythema.   Psychiatric:   Unable to assess   Vitals reviewed.      Vents:  Vent Mode: A/C (06/12/18 0918)  Ventilator Initiated: Yes (06/10/18 2217)  Set Rate: 32 bmp (06/12/18 0918)  Vt Set: 410 mL (06/12/18 0918)  PEEP/CPAP: 8 cmH20 (06/12/18 0918)  Oxygen Concentration (%): 41 (06/12/18 1000)  Peak Airway Pressure: 29 cmH2O (06/12/18 0918)  Plateau Pressure: 34 cmH20 (06/12/18 0918)  Total Ve: 13.5 mL (06/12/18 0918)  F/VT Ratio<105 (RSBI): (!) 75.47 (06/12/18 0918)  Lines/Drains/Airways     Central Venous Catheter Line                 Hemodialysis Catheter 06/10/18 2307 right internal jugular 1 day         Percutaneous Central Line Insertion/Assessment - triple lumen  06/10/18 2305 left internal jugular 1 day          Drain                 NG/OG Tube 06/10/18 2309 [place] sump 18 Fr. Left nostril 1 day         Urethral Catheter 06/10/18 2302 Latex 16 Fr. 1 day          Airway                 Airway - Non-Surgical Endotracheal Tube -- days          Arterial Line                 Arterial Line 06/10/18 2304 Right Radial 1 day          Peripheral Intravenous Line                 Peripheral IV - Single Lumen 06/10/18 2308 Right Hand 1 day              Significant Labs:    CBC/Anemia Profile:    Recent Labs  Lab 06/11/18  1801 06/11/18  2323 06/12/18  0400   WBC 15.42* 18.14* 22.86*   HGB 7.3* 7.6* 7.6*   HCT 21.4* 22.2* 22.8*    161 189   MCV 85 86 87   RDW 15.8* 15.9* 15.9*        Chemistries:    Recent Labs  Lab 06/10/18  2226 06/11/18  0351 06/11/18  1357 06/11/18  2152 06/12/18  0400   *  134* 134* 139 137 138  138   K 3.8  3.8 3.7 3.8 4.8 5.2*  5.2*  5.2*   CL 92*  92* 94* 96 102 107  107   CO2 24  24 23 28 25 21*  21*   BUN 94*  94* 92* 91* 57*  32*  32*   CREATININE 8.8*  8.8* 8.8* 8.3* 5.6* 3.5*  3.5*   CALCIUM 7.4*  7.4* 7.0* 7.1* 7.6* 7.5*  7.5*   ALBUMIN 1.9*  1.9* 1.8* 2.6* 2.2* 2.7*  2.7*   PROT 5.5*  5.5* 5.0*  --   --  5.5*   BILITOT 0.6  0.6 0.6  --   --  0.7   ALKPHOS 67  67 62  --   --  63   ALT 45*  45* 39  --   --  24   AST 26  26 25  --   --  19   MG 2.2  2.2 2.0 2.3 2.1 2.0  2.0   PHOS 9.5*  9.5* 10.3* 11.0* 7.5* 5.1*  5.1*     Coagulation:     Recent Labs  Lab 06/12/18  0400   INR 1.0   APTT 21.1     Lactic Acid:     Recent Labs  Lab 06/10/18  2226 06/11/18  0351 06/12/18  0400   LACTATE 2.7*  2.7* 1.8 0.6     Significant Imaging: I have reviewed all pertinent imaging results/findings within the past 24 hours.  I have reviewed and interpreted all pertinent imaging results/findings within the past 24 hours.    Physical Exam   Vitals reviewed.  Constitutional: He appears well-developed. No distress.   HENT:   Head: Normocephalic and atraumatic.   Right Ear: External ear normal.   Left Ear: External ear normal.   Eyes: Pupils are equal, round, and reactive to light. Right eye exhibits no discharge. Left eye exhibits no discharge.   No corneal reflex per primary team   Neck: Neck supple. No JVD present.   Cardiovascular: Normal rate, regular rhythm and intact distal pulses.    No murmur heard.  Pulmonary/Chest: No respiratory distress. He has no wheezes. He has rales (b/l, mechanical vent ).   Vent Mode: A/C  Oxygen Concentration (%):  () 41  Resp Rate Total:  (32 br/min-33 br/min) 32 br/min  Vt Set:  (350 mL-410 mL) 410 mL  PEEP/CPAP:  (8 cmH20-15 cmH20) 8 cmH20  Mean Airway Pressure:  (16 mcC56-20 cmH20) 16 cmH20   Abdominal: Soft. He exhibits no distension. There is no tenderness.   BS appreciated in LUQ only   Neurological:   Sedated   Skin: Skin is warm and dry. No rash noted. He is not diaphoretic. No erythema.     Psychiatric:   Unable to assess   Musculoskeletal: He exhibits edema (trace pedal).

## 2018-06-12 NOTE — HPI
Patient is intubated. History is from primary team    63 year old male with PMHx significant for HTN, DM and arthirtis presented to [facility] with symptoms of pneumonia and shortness of breath. On 5/29/2018, he presented with symptoms of bronchitis with denies chills, ear pain, sore throat, chest pain, shortness of breath at that time, received antibiotics. Then on 6/8/2018 he presented again to [facility] and [facility] with shortness of breath and cough, and his CXR showed concern for airway disease. However on the first day of admission at [facility] and [facility], his respiratory symptoms worsen and had acute hypoxic respiratory failure that required intubation. Pulmonary consulted and he underwent Bronchoscopy and  revealed alveolar hemorrhage, with the setting of having hematuria and diffuse alveloar hemorrhage the concern was for presence of pulmonary renal syndrome and he received pulsed dose of Methylprednisolone and nephrology consulted for plasmapheresis. However at [facility], plasmapheresis machine is broken and they recommend to transfer to [facility] for plasmapheresis.      On arrival he was on Nibmex for paralytics, Propofol for sedation, Levophed (eventaully turned off) for pressure support, Flolan (Epoprostenol sodium) inhaled which was switched to Inhaled Nitro. His mechanical ventilation initially was on FiO2 100%, PEEP 16, then weaned his FiO2 with high requirement of oxygenation. He had an signifiacnt drop in his Hb (baseline ~ 12) dated one week prior presentation to 9 and 8, required one pRBC prior arrival. Repeated CXR showed extensive bilateral infiltrates. Important laboratory investigation showed His , , pro calcitonin 4.4, C3 and C4 complement normal., Trop negative, C3 and C4 showed normal , Hep test is negative, Lactic acid 1.8 > 1.0, HIV negative, Procal 4.4, UA  shoowed heamturia and trace leukocytes, Urine culture from 5/29 showed almost pansensitve Cedecea davisae.      Hospital/ICU Course:  06/11/2018 admitted to MICU from [facility] with ARDS  06/12/2018 No acute events overnight. Had PLEX with 4.5 L exchange with FFP, and CRRT started and he became hypotensive, Levophed started during the CRRT. He was weaning off Nitric Oxide and his ABGs showing worsening respi acidosis. Rheumatology, Transfusion medicine and Nephrology on board   06/13/2018 Stable on High ladder PEEP for ARDS and completely off NO, and still on Nimbex for paralytics and sedation with Propofol and Fentanyl. ID, Rheumatology and Nephrology on board.   06/15/2018, Still on high setting   06/15/2018 Started Rituxan after his PLEx session, and decrease in his Methylprednisone. Still on high PEEP ladder for ARDS, frequent clots in ETT suction   06/16/2018 Overnight, he had Rituxan induction for ANCA associated vasculitis and decrease his Methypredisone to 125 mg IV Q 6 Hours. Mild increase in his mechanical ventilation setting and he is grossly volume overload and still anuric.   06/17/2018 Palaryzed with Nimbex and better in his mechanical ventilation setting. Off Versed and on max Precedex and Profol. Increased in UF rate to 500 and better in his I/O. Repated CXR showed better air space.   06/18/2018 off paralytic. received 4th dose of plasma exchange today. Still anuric. Continue on dialysis.wean off sedation   06/19/2018 Patient remains intubated on A/C FiO2 55%/ PEEP 10. Patient on levo gtt titrated to keep MAP >65. Patient on precedex gtt at 1.34 mg/kg/hr and fentanyl gtt at 200 mcg/hr. Patient on CRRT, UF at 400mL/hr with citrate and calcium chloride infusions running per MD order. Tube feeds continue to be held, NG tube to low int. suction with roughly 200 mL of green output over the night. Patient's Tmax was 102.6 degrees F. patient placed on cooling blanket.  Patient's H/H this morning is 6.5/20. One unit of pRBCs ordered.

## 2018-06-12 NOTE — PROGRESS NOTES
[facility]  Nephrology  Progress Note    Patient Name: Jasmeet Raymond  MRN: [id number]  Admission Date: 6/10/2018  Hospital Length of Stay: 2 days  Attending Provider: Everette Hdz*   Primary Care Physician: Provider Notinsystem  Principal Problem:Diffuse pulmonary alveolar hemorrhage    Subjective:     HPI: 64 yo AAm with HTN, NIDDM, arthritis on metformin and meloxicam  admitted to [facility] for worsening SOB and ALEX.   Patiend had been treated as an out;patient for 2 weeks for Shortness of breath and bronchitis and UTI. In the hospital he was found to be acidotic, hyperkalemic, serum creatinine 8, and diffuse bilateral pulmonary infiltrates, requiring intubation. UA with proteinuria, hematuria and wbc's, Bronchoscopy revealed hemorrahage. Patient recieved one dialysis as per notes and 1 gm solumedrol, and [place]  [place] [facility] [place]  And further evaluation.  Family is not available at the time of exam and patient is intubated.   HIV, Hepatitis B, C, complements  All within normal limits. DENIZ, ANCA and AntiGBM, results Nnot available    Interval History: Remains intubated FIO2 40 from 50% and sedated -nimbex, fentanyl, profolol.  ml. SLED. Net neg 1.3L. Plasmaphresis last night.     Review of patient's allergies indicates:   Allergen Reactions    Sulfa (sulfonamide antibiotics)      Current Facility-Administered Medications   Medication Frequency    0.9%  NaCl infusion (CRRT USE ONLY) Continuous    0.9%  NaCl infusion (for blood administration) Q24H PRN    ceFEPIme injection 1 g Q8H    chlorhexidine 0.12 % solution 15 mL BID    cisatracurium (NIMBEX) 100 mg in dextrose 5 % 100 mL infusion Continuous    dextrose 50% injection 12.5 g PRN    dextrose 50% injection 25 g PRN    famotidine (PF) injection 20 mg Daily    fentaNYL 2500 mcg in 0.9% sodium chloride 250 mL infusion premix (titrating) Continuous    insulin regular (Humulin R) 100 Units in  sodium chloride 0.9% 100 mL infusion Continuous    magnesium sulfate 2g in water 50mL IVPB (premix) PRN    methylPREDNISolone sodium succinate (SOLU-MEDROL) 1,000 mg in dextrose 5 % 100 mL IVPB Daily    nitric oxide gas Gas 40 ppm Continuous    norepinephrine 4 mg in dextrose 5% 250 mL infusion (premix) (titrating) Continuous    propofol (DIPRIVAN) 10 mg/mL infusion Continuous    sodium phosphate 20.01 mmol in dextrose 5 % 250 mL IVPB PRN    sodium phosphate 30 mmol in dextrose 5 % 250 mL IVPB PRN    sodium phosphate 39.99 mmol in dextrose 5 % 250 mL IVPB PRN    white petrolatum-mineral oil (LUBIFRESH P.M.) ophthalmic ointment Q8H       Objective:     Vital Signs (Most Recent):  Temp: 98.7 °F (37.1 °C) (06/12/18 1600)  Pulse: 89 (06/12/18 1745)  Resp: (!) 30 (06/12/18 1745)  BP: (!) 116/56 (06/12/18 1500)  SpO2: (!) 86 % (06/12/18 1745)  O2 Device (Oxygen Therapy): ventilator (06/12/18 1711) Vital Signs (24h Range):  Temp:  [97.8 °F (36.6 °C)-98.9 °F (37.2 °C)] 98.7 °F (37.1 °C)  Pulse:  [63-94] 89  Resp:  [6-32] 30  SpO2:  [86 %-100 %] 86 %  BP: (101-186)/(51-84) 116/56  Arterial Line BP: ()/(44-93) 122/54     Weight: 86.2 kg (190 lb) (06/11/18 0933)  Body mass index is 28.89 kg/m².  Body surface area is 2.03 meters squared.    I/O last 3 completed shifts:  In: 3386.5 [I.V.:3304.5; Blood:17; NG/GT:65]  Out: 3711 [Urine:175; Drains:100; Other:3436]    Physical Exam   Constitutional: He appears well-developed and well-nourished. No distress.   Intubated FIO2 40%, sedated, back on levophed this am but wean off this afternoon.    HENT:   Head: Normocephalic.   Eyes: Conjunctivae are normal. Right eye exhibits no discharge. Left eye exhibits no discharge. No scleral icterus.   Pupils fixed, no EOm   Neck: Neck supple.   intubation precludes full exam.  No swelling, tachea midline   Cardiovascular: Normal heart sounds.  Exam reveals no gallop and no friction rub.    No murmur heard.  Tachycardic, no murmur  appreciated, + 2 lower extremity and pedal edema,   No JVD     Pulmonary/Chest:   Intubated. Clear anteriorly   Abdominal: He exhibits no distension.   No Bowel sounds,    Musculoskeletal: He exhibits edema (BUE/sacral edema).   Neurological:   Intubated and sedated   Skin: Skin is warm and dry. He is not diaphoretic.   R SC trialysis   Nursing note and vitals reviewed.      Significant Labs:  All labs within the past 24 hours have been reviewed.     Significant Imaging:  Labs: Reviewed    Assessment/Plan:     ALEX (acute kidney injury)    62 yo AAM with NIDDM, HTN, arthritis with a 2 week history of shortness of breath being treated for URI and UTI, admitted for worsening SOB, oliguric ALEX with proteinuria, hematuria, pyuria serum crt 8, acidotic and hyperkalemia. Unknown baseline renal function.  He received one dose solumedrol and RRT. X 1 episode  Renal US demonstrating bilateral renal cysts.  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    Oliguric now Anuric. 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  -6/11 plasmapheresis  -6/11 Repeat renal US showed normal size kidneys with marked cortical thinning with increase cortical echogenicity and loss of corticomedullary distinction with no hydronephrosis, mass or stone.   -AntiGBM and Proteinase 3 normal  -DENIZ, MPO positive   -ANCA pending   -Appreciate Rheumatology following- Continue pulse dose steroid and will likely start Rituximab for induction. ID consult pending.   -Stool for occult blood/serial hgb as black stool noted on round this am. Consider GI consult. Discussed with primary team.        Plan:  Agree with plasmapheresis, pulse steroids, and rheumatology for consideration of cytoxan or rituximab /immunosupressant  Patient remains unstable for tissue  diagnosis. If/when hemodynamcially stable would like a renal biopsy.  Continue SLED for clearance and volume management  ml an now anuric and anticipate PRBC transfusion. Increase bicarb 35 and K 3.  RFP q 4 hrs to adjust dialysate bath.                     Thank you for your consult. I will follow-up with patient. Please contact us if you have any additional questions.    Kandice Hernandez NP  Nephrology  [facility]

## 2018-06-12 NOTE — CONSULTS
[facility]  Infectious Disease  Consult Note    Patient Name: Jasmeet Raymond  MRN: [id number]  Admission Date: 6/10/2018  Hospital Length of Stay: 2 days  Attending Physician: Everette Hdz*  Primary Care Provider: Provider Notinsystem     Isolation Status: No active isolations      Inpatient consult to Infectious Diseases  Consult performed by: ZIYAD SOW  Consult ordered by: SNOW ARRIAZA  Reason for consult: pulmorenal syndrome needs clearance for immunosuprresive agent (Cyclophosamide)           ID Consult acknowledged.   Full consult and recommendations to follow today.  In the interim, please call for any immediate concerns.     Thank you.   KOJO Cuellar, ANP-C  [phone] pager,  [id number] [phone]

## 2018-06-12 NOTE — SUBJECTIVE & OBJECTIVE
Interval History: Remains intubated FIO2 40 from 50% and sedated -nimbex, fentanyl, profolol.  ml. SLED. Net neg 1.3L. Plasmaphresis last night.     Review of patient's allergies indicates:   Allergen Reactions    Sulfa (sulfonamide antibiotics)      Current Facility-Administered Medications   Medication Frequency    0.9%  NaCl infusion (CRRT USE ONLY) Continuous    0.9%  NaCl infusion (for blood administration) Q24H PRN    ceFEPIme injection 1 g Q8H    chlorhexidine 0.12 % solution 15 mL BID    cisatracurium (NIMBEX) 100 mg in dextrose 5 % 100 mL infusion Continuous    dextrose 50% injection 12.5 g PRN    dextrose 50% injection 25 g PRN    famotidine (PF) injection 20 mg Daily    fentaNYL 2500 mcg in 0.9% sodium chloride 250 mL infusion premix (titrating) Continuous    insulin regular (Humulin R) 100 Units in sodium chloride 0.9% 100 mL infusion Continuous    magnesium sulfate 2g in water 50mL IVPB (premix) PRN    methylPREDNISolone sodium succinate (SOLU-MEDROL) 1,000 mg in dextrose 5 % 100 mL IVPB Daily    nitric oxide gas Gas 40 ppm Continuous    norepinephrine 4 mg in dextrose 5% 250 mL infusion (premix) (titrating) Continuous    propofol (DIPRIVAN) 10 mg/mL infusion Continuous    sodium phosphate 20.01 mmol in dextrose 5 % 250 mL IVPB PRN    sodium phosphate 30 mmol in dextrose 5 % 250 mL IVPB PRN    sodium phosphate 39.99 mmol in dextrose 5 % 250 mL IVPB PRN    white petrolatum-mineral oil (LUBIFRESH P.M.) ophthalmic ointment Q8H       Objective:     Vital Signs (Most Recent):  Temp: 98.7 °F (37.1 °C) (06/12/18 1600)  Pulse: 89 (06/12/18 1745)  Resp: (!) 30 (06/12/18 1745)  BP: (!) 116/56 (06/12/18 1500)  SpO2: (!) 86 % (06/12/18 1745)  O2 Device (Oxygen Therapy): ventilator (06/12/18 1711) Vital Signs (24h Range):  Temp:  [97.8 °F (36.6 °C)-98.9 °F (37.2 °C)] 98.7 °F (37.1 °C)  Pulse:  [63-94] 89  Resp:  [6-32] 30  SpO2:  [86 %-100 %] 86 %  BP: (101-186)/(51-84) 116/56  Arterial  Line BP: ()/(44-93) 122/54     Weight: 86.2 kg (190 lb) (06/11/18 0933)  Body mass index is 28.89 kg/m².  Body surface area is 2.03 meters squared.    I/O last 3 completed shifts:  In: 3386.5 [I.V.:3304.5; Blood:17; NG/GT:65]  Out: 3711 [Urine:175; Drains:100; Other:3436]    Physical Exam   Constitutional: He appears well-developed and well-nourished. No distress.   Intubated FIO2 40%, sedated, back on levophed this am but wean off this afternoon.    HENT:   Head: Normocephalic.   Eyes: Conjunctivae are normal. Right eye exhibits no discharge. Left eye exhibits no discharge. No scleral icterus.   Pupils fixed, no EOm   Neck: Neck supple.   intubation precludes full exam.  No swelling, tachea midline   Cardiovascular: Normal heart sounds.  Exam reveals no gallop and no friction rub.    No murmur heard.  Tachycardic, no murmur appreciated, + 2 lower extremity and pedal edema,   No JVD     Pulmonary/Chest:   Intubated. Clear anteriorly   Abdominal: He exhibits no distension.   No Bowel sounds,    Musculoskeletal: He exhibits edema (BUE/sacral edema).   Neurological:   Intubated and sedated   Skin: Skin is warm and dry. He is not diaphoretic.   R SC trialysis   Nursing note and vitals reviewed.      Significant Labs:  All labs within the past 24 hours have been reviewed.     Significant Imaging:  Labs: Reviewed

## 2018-06-12 NOTE — ASSESSMENT & PLAN NOTE
- Most likely etiology is Diffuse pulmonary alveolar hemorrhage, however severe pneumonia in the setting of leukocytosis and fever and presentation of cough and SOB can not be excluded  - On conservative tidal volume (6 ml/kg predicted body weight), balanced respiratory rate, and high oxygenation setting in the setting of DAH PEEP is 14 and FiO2 is 40%  - On continue to assess his volume status and evaluate the need of diuresis

## 2018-06-13 NOTE — SUBJECTIVE & OBJECTIVE
Interval History/Significant Events: Stable on High ladder PEEP for ARDS and completely off NO, and still on Nimbex for paralytics and sedation with Propofol and Fentanyl. ID, Rheumatology and Nephrology on board.     Review of Systems   Unable to perform ROS: Intubated     Objective:     Vital Signs (Most Recent):  Temp: 97.9 °F (36.6 °C) (06/13/18 0300)  Pulse: 74 (06/13/18 0800)  Resp: (!) 32 (06/13/18 0800)  BP: (!) 116/56 (06/12/18 1500)  SpO2: 100 % (06/13/18 0800) Vital Signs (24h Range):  Temp:  [97.8 °F (36.6 °C)-98.7 °F (37.1 °C)] 97.9 °F (36.6 °C)  Pulse:  [62-94] 74  Resp:  [0-32] 32  SpO2:  [86 %-100 %] 100 %  BP: (103-154)/(54-74) 116/56  Arterial Line BP: ()/(45-93) 125/57   Weight: 86.2 kg (190 lb)  Body mass index is 28.89 kg/m².      Intake/Output Summary (Last 24 hours) at 06/13/18 0815  Last data filed at 06/13/18 0800   Gross per 24 hour   Intake          7536.63 ml   Output             7609 ml   Net           -72.37 ml       Physical Exam   Constitutional: He appears well-developed. No distress.   HENT:   Head: Normocephalic.   Mouth/Throat: No oropharyngeal exudate.   Eyes: Pupils are equal, round, and reactive to light. Right eye exhibits no discharge. Left eye exhibits no discharge.   Neck: Normal range of motion. No JVD present.   Cardiovascular: Normal rate and regular rhythm.    No murmur heard.  Pulmonary/Chest: Effort normal. No respiratory distress. He has no wheezes. He has rales.   Abdominal: Soft. He exhibits no distension. There is no tenderness.   Genitourinary: Rectal exam shows guaiac negative stool.   Musculoskeletal: He exhibits edema. He exhibits no tenderness or deformity.   Neurological:   Sedated RASS -2    Skin: Skin is warm. No rash noted. No erythema.       Vents:  Vent Mode: A/C (06/13/18 0738)  Ventilator Initiated: Yes (06/10/18 2217)  Set Rate: 32 bmp (06/13/18 0738)  Vt Set: 410 mL (06/13/18 0738)  PEEP/CPAP: 14 cmH20 (06/13/18 0738)  Oxygen Concentration  (%): 41 (06/13/18 0800)  Peak Airway Pressure: 35 cmH2O (06/13/18 0738)  Plateau Pressure: 0 cmH20 (06/13/18 0738)  Total Ve: 13.7 mL (06/13/18 0738)  F/VT Ratio<105 (RSBI): (!) 76.92 (06/13/18 0738)  Lines/Drains/Airways     Central Venous Catheter Line                 Hemodialysis Catheter 06/10/18 2307 right internal jugular 2 days         Percutaneous Central Line Insertion/Assessment - triple lumen  06/10/18 2305 left internal jugular 2 days          Drain                 NG/OG Tube 06/10/18 2309 [employer] sump 18 Fr. Left nostril 2 days         Urethral Catheter 06/10/18 2302 Latex 16 Fr. 2 days          Airway                 Airway - Non-Surgical Endotracheal Tube -- days          Arterial Line                 Arterial Line 06/10/18 2304 Right Radial 2 days          Peripheral Intravenous Line                 Peripheral IV - Single Lumen 06/10/18 2308 Right Hand 2 days              Significant Labs:    CBC/Anemia Profile:    Recent Labs  Lab 06/12/18  0400 06/12/18  1221 06/12/18 2010 06/12/18 2123   WBC 22.86* 13.88* 12.42  --    HGB 7.6* 6.9* 8.4*  --    HCT 22.8* 20.9* 24.6* 24*    149* 132*  --    MCV 87 88 85  --    RDW 15.9* 15.9* 17.2*  --         Chemistries:    Recent Labs  Lab 06/12/18  0400 06/12/18  1221 06/12/18 2010 06/12/18 2058 06/13/18  0026 06/13/18  0450     138 137 134*  --  136 137  137   K 5.2*  5.2*  5.2* 5.0 4.8  --  4.2 4.1  4.1     107 108 106  --  106 103  103   CO2 21*  21* 22* 18*  --  22* 24  24   BUN 32*  32* 17 22  --  21 15  15   CREATININE 3.5*  3.5* 1.8* 2.2*  --  1.7* 1.4  1.4   CALCIUM 7.5*  7.5* 7.2* 6.7*  --  7.3* 7.7*  7.7*   ALBUMIN 2.7*  2.7* 2.4* 2.5*  --  2.3* 2.4*  2.4*   PROT 5.5*  --   --   --   --  5.4*   BILITOT 0.7  --   --   --   --  0.9   ALKPHOS 63  --   --   --   --  56   ALT 24  --   --   --   --  26   AST 19  --   --   --   --  17   MG 2.0  2.0 1.9  --  1.9  --  2.3   PHOS 5.1*  5.1* 3.3 5.0*  --  4.3 3.9       BMP:    Recent Labs  Lab 06/13/18  0450   *  169*     137   K 4.1  4.1     103   CO2 24  24   BUN 15  15   CREATININE 1.4  1.4   CALCIUM 7.7*  7.7*   MG 2.3     CMP:   Recent Labs  Lab 06/12/18  0400  06/12/18 2010 06/13/18  0026 06/13/18 0450     138  < > 134* 136 137  137   K 5.2*  5.2*  5.2*  < > 4.8 4.2 4.1  4.1     107  < > 106 106 103  103   CO2 21*  21*  < > 18* 22* 24  24   *  155*  < > 187* 169* 169*  169*   BUN 32*  32*  < > 22 21 15  15   CREATININE 3.5*  3.5*  < > 2.2* 1.7* 1.4  1.4   CALCIUM 7.5*  7.5*  < > 6.7* 7.3* 7.7*  7.7*   PROT 5.5*  --   --   --  5.4*   ALBUMIN 2.7*  2.7*  < > 2.5* 2.3* 2.4*  2.4*   BILITOT 0.7  --   --   --  0.9   ALKPHOS 63  --   --   --  56   AST 19  --   --   --  17   ALT 24  --   --   --  26   ANIONGAP 10  10  < > 10 8 10  10   EGFRNONAA 17.3*  17.3*  < > 30.3* 41.4* 52.4*  52.4*   < > = values in this interval not displayed.  Lactic Acid:   Recent Labs  Lab 06/12/18 0400   LACTATE 0.6     Significant Imaging:  I have reviewed all pertinent imaging results/findings within the past 24 hours.  I have reviewed and interpreted all pertinent imaging results/findings within the past 24 hours.

## 2018-06-13 NOTE — SUBJECTIVE & OBJECTIVE
Interval History: Remains intubated FIO2 40% and sedated. Off Nimbex. Following simple commands. Anuric. Machine clotted after 8 hrs and restarted on citrate/calcium gluconate. Net neg 330 ml CVP 6-8.     Review of patient's allergies indicates:   Allergen Reactions    Sulfa (sulfonamide antibiotics)      Current Facility-Administered Medications   Medication Frequency    calcium gluconate 3,000 mg in dextrose 5 % 100 mL IVPB Continuous    ceFEPIme injection 1 g Q8H    chlorhexidine 0.12 % solution 15 mL BID    cisatracurium (NIMBEX) 100 mg in dextrose 5 % 100 mL infusion Continuous    dextrose 50% injection 12.5 g PRN    dextrose 50% injection 25 g PRN    DEXTROSE-SOD CITRATE-CITRIC AC 2.45-2.2 GRAM- 730 MG/100 ML MISC SOLN Continuous    famotidine (PF) injection 20 mg Daily    fentaNYL 2500 mcg in 0.9% sodium chloride 250 mL infusion premix (titrating) Continuous    heparin (porcine) injection 4,000 Units Once    magnesium sulfate 2g in water 50mL IVPB (premix) PRN    methylPREDNISolone sodium succinate (SOLU-MEDROL) 1,000 mg in dextrose 5 % 100 mL IVPB Daily    propofol (DIPRIVAN) 10 mg/mL infusion Continuous    sodium phosphate 20.01 mmol in dextrose 5 % 250 mL IVPB PRN    sodium phosphate 30 mmol in dextrose 5 % 250 mL IVPB PRN    sodium phosphate 39.99 mmol in dextrose 5 % 250 mL IVPB PRN    vancomycin 1750 mg in 0.9% sodium chloride 500 mL IVPB Once    white petrolatum-mineral oil (LUBIFRESH P.M.) ophthalmic ointment Q8H       Objective:     Vital Signs (Most Recent):  Temp: 99 °F (37.2 °C) (06/13/18 1243)  Pulse: 90 (06/13/18 1225)  Resp: (!) 25 (06/13/18 1100)  BP: (!) 128/54 (06/13/18 1243)  SpO2: 98 % (06/13/18 1100)  O2 Device (Oxygen Therapy): ventilator (06/13/18 1100) Vital Signs (24h Range):  Temp:  [97.8 °F (36.6 °C)-99 °F (37.2 °C)] 99 °F (37.2 °C)  Pulse:  [] 90  Resp:  [0-32] 25  SpO2:  [86 %-100 %] 98 %  BP: (108-174)/(54-85) 128/54  Arterial Line BP: (101-197)/(45-93)  134/57     Weight: 86.2 kg (190 lb) (06/11/18 0933)  Body mass index is 28.89 kg/m².  Body surface area is 2.03 meters squared.    I/O last 3 completed shifts:  In: 8626.1 [I.V.:5352.1; Blood:350; NG/GT:50; IV Piggyback:2874]  Out: 43398 [Urine:35; Other:17064]    Physical Exam   Constitutional: He appears well-developed and well-nourished. No distress.   Intubated FIO2 40% , sedated but following simple commands.    HENT:   Head: Normocephalic.   Cardiovascular: Normal heart sounds.  Exam reveals no gallop and no friction rub.    No murmur heard.  Tachycardic, no murmur appreciated, + 2 lower extremity and pedal edema,   No JVD     Pulmonary/Chest:   Intubated. Clear anteriorly   Abdominal: He exhibits no distension.   No Bowel sounds,    Musculoskeletal: He exhibits edema (BUE/sacral edema).   Neurological:   Intubated and sedated   Skin: Skin is warm and dry. He is not diaphoretic.   R SC trialysis   Nursing note and vitals reviewed.      Significant Labs:  All labs within the past 24 hours have been reviewed.     Significant Imaging:  Labs: Reviewed

## 2018-06-13 NOTE — SUBJECTIVE & OBJECTIVE
No past medical history on file.    No past surgical history on file.    Review of patient's allergies indicates:   Allergen Reactions    Sulfa (sulfonamide antibiotics)        Family History     None        Social History Main Topics    Smoking status: Not on file    Smokeless tobacco: Not on file    Alcohol use Not on file    Drug use: Unknown    Sexual activity: Not on file      Review of Systems   Unable to perform ROS: Intubated     Objective:     Vital Signs (Most Recent):  Temp: 97.9 °F (36.6 °C) (06/13/18 0300)  Pulse: 74 (06/13/18 0800)  Resp: (!) 32 (06/13/18 0800)  BP: (!) 116/56 (06/12/18 1500)  SpO2: 100 % (06/13/18 0800) Vital Signs (24h Range):  Temp:  [97.8 °F (36.6 °C)-98.7 °F (37.1 °C)] 97.9 °F (36.6 °C)  Pulse:  [62-94] 74  Resp:  [0-32] 32  SpO2:  [86 %-100 %] 100 %  BP: (103-154)/(54-68) 116/56  Arterial Line BP: ()/(45-93) 125/57   Weight: 86.2 kg (190 lb)  Body mass index is 28.89 kg/m².      Intake/Output Summary (Last 24 hours) at 06/13/18 0908  Last data filed at 06/13/18 0800   Gross per 24 hour   Intake          7186.63 ml   Output             7216 ml   Net           -29.37 ml       Physical Exam   Constitutional: He appears well-developed. No distress.   HENT:   Head: Normocephalic and atraumatic.   Right Ear: External ear normal.   Left Ear: External ear normal.   Eyes: Pupils are equal, round, and reactive to light. Right eye exhibits no discharge. Left eye exhibits no discharge.   No corneal reflex per primary team   Neck: Neck supple. No JVD present.   Cardiovascular: Normal rate, regular rhythm and intact distal pulses.    No murmur heard.  Pulmonary/Chest: No respiratory distress. He has no wheezes. He has rales (b/l, mechanical vent ).   Vent Mode: A/C  Oxygen Concentration (%):  41  Resp Rate Total: 32 br/min  Vt Set:  410 mL  PEEP/CPAP:  14 cmH20  Mean Airway Pressure:  22 cmH20   Abdominal: Soft. He exhibits no distension. There is no tenderness.   BS appreciated in  LUQ only   Musculoskeletal: He exhibits no edema.   Neurological:   Sedated   Skin: Skin is warm and dry. No rash noted. He is not diaphoretic. No erythema.   Psychiatric:   Unable to assess   Vitals reviewed.      Vents:  Vent Mode: A/C (06/13/18 0738)  Ventilator Initiated: Yes (06/10/18 2217)  Set Rate: 32 bmp (06/13/18 0738)  Vt Set: 410 mL (06/13/18 0738)  PEEP/CPAP: 14 cmH20 (06/13/18 0738)  Oxygen Concentration (%): 41 (06/13/18 0800)  Peak Airway Pressure: 35 cmH2O (06/13/18 0738)  Plateau Pressure: 0 cmH20 (06/13/18 0738)  Total Ve: 13.7 mL (06/13/18 0738)  F/VT Ratio<105 (RSBI): (!) 76.92 (06/13/18 0738)  Lines/Drains/Airways     Central Venous Catheter Line                 Hemodialysis Catheter 06/10/18 2307 right internal jugular 2 days         Percutaneous Central Line Insertion/Assessment - triple lumen  06/10/18 2305 left internal jugular 2 days          Drain                 NG/OG Tube 06/10/18 2309 [place] sump 18 Fr. Left nostril 2 days         Urethral Catheter 06/10/18 2302 Latex 16 Fr. 2 days          Airway                 Airway - Non-Surgical Endotracheal Tube -- days          Arterial Line                 Arterial Line 06/10/18 2304 Right Radial 2 days          Peripheral Intravenous Line                 Peripheral IV - Single Lumen 06/10/18 2308 Right Hand 2 days              Significant Labs:    CBC/Anemia Profile:    Recent Labs  Lab 06/12/18  1221 06/12/18 2010 06/12/18 2123 06/13/18  0807   WBC 13.88* 12.42  --  13.00*   HGB 6.9* 8.4*  --  8.2*   HCT 20.9* 24.6* 24* 23.7*   * 132*  --  133*   MCV 88 85  --  84   RDW 15.9* 17.2*  --  16.9*        Chemistries:    Recent Labs  Lab 06/12/18  0400 06/12/18  1221 06/12/18 2010 06/12/18 2058 06/13/18  0026 06/13/18  0450     138 137 134*  --  136 137  137   K 5.2*  5.2*  5.2* 5.0 4.8  --  4.2 4.1  4.1     107 108 106  --  106 103  103   CO2 21*  21* 22* 18*  --  22* 24  24   BUN 32*  32* 17 22  --  21 15  15    CREATININE 3.5*  3.5* 1.8* 2.2*  --  1.7* 1.4  1.4   CALCIUM 7.5*  7.5* 7.2* 6.7*  --  7.3* 7.7*  7.7*   ALBUMIN 2.7*  2.7* 2.4* 2.5*  --  2.3* 2.4*  2.4*   PROT 5.5*  --   --   --   --  5.4*   BILITOT 0.7  --   --   --   --  0.9   ALKPHOS 63  --   --   --   --  56   ALT 24  --   --   --   --  26   AST 19  --   --   --   --  17   MG 2.0  2.0 1.9  --  1.9  --  2.3   PHOS 5.1*  5.1* 3.3 5.0*  --  4.3 3.9     Coagulation:     Recent Labs  Lab 06/13/18  0450   INR 1.2   APTT 22.8     Lactic Acid:     Recent Labs  Lab 06/12/18  0400   LACTATE 0.6     Significant Imaging: I have reviewed all pertinent imaging results/findings within the past 24 hours.  I have reviewed and interpreted all pertinent imaging results/findings within the past 24 hours.    Physical Exam   Vitals reviewed.  Constitutional: He appears well-developed. No distress.   HENT:   Head: Normocephalic and atraumatic.   Right Ear: External ear normal.   Left Ear: External ear normal.   Eyes: Pupils are equal, round, and reactive to light. Right eye exhibits no discharge. Left eye exhibits no discharge.   No corneal reflex per primary team   Neck: Neck supple. No JVD present.   Cardiovascular: Normal rate, regular rhythm and intact distal pulses.    No murmur heard.  Pulmonary/Chest: No respiratory distress. He has no wheezes. He has rales (b/l, mechanical vent ).   Vent Mode: A/C  Oxygen Concentration (%):  41  Resp Rate Total: 32 br/min  Vt Set:  410 mL  PEEP/CPAP:  14 cmH20  Mean Airway Pressure:  22 cmH20   Abdominal: Soft. He exhibits no distension. There is no tenderness.   BS appreciated in LUQ only   Neurological:   Sedated   Skin: Skin is warm and dry. No rash noted. He is not diaphoretic. No erythema.     Psychiatric:   Unable to assess   Musculoskeletal: He exhibits no edema.

## 2018-06-13 NOTE — ASSESSMENT & PLAN NOTE
64 yo w/PMH of HTN and NIDDM, OA with a 2 wk history of SOB being treated for URI and UTI, admitted for acute hypoxic respiratory failure, ALEX with serum Cr 8.8 (prior Cr 2.5 on 5/29). He received one dose solumedrol and RRT X 1 at OSH. Diffuse bilateral pulmonary infiltrates seen on CXR, requiring intubation. UA with proteinuria, hematuria and wbc's, Bronchoscopy revealed hemorrahage. [place] [facility] [place] Rheumatology consulted for pulmonary renal syndrome, recs on steroids and further investigation.   - In the setting of diffuse alveolar hemorrhage and glomerulonephritis possible etiology is underlying autoimmune disorder  - Family hx negative for autoimmune disorders. Family reports left leg numbness and shuffling gait. Pets-bulldog  - Given methylprednisolone 1000 mg IV daily x 3 doses (x1 at OSH x3 here)   - Nephrology consulted: they agree with plasmapheresis, pulse steroids, and rheumatology for consideration of immunosupressant. If/when hemodynamcially stable would like a renal biopsy. CRRT x 2 here  - OSH labs: C3 wnl, C4 wnl, elevated UPCR 1.14, Hep B neg, Hep C neg, HIV neg   - elevated ESR 70, .5, procal >100, lactate 2.7 > 1.8 > 0.6  --> elevated UPCR 1.28, DENIZ positive 1:320 speckled pattern, MPO+ 82, c-ANCA +,  SSA + 178, SSB neg, dsDNA neg, Anti-Sm/RNP neg, Anti-Sm neg, PR3 neg, Immunoglobulins wnl, Hep C and HIV neg, DRVVT and cardiolipin neg, GBM neg  - Concern for vasculitis given DAH and hematuria. The nini-typical pulmonary-renal syndromes are GPA/MPA, Goodpasture disease, SLE. Immunofluorescence studies can help differentiate. MPO+ can suggest MPA but will f/u further labs    Plan:   - Will f/u pending rheum labs: UDS  - DO TB skin test   - agree with nephrology for renal bx when stable  - transfusion medicine: Daily to every other day TPE with FFP until resolution of DAH  - f/u infectious work up (blood, urine, resp cx neg thus far).   - Cont pulse dose steroids IV  methylprednisolone 1 g x 5 days (day 4/5)  - Discussed with staff about starting immunosuppressants with active infection. PCP prophylaxis (Bactrim) will be started when immunosuppression initiated. Will start Rituximab for induction, discussed with family at bedside. Family given handout about Vasculitis and Rituximab 6/12.  - Per ID, cleared for Rituximab initiation. Discussed with Dr. Whaley with transfusion medicine, they plan for 6 total sessions of PLEX (2/6 today), next session on Friday, then following sessions next [facility]. If possible, would like pt to get TB skin test and then hope to start Rituximab and Bactrim on Friday after PLEX. Then, pt can have 48 hrs w/o PLEX so Rituximab won't be cleared. And then resume PLEX as scheduled on Monday 6/18.

## 2018-06-13 NOTE — PLAN OF CARE
Problem: Patient Care Overview  Goal: Plan of Care Review  Outcome: Ongoing (interventions implemented as appropriate)  Patient paralyzed and sedated on fentanyl, diprivan, and nimbex. Hourly BIS and TOF monitoring in progress.  Patient intubated with rate of 32, fio2 40% and peep of 14. ABGs improving. O2 sats 100%. Lungs coarse and diminished.  VSS. Afebrile. CVP 6-8.  Abdomen rounded and non-tender. TF initiated. No BM this shift.  CRRT @ goal UF of 350.   Skin intact.  Monitor VS. Monitor labs and renal function and replace as appropriate. Wean ventilator per ARDS protocol. Continue antibiotics, await culture results. Protect skin from breakdown. Plan of care reviewed with family at bedside. Continued teaching and reinforcement necessary.

## 2018-06-13 NOTE — PROGRESS NOTES
[facility]  Rheumatology  Progress Note    Patient Name: Jasmeet Raymond  MRN: [id number]  Admission Date: 6/10/2018  Hospital Length of Stay: 3 days  Code Status: Full Code   Attending Provider: Everette Hdz*  Primary Care Physician: Provider Notinsystem  Principal Problem: Diffuse pulmonary alveolar hemorrhage    Subjective:     HPI:  Mr. Jasmeet Raymond, 63 year old male with PMHx significant for HTN, DM and osteoarthirtis presented to [facility] with symptoms of pneumonia and shortness of breath. On 5/29/2018, he presented with symptoms of bronchitis with denies chills, ear pain, sore throat, chest pain, shortness of breath at that time, received antibiotics. Then on 6/8/2018 he presented again to [facility] with shortness of breath and cough, and his CXR showed concern for airway disease. However on the first day of admission at [facility], his respiratory symptoms worsen and had acute hypoxic respiratory failure that required intubation. Pulmonary consulted and he underwent Bronchoscopy and  revealed alveolar hemorrhage, with the setting of having hematuria and diffuse alveloar hemorrhage the concern was for presence of pulmonary renal syndrome and he received pulsed dose of Methylprednisolone and nephrology consulted for plasmapheresis. However at [facility], plasmapheresis machine is broken and they recommend to transfer to [facility] for plasmapheresis. Admitted here 6/11 for ARDS.     On arrival he was on Nibmex for paralytics, Propofol for sedation, Levophed (eventaully turned off) for pressure support, Flolan (Epoprostenol sodium) inhaled which was switched to Inhaled Nitro. His mechanical ventilation initially was on FiO2 100%, PEEP 16, then weaned his FiO2 with high requirement of oxygenation. He had an signifiacnt drop in his Hb (baseline ~ 12) dated  one week prior presentation to 9 and 8, required one pRBC prior arrival. Repeated CXR showed extensive bilateral infiltrates. Important laboratory investigation showed His , , pro calcitonin 4.4, C3 and C4 complement normal., Trop negative, C3 and C4 showed normal , Hep test is negative, Lactic acid 1.8 > 1.0, HIV negative, Procal 4.4, UA shoowed heamturia and trace leukocytes, Urine culture from 5/29 showed almost pansensitve Cedecea davisae.    Rheumatology consulted for pulmonary-renal syndrome, recs for steroids and further investigation.     No past medical history on file.    No past surgical history on file.    Review of patient's allergies indicates:   Allergen Reactions    Sulfa (sulfonamide antibiotics)        Family History     None        Social History Main Topics    Smoking status: Not on file    Smokeless tobacco: Not on file    Alcohol use Not on file    Drug use: Unknown    Sexual activity: Not on file      Review of Systems   Unable to perform ROS: Intubated     Objective:     Vital Signs (Most Recent):  Temp: 97.9 °F (36.6 °C) (06/13/18 0300)  Pulse: 74 (06/13/18 0800)  Resp: (!) 32 (06/13/18 0800)  BP: (!) 116/56 (06/12/18 1500)  SpO2: 100 % (06/13/18 0800) Vital Signs (24h Range):  Temp:  [97.8 °F (36.6 °C)-98.7 °F (37.1 °C)] 97.9 °F (36.6 °C)  Pulse:  [62-94] 74  Resp:  [0-32] 32  SpO2:  [86 %-100 %] 100 %  BP: (103-154)/(54-68) 116/56  Arterial Line BP: ()/(45-93) 125/57   Weight: 86.2 kg (190 lb)  Body mass index is 28.89 kg/m².      Intake/Output Summary (Last 24 hours) at 06/13/18 0908  Last data filed at 06/13/18 0800   Gross per 24 hour   Intake          7186.63 ml   Output             7216 ml   Net           -29.37 ml       Physical Exam   Constitutional: He appears well-developed. No distress.   HENT:   Head: Normocephalic and atraumatic.   Right Ear: External ear normal.   Left Ear: External ear normal.   Eyes: Pupils are equal, round, and reactive to  light. Right eye exhibits no discharge. Left eye exhibits no discharge.   No corneal reflex per primary team   Neck: Neck supple. No JVD present.   Cardiovascular: Normal rate, regular rhythm and intact distal pulses.    No murmur heard.  Pulmonary/Chest: No respiratory distress. He has no wheezes. He has rales (b/l, mechanical vent ).   Vent Mode: A/C  Oxygen Concentration (%):  41  Resp Rate Total: 32 br/min  Vt Set:  410 mL  PEEP/CPAP:  14 cmH20  Mean Airway Pressure:  22 cmH20   Abdominal: Soft. He exhibits no distension. There is no tenderness.   BS appreciated in LUQ only   Musculoskeletal: He exhibits no edema.   Neurological:   Sedated   Skin: Skin is warm and dry. No rash noted. He is not diaphoretic. No erythema.   Psychiatric:   Unable to assess   Vitals reviewed.      Vents:  Vent Mode: A/C (06/13/18 0738)  Ventilator Initiated: Yes (06/10/18 2217)  Set Rate: 32 bmp (06/13/18 0738)  Vt Set: 410 mL (06/13/18 0738)  PEEP/CPAP: 14 cmH20 (06/13/18 0738)  Oxygen Concentration (%): 41 (06/13/18 0800)  Peak Airway Pressure: 35 cmH2O (06/13/18 0738)  Plateau Pressure: 0 cmH20 (06/13/18 0738)  Total Ve: 13.7 mL (06/13/18 0738)  F/VT Ratio<105 (RSBI): (!) 76.92 (06/13/18 0738)  Lines/Drains/Airways     Central Venous Catheter Line                 Hemodialysis Catheter 06/10/18 2307 right internal jugular 2 days         Percutaneous Central Line Insertion/Assessment - triple lumen  06/10/18 2305 left internal jugular 2 days          Drain                 NG/OG Tube 06/10/18 2309 [place] sump 18 Fr. Left nostril 2 days         Urethral Catheter 06/10/18 2302 Latex 16 Fr. 2 days          Airway                 Airway - Non-Surgical Endotracheal Tube -- days          Arterial Line                 Arterial Line 06/10/18 2304 Right Radial 2 days          Peripheral Intravenous Line                 Peripheral IV - Single Lumen 06/10/18 2308 Right Hand 2 days              Significant Labs:    CBC/Anemia Profile:    Recent  Labs  Lab 06/12/18  1221 06/12/18 2010 06/12/18 2123 06/13/18  0807   WBC 13.88* 12.42  --  13.00*   HGB 6.9* 8.4*  --  8.2*   HCT 20.9* 24.6* 24* 23.7*   * 132*  --  133*   MCV 88 85  --  84   RDW 15.9* 17.2*  --  16.9*        Chemistries:    Recent Labs  Lab 06/12/18  0400 06/12/18  1221 06/12/18 2010 06/12/18 2058 06/13/18  0026 06/13/18  0450     138 137 134*  --  136 137  137   K 5.2*  5.2*  5.2* 5.0 4.8  --  4.2 4.1  4.1     107 108 106  --  106 103  103   CO2 21*  21* 22* 18*  --  22* 24  24   BUN 32*  32* 17 22  --  21 15  15   CREATININE 3.5*  3.5* 1.8* 2.2*  --  1.7* 1.4  1.4   CALCIUM 7.5*  7.5* 7.2* 6.7*  --  7.3* 7.7*  7.7*   ALBUMIN 2.7*  2.7* 2.4* 2.5*  --  2.3* 2.4*  2.4*   PROT 5.5*  --   --   --   --  5.4*   BILITOT 0.7  --   --   --   --  0.9   ALKPHOS 63  --   --   --   --  56   ALT 24  --   --   --   --  26   AST 19  --   --   --   --  17   MG 2.0  2.0 1.9  --  1.9  --  2.3   PHOS 5.1*  5.1* 3.3 5.0*  --  4.3 3.9     Coagulation:     Recent Labs  Lab 06/13/18  0450   INR 1.2   APTT 22.8     Lactic Acid:     Recent Labs  Lab 06/12/18  0400   LACTATE 0.6     Significant Imaging: I have reviewed all pertinent imaging results/findings within the past 24 hours.  I have reviewed and interpreted all pertinent imaging results/findings within the past 24 hours.    Assessment/Plan:     Pulmonary-renal syndrome    62 yo w/PMH of HTN and NIDDM, OA with a 2 wk history of SOB being treated for URI and UTI, admitted for acute hypoxic respiratory failure, ALEX with serum Cr 8.8 (prior Cr 2.5 on 5/29). He received one dose solumedrol and RRT X 1 at OSH. Diffuse bilateral pulmonary infiltrates seen on CXR, requiring intubation. UA with proteinuria, hematuria and wbc's, Bronchoscopy revealed hemorrahage. Transferred  to [facility] [place] for plasmapheresis. Rheumatology consulted for pulmonary renal syndrome, recs on steroids and further investigation.   - In the setting of diffuse  alveolar hemorrhage and glomerulonephritis possible etiology is underlying autoimmune disorder  - Family hx negative for autoimmune disorders. Family reports left leg numbness and shuffling gait. Pets-bulldog  - Given methylprednisolone 1000 mg IV daily x 4 doses (x1 at OSH x3 here)   - Nephrology consulted: they agree with plasmapheresis, pulse steroids, and rheumatology for consideration of immunosupressant. If/when hemodynamcially stable would like a renal biopsy. CRRT x 2 here  - OSH labs: C3 wnl, C4 wnl, elevated UPCR 1.14, Hep B neg, Hep C neg, HIV neg   - elevated ESR 70, .5, procal >100, lactate 2.7 > 1.8 > 0.6  --> elevated UPCR 1.28, DENIZ positive 1:320 speckled pattern, MPO+ 82, c-ANCA +,  SSA + 178, SSB neg, dsDNA neg, Anti-Sm/RNP neg, Anti-Sm neg, PR3 neg, Immunoglobulins wnl, Hep C and HIV neg, DRVVT and cardiolipin neg, GBM neg  - Concern for vasculitis given DAH and hematuria. The nini-typical pulmonary-renal syndromes are GPA/MPA, Goodpasture disease, SLE. Immunofluorescence studies can help differentiate. MPO+ can suggest MPA but will f/u further labs    Plan:   - Will f/u pending rheum labs  - DO TB skin test   - agree with nephrology for renal bx when stable  - transfusion medicine: Every other day TPE with FFP until resolution of DAH  - f/u infectious work up (blood, urine, resp cx neg thus far).   - Cont pulse dose steroids IV methylprednisolone 1 g x 5 days (day 4/5)  - Discussed with staff about starting immunosuppressants with active infection. PCP prophylaxis (Bactrim) will be started when immunosuppression initiated. Will start Rituximab for induction, discussed with family at bedside. Family given handout about Vasculitis and Rituximab 6/12.  - Per ID, cleared for Rituximab initiation. Discussed with Dr. Whaley with transfusion medicine, they plan for 6 total sessions of PLEX (2/6 today), next session on Friday, then following sessions next MWF. If possible, would like pt to get  TB skin test and then hope to start Rituximab and Bactrim on Friday after PLEX. Then, pt can have 48 hrs w/o PLEX so Rituximab won't be cleared. And then resume PLEX as scheduled on Monday 6/18.          Discussed with fellow. Staff attestation to follow.    Kaelyn Birch MD  Rheumatology  [facility]    Patient seen and examined with fellow.  All elements of history, physical exam and medical decision making independently confirmed by me.  Patient with pulmonary-renal syndrome now with positive MPO and +c-ANCA  Await remaining labs. Patient to have vaccinations and indeterminate TB test evaluated. Renal biopsy when stable. Discussed vasculitis and treatment with wife and need for consent.  She will review handout and be prepared tomorrow to sign consent if she agrees.  Continue pulse steroid for total of 5 days and continue PLEX as above.    See note for details.

## 2018-06-13 NOTE — PROGRESS NOTES
[facility]  Nephrology  Progress Note    Patient Name: Jasmeet Raymond  MRN: [id number]  Admission Date: 6/10/2018  Hospital Length of Stay: 3 days  Attending Provider: Everette Hdz*   Primary Care Physician: Provider Notinsystem  Principal Problem:Diffuse pulmonary alveolar hemorrhage    Subjective:     HPI: 64 yo AAm with HTN, NIDDM, arthritis on metformin and meloxicam  admitted to [facility] for worsening SOB and ALEX.   Patiend had been treated as an out;patient for 2 weeks for Shortness of breath and bronchitis and UTI. In the hospital he was found to be acidotic, hyperkalemic, serum creatinine 8, and diffuse bilateral pulmonary infiltrates, requiring intubation. UA with proteinuria, hematuria and wbc's, Bronchoscopy revealed hemorrahage. Patient recieved one dialysis as per notes and 1 gm solumedrol, and transferred  to [facility] for plasmapheresis  And further evaluation.  Family is not available at the time of exam and patient is intubated.   HIV, Hepatitis B, C, complements  All within normal limits. DENIZ, ANCA and AntiGBM, results Nnot available    Interval History: Remains intubated FIO2 40% and sedated. Off Nimbex. Following simple commands. Anuric. Machine clotted after 8 hrs and restarted on citrate/calcium gluconate. Net neg 330 ml CVP 6-8.     Review of patient's allergies indicates:   Allergen Reactions    Sulfa (sulfonamide antibiotics)      Current Facility-Administered Medications   Medication Frequency    calcium gluconate 3,000 mg in dextrose 5 % 100 mL IVPB Continuous    ceFEPIme injection 1 g Q8H    chlorhexidine 0.12 % solution 15 mL BID    cisatracurium (NIMBEX) 100 mg in dextrose 5 % 100 mL infusion Continuous    dextrose 50% injection 12.5 g PRN    dextrose 50% injection 25 g PRN    DEXTROSE-SOD CITRATE-CITRIC AC 2.45-2.2 GRAM- 730 MG/100 ML MISC SOLN Continuous    famotidine (PF) injection 20 mg Daily    fentaNYL 2500 mcg in 0.9% sodium chloride 250  mL infusion premix (titrating) Continuous    heparin (porcine) injection 4,000 Units Once    magnesium sulfate 2g in water 50mL IVPB (premix) PRN    methylPREDNISolone sodium succinate (SOLU-MEDROL) 1,000 mg in dextrose 5 % 100 mL IVPB Daily    propofol (DIPRIVAN) 10 mg/mL infusion Continuous    sodium phosphate 20.01 mmol in dextrose 5 % 250 mL IVPB PRN    sodium phosphate 30 mmol in dextrose 5 % 250 mL IVPB PRN    sodium phosphate 39.99 mmol in dextrose 5 % 250 mL IVPB PRN    vancomycin 1750 mg in 0.9% sodium chloride 500 mL IVPB Once    white petrolatum-mineral oil (LUBIFRESH P.M.) ophthalmic ointment Q8H       Objective:     Vital Signs (Most Recent):  Temp: 99 °F (37.2 °C) (06/13/18 1243)  Pulse: 90 (06/13/18 1225)  Resp: (!) 25 (06/13/18 1100)  BP: (!) 128/54 (06/13/18 1243)  SpO2: 98 % (06/13/18 1100)  O2 Device (Oxygen Therapy): ventilator (06/13/18 1100) Vital Signs (24h Range):  Temp:  [97.8 °F (36.6 °C)-99 °F (37.2 °C)] 99 °F (37.2 °C)  Pulse:  [] 90  Resp:  [0-32] 25  SpO2:  [86 %-100 %] 98 %  BP: (108-174)/(54-85) 128/54  Arterial Line BP: (101-197)/(45-93) 134/57     Weight: 86.2 kg (190 lb) (06/11/18 0933)  Body mass index is 28.89 kg/m².  Body surface area is 2.03 meters squared.    I/O last 3 completed shifts:  In: 8626.1 [I.V.:5352.1; Blood:350; NG/GT:50; IV Piggyback:2874]  Out: 56935 [Urine:35; Other:32061]    Physical Exam   Constitutional: He appears well-developed and well-nourished. No distress.   Intubated FIO2 40% , sedated but following simple commands.    HENT:   Head: Normocephalic.   Cardiovascular: Normal heart sounds.  Exam reveals no gallop and no friction rub.    No murmur heard.  Tachycardic, no murmur appreciated, + 2 lower extremity and pedal edema,   No JVD     Pulmonary/Chest:   Intubated. Clear anteriorly   Abdominal: He exhibits no distension.   No Bowel sounds,    Musculoskeletal: He exhibits edema (BUE/sacral edema).   Neurological:   Intubated and sedated    Skin: Skin is warm and dry. He is not diaphoretic.   R SC trialysis   Nursing note and vitals reviewed.      Significant Labs:  All labs within the past 24 hours have been reviewed.     Significant Imaging:  Labs: Reviewed    Assessment/Plan:     ALEX (acute kidney injury)    62 yo AAM with NIDDM, HTN, arthritis with a 2 week history of shortness of breath being treated for URI and UTI, admitted for worsening SOB, oliguric ALEX with proteinuria, hematuria, pyuria serum crt 8, acidotic and hyperkalemia. Unknown baseline renal function.  He received one dose solumedrol and RRT. X 1 episode PTT.   Renal US demonstrating bilateral renal cysts.  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    Initially oliguric now anuric. 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 ANCA pending.   -6/11  2 D echo EF 75% and normal diastolic function and small pericardial effusion.  -6/11 plasmapheresis  -6/11 Repeat renal US showed normal size kidneys with marked cortical thinning with increase cortical echogenicity and loss of corticomedullary distinction with no hydronephrosis, mass or stone.   -C3C4 , AntiGBM and Proteinase 3 normal .   -DENIZ, MPO positive   -ANCA pending   -Appreciate Rheumatology following- Continue plasmapheresis, pulse dose steroid x 5 days and will likely start Rituximab for induction.   -Infectious work up in progress.   -Patient remains unstable for tissue diagnosis. If/when hemodynamcially stable would like a renal biopsy.  -Continue SLED for clearance and volume management  ml. Switch to 4/3/140/30. Decrease  ml (total hr intake 75 cc/hr and IVBP 130 ml)                      Thank you for your consult. I will follow-up with patient. Please contact us if you have any additional questions.    Kandice Hernandez NP  Nephrology  [facility]

## 2018-06-13 NOTE — ASSESSMENT & PLAN NOTE
64 yo AAM with NIDDM, HTN, arthritis with a 2 week history of shortness of breath being treated for URI and UTI, admitted for worsening SOB, oliguric ALEX with proteinuria, hematuria, pyuria serum crt 8, acidotic and hyperkalemia. Unknown baseline renal function.  He received one dose solumedrol and RRT. X 1 episode PTT.   Renal US demonstrating bilateral renal cysts.  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    Initially oliguric now anuric. 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 ANCA pending.   -6/11  2 D echo EF 75% and normal diastolic function and small pericardial effusion.  -6/11 plasmapheresis  -6/11 Repeat renal US showed normal size kidneys with marked cortical thinning with increase cortical echogenicity and loss of corticomedullary distinction with no hydronephrosis, mass or stone.   -C3C4 , AntiGBM and Proteinase 3 normal .   -DENIZ, MPO positive   -ANCA pending   -Appreciate Rheumatology following- Continue plasmapheresis, pulse dose steroid x 5 days and will likely start Rituximab for induction.   -Infectious work up in progress.   -Patient remains unstable for tissue diagnosis. If/when hemodynamcially stable would like a renal biopsy.  -Continue SLED for clearance and volume management  ml. Switch to 4/3/140/30. Decrease  ml (total hr intake 75 cc/hr and IVBP 130 ml)

## 2018-06-13 NOTE — PROGRESS NOTES
Apheresis tx #2 started at 1110 with out difficulty.  Pt intubated and sedated, therefore unable to assess pain and orientation.  4.5 Liter exchange.  Replacement fluids 4.5 Liter FFP via RIJ cath, patent, dressing CDI.  50 mg benadryl IVP prior to start. Tx ended at 1243.  Cath flushed with NS only per ICU nurse Blayne.  Pt tolerated tx well.  Next tx TBD.

## 2018-06-13 NOTE — CONSULTS
[facility]  Infectious Disease  Consult Note    Patient Name: Jasmeet Raymond  MRN: [id number]  Admission Date: 6/10/2018  Hospital Length of Stay: 2 days  Attending Physician: Everette Hdz*  Primary Care Provider: Provider Notinsystem     Isolation Status: No active isolations    Patient information was obtained from spouse/SO and past medical records.      Consults  Assessment/Plan:     Pulmonary-renal syndrome       63 year old male with PMHx significant for HTN, DM and arthritis who presented on 5/29 to  [facility] with symptoms of pneumonia, SOB, acute renal failure. He decompensated quickly and was transferred to [facility] on 6/11 for concern for diffuse alveolar hemorrhage (DAH) and pulmonary renal syndrome.   He is currently on high dose steroids,  plasma exchange, as well as broad spectrum antibiotics.  On SLED.   Rheumatology evaluating and suspects underlying autoimmune disorder.  Planning for immunosuppressive therapy- likely Rituximab for induction.   ID is consulted for clearance for immunosuppression.       Patient with recent history prior to this admission of treatment for suspected bronchitis (tx with Augmentin 5/11/18) and UTI/suspected pyelo (course of levaquin 5/29)).  On admission 6/8 to OSH was started on broad spectrum antibiotics (vanc/cefepime, then ceftaroline) for sepsis thought to be 2/2 to PNA.       - Micro:  Blood and respiratory cultures here NGTD.  Urine culture is negative.  Blood cultures and urine cultures of 6/8 from OSH were negative.  Urine culture of 5/29 prior to admission + for julietaecae noemiae.   -  Serologies to date:  HIV, Hep B SAg, HCV negative.  Quantiferon gold is pending.   -  History of chronic infections:  Family denies history of chronic or recurring infections.   Does have dental issues/pyorrhea   -  Imaging: CXR with extensive bilateral pulmonary infiltrates.           CT of abd from OSH noted multiple  non-obstructing kidney stones.   -  Procalcitonin noted to be > 100. This may be unreliable in setting of kidney failure    -  Currently afebrile, WBC 22 >> 13 (on pulsed steroids).        Plan:   -  Continue current antibiotics for now pending further evaluation.    -  Will follow pending cultures    -  AFB, respiratory fungal culture, respiratory viral panel ordered   -  Additional serologies ordered - RPR, strongyloides, histoplasma (urine/serum), blastomyces, (urine), coccidioides, cryptococcal antigen    -  Follow up quantiferon gold.  If indeterminate- get T spot.    -  Will need immunization update, though efficacy in setting of high dose steroids may be reduced - pneumococcal (PCV 13, followed by PPSV23),  Tdap, Hep A/B series.  Hep B surface antibody here is reported as positive, but negative 2 days ago at OSH. ? Repeat.    -  Will follow up tomorrow with further recommendations   -  See Staff attestation to follow     Patient seen by, and plan discussed with, ID staff                    Thank you for your consult. I will follow-up with patient. Please contact us if you have any additional questions.     Thank you.   Please call for any questions or concerns.  Telma Zamora, APRN, ANP-C  [phone]    Subjective:     Principal Problem: Diffuse pulmonary alveolar hemorrhage    HPI:     Mr. Jasmeet Raymond is a 63 year old male with PMHx significant for HTN, DM and arthirtis who presented to on 5/29 [facility] with symptoms of pneumonia and shortness of breath. He was transferred to [facility] on 6/11 for concern for diffuse alveolar hemorrhage (DAH).  He is currently on high dose steroids and plasma exchange and plan is for possible treatment with cyclophosamide. ID is consulted for clearance for immunosuppression.      Unable to obtain history from patient.  Spoke with wife and have started review of Care Everywhere records.       5/11/18- Presented to urgent care complaining of  "cough. Dx with bronchitis and treated with Augmentin.   Noted on exam to have pyorrhea - per wife they were advised to have teeth pulled.     5/17 - CXR by PCP showed mild interstitial prominence, no consolidation or effusion    5/29/18 - presented to ED complaining of cough, fevers X 2 weeks. Myalgias for about one month.    Noted to have creatinine 2.5, U/A with hematuria  CT abd showed bilateral non-obstructing kidney stones  Urine culture - low colony count luciano avery  Dx with UTI/pyelo and discharged home on levaquin     6/7/18 - Renal US - bilateral renal cysts    6/8/18  - presented to Ed with cough, fevers, swelling bilateral feet and hands and new bilateral pulmonary infiltrated.  Decompensated quickly.  Thought to have sepsis 2/2 to PNA.  Initially on vanc/cefepime..  Then changed to ceftaroline.     Blood and urine cultures at OSH - negative.    Respiratory cultures negative  AFB stain - negative     Patient is a diabetic.  Wife denies any history of diabetic foot wounds/bone infections.   Denies splenectomy  Denies any history of chronic or recurring infections  prior to this admission.  Notes they were told in urgent care that his teeth were "infected" and should be pulled.  Note from urgent care notes gingival erythema and gingivitis but makes no mention of abscess.     Denies known exposure to TB.  Notes that wife had cousin with TB that used to visit, but ? Tested previously and was negative.     Travel:  Recent trip to [place] just prior to admission.   Pets:  One dog. 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 Wife denies exposure to chemicals/pesticides/fertilizers   Hobbies:  Hunting/fishing     Immunization:  ? Usual childhood vaccines   Denies any other vaccines - reports "we don't believe in vaccines."    Serologies to date:   Results for FELIPE BERNAL (MRN [id number]) as of 6/12/2018 19:23   Ref. Range 6/11/2018 08:10   Hep B Core Total Ab " Unknown Negative   Hep B S Ab Unknown Positive (A)   Hepatitis B Surface Ag Unknown Negative   Hepatitis C Ab Unknown Negative   HIV 1/2 Ag/Ab Latest Ref Range: Negative  Negative     Hep B surface antibody reported negative at OSH.     Micro:  Blood cultures 6/10 NGTD  Respiratory Cultures NGTD   Urine culture - No growth     Imaging:    Renal US - bilateral simple renal cysts. Medical renal disease   CXR - bilateral solid airspace infiltrates         No past medical history on file.    No past surgical history on file.    Review of patient's allergies indicates:   Allergen Reactions    Sulfa (sulfonamide antibiotics)        Medications:  No prescriptions prior to admission.     Antibiotics     Start     Stop Route Frequency Ordered    06/11/18 1000  ceFEPIme injection 1 g      -- IV Every 8 hours (non-standard times) 06/11/18 0933        Antifungals     None        Antivirals     None             There is no immunization history on file for this patient.    Family History     None        Social History     Social History    Marital status:      Spouse name: N/A    Number of children: N/A    Years of education: N/A     Social History Main Topics    Smoking status: Not on file    Smokeless tobacco: Not on file    Alcohol use Not on file    Drug use: Unknown    Sexual activity: Not on file     Other Topics Concern    Not on file     Social History Narrative    No narrative on file     Review of Systems   Unable to perform ROS: Intubated     Objective:     Vital Signs (Most Recent):  Temp: 98.7 °F (37.1 °C) (06/12/18 1600)  Pulse: 62 (06/12/18 1800)  Resp: (!) 32 (06/12/18 1800)  BP: (!) 116/56 (06/12/18 1500)  SpO2: 96 % (06/12/18 1800) Vital Signs (24h Range):  Temp:  [97.8 °F (36.6 °C)-98.9 °F (37.2 °C)] 98.7 °F (37.1 °C)  Pulse:  [62-94] 62  Resp:  [6-32] 32  SpO2:  [86 %-100 %] 96 %  BP: (101-186)/(51-84) 116/56  Arterial Line BP: ()/(44-93) 142/57     Weight: 86.2 kg (190 lb)  Body mass  index is 28.89 kg/m².    Estimated Creatinine Clearance: 43.7 mL/min (A) (based on SCr of 1.8 mg/dL (H)).    Physical Exam   Constitutional:   Intubated and sedated     HENT:   Head: Normocephalic and atraumatic.   Oral ET tube   NG tube  Unable to fully assess dentition      Eyes: No scleral icterus.   Cardiovascular: Normal rate and regular rhythm.  Exam reveals no gallop and no friction rub.    No murmur heard.  Pulmonary/Chest:   Ventilated   Clear anteriorly, decreased at bases, crackles heard    Abdominal: Soft. He exhibits no distension.   No bowel sounds appreciated     Genitourinary:   Genitourinary Comments: Liu to gravity    Musculoskeletal: He exhibits edema (mild bilateral LE edema).   Lymphadenopathy:     He has no cervical adenopathy.   Neurological:   Sedated   Skin: No rash noted.   Right IJ trialysis cath - site clear   Left triple lumen - site clear  Right radial a-line - site clear   PIV right hand - site clear   Vitals reviewed.      Significant Labs:   Blood Culture:   Recent Labs  Lab 06/10/18  2249 06/10/18  2300   LABBLOO No Growth to date  No Growth to date No Growth to date  No Growth to date     CBC:   Recent Labs  Lab 06/11/18  2323 06/12/18  0400 06/12/18  1221   WBC 18.14* 22.86* 13.88*   HGB 7.6* 7.6* 6.9*   HCT 22.2* 22.8* 20.9*    189 149*     CMP:   Recent Labs  Lab 06/10/18  2226 06/11/18  0351  06/11/18  2152 06/12/18  0400 06/12/18  1221   *  134* 134*  < > 137 138  138 137   K 3.8  3.8 3.7  < > 4.8 5.2*  5.2*  5.2* 5.0   CL 92*  92* 94*  < > 102 107  107 108   CO2 24  24 23  < > 25 21*  21* 22*   *  352* 314*  < > 159* 155*  155* 139*   BUN 94*  94* 92*  < > 57* 32*  32* 17   CREATININE 8.8*  8.8* 8.8*  < > 5.6* 3.5*  3.5* 1.8*   CALCIUM 7.4*  7.4* 7.0*  < > 7.6* 7.5*  7.5* 7.2*   PROT 5.5*  5.5* 5.0*  --   --  5.5*  --    ALBUMIN 1.9*  1.9* 1.8*  < > 2.2* 2.7*  2.7* 2.4*   BILITOT 0.6  0.6 0.6  --   --  0.7  --    ALKPHOS 67  67 62   --   --  63  --    AST 26  26 25  --   --  19  --    ALT 45*  45* 39  --   --  24  --    ANIONGAP 18*  18* 17*  < > 10 10  10 7*   EGFRNONAA 5.7*  5.7* 5.7*  < > 9.8* 17.3*  17.3* 38.6*   < > = values in this interval not displayed.  Fungus Culture (Blood or Bone Marrow): No results for input(s): FUNGUSCULTUR in the last 4320 hours.  HIV 1/2 Antibodies:   Recent Labs  Lab 06/11/18  0810   [id number] Negative     Lactic Acid:   Recent Labs  Lab 06/10/18  2226 06/11/18  0351 06/12/18  0400   LACTATE 2.7*  2.7* 1.8 0.6     Procalcitonin:   Recent Labs  Lab 06/10/18  2249   PROCAL >100.00*     Quantiferon: No results for input(s): NIL, TBAG, TBAGNIL, MITOGENNIL, TBGOLD in the last 48 hours.  Respiratory Culture:   Recent Labs  Lab 06/10/18  2345   GSRESP <10 epithelial cells per low power field.  No WBC's or organisms seen   RESPIRATORYC No Growth     Urine Culture:   Recent Labs  Lab 06/11/18  0101   LABURIN No growth     Urine Studies:   Recent Labs  Lab 06/11/18  0100   COLORU Yellow   APPEARANCEUA Cloudy*   PHUR 5.0   SPECGRAV 1.010   PROTEINUA 2+*   GLUCUA 3+*   KETONESU Negative   BILIRUBINUA Negative   OCCULTUA 3+*   NITRITE Negative   UROBILINOGEN Negative   LEUKOCYTESUR 3+*   RBCUA 41*   WBCUA 38*   BACTERIA Many*   SQUAMEPITHEL 5   HYALINECASTS 7*     Wound Culture: No results for input(s): LABAERO in the last 4320 hours.    Significant Imaging: I have reviewed all pertinent imaging results/findings within the past 24 hours.

## 2018-06-13 NOTE — PROGRESS NOTES
CRRT restarted with Citrate 200ml/hr pre filter and Calcium Gluconate 3g at 10ml/hr. Alarms set and lines secured.

## 2018-06-13 NOTE — ASSESSMENT & PLAN NOTE
- Differential Diagnoses include Pulmonary renal syndrome.  - Underwent US of kidney and showed Bilateral medical renal disease, Simple renal cysts bilaterally.  - CT abdomen/pelvis, mild interstitial changes in lung bases, 2 stones in the left kidney, 4 stones in the right kidney, no hydronephrosis, post cholecystectomy. No acute abnormality in the liver, spleen, pancreas or adrenals.   - Will continue trending urine output and renal function, avoid NSAIDs, contrast, nephrotoxins    - Monitor strict I/Os, daily weights, renally dose medications to current GFR  - ON CRRTurrent Creatinine is 1.4 post three sessions of CRRT

## 2018-06-13 NOTE — SUBJECTIVE & OBJECTIVE
Interval History:  No acute events overnight.  Weaning ventilator.  Continues with plasma exchange and steroids.     Fungal, AFB cultures pending  Respiratory culture 6/10 NGTD  Blood cultures 6/10 NGTD   Additional serologies, including QG, pending.     Spoke with patient PCP today.  She advises that prior to current issues, patient had been generally healthy with no chronic infectious issues.  She confirms that he has had no chronic infectious issues.     PCP  forwarded to me pulmonary cytology results from bronchial wash from OSH ICU - GMS stain was negative for fungal organisms.     Remains afebrile, WBC 13     Per discussions with Rheumatology - high suspicion for MPA, still with plan for rituxumab, possibly Friday after PLEX.         Review of Systems   Unable to perform ROS: Intubated     Objective:     Vital Signs (Most Recent):  Temp: 99 °F (37.2 °C) (06/13/18 1243)  Pulse: 71 (06/13/18 1316)  Resp: (!) 32 (06/13/18 1316)  BP: (!) 128/54 (06/13/18 1243)  SpO2: 97 % (06/13/18 1316) Vital Signs (24h Range):  Temp:  [97.8 °F (36.6 °C)-99 °F (37.2 °C)] 99 °F (37.2 °C)  Pulse:  [] 71  Resp:  [0-32] 32  SpO2:  [86 %-100 %] 97 %  BP: (108-174)/(54-85) 128/54  Arterial Line BP: (101-197)/(45-93) 134/57     Weight: 86.2 kg (190 lb)  Body mass index is 28.89 kg/m².    Estimated Creatinine Clearance: 65.5 mL/min (based on SCr of 1.2 mg/dL).    Physical Exam   Constitutional:   Intubated and sedated     HENT:   Head: Normocephalic and atraumatic.   Oral ET tube   NG tube  Unable to fully assess dentition      Eyes: No scleral icterus.   Cardiovascular: Normal rate and regular rhythm.  Exam reveals no gallop and no friction rub.    No murmur heard.  Pulmonary/Chest:   Ventilated   Clear anteriorly, decreased at bases   Abdominal: Soft. He exhibits no distension.   No bowel sounds appreciated     Genitourinary:   Genitourinary Comments: Liu to gravity    Musculoskeletal: He exhibits edema (mild bilateral LE  edema).   Lymphadenopathy:     He has no cervical adenopathy.   Neurological:   Sedated   Skin: No rash noted.   Right IJ trialysis cath - site clear   Left triple lumen - site clear  Right radial a-line - site clear   PIV right hand - site clear   Vitals reviewed.      Significant Labs:   Blood Culture:     Recent Labs  Lab 06/10/18  2249 06/10/18  2300   LABBLOO No Growth to date  No Growth to date  No Growth to date No Growth to date  No Growth to date  No Growth to date     CBC:     Recent Labs  Lab 06/12/18  1221 06/12/18 2010 06/12/18  2123 06/13/18  0807   WBC 13.88* 12.42  --  13.00*   HGB 6.9* 8.4*  --  8.2*   HCT 20.9* 24.6* 24* 23.7*   * 132*  --  133*     CMP:   Recent Labs  Lab 06/12/18  0400  06/13/18  0026 06/13/18  0450 06/13/18  0807     138  < > 136 137  137 135*   K 5.2*  5.2*  5.2*  < > 4.2 4.1  4.1 4.0     107  < > 106 103  103 101   CO2 21*  21*  < > 22* 24  24 24   *  155*  < > 169* 169*  169* 161*   BUN 32*  32*  < > 21 15  15 14   CREATININE 3.5*  3.5*  < > 1.7* 1.4  1.4 1.2   CALCIUM 7.5*  7.5*  < > 7.3* 7.7*  7.7* 7.7*   PROT 5.5*  --   --  5.4*  --    ALBUMIN 2.7*  2.7*  < > 2.3* 2.4*  2.4* 2.4*   BILITOT 0.7  --   --  0.9  --    ALKPHOS 63  --   --  56  --    AST 19  --   --  17  --    ALT 24  --   --  26  --    ANIONGAP 10  10  < > 8 10  10 10   EGFRNONAA 17.3*  17.3*  < > 41.4* 52.4*  52.4* >60.0   < > = values in this interval not displayed.  Fungus Culture (Blood or Bone Marrow): No results for input(s): FUNGUSCULTUR in the last 4320 hours.  HIV 1/2 Antibodies: No results for input(s): [id number] in the last 48 hours.  Procalcitonin: No results for input(s): PROCAL in the last 48 hours.  Quantiferon: No results for input(s): NIL, TBAG, TBAGNIL, MITOGENNIL, TBGOLD in the last 48 hours.  Respiratory Culture:     Recent Labs  Lab 06/10/18  2345   GSRESP <10 epithelial cells per low power field.  No WBC's or organisms seen    RESPIRATORYC No growth     Urine Culture:     Recent Labs  Lab 06/11/18  0101   LABURIN No growth     Urine Studies:     Recent Labs  Lab 06/11/18  0100   COLORU Yellow   APPEARANCEUA Cloudy*   PHUR 5.0   SPECGRAV 1.010   PROTEINUA 2+*   GLUCUA 3+*   KETONESU Negative   BILIRUBINUA Negative   OCCULTUA 3+*   NITRITE Negative   UROBILINOGEN Negative   LEUKOCYTESUR 3+*   RBCUA 41*   WBCUA 38*   BACTERIA Many*   SQUAMEPITHEL 5   HYALINECASTS 7*     Wound Culture: No results for input(s): LABAERO in the last 4320 hours.    Significant Imaging: I have reviewed all pertinent imaging results/findings within the past 24 hours.

## 2018-06-13 NOTE — ASSESSMENT & PLAN NOTE
- Will keep his blood glucose on the target 140-180  - Weaned off Insulin infusion  Recent Labs      06/12/18   2056  06/12/18   2203  06/12/18   2308  06/13/18   0017  06/13/18   0207  06/13/18   0306  06/13/18   0500  06/13/18   0638   POCTGLUCOSE  188*  171*  188*  174*  167*  168*  175*  159*

## 2018-06-13 NOTE — PROCEDURES
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 The patient was transferred to [facility] for therapeutic plasma exchange and is currently intubated for respiratory failure. Anti-GBM antibodies are negative. C-ANCA are positive (more often associated with GPA than P-ANCA).    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 Journal of Clinical Apheresis 2016; 31:149-162.) This diagnosis supports daily to every other day TPE with FFP until resolution of DAH, then TPE with albumin replacement every 2-3 days for a total of 6 procedures, planned as follows: /F/M/W/F.     Today's procedure was well tolerated and without complication. We will plan for the next TPE to occur on Friday with albumin. No mavis blood from the ET tube/suction has been noted since admission. Given active pulmonary hemorrhage resolution, this TPE series can be ended early, as needed, for initiation of rituximab therapy. Please contact the TMS team regarding any desired schedule change.    Pertinent Laboratory Data:   Results for FELIPE BERNAL (MRN [id number]) as of  6/13/2018 14:44   Ref. Range 6/10/2018 22:49   Cytoplasmic Neutrophilic Ab Latest Ref Range: <1:20 Titer 1:320 (A)   Perinuclear (P-ANCA) Latest Ref Range: <1:20 Titer <1:20   Results for FELIPE BERNAL (MRN [id number]) as of 6/13/2018 14:44   Ref. Range 6/11/2018 08:10   ANCA Proteinase 3 Latest Ref Range: <0.4 (Negative) U <0.2   MPO Latest Ref Range: <=20 UNITS 82 (H)   Results for FELIPE BERNAL (MRN [id number]) as of 6/13/2018 14:44   Ref. Range 6/11/2018 06:01   GBM Ab Latest Ref Range: <1.0 (Negative) U <0.2     Complete Blood Count:   Lab Results   Component Value Date    HGB 8.2 (L) 06/13/2018    HCT 23.7 (L) 06/13/2018     (L) 06/13/2018    WBC 13.00 (H) 06/13/2018     Basic Metabolic Panel:   Lab Results   Component Value Date     (L) 06/13/2018    K 4.0 06/13/2018     06/13/2018    CO2 24 06/13/2018     (H) 06/13/2018    BUN 14 06/13/2018    CREATININE 1.2 06/13/2018    CALCIUM 7.7 (L) 06/13/2018    ANIONGAP 10 06/13/2018    ESTGFRAFRICA >60.0 06/13/2018    EGFRNONAA >60.0 06/13/2018       Pertinent Medications: None contraindicated    Review of patient's allergies indicates:   Allergen Reactions    Sulfa (sulfonamide antibiotics)        Anesthesia: None     Technical Procedures Used: Plasma Exchange: Volume exchanged - 4.5L; Replacement fluid - Fresh Frozen Plasma; Number of procedures 2 of 6; Date of next procedure 6/15/18 (Fri).    Description of the Findings of the Procedure:   Please see Apheresis Nurse flowsheet for details.    The patient was evaluated and all clinical and laboratory data relevant to the treatment was reviewed, and a decision was made to proceed with the Apheresis procedure.    I was available to the clinical staff throughout the procedure.    Significant Surgical Tasks Conducted by the Assistant(s): Not applicable  Complications: None  Estimated Blood Loss (EBL): None  Implants: None   Specimens: None      Yeni Whaley MD, PhD  Section of  Transfusion Medicine & Histocompatibility  Department of Pathology and Laboratory Medicine  [facility]  [phone] (HLA & Blood Bank Offices)  06/13/2018

## 2018-06-13 NOTE — NURSING
Spoke to MD and communicated critical calcium. Also discussed Glucose in 180s x several hours. According to current orders insulin infusion to be restarted. Addressed the appropriateness of this order. MD to to address orders for glucose monitoring and insulin and calcium replacements.

## 2018-06-13 NOTE — NURSING
Dialysis notified of problems with TMP pressures on CRRT. Instructed to rinse patient back before machine clots off.

## 2018-06-13 NOTE — ASSESSMENT & PLAN NOTE
63 year old male with PMHx significant for HTN, DM and arthritis who presented on 5/29 to  [facility] with symptoms of pneumonia, SOB, acute renal failure. He decompensated quickly and was transferred to [facility] on 6/11 for concern for diffuse alveolar hemorrhage (DAH) and pulmonary renal syndrome.   He is currently on high dose steroids,  plasma exchange, as well as broad spectrum antibiotics.  On SLED.   Rheumatology evaluating and suspects underlying autoimmune disorder.  Planning for immunosuppressive therapy- likely Rituximab for induction.   ID is consulted for clearance for immunosuppression.       Patient with recent history prior to this admission of treatment for suspected bronchitis (tx with Augmentin 5/11/18) and UTI/suspected pyelo (course of levaquin 5/29)).  On admission 6/8 to OSH was started on broad spectrum antibiotics (vanc/cefepime, then ceftaroline) for sepsis thought to be 2/2 to PNA.       - Micro:  Blood and respiratory cultures here NGTD.  Urine culture is negative.  Blood cultures and urine cultures of 6/8 from OSH were negative.  Urine culture of 5/29 prior to admission + for luciano avery.   -  Serologies to date:  HIV, Hep B SAg, HCV negative.  Quantiferon gold is pending.   -  History of chronic infections:  Family denies history of chronic or recurring infections.   Does have dental issues/pyorrhea   -  Imaging: CXR with extensive bilateral pulmonary infiltrates.           CT of abd from OSH noted multiple non-obstructing kidney stones.   -  Procalcitonin noted to be > 100. This may be unreliable in setting of kidney failure    -  Currently afebrile, WBC 22 >> 13 (on pulsed steroids).        Plan:   -  Continue current antibiotics for now pending further evaluation.    -  Will follow pending cultures    -  AFB, respiratory fungal culture, respiratory viral panel ordered   -  Additional serologies ordered - RPR, strongyloides, histoplasma  (urine/serum), blastomyces, (urine), coccidioides, cryptococcal antigen    -  Follow up quantiferon gold.  If indeterminate- get T spot.    -  Will need immunization update, though efficacy in setting of high dose steroids may be reduced - pneumococcal (PCV 13, followed by PPSV23),  Tdap, Hep A/B series.  Hep B surface antibody here is reported as positive, but negative 2 days ago at OSH. ? Repeat.    -  Will follow up tomorrow with further recommendations   -  See Staff attestation to follow     Patient seen by, and plan discussed with, ID staff

## 2018-06-13 NOTE — ASSESSMENT & PLAN NOTE
- Most likely etiology is Diffuse pulmonary alveolar hemorrhage, however severe pneumonia in the setting of leukocytosis and fever and presentation of cough and SOB can not be excluded  - On conservative tidal volume (6 ml/kg predicted body weight), balanced respiratory rate, and high oxygenation setting in the setting of DAH PEEP is 14 and FiO2 is 40%

## 2018-06-13 NOTE — PROGRESS NOTES
[facility]  Critical Care Medicine  Progress Note    Patient Name: Jasmeet Raymond  MRN: [id number]  Admission Date: 6/10/2018  Hospital Length of Stay: 3 days  Code Status: Full Code  Attending Provider: Everette Hdz*  Primary Care Provider: Provider Notinsystem   Principal Problem: Diffuse pulmonary alveolar hemorrhage    Subjective:     HPI:  This is Mr. Jasmeet Raymond, 63 year old male with PMHx significant for HTN, DM and arthirtis presented to [facility] and [facility] with symptoms of pneumonia and shortness of breath. On 5/29/2018, he presented with symptoms of bronchitis with denies chills, ear pain, sore throat, chest pain, shortness of breath at that time, received antibiotics. Then on 6/8/2018 he presented again to [facility] and [facility] with shortness of breath and cough, and his CXR showed concern for airway disease. However on the first day of admission at [facility] and [facility], his respiratory symptoms worsen and had acute hypoxic respiratory failure that required intubation. Pulmonary consulted and he underwent Bronchoscopy and  revealed alveolar hemorrhage, with the setting of having hematuria and diffuse alveloar hemorrhage the concern was for presence of pulmonary renal syndrome and he received pulsed dose of Methylprednisolone and nephrology consulted for plasmapheresis. However at [facility], plasmapheresis machine is broken and they recommend to [place] [facility] [place]mapheresis.     On arrival he was on Nibmex for paralytics, Propofol for sedation, Levophed (eventaully turned off) for pressure support, Flolan (Epoprostenol sodium) inhaled which was switched to Inhaled Nitro. His mechanical ventilation initially was on FiO2 100%, PEEP 16, then weaned his FiO2 with high requirement of oxygenation. He had an signifiacnt drop in his Hb (baseline ~ 12) dated one week prior  presentation to 9 and 8, required one pRBC prior arrival. Repeated CXR showed extensive bilateral infiltrates. Important laboratory investigation showed His , , pro calcitonin 4.4, C3 and C4 complement normal., Trop negative, C3 and C4 showed normal , Hep test is negative, Lactic acid 1.8 > 1.0, HIV negative, Procal 4.4, UA shoowed heamturia and trace leukocytes, Urine culture from 5/29 showed almost pansensitve Cedecea davisae.     Hospital/ICU Course:  06/11/2018 admitted to MICU from [facility] with ARDS  06/12/2018 No acute events overnight. Had PLEX with 4.5 L exchange with FFP, and CRRT started and he became hypotensive, Levophed started during the CRRT. He was weaning off Nitric Oxide and his ABGs showing worsening respi acidosis. Rheumatology, Transfusion medicine and Nephrology on board   06/13/2018 Stable on High ladder PEEP for ARDS and completely off NO, and still on Nimbex for paralytics and sedation with Propofol and Fentanyl. ID, Rheumatology and Nephrology on board.     Interval History/Significant Events: Stable on High ladder PEEP for ARDS and completely off NO, and still on Nimbex for paralytics and sedation with Propofol and Fentanyl. ID, Rheumatology and Nephrology on board.     Review of Systems   Unable to perform ROS: Intubated     Objective:     Vital Signs (Most Recent):  Temp: 97.9 °F (36.6 °C) (06/13/18 0300)  Pulse: 74 (06/13/18 0800)  Resp: (!) 32 (06/13/18 0800)  BP: (!) 116/56 (06/12/18 1500)  SpO2: 100 % (06/13/18 0800) Vital Signs (24h Range):  Temp:  [97.8 °F (36.6 °C)-98.7 °F (37.1 °C)] 97.9 °F (36.6 °C)  Pulse:  [62-94] 74  Resp:  [0-32] 32  SpO2:  [86 %-100 %] 100 %  BP: (103-154)/(54-74) 116/56  Arterial Line BP: ()/(45-93) 125/57   Weight: 86.2 kg (190 lb)  Body mass index is 28.89 kg/m².      Intake/Output Summary (Last 24 hours) at 06/13/18 0815  Last data filed at 06/13/18 0800   Gross per 24 hour   Intake           7536.63 ml   Output             7609 ml   Net           -72.37 ml       Physical Exam   Constitutional: He appears well-developed. No distress.   HENT:   Head: Normocephalic.   Mouth/Throat: No oropharyngeal exudate.   Eyes: Pupils are equal, round, and reactive to light. Right eye exhibits no discharge. Left eye exhibits no discharge.   Neck: Normal range of motion. No JVD present.   Cardiovascular: Normal rate and regular rhythm.    No murmur heard.  Pulmonary/Chest: Effort normal. No respiratory distress. He has no wheezes. He has rales.   Abdominal: Soft. He exhibits no distension. There is no tenderness.   Genitourinary: Rectal exam shows guaiac negative stool.   Musculoskeletal: He exhibits edema. He exhibits no tenderness or deformity.   Neurological:   Sedated RASS -2    Skin: Skin is warm. No rash noted. No erythema.     Vents:  Vent Mode: A/C (06/13/18 0738)  Ventilator Initiated: Yes (06/10/18 2217)  Set Rate: 32 bmp (06/13/18 0738)  Vt Set: 410 mL (06/13/18 0738)  PEEP/CPAP: 14 cmH20 (06/13/18 0738)  Oxygen Concentration (%): 41 (06/13/18 0800)  Peak Airway Pressure: 35 cmH2O (06/13/18 0738)  Plateau Pressure: 0 cmH20 (06/13/18 0738)  Total Ve: 13.7 mL (06/13/18 0738)  F/VT Ratio<105 (RSBI): (!) 76.92 (06/13/18 0738)  Lines/Drains/Airways     Central Venous Catheter Line                 Hemodialysis Catheter 06/10/18 2307 right internal jugular 2 days         Percutaneous Central Line Insertion/Assessment - triple lumen  06/10/18 2305 left internal jugular 2 days          Drain                 NG/OG Tube 06/10/18 2309 [place] sump 18 Fr. Left nostril 2 days         Urethral Catheter 06/10/18 2302 Latex 16 Fr. 2 days          Airway                 Airway - Non-Surgical Endotracheal Tube -- days          Arterial Line                 Arterial Line 06/10/18 2304 Right Radial 2 days          Peripheral Intravenous Line                 Peripheral IV - Single Lumen 06/10/18 2308 Right Hand 2 days               Significant Labs:    CBC/Anemia Profile:    Recent Labs  Lab 06/12/18  0400 06/12/18  1221 06/12/18 2010 06/12/18  2123   WBC 22.86* 13.88* 12.42  --    HGB 7.6* 6.9* 8.4*  --    HCT 22.8* 20.9* 24.6* 24*    149* 132*  --    MCV 87 88 85  --    RDW 15.9* 15.9* 17.2*  --         Chemistries:    Recent Labs  Lab 06/12/18  0400 06/12/18  1221 06/12/18 2010 06/12/18 2058 06/13/18  0026 06/13/18  0450     138 137 134*  --  136 137  137   K 5.2*  5.2*  5.2* 5.0 4.8  --  4.2 4.1  4.1     107 108 106  --  106 103  103   CO2 21*  21* 22* 18*  --  22* 24  24   BUN 32*  32* 17 22  --  21 15  15   CREATININE 3.5*  3.5* 1.8* 2.2*  --  1.7* 1.4  1.4   CALCIUM 7.5*  7.5* 7.2* 6.7*  --  7.3* 7.7*  7.7*   ALBUMIN 2.7*  2.7* 2.4* 2.5*  --  2.3* 2.4*  2.4*   PROT 5.5*  --   --   --   --  5.4*   BILITOT 0.7  --   --   --   --  0.9   ALKPHOS 63  --   --   --   --  56   ALT 24  --   --   --   --  26   AST 19  --   --   --   --  17   MG 2.0  2.0 1.9  --  1.9  --  2.3   PHOS 5.1*  5.1* 3.3 5.0*  --  4.3 3.9       BMP:   Recent Labs  Lab 06/13/18  0450   *  169*     137   K 4.1  4.1     103   CO2 24  24   BUN 15  15   CREATININE 1.4  1.4   CALCIUM 7.7*  7.7*   MG 2.3     CMP:   Recent Labs  Lab 06/12/18  0400  06/12/18 2010 06/13/18  0026 06/13/18  0450     138  < > 134* 136 137  137   K 5.2*  5.2*  5.2*  < > 4.8 4.2 4.1  4.1     107  < > 106 106 103  103   CO2 21*  21*  < > 18* 22* 24  24   *  155*  < > 187* 169* 169*  169*   BUN 32*  32*  < > 22 21 15  15   CREATININE 3.5*  3.5*  < > 2.2* 1.7* 1.4  1.4   CALCIUM 7.5*  7.5*  < > 6.7* 7.3* 7.7*  7.7*   PROT 5.5*  --   --   --  5.4*   ALBUMIN 2.7*  2.7*  < > 2.5* 2.3* 2.4*  2.4*   BILITOT 0.7  --   --   --  0.9   ALKPHOS 63  --   --   --  56   AST 19  --   --   --  17   ALT 24  --   --   --  26   ANIONGAP 10  10  < > 10 8 10  10   EGFRNONAA 17.3*  17.3*  < > 30.3* 41.4* 52.4*   52.4*   < > = values in this interval not displayed.  Lactic Acid:   Recent Labs  Lab 06/12/18  0400   LACTATE 0.6     Significant Imaging:  I have reviewed all pertinent imaging results/findings within the past 24 hours.  I have reviewed and interpreted all pertinent imaging results/findings within the past 24 hours.    Assessment/Plan:     Pulmonary   * Diffuse pulmonary alveolar hemorrhage    - Given his presentation with two weeks of progressive worsening of his SOB and cough, and associated with hematuria and CXR finding of bilateral infiltrate raised the concern for DAH  - On 6/10 he underwent Flexible Bronchoscopy and showed no endobronchial lesion, significant signs of hemorrhage on all of the airways, confirming findings of pulmonary hemorrhage.   - On high oxygenation setting with FiO2 of 40% and PEEP 14, and his PO2 is ~ 100   - Off Nitric Oxide   - Requires on pRBC on 6/12, then his CBC was stable        Acute hypoxemic respiratory failure    - Please see principal problem for more elaboration         ARDS (adult respiratory distress syndrome)    - Most likely etiology is Diffuse pulmonary alveolar hemorrhage, however severe pneumonia in the setting of leukocytosis and fever and presentation of cough and SOB can not be excluded  - On conservative tidal volume (6 ml/kg predicted body weight), balanced respiratory rate, and high oxygenation setting in the setting of DAH PEEP is 14 and FiO2 is 40%        Renal/   ALEX (acute kidney injury)    - Differential Diagnoses include Pulmonary renal syndrome.  - Underwent US of kidney and showed Bilateral medical renal disease, Simple renal cysts bilaterally.  - CT abdomen/pelvis, mild interstitial changes in lung bases, 2 stones in the left kidney, 4 stones in the right kidney, no hydronephrosis, post cholecystectomy. No acute abnormality in the liver, spleen, pancreas or adrenals.   - Will continue trending urine output and renal function, avoid NSAIDs, contrast,  nephrotoxins    - Monitor strict I/Os, daily weights, renally dose medications to current GFR  - ON CRRTurrent Creatinine is 1.4 post three sessions of CRRT         ID   Septic shock    - See principal problem for more elaboration.   - Currently on low need for pressors, will continue to monitor and ensure MAP > 65 mmHg (during CRRT)  - On broad spectrum antibiotics with Cefepime (renally dose) and Vancomycin   - Blood culture and Urine Culture showed NGTD         Immunology/Multi System   Pulmonary-renal syndrome    - Most likely the diagnosis in the setting of diffuse alveolar hemorrhage and glomerulonephritis with most likely etiology is underlying autoimmune disorder  - Elevated UPCR 1.28, DENIZ positive 1:320 speckled pattern, MPO+ 82, PR3 neg, Immunoglobulins wnl, Hep C and HIV neg  - Plan for 5 days of Methylprednisolone 1000 mg IV daily  - Rheumatology on board for the plan of Immunosuppress agents   - ID consulted for pre starting Immunosuppress agents, send further labs to follow         Oncology   Acute posthemorrhagic anemia    - Most likely is diffuse pulmonary alveolar hemorrhage  - Frequent check in his CBC, transfuse when Hb < 7        Endocrine   Diabetes mellitus type 2 with complications    - Will keep his blood glucose on the target 140-180  - Weaned off Insulin infusion  Recent Labs      06/12/18   2056  06/12/18   2203  06/12/18   2308  06/13/18   0017  06/13/18   0207  06/13/18   0306  06/13/18   0500  06/13/18   0638   POCTGLUCOSE  188*  171*  188*  174*  167*  168*  175*  159*           Other   Severe sepsis with septic shock (CODE)    - See septic shock for more elaborating            Critical Care Daily Checklist:    A: Awake: RASS Goal/Actual Goal: RASS Goal: -5-->unarousable  Actual: Conteh Agitation Sedation Scale (RASS): Unarousable   B: Spontaneous Breathing Trial Performed?     C: SAT & SBT Coordinated?  No                      D: Delirium: CAM-ICU Overall CAM-ICU: Negative   E:  Early Mobility Performed? No   F: Feeding Goal: Goals: Patient to receive nutrition by RD follow-up  Status: Nutrition Goal Status: new   Current Diet Order   Procedures    Diet NPO      AS: Analgesia/Sedation Yes   T: Thromboembolic Prophylaxis No   H: HOB > 300 Yes   U: Stress Ulcer Prophylaxis (if needed) Yes   G: Glucose Control Yes   B: Bowel Function Stool Occurrence: 1   I: Indwelling Catheter (Lines & Liu) Necessity Ys   D: De-escalation of Antimicrobials/Pharmacotherapies No    Plan for the day/ETD No    Code Status:  Family/Goals of Care: Full Code  Ongoing        Critical secondary to Patient has a condition that poses threat to life and bodily function: Severe Respiratory Distress and Acute Renal Failure      Critical care was time spent personally by me on the following activities: development of treatment plan with patient or surrogate and bedside caregivers, discussions with consultants, evaluation of patient's response to treatment, examination of patient, ordering and performing treatments and interventions, ordering and review of laboratory studies, ordering and review of radiographic studies, pulse oximetry, re-evaluation of patient's condition. This critical care time did not overlap with that of any other provider or involve time for any procedures.     Tommie Kuhn MD  Critical Care Medicine  [facility]

## 2018-06-13 NOTE — PLAN OF CARE
"Problem: Patient Care Overview  Goal: Plan of Care Review  Outcome: Ongoing (interventions implemented as appropriate)  Dx: Pulmonary Renal Syndrome    Shift Events: Paralytic weaned off, plasmapheresis today    Neuro: Arouses to voice, Moves all extremities and Follows commands    Vital Signs: BP (!) 128/54   Pulse 81   Temp 99.6 °F (37.6 °C) (Oral)   Resp (!) 28   Ht 5' 8" (1.727 m)   Wt 86.2 kg (190 lb)   SpO2 (!) 93%   BMI 28.89 kg/m²     Intake/Gtts/Diet: TF increased to goal rate of 40, residuals WDL. Gtts: Fentanyl, Propofol.    Output: UOP 5 cc/shift. CRRT in use, UF at goal of 350 cc/hr.    Pain Management: Fentanyl gtt titrated as needed     Labs: Accuchecks q6hr, SSI administered as ordered. CBC [id number]. Renal panel & Mag drawn q8hr, lytes adjusted as necessary.    Skin: CDI - heels, elbows, and sacrum w/o redness or breakdown.          "

## 2018-06-13 NOTE — ASSESSMENT & PLAN NOTE
- Most likely the diagnosis in the setting of diffuse alveolar hemorrhage and glomerulonephritis with most likely etiology is underlying autoimmune disorder  - Elevated UPCR 1.28, DENIZ positive 1:320 speckled pattern, MPO+ 82, PR3 neg, Immunoglobulins wnl, Hep C and HIV neg  - Plan for 5 days of Methylprednisolone 1000 mg IV daily  - Rheumatology on board for the plan of Immunosuppress agents   - ID consulted for pre starting Immunosuppress agents, send further labs to follow

## 2018-06-13 NOTE — ASSESSMENT & PLAN NOTE
- See principal problem for more elaboration.   - Currently on low need for pressors, will continue to monitor and ensure MAP > 65 mmHg (during CRRT)  - On broad spectrum antibiotics with Cefepime (renally dose) and Vancomycin   - Blood culture and Urine Culture showed NGTD

## 2018-06-13 NOTE — ASSESSMENT & PLAN NOTE
SUBJECTIVE:                                                    Phil Hawk is a 58 year old male who presents to clinic today for the following health issues:      Hyperlipidemia Follow-Up      Rate your low fat/cholesterol diet?: good    Taking statin?  Yes, no muscle aches from statin    Other lipid medications/supplements?:  none       Amount of exercise or physical activity: 2-3 days/week for an average of 15-30 minutes    Problems taking medications regularly: No    Medication side effects: none  Diet: regular (no restrictions)    Hypertension Follow-up      Outpatient blood pressures are not being checked.    Low Salt Diet: no added salt       Depression follow-up: He is tolerating his medication and it seems to be working well. Current symptoms include:  PHQ-9 ([employer]) 2/28/2017   No Interest In Doing Things    Feeling Depressed    Trouble Sleeping    Tired / No Energy    No appetite or Over-Eating    Feeling Bad about Self    Trouble Concentrating    Moving Slow or Restless    Suicidal Thoughts    Total Score    1.  Little interest or pleasure in doing things 1   2.  Feeling down, depressed, or hopeless 0   3.  Trouble falling or staying asleep, or sleeping too much 1   4.  Feeling tired or having little energy 1   5.  Poor appetite or overeating 0   6.  Feeling bad about yourself 0   7.  Trouble concentrating 0   8.  Moving slowly or restless 0   9.  Suicidal or self-harm thoughts 0   PHQ-9 Total Score 3   Difficulty at work, home, or with people Not difficult at all     GERALDINE-7   [employer], 2002; Used with Permission) 2/28/2017   Over the last 2 weeks, how often have you been bothered by feeling nervous, anxious or on edge?    Over the last 2 weeks, how often have you been bothered by not being able to stop or control worrying?    Over the last 2 weeks, how often have you been bothered by worrying too much about different things?    Over the last 2 weeks, how often have you been bothered by trouble  - Given his presentation with two weeks of progressive worsening of his SOB and cough, and associated with hematuria and CXR finding of bilateral infiltrate raised the concern for DAH  - On 6/10 he underwent Flexible Bronchoscopy and showed no endobronchial lesion, significant signs of hemorrhage on all of the airways, confirming findings of pulmonary hemorrhage.   - On high oxygenation setting with FiO2 of 40% and PEEP 14, and his PO2 is ~ 100   - Off Nitric Oxide   - Requires on pRBC on 6/12, then his CBC was stable   "relaxing?    Over the last 2 weeks, how often have you been bothered by being so restless that it is hard to sit still?    Over the last 2 weeks, how often have you been bothered by becoming easily annoyed or irritable?    Over the last 2 weeks, how often have you been bothered by feeling afraid as if something awful might happen?    GERALDINE-7 Total Score =     1. Feeling nervous, anxious, or on edge 1   2. Not being able to stop or control worrying 1   3. Worrying too much about different things 1   4. Trouble relaxing 0   5. Being so restless that it is hard to sit still 0   6. Becoming easily annoyed or irritable 1   7. Feeling afraid, as if something awful might happen 2   GERALDINE-7 Total Score 6   If you checked any problems, how difficult have they made it for you to do your work, take care of things at home, or get along with other people? Somewhat difficult     Problem list and histories reviewed & adjusted, as indicated.  Additional history: none        Reviewed and updated as needed this visit by clinical staff  Tobacco  Allergies  Med Hx  Surg Hx  Fam Hx  Soc Hx      Reviewed and updated as needed this visit by Provider               ROS:  Constitutional, HEENT, cardiovascular, pulmonary, gi and gu systems are negative, except as otherwise noted.        OBJECTIVE:                                                    /62 (BP Location: Right arm, Patient Position: Chair, Cuff Size: Adult Regular)  Pulse 80  Ht 5' 10\" (1.778 m)  Wt 212 lb 12.8 oz (96.5 kg)  BMI 30.53 kg/m2    GENERAL: healthy, alert and no distress  EYES: Eyes grossly normal to inspection, extraocular movements - intact, and PERRL  NECK: no tenderness, no adenopathy, no asymmetry, no masses, no stiffness; thyroid- normal to palpation  RESP: lungs clear to auscultation - no rales, no rhonchi, no wheezes  CV: regular rates and rhythm, normal S1 S2, no S3 or S4 and no murmur, no click or rub -  MS: extremities- no gross deformities noted, no " edema  PSYCH: Alert and oriented times 3; coherent speech, normal rate and volume, able to articulate logical thoughts, able to abstract reason, no tangential thoughts, no hallucinations or delusions. Affect is normal.         ASSESSMENT/PLAN:                                                    ASSESSMENT:  1. Hypertension goal BP (blood pressure) < 140/90    2. Major depressive disorder, recurrent episode, moderate (H)    3. Mixed hyperlipidemia    4. Screen for colon cancer        PLAN:  Orders Placed This Encounter     Basic metabolic panel     LDL cholesterol direct     Cholesterol     Fecal colorectal cancer screen (FIT)     citalopram (CELEXA) 40 MG tablet     atorvastatin (LIPITOR) 20 MG tablet     lisinopril-hydrochlorothiazide (PRINZIDE/ZESTORETIC) 20-12.5 MG per tablet       Patient Instructions   Remember to mail in the stool test. Recheck in one year      ALEXANDRIA Guallpa  [facility]

## 2018-06-14 PROBLEM — I77.82 ANCA-ASSOCIATED VASCULITIS: Status: ACTIVE | Noted: 2018-01-01

## 2018-06-14 NOTE — ASSESSMENT & PLAN NOTE
63 year old male with PMHx significant for HTN, DM and arthritis who presented on 5/29 to  [facility] with symptoms of pneumonia, SOB, acute renal failure. He decompensated quickly and was transferred to [facility] on 6/11 for concern for diffuse alveolar hemorrhage (DAH) and pulmonary renal syndrome.   He is currently on high dose steroids,  plasma exchange, as well as broad spectrum antibiotics.  On SLED.   Rheumatology evaluating and suspects underlying autoimmune disorder.  Planning for immunosuppressive therapy- likely Rituximab for induction.   ID is consulted for clearance for immunosuppression.       Patient with recent history prior to this admission of treatment for suspected bronchitis (tx with Augmentin 5/11/18) and UTI/suspected pyelo (course of levaquin 5/29)).  On admission 6/8 to OSH was started on broad spectrum antibiotics (vanc/cefepime, then ceftaroline) for sepsis thought to be 2/2 to PNA.       - Micro:  Blood and respiratory cultures here NGTD. No AFB seen on smear.    Urine culture is negative.  RSV panel is negative.  Blood cultures from 6/8 from OSH finalized as no growth (called micro lab today), and urine culture negative.   Urine culture of 5/29 prior to admission + for cedecae davisae (pan sensitive and treated with levaquin).  Blood cultures 5/29 from OSH negative.      Bronchial wash cytology 6/10 from OSH - GMS stain negative for fungal organisms. Legionella negative, HSV negative     -  Serologies to date:  HIV, Hep B SAg, HCV, RPR negative.  Quantiferon gold is indeterminate, TST ordered by Rheum and have added Tspot.   Histoplasma, blastomyces, coccidioides pending.      -  History of chronic infections:  Family denies history of chronic or recurring infections.  PCP today confirms no known history of chronic infection.  Does have dental issues/pyorrhea     -  Imaging: CXR with extensive bilateral pulmonary infiltrates.           CT of abd from OSH  noted multiple non-obstructing kidney stones.     -  Procalcitonin noted to be > 100. This may be unreliable in setting of kidney failure      -  T max 24 100.5.  Currently afebrile.  WBC 14 (on pulsed steroids).  Remains on IV cefepime (recommend d/c vanc yesterday)    Given the information currently available at this time, there is no unusual risk of infection or absolute contraindication to immunosuppressive therapy.      Plan:              -  Recommend de-escalating antibiotics - stop vancomycin first and observe.  If no clinical decompensation, recommend discontinuing cefepime.               -  Histo, blasto, cryptococcus, coccidioides pending. Please notify ID if positive               -  QG indeterminate - TST ordered by Rheum. Have also added T-spot. Please notify ID if positive.               -  Will need PCP ppx - note that patient had reported sulfa allergy and poor renal function.  In this setting,  alternative PCP ppx  would be Mepron (Atovaquone) 750 mg twice a day.               -   Patient's family (wife and son) counseled on the general  increased risk of infection with immunosuppressive therapy. Counseled wife and son on the importance of vaccinations.  Advised we recommended vaccines now, prior to initiation of therapy, as well as the importance of completing series in the future and the importance of a yearly flu vaccine.  Stressed importance of family members keeping up to date with recommended vaccines, including flu                -  Immunization history reviewed - PCV 13 (prevnar), Tdap, Hepatitis B (high dose) ordered. Hep B surface antibody here noted to be positive, but negative at OSH.  Will vaccinate.  Hep A IgG is positive. Canceled Hepatitis A vaccine.  Will need PPSV 23 (pnemovax) 8 weeks after prevnar, and complete high dose hepatitis B series at 1 month and 6 months from first injection.              -   Kidney stones noted on CT at OSH will need to be followed up.              -    Patient noted to have poor dentition/pyorhhea by outside records.  He will need dental issues addressed/teeth fixed at some point as these will pose an increased risk of infection.               -  If recurrent fever, recommend considering line change.               -  See Staff attestation to follow    -  Will sign off.  Please call or re-consult as needed.       Patient seen by, and plan discussed with, ID staff

## 2018-06-14 NOTE — PLAN OF CARE
MAP > 65   Accu cks q 6 hr; no supplemental coverage needed  Renal panel & Mg drawn q 8 hr  CBC q 12 hr  Vent settings: A/C 40% FiO2 & 14 PEEP  Pt opens eyes to voice; follows commands; moves all extremities  MD notified for pt's increasing agitation throughout shift & SBP in 170s; ordered to initiate precedex gtt; order carried out; precedex gtt currently @ 0.4 mcg/kg/hr  Propofol gtt @ 45 mcg/kg/min; fentanyl gtt @ 100 mcg/hr; both gtts titrated as needed   Pt on CRRT w/ UF of 300; tolerating well   TF @ goal 40 cc/hr; tolerating well w/ minimal to no residuals   Pt remains oliguric; UOP 5 cc/shift  No new skin breakdown noted  Will continue to monitor closely

## 2018-06-14 NOTE — SUBJECTIVE & OBJECTIVE
Interval History: Remains intubated FIO40 %and sedated CVP 4-5 overnight.  to 300. Net pos 976 ml. Hourly intake 75 cc/hr-ca gluonate, propofol, fentanyl. iCa 1.0 with increase post filter ca gluconate.   PEX last night. Infectious work up neg so far.     Review of patient's allergies indicates:   Allergen Reactions    Sulfa (sulfonamide antibiotics)      Current Facility-Administered Medications   Medication Frequency    0.9%  NaCl infusion (CRRT USE ONLY) Continuous    calcium gluconate 3,000 mg in dextrose 5 % 100 mL IVPB Continuous    ceFEPIme injection 1 g Q8H    chlorhexidine 0.12 % solution 15 mL BID    dexmedetomidine (PRECEDEX) 400mcg/100mL 0.9% NaCL infusion Continuous    dextrose 50% injection 12.5 g PRN    dextrose 50% injection 25 g PRN    DEXTROSE-SOD CITRATE-CITRIC AC 2.45-2.2 GRAM- 730 MG/100 ML MISC SOLN Continuous    DIPH,PERTUSS(ACEL),TET VAC(PF)(ADULT)(ADACEL)(TDaP) Once    famotidine (PF) injection 20 mg BID    fentaNYL 2500 mcg in 0.9% sodium chloride 250 mL infusion premix (titrating) Continuous    heparin (porcine) injection 4,000 Units Once    hepatitis B virus vacc.rec(PF) injection 40 mcg vaccine x 1 dose    insulin aspart U-100 pen 0-5 Units Q6H PRN    lorazepam injection 1 mg Q30 Min PRN    magnesium sulfate 2g in water 50mL IVPB (premix) PRN    norepinephrine 4 mg in dextrose 5% 250 mL infusion (premix) (titrating) Continuous    potassium, sodium phosphates 280-160-250 mg packet 1 packet TID    propofol (DIPRIVAN) 10 mg/mL infusion Continuous    sodium phosphate 20.01 mmol in dextrose 5 % 250 mL IVPB PRN    sodium phosphate 30 mmol in dextrose 5 % 250 mL IVPB PRN    sodium phosphate 39.99 mmol in dextrose 5 % 250 mL IVPB PRN       Objective:     Vital Signs (Most Recent):  Temp: 98.9 °F (37.2 °C) (06/14/18 1100)  Pulse: 66 (06/14/18 1300)  Resp: 18 (06/14/18 1300)  BP: (!) 128/54 (06/13/18 1243)  SpO2: 96 % (06/14/18 1300)  O2 Device (Oxygen Therapy):  ventilator (06/14/18 1300) Vital Signs (24h Range):  Temp:  [98.9 °F (37.2 °C)-100.5 °F (38.1 °C)] 98.9 °F (37.2 °C)  Pulse:  [] 66  Resp:  [16-37] 18  SpO2:  [82 %-100 %] 96 %  Arterial Line BP: ()/(43-83) 115/53     Weight: 86.1 kg (189 lb 13.1 oz) (06/13/18 2111)  Body mass index is 28.86 kg/m².  Body surface area is 2.03 meters squared.    I/O last 3 completed shifts:  In: 9528.8 [I.V.:1580; Blood:16; NG/GT:960; IV Piggyback:6972.8]  Out: 9422 [Urine:5; Other:9417]    Physical Exam   Constitutional: He appears well-developed and well-nourished. No distress.   Intubated FIO2 40% , sedated    HENT:   Head: Normocephalic.   Cardiovascular: Normal heart sounds.  Exam reveals no gallop and no friction rub.    No murmur heard.  Tachycardic, no murmur appreciated, + 2 lower extremity and pedal edema,   No JVD     Pulmonary/Chest:   Intubated. Clear anteriorly   Abdominal: He exhibits no distension.   No Bowel sounds,    Musculoskeletal: He exhibits edema (BUE/sacral mild edema).   Neurological:   Intubated and sedated   Skin: Skin is warm and dry. He is not diaphoretic.   R SC trialysis   Nursing note and vitals reviewed.      Significant Labs:  All labs within the past 24 hours have been reviewed.     Significant Imaging:  Labs: Reviewed

## 2018-06-14 NOTE — ASSESSMENT & PLAN NOTE
- Will keep his blood glucose on the target 140-180  - Weaned off Insulin infusion  Recent Labs      06/13/18   0500  06/13/18   0638  06/13/18   0839  06/13/18   1410  06/13/18   1827  06/14/18   0012  06/14/18   0559  06/14/18   1155   POCTGLUCOSE  175*  159*  174*  293*  284*  195*  214*  227*

## 2018-06-14 NOTE — PROGRESS NOTES
[facility]  Nephrology  Progress Note    Patient Name: Jasmeet Raymodn  MRN: [id number]  Admission Date: 6/10/2018  Hospital Length of Stay: 4 days  Attending Provider: Everette Hdz*   Primary Care Physician: Provider Notinsystem  Principal Problem:Diffuse pulmonary alveolar hemorrhage    Subjective:     HPI: 64 yo AAm with HTN, NIDDM, arthritis on metformin and meloxicam  admitted to [facility] for worsening SOB and ALEX.   Patiend had been treated as an out;patient for 2 weeks for Shortness of breath and bronchitis and UTI. In the hospital he was found to be acidotic, hyperkalemic, serum creatinine 8, and diffuse bilateral pulmonary infiltrates, requiring intubation. UA with proteinuria, hematuria and wbc's, Bronchoscopy revealed hemorrahage. Patient recieved one dialysis as per notes and 1 gm solumedrol, and transferred  to [facility] for plasmapheresis  And further evaluation.  Family is not available at the time of exam and patient is intubated.   HIV, Hepatitis B, C, complements  All within normal limits. DENIZ, ANCA and AntiGBM, results Nnot available    Interval History: Remains intubated FIO40 %and sedated CVP 4-5 overnight.  to 300. Net pos 976 ml. Hourly intake 75 cc/hr-ca gluonate, propofol, fentanyl. iCa 1.0 with increase post filter ca gluconate.   PEX last night. Infectious work up neg so far.     Review of patient's allergies indicates:   Allergen Reactions    Sulfa (sulfonamide antibiotics)      Current Facility-Administered Medications   Medication Frequency    0.9%  NaCl infusion (CRRT USE ONLY) Continuous    calcium gluconate 3,000 mg in dextrose 5 % 100 mL IVPB Continuous    ceFEPIme injection 1 g Q8H    chlorhexidine 0.12 % solution 15 mL BID    dexmedetomidine (PRECEDEX) 400mcg/100mL 0.9% NaCL infusion Continuous    dextrose 50% injection 12.5 g PRN    dextrose 50% injection 25 g PRN    DEXTROSE-SOD CITRATE-CITRIC AC 2.45-2.2 GRAM- 730 MG/100 ML MISC  SOLN Continuous    DIPH,PERTUSS(ACEL),TET VAC(PF)(ADULT)(ADACEL)(TDaP) Once    famotidine (PF) injection 20 mg BID    fentaNYL 2500 mcg in 0.9% sodium chloride 250 mL infusion premix (titrating) Continuous    heparin (porcine) injection 4,000 Units Once    hepatitis B virus vacc.rec(PF) injection 40 mcg vaccine x 1 dose    insulin aspart U-100 pen 0-5 Units Q6H PRN    lorazepam injection 1 mg Q30 Min PRN    magnesium sulfate 2g in water 50mL IVPB (premix) PRN    norepinephrine 4 mg in dextrose 5% 250 mL infusion (premix) (titrating) Continuous    potassium, sodium phosphates 280-160-250 mg packet 1 packet TID    propofol (DIPRIVAN) 10 mg/mL infusion Continuous    sodium phosphate 20.01 mmol in dextrose 5 % 250 mL IVPB PRN    sodium phosphate 30 mmol in dextrose 5 % 250 mL IVPB PRN    sodium phosphate 39.99 mmol in dextrose 5 % 250 mL IVPB PRN       Objective:     Vital Signs (Most Recent):  Temp: 98.9 °F (37.2 °C) (06/14/18 1100)  Pulse: 66 (06/14/18 1300)  Resp: 18 (06/14/18 1300)  BP: (!) 128/54 (06/13/18 1243)  SpO2: 96 % (06/14/18 1300)  O2 Device (Oxygen Therapy): ventilator (06/14/18 1300) Vital Signs (24h Range):  Temp:  [98.9 °F (37.2 °C)-100.5 °F (38.1 °C)] 98.9 °F (37.2 °C)  Pulse:  [] 66  Resp:  [16-37] 18  SpO2:  [82 %-100 %] 96 %  Arterial Line BP: ()/(43-83) 115/53     Weight: 86.1 kg (189 lb 13.1 oz) (06/13/18 2111)  Body mass index is 28.86 kg/m².  Body surface area is 2.03 meters squared.    I/O last 3 completed shifts:  In: 9528.8 [I.V.:1580; Blood:16; NG/GT:960; IV Piggyback:6972.8]  Out: 9422 [Urine:5; Other:9417]    Physical Exam   Constitutional: He appears well-developed and well-nourished. No distress.   Intubated FIO2 40% , sedated    HENT:   Head: Normocephalic.   Cardiovascular: Normal heart sounds.  Exam reveals no gallop and no friction rub.    No murmur heard.  Tachycardic, no murmur appreciated, + 2 lower extremity and pedal edema,   No JVD      Pulmonary/Chest:   Intubated. Clear anteriorly   Abdominal: He exhibits no distension.   No Bowel sounds,    Musculoskeletal: He exhibits edema (BUE/sacral mild edema).   Neurological:   Intubated and sedated   Skin: Skin is warm and dry. He is not diaphoretic.   R SC trialysis   Nursing note and vitals reviewed.      Significant Labs:  All labs within the past 24 hours have been reviewed.     Significant Imaging:  Labs: Reviewed    Assessment/Plan:     ALEX (acute kidney injury)    64 yo AAM with NIDDM, HTN, arthritis with a 2 week history of shortness of breath being treated for URI and UTI, admitted for worsening SOB, oliguric ALEX with proteinuria, hematuria, pyuria serum crt 8, acidotic and hyperkalemia. Unknown baseline renal function.  He received one dose solumedrol and RRT. X 1 episode PTT.   Renal US demonstrating bilateral renal cysts.  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    MPO-ANCA positive RPGN  Initially oliguric now anuric. Pulmonary hemorrhage and pulmonary renal syndrome with RPGN. 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 ANCA pending.   -Urine sediment showed red blood cell cast.   -6/11  2 D echo EF 75% and normal diastolic function and small pericardial effusion.  -6/11 plasmapheresis  -6/11 Repeat renal US showed normal size kidneys with marked cortical thinning with increase cortical echogenicity and loss of corticomedullary distinction with no hydronephrosis, mass or stone.   -C3C4 , AntiGBM and Proteinase 3 normal .     Plan:  -Appreciate Rheumatology following- Continue plasmapheresis, pulse dose steroid x 5 days, start Rituximab for induction tomorrow.   -Infectious work up negative so far.   -Patient remains unstable for tissue diagnosis. If/when hemodynamcially stable would like a renal biopsy.  -Continue SLED for clearance and volume management  ml-net even. Switch to 3/2.5/140/25.  Citrate for anticoagulation/calcium chloride 15 cc/hr.                     Thank you for your consult. I will follow-up with patient. Please contact us if you have any additional questions.    Kandice Hernandez NP  Nephrology  [facility]

## 2018-06-14 NOTE — ASSESSMENT & PLAN NOTE
- Most likely etiology is Diffuse pulmonary alveolar hemorrhage, however severe pneumonia in the setting of leukocytosis and fever and presentation of cough and SOB can not be excluded  - On conservative tidal volume (6 ml/kg predicted body weight), balanced respiratory rate, and high oxygenation setting in the setting of DAH going with high PEEP ladder

## 2018-06-14 NOTE — PROGRESS NOTES
" [facility]  Adult Nutrition  Progress Note    SUMMARY       Recommendations  Recommendation/Intervention:   1. Continue current TF - meeting EEN and EPN.   2. When able to extubate, ADAT to Diabetic with texture per SLP.   RD to monitor.    Goals: Patient to receive nutrition by RD follow-up  Nutrition Goal Status: goal met  Communication of RD Recs:  (POC)    Reason for Assessment  Reason for Assessment: RD follow-up  Diagnosis:  (diffuse pulmonary alveolar hemorrhage)  Relevant Medical History: DM2, HTN  General Information Comments: Patient intubated, sedated. On CRRT. On TF, tolerating at goal rate.  Nutrition Discharge Planning: Unable to determine at this time.    Nutrition/Diet History  Food Preferences: ROMA  Do you have any cultural, spiritual, Christianity conflicts, given your current situation?: roma  Factors Affecting Nutritional Intake: NPO, on mechanical ventilation    Anthropometrics  Temp: 99.7 °F (37.6 °C)  Height: 5' 8" (172.7 cm)  Height (inches): 68 in  Weight Method: Bed Scale  Weight: 86.1 kg (189 lb 13.1 oz)  Weight (lb): 189.82 lb  Ideal Body Weight (IBW), Male: 154 lb  % Ideal Body Weight, Male (lb): 123.38 lb  BMI (Calculated): 28.9  BMI Grade: 25 - 29.9 - overweight    Lab/Procedures/Meds  Pertinent Labs Reviewed: reviewed  Pertinent Labs Comments: Glu 191, POCT Glu 195, HgbA1c 6.2, Ca 7.4, Phos 1.7, Alb 2.7, T. bili 1.6  Pertinent Medications Reviewed: reviewed  Pertinent Medications Comments: famotidine, precedex, fentnayl, propofol    Physical Findings/Assessment  Overall Physical Appearance: on ventilator support  Tubes: nasogastric tube  Oral/Mouth Cavity: tooth/teeth missing  Skin: edema    Estimated/Assessed Needs  Weight Used For Calorie Calculations: 86.1 kg (189 lb 13.1 oz)  Energy Calorie Requirements (kcal): 2074 kcal/day  Energy Need Method: [facility]  Protein Requirements: 104-130 g/day (1.2-1.5 g/kg)  Weight Used For Protein Calculations: 86.2 kg (190 lb " 0.6 oz)  Fluid Requirements (mL): 1 mL/kcal or per MD  Fluid Need Method: RDA Method  RDA Method (mL): 2074    Nutrition Prescription Ordered  Current Diet Order: NPO  Current Nutrition Support Formula Ordered: Novasource Renal  Current Nutrition Support Rate Ordered: 40 (ml)  Current Nutrition Support Frequency Ordered: mL/hr    Evaluation of Received Nutrient/Fluid Intake  Enteral Calories (kcal): 1970  Enteral Protein (gm): 87  Enteral (Free Water) Fluid (mL): 688  Other Calories (kcal): 412  Total Calories (kcal): 2382  % Kcal Needs: 115  % Protein Needs: 84  I/O: Noted  Energy Calories Required: exceed needs  Protein Required: meeting needs  Fluid Required:  (per MD)  Comments: LBM 6/12  Tolerance: tolerating  % Intake of Estimated Energy Needs: Other: > 100%  % Meal Intake: NPO    Nutrition Risk  Level of Risk/Frequency of Follow-up: high (2x/week)     Assessment and Plan  * Diffuse pulmonary alveolar hemorrhage    Contributing Nutrition Diagnosis  Inadequate energy intake    Related to (etiology):  Intubated, NPO    Signs and Symptoms (as evidenced by):   Currently meeting < 85% EEN and EPN     Nutrition Diagnosis Status:   Resolved          Monitor and Evaluation  Food and Nutrient Intake: energy intake, enteral nutrition intake  Food and Nutrient Adminstration: enteral and parenteral nutrition administration  Anthropometric Measurements: weight, weight change  Biochemical Data, Medical Tests and Procedures: electrolyte and renal panel, gastrointestinal profile, inflammatory profile  Nutrition-Focused Physical Findings: overall appearance     Nutrition Follow-Up  RD Follow-up?: Yes

## 2018-06-14 NOTE — ASSESSMENT & PLAN NOTE
- Most likely the diagnosis in the setting of diffuse alveolar hemorrhage and glomerulonephritis with most likely etiology is underlying autoimmune disorder  - Elevated UPCR 1.28, DENIZ positive 1:320 speckled pattern, MPO+ 82, PR3 neg, Immunoglobulins wnl, Hep C and HIV neg  - Plan for 5 days of Methylprednisolone 1000 mg IV daily  - Rheumatology on board for the plan of Immunosuppress agents   - ID will vaccinate patient today in preperation for immunosuppression

## 2018-06-14 NOTE — SUBJECTIVE & OBJECTIVE
No past medical history on file.    No past surgical history on file.    Review of patient's allergies indicates:   Allergen Reactions    Sulfa (sulfonamide antibiotics)        Family History     None        Social History Main Topics    Smoking status: Not on file    Smokeless tobacco: Not on file    Alcohol use Not on file    Drug use: Unknown    Sexual activity: Not on file      Review of Systems   Unable to perform ROS: Intubated     Objective:     Vital Signs (Most Recent):  Temp: 98.9 °F (37.2 °C) (06/14/18 1100)  Pulse: 66 (06/14/18 1300)  Resp: 18 (06/14/18 1300)  BP: (!) 128/54 (06/13/18 1243)  SpO2: 96 % (06/14/18 1300) Vital Signs (24h Range):  Temp:  [98.9 °F (37.2 °C)-100.5 °F (38.1 °C)] 98.9 °F (37.2 °C)  Pulse:  [] 66  Resp:  [16-37] 18  SpO2:  [82 %-100 %] 96 %  Arterial Line BP: ()/(43-83) 115/53   Weight: 86.1 kg (189 lb 13.1 oz)  Body mass index is 28.86 kg/m².      Intake/Output Summary (Last 24 hours) at 06/14/18 1413  Last data filed at 06/14/18 1300   Gross per 24 hour   Intake          5848.05 ml   Output             5968 ml   Net          -119.95 ml       Physical Exam   Constitutional: He appears well-developed. No distress.   HENT:   Head: Normocephalic and atraumatic.   Right Ear: External ear normal.   Left Ear: External ear normal.   Eyes: Pupils are equal, round, and reactive to light. Right eye exhibits no discharge. Left eye exhibits no discharge.   Neck: Neck supple. No JVD present.   Cardiovascular: Normal rate, regular rhythm and intact distal pulses.    No murmur heard.  Pulmonary/Chest: No respiratory distress. He has no wheezes. He has rales (b/l, mechanical vent ).   Abdominal: Soft. He exhibits no distension. There is no tenderness.   BS appreciated in LUQ only   Musculoskeletal: He exhibits no edema.   Neurological:   Sedated but follows commands intermittently   Skin: Skin is warm and dry. No rash noted. He is not diaphoretic. No erythema.   Psychiatric:    Unable to assess   Vitals reviewed.      Vents:  Vent Mode: A/C (06/14/18 1257)  Ventilator Initiated: Yes (06/10/18 2217)  Set Rate: 18 bmp (06/14/18 1257)  Vt Set: 440 mL (06/14/18 1257)  PEEP/CPAP: 16 cmH20 (06/14/18 1257)  Oxygen Concentration (%): 50 (06/14/18 1300)  Peak Airway Pressure: 29 cmH2O (06/14/18 1257)  Plateau Pressure: 29 cmH20 (06/14/18 1257)  Total Ve: 11.1 mL (06/14/18 1257)  F/VT Ratio<105 (RSBI): (!) 28.15 (06/14/18 1257)  Lines/Drains/Airways     Central Venous Catheter Line                 Hemodialysis Catheter 06/10/18 2307 right internal jugular 3 days         Percutaneous Central Line Insertion/Assessment - triple lumen  06/10/18 2305 left internal jugular 3 days          Drain                 NG/OG Tube 06/10/18 2309 [place] sump 18 Fr. Left nostril 3 days         Urethral Catheter 06/10/18 2302 Latex 16 Fr. 3 days          Airway                 Airway - Non-Surgical Endotracheal Tube -- days          Arterial Line                 Arterial Line 06/10/18 2304 Right Radial 3 days              Significant Labs:    CBC/Anemia Profile:    Recent Labs  Lab 06/13/18  1957 06/14/18  0327 06/14/18  0806   WBC 17.35* 14.85* 14.28*   HGB 8.3* 7.6* 7.8*   HCT 23.8* 22.1* 22.5*   * 97* 96*   MCV 84 85 84   RDW 16.4* 16.1* 15.9*        Chemistries:    Recent Labs  Lab 06/13/18  0450  06/13/18  1408 06/13/18  2134 06/14/18  0327     137  < > 135* 135* 138  138   K 4.1  4.1  < > 5.0 4.3 3.8  3.8     103  < > 102 100 103  103   CO2 24  24  < > 23 28 25  25   BUN 15  15  < > 22 20 15  15   CREATININE 1.4  1.4  < > 1.6* 1.3 1.1  1.1   CALCIUM 7.7*  7.7*  < > 7.6* 7.6* 7.4*  7.4*   ALBUMIN 2.4*  2.4*  < > 2.8* 2.8* 2.7*  2.7*   PROT 5.4*  --   --   --  5.5*   BILITOT 0.9  --   --   --  1.6*   ALKPHOS 56  --   --   --  64   ALT 26  --   --   --  30   AST 17  --   --   --  44*   MG 2.3  < > 2.5 2.2 1.8   PHOS 3.9  < > 4.4 2.6* 1.7*   < > = values in this interval not  displayed.  Coagulation:     Recent Labs  Lab 06/14/18  0327   INR 1.0   APTT <21.0     Lactic Acid:   No results for input(s): LACTATE in the last 48 hours.  Significant Imaging: I have reviewed all pertinent imaging results/findings within the past 24 hours.  I have reviewed and interpreted all pertinent imaging results/findings within the past 24 hours.    Physical Exam   Vitals reviewed.  Constitutional: He appears well-developed. No distress.   HENT:   Head: Normocephalic and atraumatic.   Right Ear: External ear normal.   Left Ear: External ear normal.   Eyes: Pupils are equal, round, and reactive to light. Right eye exhibits no discharge. Left eye exhibits no discharge.   Neck: Neck supple. No JVD present.   Cardiovascular: Normal rate, regular rhythm and intact distal pulses.    No murmur heard.  Pulmonary/Chest: No respiratory distress. He has no wheezes. He has rales (b/l, mechanical vent ).   Abdominal: Soft. He exhibits no distension. There is no tenderness.   BS appreciated in LUQ only   Neurological:   Sedated but follows commands intermittently   Skin: Skin is warm and dry. No rash noted. He is not diaphoretic. No erythema.     Psychiatric:   Unable to assess   Musculoskeletal: He exhibits no edema.

## 2018-06-14 NOTE — ASSESSMENT & PLAN NOTE
63 year old male with PMHx significant for HTN, DM and arthritis who presented on 5/29 to  [facility] with symptoms of pneumonia, SOB, acute renal failure. He decompensated quickly and was transferred to [facility] on 6/11 for concern for diffuse alveolar hemorrhage (DAH) and pulmonary renal syndrome.   He is currently on high dose steroids,  plasma exchange, as well as broad spectrum antibiotics.  On SLED.   Rheumatology evaluating and suspects underlying autoimmune disorder.  Planning for immunosuppressive therapy- likely Rituximab for induction.   ID is consulted for clearance for immunosuppression.       Patient with recent history prior to this admission of treatment for suspected bronchitis (tx with Augmentin 5/11/18) and UTI/suspected pyelo (course of levaquin 5/29)).  On admission 6/8 to OSH was started on broad spectrum antibiotics (vanc/cefepime, then ceftaroline) for sepsis thought to be 2/2 to PNA.       - Micro:  Blood and respiratory cultures here NGTD. No AFB seen on smear.  Urine culture is negative.    Blood cultures from OSH NGTD, and urine culture negative.   Urine culture of 5/29 prior to admission + for cedecae davisae. Blood cultures 5/29 from OSH negative.   Bronchial wash cytology 6/10 - GMS stain negative for fungal organisms. Legionella negative, HSV negative     -  Serologies to date:  HIV, Hep B SAg, HCV, RPR negative.  Quantiferon gold is indeterminate, TST ordered by Rheum.  Histoplasma, blastomyces, coccidioides pending.      -  History of chronic infections:  Family denies history of chronic or recurring infections.  PCP today confirms no known history of chronic infection.  Does have dental issues/pyorrhea     -  Imaging: CXR with extensive bilateral pulmonary infiltrates.           CT of abd from OSH noted multiple non-obstructing kidney stones.     -  Procalcitonin noted to be > 100. This may be unreliable in setting of kidney failure      -   Currently afebrile, WBC 22 >> 13 >> 17 (on pulsed steroids).  Remains on IV vancomycin and cefepime.     Given the information currently available at this time, there is no unusual risk of infection or absolute contraindication to immunosuppressive therapy.      Plan:   -  Recommend de-escalating antibiotics - stop vancomycin first and observe.  If no clinical decompensation, recommend discontinuing cefepime.    -  Will follow pending cultures and serologies     -  Will need PCP ppx - note that patient had reported sulfa allergy and poor renal function.  In this setting,  alternative PCP ppx  would be Mepron (Atovaquone) 750 mg twice a day.    -   Patient's family (wife and son) counseled on the general  increased risk of infection with immunosuppressive therapy. Counseled wife and son on the importance of vaccinations.  Advised we recommended vaccines now, prior to initiation of therapy, as well as the importance of completing series in the future and the importance of a yearly flu vaccine.  Stressed importance of family members keeping up to date with recommended vaccines, including flu     -  Immunization history reviewed - will order  PCV 13 (prevnar), Tdap, Hep A/B series. Will need PPSV 23 (pnemovax) 8 weeks after prevnar.    -  Will follow up tomorrow   -  See Staff attestation to follow     Patient seen by, and plan discussed with, ID staff  Discussed with Rheumatology

## 2018-06-14 NOTE — PROGRESS NOTES
[facility]  Critical Care Medicine  Progress Note    Patient Name: Jasmeet Raymond  MRN: [id number]  Admission Date: 6/10/2018  Hospital Length of Stay: 4 days  Code Status: Full Code  Attending Provider: Everette Hdz*  Primary Care Provider: Provider Notinsystem   Principal Problem: Diffuse pulmonary alveolar hemorrhage    Subjective:     Interval History/Significant Events:  No acute events overnight.  Patient has been bouncing between hypotensive and normoxic overnight or hypoxic and normotensive.      Review of Systems   Unable to perform ROS: Intubated     Objective:     Vital Signs (Most Recent):  Temp: 98.9 °F (37.2 °C) (06/14/18 1100)  Pulse: 66 (06/14/18 1635)  Resp: (!) 22 (06/14/18 1635)  BP: (!) 128/54 (06/13/18 1243)  SpO2: 99 % (06/14/18 1635) Vital Signs (24h Range):  Temp:  [98.9 °F (37.2 °C)-100.5 °F (38.1 °C)] 98.9 °F (37.2 °C)  Pulse:  [] 66  Resp:  [16-37] 22  SpO2:  [82 %-100 %] 99 %  Arterial Line BP: ()/(43-83) 131/60   Weight: 86.1 kg (189 lb 13.1 oz)  Body mass index is 28.86 kg/m².      Intake/Output Summary (Last 24 hours) at 06/14/18 1651  Last data filed at 06/14/18 1615   Gross per 24 hour   Intake          5848.05 ml   Output             6804 ml   Net          -955.95 ml       Physical Exam   Constitutional: He is oriented to person, place, and time. He appears well-developed. No distress. He is intubated.   Cardiovascular: Normal rate, regular rhythm and intact distal pulses.  Exam reveals no gallop and no friction rub.    No murmur heard.  Pulmonary/Chest: Effort normal. He is intubated. No respiratory distress. He has no wheezes. He has no rhonchi. He has rales.   Abdominal: Soft. Bowel sounds are normal. He exhibits no distension. There is no tenderness.   Musculoskeletal: He exhibits edema (BL LE pitting).   Neurological: He is alert and oriented to person, place, and time.   Sedated RASS -2   [id number]       Vents:  Vent Mode: A/C (06/14/18  1635)  Ventilator Initiated: Yes (06/10/18 2217)  Set Rate: 18 bmp (06/14/18 1635)  Vt Set: 440 mL (06/14/18 1635)  PEEP/CPAP: 16 cmH20 (06/14/18 1635)  Oxygen Concentration (%): 50 (06/14/18 1516)  Peak Airway Pressure: 41 cmH2O (06/14/18 1635)  Plateau Pressure: 29 cmH20 (06/14/18 1635)  Total Ve: 12.9 mL (06/14/18 1635)  F/VT Ratio<105 (RSBI): (!) 36.36 (06/14/18 1635)  Lines/Drains/Airways     Central Venous Catheter Line                 Hemodialysis Catheter 06/10/18 2307 right internal jugular 3 days         Percutaneous Central Line Insertion/Assessment - triple lumen  06/10/18 2305 left internal jugular 3 days          Drain                 NG/OG Tube 06/10/18 2309 [place] sump 18 Fr. Left nostril 3 days         Urethral Catheter 06/10/18 2302 Latex 16 Fr. 3 days          Airway                 Airway - Non-Surgical Endotracheal Tube -- days          Arterial Line                 Arterial Line 06/10/18 2304 Right Radial 3 days              Significant Labs:    CBC/Anemia Profile:    Recent Labs  Lab 06/13/18  1957 06/14/18  0327 06/14/18  0806   WBC 17.35* 14.85* 14.28*   HGB 8.3* 7.6* 7.8*   HCT 23.8* 22.1* 22.5*   * 97* 96*   MCV 84 85 84   RDW 16.4* 16.1* 15.9*        Chemistries:    Recent Labs  Lab 06/13/18  0450  06/13/18  2134 06/14/18  0327 06/14/18  1328     137  < > 135* 138  138 137   K 4.1  4.1  < > 4.3 3.8  3.8 4.4     103  < > 100 103  103 107   CO2 24  24  < > 28 25  25 23   BUN 15  15  < > 20 15  15 12   CREATININE 1.4  1.4  < > 1.3 1.1  1.1 1.0   CALCIUM 7.7*  7.7*  < > 7.6* 7.4*  7.4* 7.5*   ALBUMIN 2.4*  2.4*  < > 2.8* 2.7*  2.7* 2.6*   PROT 5.4*  --   --  5.5*  --    BILITOT 0.9  --   --  1.6*  --    ALKPHOS 56  --   --  64  --    ALT 26  --   --  30  --    AST 17  --   --  44*  --    MG 2.3  < > 2.2 1.8 1.8   PHOS 3.9  < > 2.6* 1.7* 3.3   < > = values in this interval not displayed.    Results for FELIPE BERNAL (MRN [id number]) as of 6/14/2018 16:58    6/14/2018 08:29 6/14/2018 12:52 6/14/2018 16:35   POC PH 7.520 (H) 7.415 7.404   POC PCO2 35.9 38.4 39.1   POC PO2 51 (LL) 80 79 (L)   POC BE 6 0 0   POC HCO3 29.3 (H) 24.6 24.5   POC SATURATED O2 89 (L) 96 96   POC TCO2 30 (H) 26 26       Significant Imaging:  I have reviewed all pertinent imaging results/findings within the past 24 hours.    Assessment/Plan:     Pulmonary   * Diffuse pulmonary alveolar hemorrhage    - Given his presentation with two weeks of progressive worsening of his SOB and cough, and associated with hematuria and CXR finding of bilateral infiltrate raised the concern for DAH  - On 6/10 he underwent Flexible Bronchoscopy and showed no endobronchial lesion, significant signs of hemorrhage on all of the airways, confirming findings of pulmonary hemorrhage.   - On high oxygenation setting with FiO2 of 40% and PEEP 14, and his PO2 is ~ 100   - Off Nitric Oxide   - Requires on pRBC on 6/12, then his CBC was stable        Acute hypoxemic respiratory failure    - Please see principal problem for more elaboration         ARDS (adult respiratory distress syndrome)    - Most likely etiology is Diffuse pulmonary alveolar hemorrhage, however severe pneumonia in the setting of leukocytosis and fever and presentation of cough and SOB can not be excluded  - On conservative tidal volume (6 ml/kg predicted body weight), balanced respiratory rate, and high oxygenation setting in the setting of DAH going with high PEEP ladder        Renal/   ALEX (acute kidney injury)    - Differential Diagnoses include Pulmonary renal syndrome.  - Underwent US of kidney and showed Bilateral medical renal disease, Simple renal cysts bilaterally.  - CT abdomen/pelvis, mild interstitial changes in lung bases, 2 stones in the left kidney, 4 stones in the right kidney, no hydronephrosis, post cholecystectomy. No acute abnormality in the liver, spleen, pancreas or adrenals.   - Will continue trending urine output and renal function,  avoid NSAIDs, contrast, nephrotoxins    - Monitor strict I/Os, daily weights, renally dose medications to current GFR  - Continue CRRT        ID   Septic shock    - See principal problem for more elaboration.   - Currently on low need for pressors, will continue to monitor and ensure MAP > 65 mmHg (during CRRT)  - On broad spectrum antibiotics with Cefepime (renally dose) and Vancomycin   - Blood culture and Urine Culture showed NGTD         Immunology/Multi System   Pulmonary-renal syndrome    - Most likely the diagnosis in the setting of diffuse alveolar hemorrhage and glomerulonephritis with most likely etiology is underlying autoimmune disorder  - Elevated UPCR 1.28, DENIZ positive 1:320 speckled pattern, MPO+ 82, PR3 neg, Immunoglobulins wnl, Hep C and HIV neg  - Plan for 5 days of Methylprednisolone 1000 mg IV daily  - Rheumatology on board for the plan of Immunosuppress agents   - ID will vaccinate patient today in preperation for immunosuppression        Oncology   Acute posthemorrhagic anemia    - Most likely is diffuse pulmonary alveolar hemorrhage  - Frequent check in his CBC, transfuse when Hb < 7        Endocrine   Diabetes mellitus type 2 with complications    - Will keep his blood glucose on the target 140-180  - Weaned off Insulin infusion  Recent Labs      06/13/18   0500  06/13/18   0638  06/13/18   0839  06/13/18   1410  06/13/18   1827  06/14/18   0012  06/14/18   0559  06/14/18   1155   POCTGLUCOSE  175*  159*  174*  293*  284*  195*  214*  227*           Other   Severe sepsis with septic shock (CODE)    - See septic shock for more elaborating            Critical Care Daily Checklist:    A: Awake: RASS Goal/Actual Goal: RASS Goal: 0-->alert and calm  Actual: Conteh Agitation Sedation Scale (RASS): Agitated   B: Spontaneous Breathing Trial Performed?     C: SAT & SBT Coordinated?  Yes                      D: Delirium: CAM-ICU Overall CAM-ICU: Negative   E: Early Mobility Performed? Yes   F:  Feeding Goal: Goals: Patient to receive nutrition by RD follow-up  Status: Nutrition Goal Status: goal met   Current Diet Order   Procedures    Diet NPO      AS: Analgesia/Sedation As above - Propofol, Fentanyl, Precedex   T: Thromboembolic Prophylaxis Held   H: HOB > 300 Yes   U: Stress Ulcer Prophylaxis (if needed) Yes   G: Glucose Control Yes   B: Bowel Function Stool Occurrence: 1   I: Indwelling Catheter (Lines & Liu) Necessity As above   D: De-escalation of Antimicrobials/Pharmacotherapies NA    Plan for the day/ETD Vaccinate    Code Status:  Family/Goals of Care: Full Code         Critical secondary to Patient has a condition that poses threat to life and bodily function: Severe Respiratory Distress      Critical care was time spent personally by me on the following activities: development of treatment plan with patient or surrogate and bedside caregivers, discussions with consultants, evaluation of patient's response to treatment, examination of patient, ordering and performing treatments and interventions, ordering and review of laboratory studies, ordering and review of radiographic studies, pulse oximetry, re-evaluation of patient's condition. This critical care time did not overlap with that of any other provider or involve time for any procedures.     Shemar Stoll MD  Critical Care Medicine  [facility]

## 2018-06-14 NOTE — ASSESSMENT & PLAN NOTE
Contributing Nutrition Diagnosis  Inadequate energy intake    Related to (etiology):  Intubated, NPO    Signs and Symptoms (as evidenced by):   Currently meeting < 85% EEN and EPN     Nutrition Diagnosis Status:   Resolved

## 2018-06-14 NOTE — ASSESSMENT & PLAN NOTE
- Differential Diagnoses include Pulmonary renal syndrome.  - Underwent US of kidney and showed Bilateral medical renal disease, Simple renal cysts bilaterally.  - CT abdomen/pelvis, mild interstitial changes in lung bases, 2 stones in the left kidney, 4 stones in the right kidney, no hydronephrosis, post cholecystectomy. No acute abnormality in the liver, spleen, pancreas or adrenals.   - Will continue trending urine output and renal function, avoid NSAIDs, contrast, nephrotoxins    - Monitor strict I/Os, daily weights, renally dose medications to current GFR  - Continue CRRT

## 2018-06-14 NOTE — PLAN OF CARE
Patient remains intubated and sedated, paralytics weaned off.  Nephrology following, plans to continue on CRRT.  ID and Rheumatology following, plans for continued PLEX and immunosuppression.  Patient inappropriate for disposition planning at this time.  CM will continue to follow.     06/14/18 1108   Right Care Assessment   Can the patient answer the patient profile reliably? No, cognitively impaired   How often would a person be available to care for the patient? Often   Describe the patient's ability to walk at the present time. Does not walk or unable to take any steps at all   How does the patient rate their overall health at the present time? (ROMA)   Number of comorbid conditions (as recorded on the chart) Three   During the past month, has the patient often been bothered by feeling down, depressed or hopeless? (ROMA)   During the past month, has the patient often been bothered by little interest or pleasure in doing things? (ROMA)   Zahira Nava RN, BSN, CCM  Case Management  [facility]  Ext. [phone]

## 2018-06-14 NOTE — ASSESSMENT & PLAN NOTE
62 yo w/PMH of HTN and NIDDM, OA admitted for acute hypoxic respiratory failure, renal failure, and pulmonary renal syndrome receiving plasmapheresis. Rheumatology consulted for pulmonary renal syndrome, recs on steroids and further investigation.   - Given methylprednisolone 1000 mg IV daily x 5 doses   - Nephrology consulted: renal biopsy when stable  --> elevated UPCR 1.28, DENIZ positive 1:320 speckled pattern, MPO+ 82, c-ANCA +, p-anca neg,  SSA + 178, SSB neg, dsDNA neg, Anti-Sm/RNP neg, Anti-Sm neg, PR3 neg, Immunoglobulins wnl, Hep C and HIV neg, APL labs neg, GBM neg, MARTHA neg, C3/C4 wnl, ACE neg, quant gold indeterminate  - Concern for vasculitis given DAH and hematuria. The nini-typical pulmonary-renal syndromes are GPA/MPA, Goodpasture disease, SLE. Immunofluorescence studies can help differentiate. MPO+ can suggest MPA    Plan:   - Will f/u pending rheum labs: drugs of abuse screen   - agree with nephrology for renal bx when stable  - transfusion medicine: Daily TPE with FFP until resolution of DAH (6 total sessions, currently 2/6)  - Finished pulse dose steroids IV methylprednisolone 1 g x 5 days (day 5/5) today. Tomorrow 6/15 start IV methylprednisolone 125 mg q6hrs  - Will start Rituximab for induction, discussed with family at bedside. Family given handout about Vasculitis and Rituximab 6/12.  - Per ID, cleared for Rituximab initiation. Discussed with Dr. Whaley with transfusion medicine, they plan for 6 total sessions of PLEX (2/6 today), next session on Friday, then following sessions next [facility]. Will start Rituximab and Atovaquone tomorrow 6/15 AFTER PLEX. Can resume PLEX as scheduled on Monday 6/18.  - concern for HIT? With platelets decreased by 50% since admit, recommend further investigation

## 2018-06-14 NOTE — ASSESSMENT & PLAN NOTE
64 yo AAM with NIDDM, HTN, arthritis with a 2 week history of shortness of breath being treated for URI and UTI, admitted for worsening SOB, oliguric ALEX with proteinuria, hematuria, pyuria serum crt 8, acidotic and hyperkalemia. Unknown baseline renal function.  He received one dose solumedrol and RRT. X 1 episode PTT.   Renal US demonstrating bilateral renal cysts.  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    MPO-ANCA positive RPGN  Initially oliguric now anuric. Pulmonary hemorrhage and pulmonary renal syndrome with RPGN. 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 ANCA pending.   -Urine sediment showed red blood cell cast.   -6/11  2 D echo EF 75% and normal diastolic function and small pericardial effusion.  -6/11 plasmapheresis  -6/11 Repeat renal US showed normal size kidneys with marked cortical thinning with increase cortical echogenicity and loss of corticomedullary distinction with no hydronephrosis, mass or stone.   -C3C4 , AntiGBM and Proteinase 3 normal .     Plan:  -Appreciate Rheumatology following- Continue plasmapheresis, pulse dose steroid x 5 days, start Rituximab for induction tomorrow.   -Infectious work up negative so far.   -Patient remains unstable for tissue diagnosis. If/when hemodynamcially stable would like a renal biopsy.  -Continue SLED for clearance and volume management  ml-net even. Switch to 3/2.5/140/25. Citrate for anticoagulation/calcium chloride 15 cc/hr.

## 2018-06-14 NOTE — PROGRESS NOTES
[facility]  Infectious Disease  Progress Note    Patient Name: Jasmeet Raymond  MRN: [id number]  Admission Date: 6/10/2018  Length of Stay: 4 days  Attending Physician: Everette Hdz*  Primary Care Provider: Provider Notinsystem    Isolation Status: No active isolations  Assessment/Plan:      Pulmonary-renal syndrome       63 year old male with PMHx significant for HTN, DM and arthritis who presented on 5/29 to  [facility] with symptoms of pneumonia, SOB, acute renal failure. He decompensated quickly and [place] [facility] [place] for concern for diffuse alveolar hemorrhage (DAH) and pulmonary renal syndrome.   He is currently on high dose steroids,  plasma exchange, as well as broad spectrum antibiotics.  On SLED.   Rheumatology evaluating and suspects underlying autoimmune disorder.  Planning for immunosuppressive therapy- likely Rituximab for induction.   ID is consulted for clearance for immunosuppression.       Patient with recent history prior to this admission of treatment for suspected bronchitis (tx with Augmentin 5/11/18) and UTI/suspected pyelo (course of levaquin 5/29)).  On admission 6/8 to OSH was started on broad spectrum antibiotics (vanc/cefepime, then ceftaroline) for sepsis thought to be 2/2 to PNA.       - Micro:  Blood and respiratory cultures here NGTD. No AFB seen on smear.    Urine culture is negative.  RSV panel is negative.  Blood cultures from 6/8 from OSH finalized as no growth (called micro lab today), and urine culture negative.   Urine culture of 5/29 prior to admission + for cedecae davisae (pan sensitive and treated with levaquin).  Blood cultures 5/29 from OSH negative.      Bronchial wash cytology 6/10 from OSH - GMS stain negative for fungal organisms. Legionella negative, HSV negative     -  Serologies to date:  HIV, Hep B SAg, HCV, RPR negative.  Quantiferon gold is indeterminate, TST ordered by Rheum and have  added Tspot.   Histoplasma, blastomyces, coccidioides pending.      -  History of chronic infections:  Family denies history of chronic or recurring infections.  PCP today confirms no known history of chronic infection.  Does have dental issues/pyorrhea     -  Imaging: CXR with extensive bilateral pulmonary infiltrates.           CT of abd from OSH noted multiple non-obstructing kidney stones.     -  Procalcitonin noted to be > 100. This may be unreliable in setting of kidney failure      -  T max 24 100.5.  Currently afebrile.  WBC 14 (on pulsed steroids).  Remains on IV cefepime (recommend d/c vanc yesterday)    Given the information currently available at this time, there is no unusual risk of infection or absolute contraindication to immunosuppressive therapy.      Plan:              -  Recommend de-escalating antibiotics - stop vancomycin first and observe.  If no clinical decompensation, recommend discontinuing cefepime.               -    Histo, blasto, cryptococcus, coccidioides pending. Please notify ID if positive               -  QG indeterminate - TST ordered by Rheum. Have also added T-spot. Please notify ID if positive.               -  Will need PCP ppx - note that patient had reported sulfa allergy and poor renal function.  In this setting,  alternative PCP ppx  would be Mepron (Atovaquone) 750 mg twice a day.               -   Patient's family (wife and son) counseled on the general  increased risk of infection with immunosuppressive therapy. Counseled wife and son on the importance of vaccinations.  Advised we recommended vaccines now, prior to initiation of therapy, as well as the importance of completing series in the future and the importance of a yearly flu vaccine.  Stressed importance of family members keeping up to date with recommended vaccines, including flu                -  Immunization history reviewed - PCV 13 (prevnar), Tdap, Hepatitis B (high dose) ordered. Hep B surface antibody  here noted to be positive, but negative at OSH.  Will vaccinate.  Hep A IgG is positive. Canceled Hepatitis A vaccine.  Will need PPSV 23 (pnemovax) 8 weeks after prevnar, and complete high dose hepatitis B series at 1 month and 6 months from first injection.              -   Kidney stones noted on CT at OSH will need to be followed up.              -   Patient noted to have poor dentition/pyorhhea by outside records.  He will need dental issues addressed/teeth fixed at some point as these will pose an increased risk of infection.               -    If recurrent fever, recommend considering line change.               -  See Staff attestation to follow    -  Will sign off.  Please call or re-consult as needed.       Patient seen by, and plan discussed with, ID staff                      Thank you.   Please call for any questions or concerns.  KOJO Cuellar, ANP-C  [phone]  Subjective:     Principal Problem:Diffuse pulmonary alveolar hemorrhage    HPI:     Mr. Jasmeet Raymond is a 63 year old male with PMHx significant for HTN, DM and arthirtis who presented to on 5/[facility] with symptoms of pneumonia and shortness of breath. [place] [facility] [place] [place] for concern for diffuse alveolar hemorrhage (DAH).  He is currently on high dose steroids and plasma exchange and plan is for possible treatment with cyclophosamide. ID is consulted for clearance for immunosuppression.      Unable to obtain history from patient.  Spoke with wife and have started review of Care Everywhere records.       5/11/18- Presented to urgent care complaining of cough. Dx with bronchitis and treated with Augmentin.   Noted on exam to have pyorrhea - per wife they were advised to have teeth pulled.     5/17 - CXR by PCP showed mild interstitial prominence, no consolidation or effusion    5/29/18 - presented to ED complaining of cough, fevers X 2 weeks. Myalgias for about one month.    Noted to have  "creatinine 2.5, U/A with hematuria  CT abd showed bilateral non-obstructing kidney stones  Urine culture - low colony count luciano avery  Dx with UTI/pyelo and discharged home on levaquin     6/7/18 - Renal US - bilateral renal cysts    6/8/18  - presented to Ed with cough, fevers, swelling bilateral feet and hands and new bilateral pulmonary infiltrated.  Decompensated quickly.  Thought to have sepsis 2/2 to PNA.  Initially on vanc/cefepime..  Then changed to ceftaroline.     Blood and urine cultures at OSH - negative.    Respiratory cultures negative  AFB stain - negative     Patient is a diabetic.  Wife denies any history of diabetic foot wounds/bone infections.   Denies splenectomy  Denies any history of chronic or recurring infections  prior to this admission.  Notes they were told in urgent care that his teeth were "infected" and should be pulled.  Note from urgent care notes gingival erythema and gingivitis but makes no mention of abscess.     Denies known exposure to TB.  Notes that wife had cousin with TB that used to visit, but ? Tested previously and was negative.     Travel:  Recent trip to [place] just prior to admission.   Pets:  One dog. 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 Wife denies exposure to chemicals/pesticides/fertilizers   Hobbies:  Hunting/fishing     Immunization:  ? Usual childhood vaccines   Denies any other vaccines - reports "we don't believe in vaccines."    Serologies to date:   Results for FELIPE BERNAL (MRN [id number]) as of 6/12/2018 19:23   Ref. Range 6/11/2018 08:10   Hep B Core Total Ab Unknown Negative   Hep B S Ab Unknown Positive (A)   Hepatitis B Surface Ag Unknown Negative   Hepatitis C Ab Unknown Negative   HIV 1/2 Ag/Ab Latest Ref Range: Negative  Negative     Hep B surface antibody reported negative at OSH.     Micro:  Blood cultures 6/10 NGTD  Respiratory Cultures NGTD   Urine culture - No growth "     Imaging:    Renal US - bilateral simple renal cysts. Medical renal disease   CXR - bilateral solid airspace infiltrates           Interval History:  No acute events overnight.  Weaning ventilator.  Did have temp to 100.5 last night.  Currently afebrile.     AFB stain negative. Cultures pending.   Respiratory culture 6/10 no growth  Blood cultures 6/10 NGTD   Quantiferon Gold indeterminate.  Tspot ordered        Review of Systems   Unable to perform ROS: Intubated     Objective:     Vital Signs (Most Recent):  Temp: 98.9 °F (37.2 °C) (06/14/18 1100)  Pulse: 66 (06/14/18 1300)  Resp: 18 (06/14/18 1300)  BP: (!) 128/54 (06/13/18 1243)  SpO2: 96 % (06/14/18 1300) Vital Signs (24h Range):  Temp:  [98.9 °F (37.2 °C)-100.5 °F (38.1 °C)] 98.9 °F (37.2 °C)  Pulse:  [] 66  Resp:  [16-37] 18  SpO2:  [82 %-100 %] 96 %  Arterial Line BP: ()/(43-83) 115/53     Weight: 86.1 kg (189 lb 13.1 oz)  Body mass index is 28.86 kg/m².    Estimated Creatinine Clearance: 71.5 mL/min (based on SCr of 1.1 mg/dL).    Physical Exam   Constitutional:   Intubated and sedated     HENT:   Head: Normocephalic and atraumatic.   Oral ET tube   NG tube  Unable to fully assess dentition      Eyes: No scleral icterus.   Cardiovascular: Normal rate and regular rhythm.  Exam reveals no gallop and no friction rub.    No murmur heard.  Pulmonary/Chest:   Ventilated   Clear anteriorly, decreased at bases   Abdominal: Soft. He exhibits no distension.   No bowel sounds appreciated     Genitourinary:   Genitourinary Comments: Liu to gravity    Musculoskeletal: He exhibits edema (mild bilateral LE edema).   Lymphadenopathy:     He has no cervical adenopathy.   Neurological:   Sedated   Skin: No rash noted.   Right IJ trialysis cath - site clear  Left triple lumen - site clear  Right radial a-line - site clear   PIV right hand - site clear   Vitals reviewed.      Significant Labs:   Blood Culture:     Recent Labs  Lab 06/10/18  2249 06/10/18  2300    LABBLOO No Growth to date  No Growth to date  No Growth to date  No Growth to date No Growth to date  No Growth to date  No Growth to date  No Growth to date     CBC:     Recent Labs  Lab 06/13/18  1957 06/14/18  0327 06/14/18  0806   WBC 17.35* 14.85* 14.28*   HGB 8.3* 7.6* 7.8*   HCT 23.8* 22.1* 22.5*   * 97* 96*     CMP:   Recent Labs  Lab 06/13/18  0450  06/13/18  1408 06/13/18  2134 06/14/18  0327     137  < > 135* 135* 138  138   K 4.1  4.1  < > 5.0 4.3 3.8  3.8     103  < > 102 100 103  103   CO2 24  24  < > 23 28 25  25   *  169*  < > 288* 250* 191*  191*   BUN 15  15  < > 22 20 15  15   CREATININE 1.4  1.4  < > 1.6* 1.3 1.1  1.1   CALCIUM 7.7*  7.7*  < > 7.6* 7.6* 7.4*  7.4*   PROT 5.4*  --   --   --  5.5*   ALBUMIN 2.4*  2.4*  < > 2.8* 2.8* 2.7*  2.7*   BILITOT 0.9  --   --   --  1.6*   ALKPHOS 56  --   --   --  64   AST 17  --   --   --  44*   ALT 26  --   --   --  30   ANIONGAP 10  10  < > 10 7* 10  10   EGFRNONAA 52.4*  52.4*  < > 44.5* 57.3* >60.0  >60.0   < > = values in this interval not displayed.  Fungus Culture (Blood or Bone Marrow): No results for input(s): FUNGUSCULTUR in the last 4320 hours.  HIV 1/2 Antibodies: No results for input(s): [id number] in the last 48 hours.  Procalcitonin: No results for input(s): PROCAL in the last 48 hours.  Quantiferon: No results for input(s): NIL, TBAG, TBAGNIL, MITOGENNIL, TBGOLD in the last 48 hours.  Respiratory Culture:     Recent Labs  Lab 06/10/18  2345   GSRESP <10 epithelial cells per low power field.  No WBC's or organisms seen   RESPIRATORYC No growth     Urine Culture:     Recent Labs  Lab 06/11/18  0101   LABURIN No growth     Urine Studies:     Recent Labs  Lab 06/11/18  0100   COLORU Yellow   APPEARANCEUA Cloudy*   PHUR 5.0   SPECGRAV 1.010   PROTEINUA 2+*   GLUCUA 3+*   KETONESU Negative   BILIRUBINUA Negative   OCCULTUA 3+*   NITRITE Negative   UROBILINOGEN Negative   LEUKOCYTESUR 3+*    RBCUA 41*   WBCUA 38*   BACTERIA Many*   SQUAMEPITHEL 5   HYALINECASTS 7*     Wound Culture: No results for input(s): LABAERO in the last 4320 hours.    Significant Imaging: I have reviewed all pertinent imaging results/findings within the past 24 hours.

## 2018-06-14 NOTE — SUBJECTIVE & OBJECTIVE
Interval History/Significant Events:  No acute events overnight.  Patient has been bouncing between hypotensive and normoxic overnight or hypoxic and normotensive.      Review of Systems   Unable to perform ROS: Intubated     Objective:     Vital Signs (Most Recent):  Temp: 98.9 °F (37.2 °C) (06/14/18 1100)  Pulse: 66 (06/14/18 1635)  Resp: (!) 22 (06/14/18 1635)  BP: (!) 128/54 (06/13/18 1243)  SpO2: 99 % (06/14/18 1635) Vital Signs (24h Range):  Temp:  [98.9 °F (37.2 °C)-100.5 °F (38.1 °C)] 98.9 °F (37.2 °C)  Pulse:  [] 66  Resp:  [16-37] 22  SpO2:  [82 %-100 %] 99 %  Arterial Line BP: ()/(43-83) 131/60   Weight: 86.1 kg (189 lb 13.1 oz)  Body mass index is 28.86 kg/m².      Intake/Output Summary (Last 24 hours) at 06/14/18 1651  Last data filed at 06/14/18 1615   Gross per 24 hour   Intake          5848.05 ml   Output             6804 ml   Net          -955.95 ml       Physical Exam   Constitutional: He is oriented to person, place, and time. He appears well-developed. No distress. He is intubated.   Cardiovascular: Normal rate, regular rhythm and intact distal pulses.  Exam reveals no gallop and no friction rub.    No murmur heard.  Pulmonary/Chest: Effort normal. He is intubated. No respiratory distress. He has no wheezes. He has no rhonchi. He has rales.   Abdominal: Soft. Bowel sounds are normal. He exhibits no distension. There is no tenderness.   Musculoskeletal: He exhibits edema (BL LE pitting).   Neurological: He is alert and oriented to person, place, and time.   Sedated RASS -2   [id number]       Vents:  Vent Mode: A/C (06/14/18 1635)  Ventilator Initiated: Yes (06/10/18 2217)  Set Rate: 18 bmp (06/14/18 1635)  Vt Set: 440 mL (06/14/18 1635)  PEEP/CPAP: 16 cmH20 (06/14/18 1635)  Oxygen Concentration (%): 50 (06/14/18 1516)  Peak Airway Pressure: 41 cmH2O (06/14/18 1635)  Plateau Pressure: 29 cmH20 (06/14/18 1635)  Total Ve: 12.9 mL (06/14/18 1635)  F/VT Ratio<105 (RSBI): (!) 36.36 (06/14/18  1635)  Lines/Drains/Airways     Central Venous Catheter Line                 Hemodialysis Catheter 06/10/18 2307 right internal jugular 3 days         Percutaneous Central Line Insertion/Assessment - triple lumen  06/10/18 2305 left internal jugular 3 days          Drain                 NG/OG Tube 06/10/18 2309 Andrew sump 18 Fr. Left nostril 3 days         Urethral Catheter 06/10/18 2302 Latex 16 Fr. 3 days          Airway                 Airway - Non-Surgical Endotracheal Tube -- days          Arterial Line                 Arterial Line 06/10/18 2304 Right Radial 3 days              Significant Labs:    CBC/Anemia Profile:    Recent Labs  Lab 06/13/18 1957 06/14/18 0327 06/14/18  0806   WBC 17.35* 14.85* 14.28*   HGB 8.3* 7.6* 7.8*   HCT 23.8* 22.1* 22.5*   * 97* 96*   MCV 84 85 84   RDW 16.4* 16.1* 15.9*        Chemistries:    Recent Labs  Lab 06/13/18  0450  06/13/18 2134 06/14/18 0327 06/14/18  1328     137  < > 135* 138  138 137   K 4.1  4.1  < > 4.3 3.8  3.8 4.4     103  < > 100 103  103 107   CO2 24  24  < > 28 25  25 23   BUN 15  15  < > 20 15  15 12   CREATININE 1.4  1.4  < > 1.3 1.1  1.1 1.0   CALCIUM 7.7*  7.7*  < > 7.6* 7.4*  7.4* 7.5*   ALBUMIN 2.4*  2.4*  < > 2.8* 2.7*  2.7* 2.6*   PROT 5.4*  --   --  5.5*  --    BILITOT 0.9  --   --  1.6*  --    ALKPHOS 56  --   --  64  --    ALT 26  --   --  30  --    AST 17  --   --  44*  --    MG 2.3  < > 2.2 1.8 1.8   PHOS 3.9  < > 2.6* 1.7* 3.3   < > = values in this interval not displayed.    Results for DOLORESFELIPE HARRIS (MRN [id number]) as of 6/14/2018 16:58   6/14/2018 08:29 6/14/2018 12:52 6/14/2018 16:35   POC PH 7.520 (H) 7.415 7.404   POC PCO2 35.9 38.4 39.1   POC PO2 51 (LL) 80 79 (L)   POC BE 6 0 0   POC HCO3 29.3 (H) 24.6 24.5   POC SATURATED O2 89 (L) 96 96   POC TCO2 30 (H) 26 26       Significant Imaging:  I have reviewed all pertinent imaging results/findings within the past 24 hours.

## 2018-06-14 NOTE — PROGRESS NOTES
[facility]  Rheumatology  Progress Note    Patient Name: Jasmeet Raymond  MRN: [id number]  Admission Date: 6/10/2018  Hospital Length of Stay: 4 days  Code Status: Full Code   Attending Provider: Everette Hdz*  Primary Care Physician: Provider Notinsystem  Principal Problem: Diffuse pulmonary alveolar hemorrhage    Subjective:     HPI:  Mr. Jasmeet Raymond, 63 year old male with PMHx significant for HTN, DM and osteoarthirtis presented to [facility] and [facility] with symptoms of pneumonia and shortness of breath. On 5/29/2018, he presented with symptoms of bronchitis with denies chills, ear pain, sore throat, chest pain, shortness of breath at that time, received antibiotics. Then on 6/8/2018 he presented again to [facility] and [facility] with shortness of breath and cough, and his CXR showed concern for airway disease. However on the first day of admission at [facility] and [facility], his respiratory symptoms worsen and had acute hypoxic respiratory failure that required intubation. Pulmonary consulted and he underwent Bronchoscopy and  revealed alveolar hemorrhage, with the setting of having hematuria and diffuse alveloar hemorrhage the concern was for presence of pulmonary renal syndrome and he received pulsed dose of Methylprednisolone and nephrology consulted for plasmapheresis. However at [facility], plasmapheresis machine is broken and they recommend to transfer to [facility] for plasmapheresis. Admitted here 6/11 for ARDS.     On arrival he was on Nibmex for paralytics, Propofol for sedation, Levophed (eventaully turned off) for pressure support, Flolan (Epoprostenol sodium) inhaled which was switched to Inhaled Nitro. His mechanical ventilation initially was on FiO2 100%, PEEP 16, then weaned his FiO2 with high requirement of oxygenation. He had an signifiacnt drop in his Hb (baseline ~ 12) dated  one week prior presentation to 9 and 8, required one pRBC prior arrival. Repeated CXR showed extensive bilateral infiltrates. Important laboratory investigation showed His , , pro calcitonin 4.4, C3 and C4 complement normal., Trop negative, C3 and C4 showed normal , Hep test is negative, Lactic acid 1.8 > 1.0, HIV negative, Procal 4.4, UA shoowed heamturia and trace leukocytes, Urine culture from 5/29 showed almost pansensitve Cedecea davisae.    Rheumatology consulted for pulmonary-renal syndrome, recs for steroids and further investigation.     Interval Hx: NAEON. Recieved TB skin test yesterday. Getting SLED today. Consent for Rituximab today. Plan to start Rituximab and Atovaquone tomorrow 6/15.    No past medical history on file.    No past surgical history on file.    Review of patient's allergies indicates:   Allergen Reactions    Sulfa (sulfonamide antibiotics)        Family History     None        Social History Main Topics    Smoking status: Not on file    Smokeless tobacco: Not on file    Alcohol use Not on file    Drug use: Unknown    Sexual activity: Not on file      Review of Systems   Unable to perform ROS: Intubated     Objective:     Vital Signs (Most Recent):  Temp: 98.9 °F (37.2 °C) (06/14/18 1100)  Pulse: 66 (06/14/18 1300)  Resp: 18 (06/14/18 1300)  BP: (!) 128/54 (06/13/18 1243)  SpO2: 96 % (06/14/18 1300) Vital Signs (24h Range):  Temp:  [98.9 °F (37.2 °C)-100.5 °F (38.1 °C)] 98.9 °F (37.2 °C)  Pulse:  [] 66  Resp:  [16-37] 18  SpO2:  [82 %-100 %] 96 %  Arterial Line BP: ()/(43-83) 115/53   Weight: 86.1 kg (189 lb 13.1 oz)  Body mass index is 28.86 kg/m².      Intake/Output Summary (Last 24 hours) at 06/14/18 1413  Last data filed at 06/14/18 1300   Gross per 24 hour   Intake          5848.05 ml   Output             5968 ml   Net          -119.95 ml       Physical Exam   Constitutional: He appears well-developed. No distress.   HENT:   Head: Normocephalic  and atraumatic.   Right Ear: External ear normal.   Left Ear: External ear normal.   Eyes: Pupils are equal, round, and reactive to light. Right eye exhibits no discharge. Left eye exhibits no discharge.   Neck: Neck supple. No JVD present.   Cardiovascular: Normal rate, regular rhythm and intact distal pulses.    No murmur heard.  Pulmonary/Chest: No respiratory distress. He has no wheezes. He has rales (b/l, mechanical vent ).   Abdominal: Soft. He exhibits no distension. There is no tenderness.   BS appreciated in LUQ only   Musculoskeletal: He exhibits no edema.   Neurological:   Sedated but follows commands intermittently   Skin: Skin is warm and dry. No rash noted. He is not diaphoretic. No erythema.   Psychiatric:   Unable to assess   Vitals reviewed.      Vents:  Vent Mode: A/C (06/14/18 1257)  Ventilator Initiated: Yes (06/10/18 2217)  Set Rate: 18 bmp (06/14/18 1257)  Vt Set: 440 mL (06/14/18 1257)  PEEP/CPAP: 16 cmH20 (06/14/18 1257)  Oxygen Concentration (%): 50 (06/14/18 1300)  Peak Airway Pressure: 29 cmH2O (06/14/18 1257)  Plateau Pressure: 29 cmH20 (06/14/18 1257)  Total Ve: 11.1 mL (06/14/18 1257)  F/VT Ratio<105 (RSBI): (!) 28.15 (06/14/18 1257)  Lines/Drains/Airways     Central Venous Catheter Line                 Hemodialysis Catheter 06/10/18 2307 right internal jugular 3 days         Percutaneous Central Line Insertion/Assessment - triple lumen  06/10/18 2305 left internal jugular 3 days          Drain                 NG/OG Tube 06/10/18 2309 Atlantic sump 18 Fr. Left nostril 3 days         Urethral Catheter 06/10/18 2302 Latex 16 Fr. 3 days          Airway                 Airway - Non-Surgical Endotracheal Tube -- days          Arterial Line                 Arterial Line 06/10/18 2304 Right Radial 3 days              Significant Labs:    CBC/Anemia Profile:    Recent Labs  Lab 06/13/18 1957 06/14/18  0327 06/14/18  0806   WBC 17.35* 14.85* 14.28*   HGB 8.3* 7.6* 7.8*   HCT 23.8* 22.1* 22.5*    * 97* 96*   MCV 84 85 84   RDW 16.4* 16.1* 15.9*        Chemistries:    Recent Labs  Lab 06/13/18  0450  06/13/18  1408 06/13/18  2134 06/14/18  0327     137  < > 135* 135* 138  138   K 4.1  4.1  < > 5.0 4.3 3.8  3.8     103  < > 102 100 103  103   CO2 24  24  < > 23 28 25  25   BUN 15  15  < > 22 20 15  15   CREATININE 1.4  1.4  < > 1.6* 1.3 1.1  1.1   CALCIUM 7.7*  7.7*  < > 7.6* 7.6* 7.4*  7.4*   ALBUMIN 2.4*  2.4*  < > 2.8* 2.8* 2.7*  2.7*   PROT 5.4*  --   --   --  5.5*   BILITOT 0.9  --   --   --  1.6*   ALKPHOS 56  --   --   --  64   ALT 26  --   --   --  30   AST 17  --   --   --  44*   MG 2.3  < > 2.5 2.2 1.8   PHOS 3.9  < > 4.4 2.6* 1.7*   < > = values in this interval not displayed.  Coagulation:     Recent Labs  Lab 06/14/18  0327   INR 1.0   APTT <21.0     Lactic Acid:   No results for input(s): LACTATE in the last 48 hours.  Significant Imaging: I have reviewed all pertinent imaging results/findings within the past 24 hours.  I have reviewed and interpreted all pertinent imaging results/findings within the past 24 hours.    Assessment/Plan:     Pulmonary-renal syndrome    64 yo w/PMH of HTN and NIDDM, OA admitted for acute hypoxic respiratory failure, renal failure, and pulmonary renal syndrome receiving plasmapheresis. Rheumatology consulted for pulmonary renal syndrome, recs on steroids and further investigation.   - Given methylprednisolone 1000 mg IV daily x 5 doses   - Nephrology consulted: renal biopsy when stable  --> elevated UPCR 1.28, DENIZ positive 1:320 speckled pattern, MPO+ 82, c-ANCA +, p-anca neg,  SSA + 178, SSB neg, dsDNA neg, Anti-Sm/RNP neg, Anti-Sm neg, PR3 neg, Immunoglobulins wnl, Hep C and HIV neg, APL labs neg, GBM neg, MARTHA neg, C3/C4 wnl, ACE neg, quant gold indeterminate  - Concern for vasculitis given DAH and hematuria. The nini-typical pulmonary-renal syndromes are GPA/MPA, Goodpasture disease, SLE. Immunofluorescence studies can help  differentiate. MPO+ can suggest MPA    Plan:   - Will f/u pending rheum labs: drugs of abuse screen   - agree with nephrology for renal bx when stable  - transfusion medicine: Daily TPE with FFP until resolution of DAH (6 total sessions, currently 2/6)  - Finished pulse dose steroids IV methylprednisolone 1 g x 5 days (day 5/5) today. Tomorrow 6/15 start IV methylprednisolone 125 mg q6hrs  - Will start Rituximab for induction, discussed with family at bedside. Family given handout about Vasculitis and Rituximab 6/12.  - Per ID, cleared for Rituximab initiation. Discussed with Dr. Whaley with transfusion medicine, they plan for 6 total sessions of PLEX (2/6 today), next session on Friday, then following sessions next MWF. Will start Rituximab and Atovaquone tomorrow 6/15 AFTER PLEX. Can resume PLEX as scheduled on Monday 6/18.  - concern for HIT? With platelets decreased by 50% since admit, recommend further investigation              Kaelyn Birch MD  Rheumatology  [facility]    Patient seen and examined with fellow.  All elements of history, physical exam and medical decision making independently confirmed by me.  Patient with pulmonary-renal syndrome now with positive MPO and +c-ANCA.  Patient indeterminate TB test await skin test. Renal biopsy when stable. Last day of pulse steroid.  Rituxaxn after PLEX tomorrow.  See note for details.

## 2018-06-14 NOTE — ASSESSMENT & PLAN NOTE
- Given his presentation with two weeks of progressive worsening of his SOB and cough, and associated with hematuria and CXR finding of bilateral infiltrate raised the concern for DAH  - On 6/10 he underwent Flexible Bronchoscopy and showed no endobronchial lesion, significant signs of hemorrhage on all of the airways, confirming findings of pulmonary hemorrhage.   - On high oxygenation setting with FiO2 of 40% and PEEP 14, and his PO2 is ~ 100   - Off Nitric Oxide   - Requires on pRBC on 6/12, then his CBC was stable

## 2018-06-14 NOTE — SUBJECTIVE & OBJECTIVE
Interval History:  No acute events overnight.  Weaning ventilator.  Did have temp to 100.5 last night.  Currently afebrile.     AFB stain negative. Cultures pending.   Respiratory culture 6/10 no growth  Blood cultures 6/10 NGTD   Quantiferon Gold indeterminate.  Tspot ordered        Review of Systems   Unable to perform ROS: Intubated     Objective:     Vital Signs (Most Recent):  Temp: 98.9 °F (37.2 °C) (06/14/18 1100)  Pulse: 66 (06/14/18 1300)  Resp: 18 (06/14/18 1300)  BP: (!) 128/54 (06/13/18 1243)  SpO2: 96 % (06/14/18 1300) Vital Signs (24h Range):  Temp:  [98.9 °F (37.2 °C)-100.5 °F (38.1 °C)] 98.9 °F (37.2 °C)  Pulse:  [] 66  Resp:  [16-37] 18  SpO2:  [82 %-100 %] 96 %  Arterial Line BP: ()/(43-83) 115/53     Weight: 86.1 kg (189 lb 13.1 oz)  Body mass index is 28.86 kg/m².    Estimated Creatinine Clearance: 71.5 mL/min (based on SCr of 1.1 mg/dL).    Physical Exam   Constitutional:   Intubated and sedated     HENT:   Head: Normocephalic and atraumatic.   Oral ET tube   NG tube  Unable to fully assess dentition      Eyes: No scleral icterus.   Cardiovascular: Normal rate and regular rhythm.  Exam reveals no gallop and no friction rub.    No murmur heard.  Pulmonary/Chest:   Ventilated   Clear anteriorly, decreased at bases   Abdominal: Soft. He exhibits no distension.   No bowel sounds appreciated     Genitourinary:   Genitourinary Comments: Liu to gravity    Musculoskeletal: He exhibits edema (mild bilateral LE edema).   Lymphadenopathy:     He has no cervical adenopathy.   Neurological:   Sedated   Skin: No rash noted.   Right IJ trialysis cath - site clear  Left triple lumen - site clear  Right radial a-line - site clear   PIV right hand - site clear   Vitals reviewed.      Significant Labs:   Blood Culture:     Recent Labs  Lab 06/10/18  2249 06/10/18  2300   LABBLOO No Growth to date  No Growth to date  No Growth to date  No Growth to date No Growth to date  No Growth to date   No Growth to date  No Growth to date     CBC:     Recent Labs  Lab 06/13/18 1957 06/14/18  0327 06/14/18  0806   WBC 17.35* 14.85* 14.28*   HGB 8.3* 7.6* 7.8*   HCT 23.8* 22.1* 22.5*   * 97* 96*     CMP:   Recent Labs  Lab 06/13/18  0450  06/13/18  1408 06/13/18  2134 06/14/18  0327     137  < > 135* 135* 138  138   K 4.1  4.1  < > 5.0 4.3 3.8  3.8     103  < > 102 100 103  103   CO2 24  24  < > 23 28 25  25   *  169*  < > 288* 250* 191*  191*   BUN 15  15  < > 22 20 15  15   CREATININE 1.4  1.4  < > 1.6* 1.3 1.1  1.1   CALCIUM 7.7*  7.7*  < > 7.6* 7.6* 7.4*  7.4*   PROT 5.4*  --   --   --  5.5*   ALBUMIN 2.4*  2.4*  < > 2.8* 2.8* 2.7*  2.7*   BILITOT 0.9  --   --   --  1.6*   ALKPHOS 56  --   --   --  64   AST 17  --   --   --  44*   ALT 26  --   --   --  30   ANIONGAP 10  10  < > 10 7* 10  10   EGFRNONAA 52.4*  52.4*  < > 44.5* 57.3* >60.0  >60.0   < > = values in this interval not displayed.  Fungus Culture (Blood or Bone Marrow): No results for input(s): FUNGUSCULTUR in the last 4320 hours.  HIV 1/2 Antibodies: No results for input(s): [id number] in the last 48 hours.  Procalcitonin: No results for input(s): PROCAL in the last 48 hours.  Quantiferon: No results for input(s): NIL, TBAG, TBAGNIL, MITOGENNIL, TBGOLD in the last 48 hours.  Respiratory Culture:     Recent Labs  Lab 06/10/18  2345   GSRESP <10 epithelial cells per low power field.  No WBC's or organisms seen   RESPIRATORYC No growth     Urine Culture:     Recent Labs  Lab 06/11/18  0101   LABURIN No growth     Urine Studies:     Recent Labs  Lab 06/11/18  0100   COLORU Yellow   APPEARANCEUA Cloudy*   PHUR 5.0   SPECGRAV 1.010   PROTEINUA 2+*   GLUCUA 3+*   KETONESU Negative   BILIRUBINUA Negative   OCCULTUA 3+*   NITRITE Negative   UROBILINOGEN Negative   LEUKOCYTESUR 3+*   RBCUA 41*   WBCUA 38*   BACTERIA Many*   SQUAMEPITHEL 5   HYALINECASTS 7*     Wound Culture: No results for input(s): LABAERO in  the last 4320 hours.    Significant Imaging: I have reviewed all pertinent imaging results/findings within the past 24 hours.

## 2018-06-14 NOTE — PROGRESS NOTES
[facility]  Infectious Disease  Progress Note    Patient Name: Jasmeet Raymond  MRN: [id number]  Admission Date: 6/10/2018  Length of Stay: 3 days  Attending Physician: Everette Hdz*  Primary Care Provider: Provider Notinsystem    Isolation Status: No active isolations  Assessment/Plan:      Pulmonary-renal syndrome       63 year old male with PMHx significant for HTN, DM and arthritis who presented on 5/29 to  [facility] and [facility] with symptoms of pneumonia, SOB, acute renal failure. He decompensated quickly and was transferred to [facility] on 6/11 for concern for diffuse alveolar hemorrhage (DAH) and pulmonary renal syndrome.   He is currently on high dose steroids,  plasma exchange, as well as broad spectrum antibiotics.  On SLED.   Rheumatology evaluating and suspects underlying autoimmune disorder.  Planning for immunosuppressive therapy- likely Rituximab for induction.   ID is consulted for clearance for immunosuppression.       Patient with recent history prior to this admission of treatment for suspected bronchitis (tx with Augmentin 5/11/18) and UTI/suspected pyelo (course of levaquin 5/29)).  On admission 6/8 to OSH was started on broad spectrum antibiotics (vanc/cefepime, then ceftaroline) for sepsis thought to be 2/2 to PNA.       - Micro:  Blood and respiratory cultures here NGTD. No AFB seen on smear.  Urine culture is negative.    Blood cultures from OSH NGTD, and urine culture negative.   Urine culture of 5/29 prior to admission + for cedecae davisae. Blood cultures 5/29 from OSH negative.   Bronchial wash cytology 6/10 - GMS stain negative for fungal organisms. Legionella negative, HSV negative     -  Serologies to date:  HIV, Hep B SAg, HCV, RPR negative.  Quantiferon gold is indeterminate, TST ordered by Rheum.  Histoplasma, blastomyces, coccidioides pending.      -  History of chronic infections:  Family denies history of chronic or recurring  infections.  PCP today confirms no known history of chronic infection.  Does have dental issues/pyorrhea     -  Imaging: CXR with extensive bilateral pulmonary infiltrates.           CT of abd from OSH noted multiple non-obstructing kidney stones.     -  Procalcitonin noted to be > 100. This may be unreliable in setting of kidney failure      -  Currently afebrile, WBC 22 >> 13 >> 17 (on pulsed steroids).  Remains on IV vancomycin and cefepime.     Given the information currently available at this time, there is no unusual risk of infection or absolute contraindication to immunosuppressive therapy.      Plan:   -  Recommend de-escalating antibiotics - stop vancomycin first and observe.  If no clinical decompensation, recommend discontinuing cefepime.    -  Will follow pending cultures and serologies                -  QG indeterminate - TST ordered by Rheum. Have also added T-spot.    -  Will need PCP ppx - note that patient had reported sulfa allergy and poor renal function.  In this setting,  alternative PCP ppx  would be Mepron (Atovaquone) 750 mg twice a day.    -   Patient's family (wife and son) counseled on the general  increased risk of infection with immunosuppressive therapy. Counseled wife and son on the importance of vaccinations.  Advised we recommended vaccines now, prior to initiation of therapy, as well as the importance of completing series in the future and the importance of a yearly flu vaccine.  Stressed importance of family members keeping up to date with recommended vaccines, including flu     -  Immunization history reviewed - will order  PCV 13 (prevnar), Tdap, Hep A/B series. Will need PPSV 23 (pnemovax) 8 weeks after prevnar.              -   Kidney stones noted on CT at OSH will need to be followed up.              -   Patient noted to have poor dentition/pyorhhea by outside records.  He will need dental issues addressed/teeth fixed at some point as these will pose an increased risk of  infection.    -  Will follow up tomorrow   -  See Staff attestation to follow     Patient seen by, and plan discussed with, ID staff  Discussed with Rheumatology                   Thank you.   Please call for any questions or concerns.  KOJO Cuellar, ANP-C  [phone]    Subjective:     Principal Problem:Diffuse pulmonary alveolar hemorrhage    HPI:     Mr. Jasmeet Raymond is a 63 year old male with PMHx significant for HTN, DM and arthirtis who presented to on 5/29 [facility] and [facility] with symptoms of pneumonia and shortness of breath. He was transferred to [facility] on 6/11 for concern for diffuse alveolar hemorrhage (DAH).  He is currently on high dose steroids and plasma exchange and plan is for possible treatment with cyclophosamide. ID is consulted for clearance for immunosuppression.      Unable to obtain history from patient.  Spoke with wife and have started review of Care Everywhere records.       5/11/18- Presented to urgent care complaining of cough. Dx with bronchitis and treated with Augmentin.   Noted on exam to have pyorrhea - per wife they were advised to have teeth pulled.     5/17 - CXR by PCP showed mild interstitial prominence, no consolidation or effusion    5/29/18 - presented to ED complaining of cough, fevers X 2 weeks. Myalgias for about one month.    Noted to have creatinine 2.5, U/A with hematuria  CT abd showed bilateral non-obstructing kidney stones  Urine culture - low colony count luciano bennie  Dx with UTI/pyelo and discharged home on levaquin     6/7/18 - Renal US - bilateral renal cysts    6/8/18  - presented to [facility] with cough, fevers, swelling bilateral feet and hands and new bilateral pulmonary infiltrated.  Decompensated quickly.  Thought to have sepsis 2/2 to PNA.  Initially on vanc/cefepime..  Then changed to ceftaroline.     Blood and urine cultures at OSH - negative.    Respiratory cultures negative  AFB stain - negative     Patient is a diabetic.   "Wife denies any history of diabetic foot wounds/bone infections.   Denies splenectomy  Denies any history of chronic or recurring infections  prior to this admission.  Notes they were told in urgent care that his teeth were "infected" and should be pulled.  Note from urgent care notes gingival erythema and gingivitis but makes no mention of abscess.     Denies known exposure to TB.  Notes that wife had cousin with TB that used to visit, but ? Tested previously and was negative.     Travel:  Recent trip to [place] just prior to admission.   Pets:  One dog. 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 Wife denies exposure to chemicals/pesticides/fertilizers   Hobbies:  Hunting/fishing     Immunization:  ? Usual childhood vaccines   Denies any other vaccines - reports "we don't believe in vaccines."    Serologies to date:   Results for FELIPE BERNAL (MRN [id number]) as of 6/12/2018 19:23   Ref. Range 6/11/2018 08:10   Hep B Core Total Ab Unknown Negative   Hep B S Ab Unknown Positive (A)   Hepatitis B Surface Ag Unknown Negative   Hepatitis C Ab Unknown Negative   HIV 1/2 Ag/Ab Latest Ref Range: Negative  Negative     Hep B surface antibody reported negative at OSH.     Micro:  Blood cultures 6/10 NGTD  Respiratory Cultures NGTD   Urine culture - No growth     Imaging:    Renal US - bilateral simple renal cysts. Medical renal disease   CXR - bilateral solid airspace infiltrates           Interval History:  No acute events overnight.  Weaning ventilator.  Continues with plasma exchange and steroids.     Fungal, AFB cultures pending  Respiratory culture 6/10 NGTD  Blood cultures 6/10 NGTD   Additional serologies, including QG, pending.     Spoke with patient PCP today.  She advises that prior to current issues, patient had been generally healthy with no chronic infectious issues.  She confirms that he has had no chronic infectious issues.     PCP  forwarded to me pulmonary " cytology results from bronchial wash from OSH ICU - GMS stain was negative for fungal organisms.     Remains afebrile, WBC 13     Per discussions with Rheumatology - high suspicion for MPA, still with plan for rituxumab, possibly Friday after PLEX.         Review of Systems   Unable to perform ROS: Intubated     Objective:     Vital Signs (Most Recent):  Temp: 99 °F (37.2 °C) (06/13/18 1243)  Pulse: 71 (06/13/18 1316)  Resp: (!) 32 (06/13/18 1316)  BP: (!) 128/54 (06/13/18 1243)  SpO2: 97 % (06/13/18 1316) Vital Signs (24h Range):  Temp:  [97.8 °F (36.6 °C)-99 °F (37.2 °C)] 99 °F (37.2 °C)  Pulse:  [] 71  Resp:  [0-32] 32  SpO2:  [86 %-100 %] 97 %  BP: (108-174)/(54-85) 128/54  Arterial Line BP: (101-197)/(45-93) 134/57     Weight: 86.2 kg (190 lb)  Body mass index is 28.89 kg/m².    Estimated Creatinine Clearance: 65.5 mL/min (based on SCr of 1.2 mg/dL).    Physical Exam   Constitutional:   Intubated and sedated     HENT:   Head: Normocephalic and atraumatic.   Oral ET tube   NG tube  Unable to fully assess dentition      Eyes: No scleral icterus.   Cardiovascular: Normal rate and regular rhythm.  Exam reveals no gallop and no friction rub.    No murmur heard.  Pulmonary/Chest:   Ventilated   Clear anteriorly, decreased at bases   Abdominal: Soft. He exhibits no distension.   No bowel sounds appreciated     Genitourinary:   Genitourinary Comments: Liu to gravity    Musculoskeletal: He exhibits edema (mild bilateral LE edema).   Lymphadenopathy:     He has no cervical adenopathy.   Neurological:   Sedated   Skin: No rash noted.   Right IJ trialysis cath - site clear   Left triple lumen - site clear  Right radial a-line - site clear   PIV right hand - site clear   Vitals reviewed.      Significant Labs:   Blood Culture:     Recent Labs  Lab 06/10/18  2249 06/10/18  2300   LABBLOO No Growth to date  No Growth to date  No Growth to date No Growth to date  No Growth to date  No Growth to date     CBC:      Recent Labs  Lab 06/12/18  1221 06/12/18 2010 06/12/18  2123 06/13/18  0807   WBC 13.88* 12.42  --  13.00*   HGB 6.9* 8.4*  --  8.2*   HCT 20.9* 24.6* 24* 23.7*   * 132*  --  133*     CMP:   Recent Labs  Lab 06/12/18  0400  06/13/18  0026 06/13/18  0450 06/13/18  0807     138  < > 136 137  137 135*   K 5.2*  5.2*  5.2*  < > 4.2 4.1  4.1 4.0     107  < > 106 103  103 101   CO2 21*  21*  < > 22* 24  24 24   *  155*  < > 169* 169*  169* 161*   BUN 32*  32*  < > 21 15  15 14   CREATININE 3.5*  3.5*  < > 1.7* 1.4  1.4 1.2   CALCIUM 7.5*  7.5*  < > 7.3* 7.7*  7.7* 7.7*   PROT 5.5*  --   --  5.4*  --    ALBUMIN 2.7*  2.7*  < > 2.3* 2.4*  2.4* 2.4*   BILITOT 0.7  --   --  0.9  --    ALKPHOS 63  --   --  56  --    AST 19  --   --  17  --    ALT 24  --   --  26  --    ANIONGAP 10  10  < > 8 10  10 10   EGFRNONAA 17.3*  17.3*  < > 41.4* 52.4*  52.4* >60.0   < > = values in this interval not displayed.  Fungus Culture (Blood or Bone Marrow): No results for input(s): FUNGUSCULTUR in the last 4320 hours.  HIV 1/2 Antibodies: No results for input(s): [id number] in the last 48 hours.  Procalcitonin: No results for input(s): PROCAL in the last 48 hours.  Quantiferon: No results for input(s): NIL, TBAG, TBAGNIL, MITOGENNIL, TBGOLD in the last 48 hours.  Respiratory Culture:     Recent Labs  Lab 06/10/18  2345   GSRESP <10 epithelial cells per low power field.  No WBC's or organisms seen   RESPIRATORYC No growth     Urine Culture:     Recent Labs  Lab 06/11/18  0101   LABURIN No growth     Urine Studies:     Recent Labs  Lab 06/11/18  0100   COLORU Yellow   APPEARANCEUA Cloudy*   PHUR 5.0   SPECGRAV 1.010   PROTEINUA 2+*   GLUCUA 3+*   KETONESU Negative   BILIRUBINUA Negative   OCCULTUA 3+*   NITRITE Negative   UROBILINOGEN Negative   LEUKOCYTESUR 3+*   RBCUA 41*   WBCUA 38*   BACTERIA Many*   SQUAMEPITHEL 5   HYALINECASTS 7*     Wound Culture: No results for input(s): LABAERO in  the last 4320 hours.    Significant Imaging: I have reviewed all pertinent imaging results/findings within the past 24 hours.

## 2018-06-15 PROBLEM — E78.1 PURE HYPERGLYCERIDEMIA: Status: ACTIVE | Noted: 2018-01-01

## 2018-06-15 PROBLEM — D69.6 THROMBOCYTOPENIA, UNSPECIFIED: Status: ACTIVE | Noted: 2018-01-01

## 2018-06-15 NOTE — PROGRESS NOTES
[facility]  Nephrology  Progress Note    Patient Name: Jasmeet Raymond  MRN: [id number]  Admission Date: 6/10/2018  Hospital Length of Stay: 5 days  Attending Provider: Everette Hdz*   Primary Care Physician: Provider Notinsystem  Principal Problem:Diffuse pulmonary alveolar hemorrhage    Subjective:     HPI: 64 yo AAm with HTN, NIDDM, arthritis on metformin and meloxicam  admitted to [facility] for worsening SOB and ALEX.   Patiend had been treated as an out;patient for 2 weeks for Shortness of breath and bronchitis and UTI. In the hospital he was found to be acidotic, hyperkalemic, serum creatinine 8, and diffuse bilateral pulmonary infiltrates, requiring intubation. UA with proteinuria, hematuria and wbc's, Bronchoscopy revealed hemorrahage. Patient recieved one dialysis as per notes and 1 gm solumedrol, and transferred  to [facility] for plasmapheresis  And further evaluation.  Family is not available at the time of exam and patient is intubated.   HIV, Hepatitis B, C, complements  All within normal limits. DENIZ, ANCA and AntiGBM, results Nnot available    Interval History: Remains intubated and sedated. Off levo. Increase FIO2 requirement 60% from 50%. SLED net positive 1.4 L. UOP 30 ml    Review of patient's allergies indicates:   Allergen Reactions    Sulfa (sulfonamide antibiotics)      Current Facility-Administered Medications   Medication Frequency    atovaquone suspension 750 mg Q12H    calcium chloride 1 g in dextrose 5 % 100 mL IVPB Continuous    calcium chloride 1 g in dextrose 5 % 100 mL IVPB Once    ceFEPIme injection 1 g Q8H    chlorhexidine 0.12 % solution 15 mL BID    dexmedetomidine (PRECEDEX) 400mcg/100mL 0.9% NaCL infusion Continuous    dextrose 50% injection 12.5 g PRN    dextrose 50% injection 25 g PRN    DEXTROSE-SOD CITRATE-CITRIC AC 2.45-2.2 GRAM- 730 MG/100 ML MISC SOLN Continuous    famotidine tablet 20 mg BID    fentaNYL 2500 mcg in 0.9% sodium  chloride 250 mL infusion premix (titrating) Continuous    heparin, porcine (PF) 100 unit/mL injection flush 500 Units PRN    hepatitis B virus vacc.rec(PF) injection 40 mcg vaccine x 1 dose    insulin aspart U-100 pen 0-5 Units Q6H PRN    insulin regular (Humulin R) 100 Units in sodium chloride 0.9% 100 mL infusion Continuous    lorazepam injection 1 mg Q30 Min PRN    methylPREDNISolone sodium succinate injection 125 mg Q6H    norepinephrine 4 mg in dextrose 5% 250 mL infusion (premix) (titrating) Continuous    propofol (DIPRIVAN) 10 mg/mL infusion Continuous    riTUXimab (RITUXAN) 1,000 mg in sodium chloride 0.9% 1,000 mL infusion (conc: 1 mg/mL) 1 time in Clinic/HOD    sodium chloride 0.9% flush 10 mL PRN       Objective:     Vital Signs (Most Recent):  Temp: 99.5 °F (37.5 °C) (06/15/18 1505)  Pulse: 81 (06/15/18 1717)  Resp: 20 (06/15/18 0415)  BP: (!) 111/55 (06/15/18 1700)  SpO2: 95 % (06/15/18 1717)  O2 Device (Oxygen Therapy): ventilator (06/15/18 1717) Vital Signs (24h Range):  Temp:  [99.3 °F (37.4 °C)-100.3 °F (37.9 °C)] 99.5 °F (37.5 °C)  Pulse:  [] 81  Resp:  [17-26] 20  SpO2:  [79 %-100 %] 95 %  BP: ()/(42-86) 111/55  Arterial Line BP: (101-184)/(37-78) 128/52     Weight: 86.2 kg (190 lb) (06/15/18 1017)  Body mass index is 28.89 kg/m².  Body surface area is 2.03 meters squared.    I/O last 3 completed shifts:  In: 63818 [I.V.:2841; NG/GT:1565; IV Piggyback:3717]  Out: 53562 [Urine:30; Other:18264]    Physical Exam   Constitutional: He appears well-developed and well-nourished.   Intubated FIO2 60% , sedated    HENT:   Head: Normocephalic.   Cardiovascular: Normal heart sounds.  Exam reveals no gallop and no friction rub.    No murmur heard.  Tachycardic, no murmur appreciated, + 2 lower extremity and pedal edema,   No JVD     Pulmonary/Chest:   Intubated. Clear anteriorly   Abdominal: He exhibits no distension.   No Bowel sounds,    Musculoskeletal: He exhibits no edema (all  extremities).   Neurological:   Intubated and sedated   Skin: Skin is warm and dry.   R SC trialysis   Nursing note and vitals reviewed.      Significant Labs:  All labs within the past 24 hours have been reviewed.     Significant Imaging:  Labs: Reviewed    Assessment/Plan:     ALEX (acute kidney injury)    64 yo AAM with NIDDM, HTN, arthritis with a 2 week history of shortness of breath being treated for URI and UTI, admitted for worsening SOB, oliguric ALEX with proteinuria, hematuria, pyuria serum crt 8, acidotic and hyperkalemia. Unknown baseline renal function.  He received one dose solumedrol and RRT. X 1 episode PTT.   Renal US demonstrating bilateral renal cysts.  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    MPO-cANCA positive RPGN  Initially oliguric now anuric. Pulmonary hemorrhage and pulmonary renal syndrome with RPGN. 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 ANCA pending.   -Urine sediment showed red blood cell cast.   -6/11  2 D echo EF 75% and normal diastolic function and small pericardial effusion.  -6/11 plasmapheresis  -6/11 Repeat renal US showed normal size kidneys with marked cortical thinning with increase cortical echogenicity and loss of corticomedullary distinction with no hydronephrosis, mass or stone.   -C3C4 , AntiGBM and Proteinase 3 normal .  -6/15 total hr intake 120 ml-TF, precedex, fentanyl, proprofol. CVP 5  -iCa 0.8 from 1.08 due to not using calcium chloride with AC during plasmapheresis today. Hgb 6.3<7.3. Plt decrease over past 4 days.     Plan:  -Appreciate Rheumatology following- Continue plasmapheresis (3/6 tx today 6/15), completed pulse dose steroid x 5 days (last dose 6/14) now started today 4/15 solumedrol 125 mg q 6, start Rituximab and atovaquone today 6/15.   -Infectious work up negative so far.   -When hemodynamcially stable, then renal biopsy.  -Continue SLED for clearance and  volume management -400 ml as tolerated. Citrate for anticoagulation/calcium chloride 15 cc/hr.  -Blood transfusion and evaluation for thrombocytopenia per primary team.                        Thank you for your consult. I will follow-up with patient. Please contact us if you have any additional questions.    Kandice Hernandez NP  Nephrology  [facility]

## 2018-06-15 NOTE — PROGRESS NOTES
[facility]  Critical Care Medicine  Progress Note    Patient Name: Jasmeet Raymond  MRN: [id number]  Admission Date: 6/10/2018  Hospital Length of Stay: 5 days  Code Status: Full Code  Attending Provider: Everette Hdz*  Primary Care Provider: Provider Notinsystem   Principal Problem: Diffuse pulmonary alveolar hemorrhage    Subjective:     HPI:  This is Mr. Jasmeet Raymond, 63 year old male with PMHx significant for HTN, DM and arthirtis presented to [facility] and [facility] with symptoms of pneumonia and shortness of breath. On 5/29/2018, he presented with symptoms of bronchitis with denies chills, ear pain, sore throat, chest pain, shortness of breath at that time, received antibiotics. Then on 6/8/2018 he presented again to [facility] and [facility] with shortness of breath and cough, and his CXR showed concern for airway disease. However on the first day of admission at [facility] and [facility], his respiratory symptoms worsen and had acute hypoxic respiratory failure that required intubation. Pulmonary consulted and he underwent Bronchoscopy and  revealed alveolar hemorrhage, with the setting of having hematuria and diffuse alveloar hemorrhage the concern was for presence of pulmonary renal syndrome and he received pulsed dose of Methylprednisolone and nephrology consulted for plasmapheresis. However at [facility], plasmapheresis machine is broken and they recommend to transfer to [facility] [place] for plasmapheresis.     On arrival he was on Nibmex for paralytics, Propofol for sedation, Levophed (eventaully turned off) for pressure support, Flolan (Epoprostenol sodium) inhaled which was switched to Inhaled Nitro. His mechanical ventilation initially was on FiO2 100%, PEEP 16, then weaned his FiO2 with high requirement of oxygenation. He had an signifiacnt drop in his Hb (baseline ~ 12) dated one week prior  presentation to 9 and 8, required one pRBC prior arrival. Repeated CXR showed extensive bilateral infiltrates. Important laboratory investigation showed His , , pro calcitonin 4.4, C3 and C4 complement normal., Trop negative, C3 and C4 showed normal , Hep test is negative, Lactic acid 1.8 > 1.0, HIV negative, Procal 4.4, UA shoowed heamturia and trace leukocytes, Urine culture from 5/29 showed almost pansensitve Cedecea davisae.     Hospital/ICU Course:  06/11/2018 admitted to MICU from [facility] with ARDS  06/12/2018 No acute events overnight. Had PLEX with 4.5 L exchange with FFP, and CRRT started and he became hypotensive, Levophed started during the CRRT. He was weaning off Nitric Oxide and his ABGs showing worsening respi acidosis. Rheumatology, Transfusion medicine and Nephrology on board   06/13/2018 Stable on High ladder PEEP for ARDS and completely off NO, and still on Nimbex for paralytics and sedation with Propofol and Fentanyl. ID, Rheumatology and Nephrology on board.   06/15/2018, Still on high setting   06/15/2018 Started Rituxan after his PLEx session, and decrease in his Methylprednisone. Still on high PEEP ladder for ARDS, frequent clots in ETT suction     Interval History/Significant Events: Please see hospital course above     Review of Systems   Unable to perform ROS: Intubated     Objective:     Vital Signs (Most Recent):  Temp: 99.3 °F (37.4 °C) (06/15/18 1100)  Pulse: 88 (06/15/18 1505)  Resp: 20 (06/15/18 0415)  BP: (!) 94/47 (06/15/18 1505)  SpO2: (!) 93 % (06/15/18 1505) Vital Signs (24h Range):  Temp:  [99.3 °F (37.4 °C)-100.3 °F (37.9 °C)] 99.3 °F (37.4 °C)  Pulse:  [] 88  Resp:  [17-26] 20  SpO2:  [79 %-100 %] 93 %  BP: ()/(42-86) 94/47  Arterial Line BP: (103-184)/(38-71) 110/39   Weight: 86.2 kg (190 lb)  Body mass index is 28.89 kg/m².      Intake/Output Summary (Last 24 hours) at 06/15/18 1530  Last data filed at  06/15/18 1500   Gross per 24 hour   Intake             8379 ml   Output             6461 ml   Net             1918 ml       Physical Exam   Constitutional: He appears well-developed. No distress.   HENT:   Head: Normocephalic.   Mouth/Throat: No oropharyngeal exudate.   Eyes: Pupils are equal, round, and reactive to light. Right eye exhibits no discharge. Left eye exhibits no discharge.   Neck: Normal range of motion. No JVD present.   Cardiovascular: Normal rate and regular rhythm.    No murmur heard.  Pulmonary/Chest: Effort normal. No respiratory distress. He has no wheezes. He has rales.   Abdominal: Soft. He exhibits no distension. There is no tenderness.   Genitourinary: Rectal exam shows guaiac negative stool.   Musculoskeletal: He exhibits edema. He exhibits no tenderness or deformity.   Neurological:   Sedated RASS -2    Skin: Skin is warm. No rash noted. No erythema.       Vents:  Vent Mode: A/C (06/15/18 1500)  Ventilator Initiated: Yes (06/10/18 2217)  Set Rate: 18 bmp (06/15/18 1500)  Vt Set: 510 mL (06/15/18 1500)  PEEP/CPAP: 14 cmH20 (06/15/18 1500)  Oxygen Concentration (%): 60 (06/15/18 1500)  Peak Airway Pressure: 24 cmH2O (06/15/18 1500)  Plateau Pressure: 34 cmH20 (06/15/18 1500)  Total Ve: 14.5 mL (06/15/18 1500)  F/VT Ratio<105 (RSBI): (!) 30.02 (06/15/18 0126)  Lines/Drains/Airways     Central Venous Catheter Line                 Hemodialysis Catheter 06/10/18 2307 right internal jugular 4 days         Percutaneous Central Line Insertion/Assessment - triple lumen  06/10/18 2305 left internal jugular 4 days          Drain                 NG/OG Tube 06/10/18 2309 [place] sump 18 Fr. Left nostril 4 days         Urethral Catheter 06/10/18 2302 Latex 16 Fr. 4 days          Airway                 Airway - Non-Surgical Endotracheal Tube -- days          Arterial Line                 Arterial Line 06/10/18 2304 Right Radial 4 days              Significant Labs:    CBC/Anemia Profile:    Recent Labs  Lab  06/14/18  0806 06/14/18  2023 06/15/18  0358   WBC 14.28* 12.67 15.24*   HGB 7.8* 7.1* 7.3*   HCT 22.5* 21.2* 21.5*   PLT 96* 90* 96*   MCV 84 86 87   RDW 15.9* 15.7* 15.8*        Chemistries:    Recent Labs  Lab 06/14/18  0327  06/14/18  2156 06/15/18  0358 06/15/18  1400     138  < > 138 136  136 140   K 3.8  3.8  < > 4.9 4.6  4.6 4.3     103  < > 111* 110  110 116*   CO2 25  25  < > 20* 19*  19* 19*   BUN 15  15  < > 13 13  13 11   CREATININE 1.1  1.1  < > 1.0 1.0  1.0 0.9   CALCIUM 7.4*  7.4*  < > 7.5* 7.6*  7.6* 6.7*   ALBUMIN 2.7*  2.7*  < > 2.3* 2.4*  2.4* 3.4*   PROT 5.5*  --   --  5.8*  --    BILITOT 1.6*  --   --  1.2*  --    ALKPHOS 64  --   --  86  --    ALT 30  --   --  33  --    AST 44*  --   --  54*  --    MG 1.8  < > 1.9 2.3 1.7   PHOS 1.7*  < > 2.2* 2.1* 1.8*   < > = values in this interval not displayed.    CMP:   Recent Labs  Lab 06/14/18  0327  06/14/18  2156 06/15/18  0358 06/15/18  1400     138  < > 138 136  136 140   K 3.8  3.8  < > 4.9 4.6  4.6 4.3     103  < > 111* 110  110 116*   CO2 25  25  < > 20* 19*  19* 19*   *  191*  < > 235* 226*  226* 166*   BUN 15  15  < > 13 13  13 11   CREATININE 1.1  1.1  < > 1.0 1.0  1.0 0.9   CALCIUM 7.4*  7.4*  < > 7.5* 7.6*  7.6* 6.7*   PROT 5.5*  --   --  5.8*  --    ALBUMIN 2.7*  2.7*  < > 2.3* 2.4*  2.4* 3.4*   BILITOT 1.6*  --   --  1.2*  --    ALKPHOS 64  --   --  86  --    AST 44*  --   --  54*  --    ALT 30  --   --  33  --    ANIONGAP 10  10  < > 7* 7*  7* 5*   EGFRNONAA >60.0  >60.0  < > >60.0 >60.0  >60.0 >60.0   < > = values in this interval not displayed.  Cardiac Markers: No results for input(s): CKMB, TROPONINT, MYOGLOBIN in the last 48 hours.  Coagulation:   Recent Labs  Lab 06/15/18  0358   INR 1.0   APTT 22.5     Significant Imaging:  I have reviewed all pertinent imaging results/findings within the past 24 hours.  I have reviewed and interpreted all pertinent imaging  results/findings within the past 24 hours.    Assessment/Plan:     Pulmonary   * Diffuse pulmonary alveolar hemorrhage    - Given his presentation with two weeks of progressive worsening of his SOB and cough, and associated with hematuria and CXR finding of bilateral infiltrate raised the concern for DAH  - On 6/10 he underwent Flexible Bronchoscopy and showed no endobronchial lesion, significant signs of hemorrhage on all of the airways, confirming findings of pulmonary hemorrhage.   - On high oxygenation setting with FiO2 of 40% and PEEP 14, and his PO2 is ~ 100   - Requires on pRBC on 6/12, then his CBC was stable        Acute hypoxemic respiratory failure    - Please see principal problem for more elaboration         ARDS (adult respiratory distress syndrome)    - Most likely etiology is Diffuse pulmonary alveolar hemorrhage, however severe pneumonia in the setting of leukocytosis and fever and presentation of cough and SOB can not be excluded  - On conservative tidal volume (6-8 ml/kg predicted body weight), balanced respiratory rate, and high oxygenation setting in the setting of DAH going with high PEEP ladder        Cardiac/Vascular   Pure hyperglyceridemia    - Most likely from Propofol and plan to wean off Propofol and started on Insulin infusion         Renal/   ALEX (acute kidney injury)    - Differential Diagnoses include Pulmonary renal syndrome.  - Underwent US of kidney and showed Bilateral medical renal disease, Simple renal cysts bilaterally.  - CT abdomen/pelvis, mild interstitial changes in lung bases, 2 stones in the left kidney, 4 stones in the right kidney, no hydronephrosis, post cholecystectomy. No acute abnormality in the liver, spleen, pancreas or adrenals.   - Will continue trending urine output and renal function, avoid NSAIDs, contrast, nephrotoxins    - Monitor strict I/Os, daily weights, renally dose medications to current GFR  - Still anuric and will continue CRRT        ID   Septic  shock    - See principal problem for more elaboration.   - Currently on low need for pressors, will continue to monitor and ensure MAP > 65 mmHg (during CRRT)  - Initially on broad spectrum antibiotics with Cefepime (renally dose) and Vancomycin. Stopped Vancomycoin and will plan to Stop Cefepime int he next couple of days per ID rec   - Blood culture and Urine Culture showed NGTD         Immunology/Multi System   ANCA-associated vasculitis    - See Pulmorenal syndrome for more elaboration         Pulmonary-renal syndrome    - Most likely the diagnosis in the setting of diffuse alveolar hemorrhage and glomerulonephritis with most likely etiology is underlying autoimmune disorder  - Elevated UPCR 1.28, DENIZ positive 1:320 speckled pattern, MPO+ 82, PR3 neg, Immunoglobulins wnl, Hep C and HIV neg  - Finished  5 days of Methylprednisolone 1000 mg IV daily, started on Rituxan and Methylprednisone 125 mg IV Q 6 and Atovaquin for PPx         Hematology   Thrombocytopenia, unspecified    - His 4T score is 2 which refect Low probability for Heparin Induced Thrombocytopenia   - Will continue to observe         Oncology   Acute posthemorrhagic anemia    - Most likely is diffuse pulmonary alveolar hemorrhage  - Frequent check in his CBC, transfuse when Hb < 7        Endocrine   Diabetes mellitus type 2 with complications    - Will keep his blood glucose on the target 140-180  - on Insulin infusion for Hyperglycemia and HyperTG   Recent Labs      06/15/18   0817  06/15/18   0918  06/15/18   1017  06/15/18   1058  06/15/18   1224  06/15/18   1255  06/15/18   1403  06/15/18   1458   POCTGLUCOSE  223*  177*  107  118*  109  117*  167*  185*           Other   Severe sepsis with septic shock (CODE)    - See septic shock for more elaborating            Critical Care Daily Checklist:    A: Awake: RASS Goal/Actual Goal: RASS Goal: 0-->alert and calm  Actual: Conteh Agitation Sedation Scale (RASS): Light sedation   B: Spontaneous  Breathing Trial Performed?     C: SAT & SBT Coordinated?  No                      D: Delirium: CAM-ICU Overall CAM-ICU: Negative   E: Early Mobility Performed? No   F: Feeding Goal: Goals: Patient to receive nutrition by RD follow-up  Status: Nutrition Goal Status: goal met   Current Diet Order   Procedures    Diet NPO      AS: Analgesia/Sedation Yes   T: Thromboembolic Prophylaxis No   H: HOB > 300 Yes   U: Stress Ulcer Prophylaxis (if needed) Yes   G: Glucose Control Yes   B: Bowel Function Stool Occurrence: 1   I: Indwelling Catheter (Lines & Liu) Necessity Yes   D: De-escalation of Antimicrobials/Pharmacotherapies Yes    Plan for the day/ETD No    Code Status:  Family/Goals of Care: Full Code  Ongoing        Critical secondary to Patient has a condition that poses threat to life and bodily function: Severe Respiratory Distress and Acute Renal Failure      Critical care was time spent personally by me on the following activities: development of treatment plan with patient or surrogate and bedside caregivers, discussions with consultants, evaluation of patient's response to treatment, examination of patient, ordering and performing treatments and interventions, ordering and review of laboratory studies, ordering and review of radiographic studies, pulse oximetry, re-evaluation of patient's condition. This critical care time did not overlap with that of any other provider or involve time for any procedures.     Tommie Kuhn MD  Critical Care Medicine  [facility]

## 2018-06-15 NOTE — ASSESSMENT & PLAN NOTE
- Will keep his blood glucose on the target 140-180  - on Insulin infusion for Hyperglycemia and HyperTG   Recent Labs      06/15/18   0817  06/15/18   0918  06/15/18   1017  06/15/18   1058  06/15/18   1224  06/15/18   1255  06/15/18   1403  06/15/18   1458   POCTGLUCOSE  223*  177*  107  118*  109  117*  167*  185*

## 2018-06-15 NOTE — PROGRESS NOTES
Apheresis tx #3 started at 1017 with out difficulty.  Pt intubated and sedated, therefore unable to assess pain and orientation. 4.5 Liter exchange.  Replacement fluids 4.5 Liter 5% Albumin via RIJ cath, patent, dressing CDI. Tx ended at 1118.  Cath flushed with NS only per ICU nurse Rosaura.  Pt tolerated tx well. Next tx 06/18/2018.

## 2018-06-15 NOTE — SUBJECTIVE & OBJECTIVE
No past medical history on file.    No past surgical history on file.    Review of patient's allergies indicates:   Allergen Reactions    Sulfa (sulfonamide antibiotics)        Family History     None        Social History Main Topics    Smoking status: Not on file    Smokeless tobacco: Not on file    Alcohol use Not on file    Drug use: Unknown    Sexual activity: Not on file      Review of Systems   Unable to perform ROS: Intubated     Objective:     Vital Signs (Most Recent):  Temp: 99.5 °F (37.5 °C) (06/15/18 0700)  Pulse: 79 (06/15/18 0815)  Resp: 20 (06/15/18 0415)  BP: 118/61 (06/15/18 0803)  SpO2: 100 % (06/15/18 0815) Vital Signs (24h Range):  Temp:  [98.9 °F (37.2 °C)-99.9 °F (37.7 °C)] 99.5 °F (37.5 °C)  Pulse:  [62-96] 79  Resp:  [17-26] 20  SpO2:  [82 %-100 %] 100 %  BP: (118-191)/(61-86) 118/61  Arterial Line BP: ()/(43-71) 113/51   Weight: 85 kg (187 lb 6.3 oz)  Body mass index is 28.49 kg/m².      Intake/Output Summary (Last 24 hours) at 06/15/18 0820  Last data filed at 06/15/18 0700   Gross per 24 hour   Intake             8239 ml   Output             6619 ml   Net             1620 ml       Physical Exam   Constitutional: He appears well-developed. No distress.   HENT:   Head: Normocephalic and atraumatic.   Right Ear: External ear normal.   Left Ear: External ear normal.   Eyes: Pupils are equal, round, and reactive to light. Right eye exhibits no discharge. Left eye exhibits no discharge.   Neck: Neck supple. No JVD present.   Cardiovascular: Normal rate, regular rhythm and intact distal pulses.    No murmur heard.  Pulmonary/Chest: No respiratory distress. He has no wheezes. He has rales (b/l, mechanical vent ).   Abdominal: Soft. He exhibits no distension. There is no tenderness.   BS appreciated in LUQ only   Musculoskeletal: He exhibits no edema.   Neurological:   Sedated but follows commands intermittently   Skin: Skin is warm and dry. No rash noted. He is not diaphoretic. No  erythema.   Psychiatric:   Unable to assess   Vitals reviewed.      Vents:  Vent Mode: A/C (06/15/18 0803)  Ventilator Initiated: Yes (06/10/18 2217)  Set Rate: 18 bmp (06/15/18 0803)  Vt Set: 580 mL (06/15/18 0803)  PEEP/CPAP: 16 cmH20 (06/15/18 0803)  Oxygen Concentration (%): 60 (06/15/18 0728)  Peak Airway Pressure: 27 cmH2O (06/15/18 0803)  Plateau Pressure: 29 cmH20 (06/15/18 0803)  Total Ve: 12.2 mL (06/15/18 0803)  F/VT Ratio<105 (RSBI): (!) 30.02 (06/15/18 0126)  Lines/Drains/Airways     Central Venous Catheter Line                 Hemodialysis Catheter 06/10/18 2307 right internal jugular 4 days         Percutaneous Central Line Insertion/Assessment - triple lumen  06/10/18 2305 left internal jugular 4 days          Drain                 NG/OG Tube 06/10/18 2309 [place] sump 18 Fr. Left nostril 4 days         Urethral Catheter 06/10/18 2302 Latex 16 Fr. 4 days          Airway                 Airway - Non-Surgical Endotracheal Tube -- days          Arterial Line                 Arterial Line 06/10/18 2304 Right Radial 4 days              Significant Labs:    CBC/Anemia Profile:    Recent Labs  Lab 06/14/18  0806 06/14/18  2023 06/15/18  0358   WBC 14.28* 12.67 15.24*   HGB 7.8* 7.1* 7.3*   HCT 22.5* 21.2* 21.5*   PLT 96* 90* 96*   MCV 84 86 87   RDW 15.9* 15.7* 15.8*        Chemistries:    Recent Labs  Lab 06/14/18  0327 06/14/18  1328 06/14/18  2156 06/15/18  0358     138 137 138 136  136   K 3.8  3.8 4.4 4.9 4.6  4.6     103 107 111* 110  110   CO2 25  25 23 20* 19*  19*   BUN 15  15 12 13 13  13   CREATININE 1.1  1.1 1.0 1.0 1.0  1.0   CALCIUM 7.4*  7.4* 7.5* 7.5* 7.6*  7.6*   ALBUMIN 2.7*  2.7* 2.6* 2.3* 2.4*  2.4*   PROT 5.5*  --   --  5.8*   BILITOT 1.6*  --   --  1.2*   ALKPHOS 64  --   --  86   ALT 30  --   --  33   AST 44*  --   --  54*   MG 1.8 1.8 1.9 2.3   PHOS 1.7* 3.3 2.2* 2.1*     Coagulation:     Recent Labs  Lab 06/14/18  0327 06/15/18  0358   INR 1.0 1.0   APTT  <21.0  --      Lactic Acid:   No results for input(s): LACTATE in the last 48 hours.  Significant Imaging: I have reviewed all pertinent imaging results/findings within the past 24 hours.  I have reviewed and interpreted all pertinent imaging results/findings within the past 24 hours.    Physical Exam   Vitals reviewed.  Constitutional: He appears well-developed. No distress.   HENT:   Head: Normocephalic and atraumatic.   Right Ear: External ear normal.   Left Ear: External ear normal.   Eyes: Pupils are equal, round, and reactive to light. Right eye exhibits no discharge. Left eye exhibits no discharge.   Neck: Neck supple. No JVD present.   Cardiovascular: Normal rate, regular rhythm and intact distal pulses.    No murmur heard.  Pulmonary/Chest: No respiratory distress. He has no wheezes. He has rales (b/l, mechanical vent ).   Abdominal: Soft. He exhibits no distension. There is no tenderness.   BS appreciated in LUQ only   Neurological:   Sedated but follows commands intermittently   Skin: Skin is warm and dry. No rash noted. He is not diaphoretic. No erythema.     Psychiatric:   Unable to assess   Musculoskeletal: He exhibits no edema.

## 2018-06-15 NOTE — ASSESSMENT & PLAN NOTE
- See principal problem for more elaboration.   - Currently on low need for pressors, will continue to monitor and ensure MAP > 65 mmHg (during CRRT)  - Initially on broad spectrum antibiotics with Cefepime (renally dose) and Vancomycin. Stopped Vancomycoin and will plan to Stop Cefepime int he next couple of days per ID rec   - Blood culture and Urine Culture showed NGTD

## 2018-06-15 NOTE — PLAN OF CARE
Problem: Patient Care Overview  Goal: Plan of Care Review  Patient's vital signs stable at this time. Patient continues on vent AC, 50% FiO2 and 14 of PEEP, tolerating well with Kellie %. Patient continues on CRRT at UF goal fo 300, tolerating well with MAP >65. Patient continues on precedex, fentanyl, and propofol for comfort. Patient follows commands and moves all extremities. Tube feeds continued at goal of 40cc/hr, tolerating well with minimal residuals. Patient turned Q2H. Plan of care reviewed with patient. Will continue to monitor closely.

## 2018-06-15 NOTE — PROGRESS NOTES
Dr. Elias notified of drop in H/H and clotting off of CRRT machine. Ordered a unit of blood. Will continue to monitor.

## 2018-06-15 NOTE — ASSESSMENT & PLAN NOTE
- His 4T score is 2 which refect Low probability for Heparin Induced Thrombocytopenia   - Will continue to observe

## 2018-06-15 NOTE — ASSESSMENT & PLAN NOTE
- Most likely etiology is Diffuse pulmonary alveolar hemorrhage, however severe pneumonia in the setting of leukocytosis and fever and presentation of cough and SOB can not be excluded  - On conservative tidal volume (6-8 ml/kg predicted body weight), balanced respiratory rate, and high oxygenation setting in the setting of DAH going with high PEEP ladder

## 2018-06-15 NOTE — ASSESSMENT & PLAN NOTE
- Given his presentation with two weeks of progressive worsening of his SOB and cough, and associated with hematuria and CXR finding of bilateral infiltrate raised the concern for DAH  - On 6/10 he underwent Flexible Bronchoscopy and showed no endobronchial lesion, significant signs of hemorrhage on all of the airways, confirming findings of pulmonary hemorrhage.   - On high oxygenation setting with FiO2 of 40% and PEEP 14, and his PO2 is ~ 100   - Requires on pRBC on 6/12, then his CBC was stable

## 2018-06-15 NOTE — PROGRESS NOTES
Notified [facility] of O2 saturations 84 % on 60% FiO2, 14 PEEP. RT at bedside. Pt coughing large amounts of thick red brown sputum. Orders received to check ABG. ABG reported to MD. MDs to come to bedside to assess

## 2018-06-15 NOTE — ASSESSMENT & PLAN NOTE
- Most likely the diagnosis in the setting of diffuse alveolar hemorrhage and glomerulonephritis with most likely etiology is underlying autoimmune disorder  - Elevated UPCR 1.28, DENIZ positive 1:320 speckled pattern, MPO+ 82, PR3 neg, Immunoglobulins wnl, Hep C and HIV neg  - Finished  5 days of Methylprednisolone 1000 mg IV daily, started on Rituxan and Methylprednisone 125 mg IV Q 6 and Atovaquin for PPx

## 2018-06-15 NOTE — PROGRESS NOTES
[facility]  Rheumatology  Progress Note    Patient Name: Jasmeet Raymond  MRN: [id number]  Admission Date: 6/10/2018  Hospital Length of Stay: 5 days  Code Status: Full Code   Attending Provider: Everette Hdz*  Primary Care Physician: Provider Notinsystem  Principal Problem: Diffuse pulmonary alveolar hemorrhage    Subjective:     HPI:  Mr. Jasmeet Raymond, 63 year old male with PMHx significant for HTN, DM and osteoarthirtis presented to [facility] and [facility] with symptoms of pneumonia and shortness of breath. On 5/29/2018, he presented with symptoms of bronchitis with denies chills, ear pain, sore throat, chest pain, shortness of breath at that time, received antibiotics. Then on 6/8/2018 he presented again to [facility] and [facility] with shortness of breath and cough, and his CXR showed concern for airway disease. However on the first day of admission at [facility] and [facility], his respiratory symptoms worsen and had acute hypoxic respiratory failure that required intubation. Pulmonary consulted and he underwent Bronchoscopy and  revealed alveolar hemorrhage, with the setting of having hematuria and diffuse alveloar hemorrhage the concern was for presence of pulmonary renal syndrome and he received pulsed dose of Methylprednisolone and nephrology consulted for plasmapheresis. However at [facility], plasmapheresis machine is broken and they recommend to transfer to [facility] for plasmapheresis. Admitted here 6/11 for ARDS.     On arrival he was on Nibmex for paralytics, Propofol for sedation, Levophed (eventaully turned off) for pressure support, Flolan (Epoprostenol sodium) inhaled which was switched to Inhaled Nitro. His mechanical ventilation initially was on FiO2 100%, PEEP 16, then weaned his FiO2 with high requirement of oxygenation. He had an signifiacnt drop in his Hb (baseline ~ 12) dated  one week prior presentation to 9 and 8, required one pRBC prior arrival. Repeated CXR showed extensive bilateral infiltrates. Important laboratory investigation showed His , , pro calcitonin 4.4, C3 and C4 complement normal., Trop negative, C3 and C4 showed normal , Hep test is negative, Lactic acid 1.8 > 1.0, HIV negative, Procal 4.4, UA shoowed heamturia and trace leukocytes, Urine culture from 5/29 showed almost pansensitve Cedecea davisae.    Rheumatology consulted for pulmonary-renal syndrome, recs for steroids and further investigation.     Interval Hx: NAEON. Continuing on SLED. Plan for Rituximab and atovaquone to be started after PLEX.    No past medical history on file.    No past surgical history on file.    Review of patient's allergies indicates:   Allergen Reactions    Sulfa (sulfonamide antibiotics)        Family History     None        Social History Main Topics    Smoking status: Not on file    Smokeless tobacco: Not on file    Alcohol use Not on file    Drug use: Unknown    Sexual activity: Not on file      Review of Systems   Unable to perform ROS: Intubated     Objective:     Vital Signs (Most Recent):  Temp: 99.5 °F (37.5 °C) (06/15/18 0700)  Pulse: 79 (06/15/18 0815)  Resp: 20 (06/15/18 0415)  BP: 118/61 (06/15/18 0803)  SpO2: 100 % (06/15/18 0815) Vital Signs (24h Range):  Temp:  [98.9 °F (37.2 °C)-99.9 °F (37.7 °C)] 99.5 °F (37.5 °C)  Pulse:  [62-96] 79  Resp:  [17-26] 20  SpO2:  [82 %-100 %] 100 %  BP: (118-191)/(61-86) 118/61  Arterial Line BP: ()/(43-71) 113/51   Weight: 85 kg (187 lb 6.3 oz)  Body mass index is 28.49 kg/m².      Intake/Output Summary (Last 24 hours) at 06/15/18 0820  Last data filed at 06/15/18 0700   Gross per 24 hour   Intake             8239 ml   Output             6619 ml   Net             1620 ml       Physical Exam   Constitutional: He appears well-developed. No distress.   HENT:   Head: Normocephalic and atraumatic.   Right Ear:  External ear normal.   Left Ear: External ear normal.   Eyes: Pupils are equal, round, and reactive to light. Right eye exhibits no discharge. Left eye exhibits no discharge.   Neck: Neck supple. No JVD present.   Cardiovascular: Normal rate, regular rhythm and intact distal pulses.    No murmur heard.  Pulmonary/Chest: No respiratory distress. He has no wheezes. He has rales (b/l, mechanical vent ).   Abdominal: Soft. He exhibits no distension. There is no tenderness.   BS appreciated in LUQ only   Musculoskeletal: He exhibits no edema.   Neurological:   Sedated but follows commands intermittently   Skin: Skin is warm and dry. No rash noted. He is not diaphoretic. No erythema.   Psychiatric:   Unable to assess   Vitals reviewed.      Vents:  Vent Mode: A/C (06/15/18 0803)  Ventilator Initiated: Yes (06/10/18 2217)  Set Rate: 18 bmp (06/15/18 0803)  Vt Set: 580 mL (06/15/18 0803)  PEEP/CPAP: 16 cmH20 (06/15/18 0803)  Oxygen Concentration (%): 60 (06/15/18 0728)  Peak Airway Pressure: 27 cmH2O (06/15/18 0803)  Plateau Pressure: 29 cmH20 (06/15/18 0803)  Total Ve: 12.2 mL (06/15/18 0803)  F/VT Ratio<105 (RSBI): (!) 30.02 (06/15/18 0126)  Lines/Drains/Airways     Central Venous Catheter Line                 Hemodialysis Catheter 06/10/18 2307 right internal jugular 4 days         Percutaneous Central Line Insertion/Assessment - triple lumen  06/10/18 2305 left internal jugular 4 days          Drain                 NG/OG Tube 06/10/18 2309 [place] sump 18 Fr. Left nostril 4 days         Urethral Catheter 06/10/18 2302 Latex 16 Fr. 4 days          Airway                 Airway - Non-Surgical Endotracheal Tube -- days          Arterial Line                 Arterial Line 06/10/18 2304 Right Radial 4 days              Significant Labs:    CBC/Anemia Profile:    Recent Labs  Lab 06/14/18  0806 06/14/18  2023 06/15/18  0358   WBC 14.28* 12.67 15.24*   HGB 7.8* 7.1* 7.3*   HCT 22.5* 21.2* 21.5*   PLT 96* 90* 96*   MCV 84 86 87    RDW 15.9* 15.7* 15.8*        Chemistries:    Recent Labs  Lab 06/14/18  0327 06/14/18  1328 06/14/18  2156 06/15/18  0358     138 137 138 136  136   K 3.8  3.8 4.4 4.9 4.6  4.6     103 107 111* 110  110   CO2 25  25 23 20* 19*  19*   BUN 15  15 12 13 13  13   CREATININE 1.1  1.1 1.0 1.0 1.0  1.0   CALCIUM 7.4*  7.4* 7.5* 7.5* 7.6*  7.6*   ALBUMIN 2.7*  2.7* 2.6* 2.3* 2.4*  2.4*   PROT 5.5*  --   --  5.8*   BILITOT 1.6*  --   --  1.2*   ALKPHOS 64  --   --  86   ALT 30  --   --  33   AST 44*  --   --  54*   MG 1.8 1.8 1.9 2.3   PHOS 1.7* 3.3 2.2* 2.1*     Coagulation:     Recent Labs  Lab 06/14/18 0327 06/15/18  0358   INR 1.0 1.0   APTT <21.0  --      Lactic Acid:   No results for input(s): LACTATE in the last 48 hours.  Significant Imaging: I have reviewed all pertinent imaging results/findings within the past 24 hours.  I have reviewed and interpreted all pertinent imaging results/findings within the past 24 hours.    Assessment/Plan:     Pulmonary-renal syndrome    ANCA associated vasculitis  62 yo w/PMH of HTN and NIDDM, OA admitted for acute hypoxic respiratory failure, renal failure, and pulmonary renal syndrome receiving plasmapheresis. Rheumatology consulted for pulmonary renal syndrome, recs on steroids and further investigation.   - Given methylprednisolone 1000 mg IV daily x 5 doses   - Nephrology consulted: renal biopsy when stable  --> elevated UPCR 1.28, DENIZ positive 1:320 speckled pattern, MPO+ 82, c-ANCA +, p-anca neg,  SSA + 178, SSB neg, dsDNA neg, Anti-Sm/RNP neg, Anti-Sm neg, PR3 neg, Immunoglobulins wnl, Hep C and HIV neg, APL labs neg, GBM neg, MARTHA neg, C3/C4 wnl, ACE neg, quant gold indeterminate  - Concern for vasculitis given DAH and hematuria. The niin-typical pulmonary-renal syndromes are GPA/MPA, Goodpasture disease, SLE. Immunofluorescence studies can help differentiate. MPO+ can suggest MPA    Plan:   - Will f/u pending rheum labs: TB skin test   - agree  with nephrology for renal bx when stable  - transfusion medicine: Daily TPE with FFP until resolution of DAH (6 total sessions, today is 3/6)  - Start IV methylprednisolone 125 mg q6hrs today  - Will start Rituximab for induction, discussed with family at bedside. Family given handout about Vasculitis and Rituximab 6/12.  - Per ID, cleared for Rituximab initiation. Discussed with Dr. Whaley with transfusion medicine, they plan for 6 total sessions of PLEX, next session today, then following sessions next MWF. Started Rituximab and Atovaquone today as pt finished PLEX session for today. Can resume PLEX as scheduled on Monday 6/18.  - concern for HIT? With platelets decreased by 50% since admit, recommend further investigation          Discussed with fellow. Staff attestation to follow.    Kaelyn Birch MD  Rheumatology  [facility]    Patient seen and examined with resident.  All elements of history, physical exam and medical decision making independently confirmed by me.  Patient with pulmonary-renal syndrome now with positive MPO and +c-ANCA.  Mild effusion of knees R>L  Patient indeterminate TB test await skin test. Renal biopsy when stable. Now of solumedrol 125 mg q6.  Patient to have Rituxan after PLEX today. Concern for decreasing platelets.  Consider evaluation as noted above.  See note for details.

## 2018-06-15 NOTE — PLAN OF CARE
Problem: Patient Care Overview  Goal: Plan of Care Review  Outcome: Outcome(s) achieved Date Met: 06/15/18  Plan of care reviewed with Pt and family. Pt remains intubated on AC 60% and 14 of peep. Pts O2 sats drop throughout the day due to stimulation/coughing while awake,CCMT at bedside frequently to assess and make Vent changes. Propofol, precedex, and fentanyl for sedation, comfort, RASS.  Insulin gtt titrated per algorithm. Ca and Mag replaced. Pt on CRRT, clotted off X1. Pt CVP between 4-7. 1 unit of blood given for low H/H.  TFs @ 40cc/hr. VSS. Will continue to monitor.

## 2018-06-15 NOTE — ASSESSMENT & PLAN NOTE
- Differential Diagnoses include Pulmonary renal syndrome.  - Underwent US of kidney and showed Bilateral medical renal disease, Simple renal cysts bilaterally.  - CT abdomen/pelvis, mild interstitial changes in lung bases, 2 stones in the left kidney, 4 stones in the right kidney, no hydronephrosis, post cholecystectomy. No acute abnormality in the liver, spleen, pancreas or adrenals.   - Will continue trending urine output and renal function, avoid NSAIDs, contrast, nephrotoxins    - Monitor strict I/Os, daily weights, renally dose medications to current GFR  - Still anuric and will continue CRRT

## 2018-06-15 NOTE — ASSESSMENT & PLAN NOTE
ANCA associated vasculitis  64 yo w/PMH of HTN and NIDDM, OA admitted for acute hypoxic respiratory failure, renal failure, and pulmonary renal syndrome receiving plasmapheresis. Rheumatology consulted for pulmonary renal syndrome, recs on steroids and further investigation.   - Given methylprednisolone 1000 mg IV daily x 5 doses   - Nephrology consulted: renal biopsy when stable  --> elevated UPCR 1.28, DENIZ positive 1:320 speckled pattern, MPO+ 82, c-ANCA +, p-anca neg,  SSA + 178, SSB neg, dsDNA neg, Anti-Sm/RNP neg, Anti-Sm neg, PR3 neg, Immunoglobulins wnl, Hep C and HIV neg, APL labs neg, GBM neg, MARTHA neg, C3/C4 wnl, ACE neg, quant gold indeterminate  - Concern for vasculitis given DAH and hematuria. The nini-typical pulmonary-renal syndromes are GPA/MPA, Goodpasture disease, SLE. Immunofluorescence studies can help differentiate. MPO+ can suggest MPA    Plan:   - Will f/u pending rheum labs: drugs of abuse screen, TB skin test   - agree with nephrology for renal bx when stable  - transfusion medicine: Daily TPE with FFP until resolution of DAH (6 total sessions, today is 3/6)  - Start IV methylprednisolone 125 mg q6hrs today  - Will start Rituximab for induction, discussed with family at bedside. Family given handout about Vasculitis and Rituximab 6/12.  - Per ID, cleared for Rituximab initiation. Discussed with Dr. Whaley with transfusion medicine, they plan for 6 total sessions of PLEX, next session today, then following sessions next MWF. Started Rituximab and Atovaquone today as pt finished PLEX session for today. Can resume PLEX as scheduled on Monday 6/18.  - concern for HIT? With platelets decreased by 50% since admit, recommend further investigation

## 2018-06-15 NOTE — PROCEDURES
[facility]  Transfusion Medicine  Procedure Note    SUMMARY   Therapeutic Plasma Exchange (Apheresis)  Date/Time: 6/15/2018 12:34 PM  Performed by: YENI CERRATO  Authorized by: WAQAR MENSAH         Date of Procedure: 6/15/2018     Procedure: Plasma Exchange    Provider: Yeni Cerrato MD     Pre-Procedure Diagnosis: Diffuse pulmonary alveolar hemorrhage  Post-Procedure Diagnosis: Diffuse pulmonary alveolar hemorrhage    Follow-up Assessment: 63 year old male presents with bronchoscopy-proven diffuse alevolar hemorrhage with accompanying hematuria and renal failure (Cr 8.8) due to ANCA-associated RPGN. The patient was transferred to [facility] for therapeutic plasma exchange and is currently intubated for respiratory failure. Anti-GBM antibodies are negative. C-ANCA are positive (more often associated with GPA than P-ANCA).    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 Journal of Clinical Apheresis 2016; 31:149-162.) This diagnosis supports daily to every other day TPE with FFP until resolution of DAH, then TPE with albumin replacement every 2-3 days for a total of 6 procedures, planned as follows: /M/W/F.     Today's procedure (#3 of 6) was well tolerated and without complication. Given the calcium gluconate shortages, no calcium repletion was provided during this procedure.  Platelet changes within expected values post plasma exchanges x 3. As plasma is removed via centrifugal seperation, the adjacent platelet layer is often affected and platelet activation within the circuit often results in margination from the circulation. Rituximab scheduled to follow TPE today.  Next scheduled  TPE for Monday (6/18).    Pertinent Laboratory Data:   Complete Blood Count:   Lab Results   Component Value Date    HGB 7.3 (L) 06/15/2018    HCT 21.5 (L) 06/15/2018    PLT 96 (L) 06/15/2018    WBC 15.24 (H) 06/15/2018     Basic Metabolic Panel:   Lab Results   Component Value Date     06/15/2018     06/15/2018    K 4.6 06/15/2018    K 4.6 06/15/2018     06/15/2018     06/15/2018    CO2 19 (L) 06/15/2018    CO2 19 (L) 06/15/2018     (H) 06/15/2018     (H) 06/15/2018    BUN 13 06/15/2018    BUN 13 06/15/2018    CREATININE 1.0 06/15/2018    CREATININE 1.0 06/15/2018    CALCIUM 7.6 (L) 06/15/2018    CALCIUM 7.6 (L) 06/15/2018    ANIONGAP 7 (L) 06/15/2018    ANIONGAP 7 (L) 06/15/2018    ESTGFRAFRICA >60.0 06/15/2018    ESTGFRAFRICA >60.0 06/15/2018    EGFRNONAA >60.0 06/15/2018    EGFRNONAA >60.0 06/15/2018       Pertinent Medications: None contraindicated    Review of patient's allergies indicates:   Allergen Reactions    Sulfa (sulfonamide antibiotics)        Anesthesia: None     Technical Procedures Used: Plasma Exchange: Volume exchanged - 4.5L; Replacement fluid - Fresh Frozen Plasma; Number of procedures 3 of 6; Date of next procedure 6/18/18 (Mon).    Description of the Findings of the Procedure:   Please see Apheresis Nurse flowsheet for details.    The patient was evaluated and all clinical and laboratory data relevant to the treatment was reviewed, and a decision was made to proceed with the Apheresis procedure.    I was available to the clinical staff throughout the procedure.    Significant Surgical Tasks Conducted by the Assistant(s): Not applicable  Complications: None  Estimated Blood Loss (EBL): None  Implants: None   Specimens: None      Yeni Whaley MD, PhD  Section of Transfusion Medicine & Histocompatibility  Department of Pathology and Laboratory Medicine  [facility]  [phone] (HLA & Blood Bank Offices)  06/15/2018

## 2018-06-15 NOTE — SUBJECTIVE & OBJECTIVE
Interval History: Remains intubated and sedated. Off levo. Increase FIO2 requirement 60% from 50%. SLED net positive 1.4 L. UOP 30 ml    Review of patient's allergies indicates:   Allergen Reactions    Sulfa (sulfonamide antibiotics)      Current Facility-Administered Medications   Medication Frequency    atovaquone suspension 750 mg Q12H    calcium chloride 1 g in dextrose 5 % 100 mL IVPB Continuous    calcium chloride 1 g in dextrose 5 % 100 mL IVPB Once    ceFEPIme injection 1 g Q8H    chlorhexidine 0.12 % solution 15 mL BID    dexmedetomidine (PRECEDEX) 400mcg/100mL 0.9% NaCL infusion Continuous    dextrose 50% injection 12.5 g PRN    dextrose 50% injection 25 g PRN    DEXTROSE-SOD CITRATE-CITRIC AC 2.45-2.2 GRAM- 730 MG/100 ML MISC SOLN Continuous    famotidine tablet 20 mg BID    fentaNYL 2500 mcg in 0.9% sodium chloride 250 mL infusion premix (titrating) Continuous    heparin, porcine (PF) 100 unit/mL injection flush 500 Units PRN    hepatitis B virus vacc.rec(PF) injection 40 mcg vaccine x 1 dose    insulin aspart U-100 pen 0-5 Units Q6H PRN    insulin regular (Humulin R) 100 Units in sodium chloride 0.9% 100 mL infusion Continuous    lorazepam injection 1 mg Q30 Min PRN    methylPREDNISolone sodium succinate injection 125 mg Q6H    norepinephrine 4 mg in dextrose 5% 250 mL infusion (premix) (titrating) Continuous    propofol (DIPRIVAN) 10 mg/mL infusion Continuous    riTUXimab (RITUXAN) 1,000 mg in sodium chloride 0.9% 1,000 mL infusion (conc: 1 mg/mL) 1 time in Clinic/HOD    sodium chloride 0.9% flush 10 mL PRN       Objective:     Vital Signs (Most Recent):  Temp: 99.5 °F (37.5 °C) (06/15/18 1505)  Pulse: 81 (06/15/18 1717)  Resp: 20 (06/15/18 0415)  BP: (!) 111/55 (06/15/18 1700)  SpO2: 95 % (06/15/18 1717)  O2 Device (Oxygen Therapy): ventilator (06/15/18 1717) Vital Signs (24h Range):  Temp:  [99.3 °F (37.4 °C)-100.3 °F (37.9 °C)] 99.5 °F (37.5 °C)  Pulse:  [] 81  Resp:   [17-26] 20  SpO2:  [79 %-100 %] 95 %  BP: ()/(42-86) 111/55  Arterial Line BP: (101-184)/(37-78) 128/52     Weight: 86.2 kg (190 lb) (06/15/18 1017)  Body mass index is 28.89 kg/m².  Body surface area is 2.03 meters squared.    I/O last 3 completed shifts:  In: 35420 [I.V.:2841; NG/GT:1565; IV Piggyback:7617]  Out: 39887 [Urine:30; Other:11400]    Physical Exam   Constitutional: He appears well-developed and well-nourished.   Intubated FIO2 60% , sedated    HENT:   Head: Normocephalic.   Cardiovascular: Normal heart sounds.  Exam reveals no gallop and no friction rub.    No murmur heard.  Tachycardic, no murmur appreciated, + 2 lower extremity and pedal edema,   No JVD     Pulmonary/Chest:   Intubated. Clear anteriorly   Abdominal: He exhibits no distension.   No Bowel sounds,    Musculoskeletal: He exhibits no edema (all extremities).   Neurological:   Intubated and sedated   Skin: Skin is warm and dry.   R SC trialysis   Nursing note and vitals reviewed.      Significant Labs:  All labs within the past 24 hours have been reviewed.     Significant Imaging:  Labs: Reviewed

## 2018-06-15 NOTE — SUBJECTIVE & OBJECTIVE
Interval History/Significant Events: Please see hospital course above     Review of Systems   Unable to perform ROS: Intubated     Objective:     Vital Signs (Most Recent):  Temp: 99.3 °F (37.4 °C) (06/15/18 1100)  Pulse: 88 (06/15/18 1505)  Resp: 20 (06/15/18 0415)  BP: (!) 94/47 (06/15/18 1505)  SpO2: (!) 93 % (06/15/18 1505) Vital Signs (24h Range):  Temp:  [99.3 °F (37.4 °C)-100.3 °F (37.9 °C)] 99.3 °F (37.4 °C)  Pulse:  [] 88  Resp:  [17-26] 20  SpO2:  [79 %-100 %] 93 %  BP: ()/(42-86) 94/47  Arterial Line BP: (103-184)/(38-71) 110/39   Weight: 86.2 kg (190 lb)  Body mass index is 28.89 kg/m².      Intake/Output Summary (Last 24 hours) at 06/15/18 1530  Last data filed at 06/15/18 1500   Gross per 24 hour   Intake             8379 ml   Output             6461 ml   Net             1918 ml       Physical Exam   Constitutional: He appears well-developed. No distress.   HENT:   Head: Normocephalic.   Mouth/Throat: No oropharyngeal exudate.   Eyes: Pupils are equal, round, and reactive to light. Right eye exhibits no discharge. Left eye exhibits no discharge.   Neck: Normal range of motion. No JVD present.   Cardiovascular: Normal rate and regular rhythm.    No murmur heard.  Pulmonary/Chest: Effort normal. No respiratory distress. He has no wheezes. He has rales.   Abdominal: Soft. He exhibits no distension. There is no tenderness.   Genitourinary: Rectal exam shows guaiac negative stool.   Musculoskeletal: He exhibits edema. He exhibits no tenderness or deformity.   Neurological:   Sedated RASS -2    Skin: Skin is warm. No rash noted. No erythema.       Vents:  Vent Mode: A/C (06/15/18 1500)  Ventilator Initiated: Yes (06/10/18 2217)  Set Rate: 18 bmp (06/15/18 1500)  Vt Set: 510 mL (06/15/18 1500)  PEEP/CPAP: 14 cmH20 (06/15/18 1500)  Oxygen Concentration (%): 60 (06/15/18 1500)  Peak Airway Pressure: 24 cmH2O (06/15/18 1500)  Plateau Pressure: 34 cmH20 (06/15/18 1500)  Total Ve: 14.5 mL (06/15/18  1500)  F/VT Ratio<105 (RSBI): (!) 30.02 (06/15/18 0126)  Lines/Drains/Airways     Central Venous Catheter Line                 Hemodialysis Catheter 06/10/18 2307 right internal jugular 4 days         Percutaneous Central Line Insertion/Assessment - triple lumen  06/10/18 2305 left internal jugular 4 days          Drain                 NG/OG Tube 06/10/18 2309 [place] sump 18 Fr. Left nostril 4 days         Urethral Catheter 06/10/18 2302 Latex 16 Fr. 4 days          Airway                 Airway - Non-Surgical Endotracheal Tube -- days          Arterial Line                 Arterial Line 06/10/18 2304 Right Radial 4 days              Significant Labs:    CBC/Anemia Profile:    Recent Labs  Lab 06/14/18  0806 06/14/18  2023 06/15/18  0358   WBC 14.28* 12.67 15.24*   HGB 7.8* 7.1* 7.3*   HCT 22.5* 21.2* 21.5*   PLT 96* 90* 96*   MCV 84 86 87   RDW 15.9* 15.7* 15.8*        Chemistries:    Recent Labs  Lab 06/14/18  0327  06/14/18 2156 06/15/18  0358 06/15/18  1400     138  < > 138 136  136 140   K 3.8  3.8  < > 4.9 4.6  4.6 4.3     103  < > 111* 110  110 116*   CO2 25  25  < > 20* 19*  19* 19*   BUN 15  15  < > 13 13  13 11   CREATININE 1.1  1.1  < > 1.0 1.0  1.0 0.9   CALCIUM 7.4*  7.4*  < > 7.5* 7.6*  7.6* 6.7*   ALBUMIN 2.7*  2.7*  < > 2.3* 2.4*  2.4* 3.4*   PROT 5.5*  --   --  5.8*  --    BILITOT 1.6*  --   --  1.2*  --    ALKPHOS 64  --   --  86  --    ALT 30  --   --  33  --    AST 44*  --   --  54*  --    MG 1.8  < > 1.9 2.3 1.7   PHOS 1.7*  < > 2.2* 2.1* 1.8*   < > = values in this interval not displayed.    CMP:   Recent Labs  Lab 06/14/18  0327  06/14/18  2156 06/15/18  0358 06/15/18  1400     138  < > 138 136  136 140   K 3.8  3.8  < > 4.9 4.6  4.6 4.3     103  < > 111* 110  110 116*   CO2 25  25  < > 20* 19*  19* 19*   *  191*  < > 235* 226*  226* 166*   BUN 15  15  < > 13 13  13 11   CREATININE 1.1  1.1  < > 1.0 1.0  1.0 0.9   CALCIUM 7.4*  7.4*   < > 7.5* 7.6*  7.6* 6.7*   PROT 5.5*  --   --  5.8*  --    ALBUMIN 2.7*  2.7*  < > 2.3* 2.4*  2.4* 3.4*   BILITOT 1.6*  --   --  1.2*  --    ALKPHOS 64  --   --  86  --    AST 44*  --   --  54*  --    ALT 30  --   --  33  --    ANIONGAP 10  10  < > 7* 7*  7* 5*   EGFRNONAA >60.0  >60.0  < > >60.0 >60.0  >60.0 >60.0   < > = values in this interval not displayed.  Cardiac Markers: No results for input(s): CKMB, TROPONINT, MYOGLOBIN in the last 48 hours.  Coagulation:   Recent Labs  Lab 06/15/18  0358   INR 1.0   APTT 22.5     Significant Imaging:  I have reviewed all pertinent imaging results/findings within the past 24 hours.  I have reviewed and interpreted all pertinent imaging results/findings within the past 24 hours.

## 2018-06-16 NOTE — PROGRESS NOTES
[facility]  Nephrology  Progress Note    Patient Name: Jasmeet Raymond  MRN: [id number]  Admission Date: 6/10/2018  Hospital Length of Stay: 6 days  Attending Provider: Everette Hdz*   Primary Care Physician: Provider Notinsystem  Principal Problem:Diffuse pulmonary alveolar hemorrhage    Subjective:     HPI: 62 yo AAm with HTN, NIDDM, arthritis on metformin and meloxicam  admitted to [facility] for worsening SOB and ALEX.   Patiend had been treated as an out;patient for 2 weeks for Shortness of breath and bronchitis and UTI. In the hospital he was found to be acidotic, hyperkalemic, serum creatinine 8, and diffuse bilateral pulmonary infiltrates, requiring intubation. UA with proteinuria, hematuria and wbc's, Bronchoscopy revealed hemorrahage. Patient recieved one dialysis as per notes and 1 gm solumedrol, and transferred  to [facility] for plasmapheresis  And further evaluation.  Family is not available at the time of exam and patient is intubated.   HIV, Hepatitis B, C, complements  All within normal limits. DENIZ, ANCA and AntiGBM, results Nnot available        Review of patient's allergies indicates:   Allergen Reactions    Sulfa (sulfonamide antibiotics)      Current Facility-Administered Medications   Medication Frequency    0.9%  NaCl infusion (CRRT USE ONLY) Continuous    0.9%  NaCl infusion (for blood administration) Q24H PRN    atovaquone suspension 750 mg Q12H    calcium chloride 1 g in dextrose 5 % 100 mL IVPB Continuous    ceFEPIme injection 1 g Q8H    chlorhexidine 0.12 % solution 15 mL BID    cisatracurium (NIMBEX) 100 mg in dextrose 5 % 100 mL infusion Continuous    dexmedetomidine (PRECEDEX) 400mcg/100mL 0.9% NaCL infusion Continuous    dextrose 50% injection 12.5 g PRN    dextrose 50% injection 25 g PRN    DEXTROSE-SOD CITRATE-CITRIC AC 2.45-2.2 GRAM- 730 MG/100 ML MISC SOLN Continuous    famotidine tablet 20 mg BID    fentaNYL 2500 mcg in 0.9% sodium  chloride 250 mL infusion premix (titrating) Continuous    heparin, porcine (PF) 100 unit/mL injection flush 500 Units PRN    hepatitis B virus vacc.rec(PF) injection 40 mcg vaccine x 1 dose    insulin aspart U-100 pen 0-5 Units Q6H PRN    insulin regular (Humulin R) 100 Units in sodium chloride 0.9% 100 mL infusion Continuous    lorazepam injection 1 mg Q30 Min PRN    magnesium sulfate 2g in water 50mL IVPB (premix) PRN    methylPREDNISolone sodium succinate injection 125 mg Q6H    midazolam (VERSED) 1 mg/mL in dextrose 5 % 100 mL infusion (titrating) Continuous    norepinephrine 4 mg in dextrose 5% 250 mL infusion (premix) (titrating) Continuous    propofol (DIPRIVAN) 10 mg/mL infusion Continuous    sodium chloride 0.9% flush 10 mL PRN    sodium phosphate 20.01 mmol in dextrose 5 % 250 mL IVPB PRN    sodium phosphate 30 mmol in dextrose 5 % 250 mL IVPB PRN    sodium phosphate 39.99 mmol in dextrose 5 % 250 mL IVPB PRN    white petrolatum-mineral oil (LUBIFRESH P.M.) ophthalmic ointment Q8H       Objective:     Vital Signs (Most Recent):  Temp: 98 °F (36.7 °C) (06/16/18 0700)  Pulse: 69 (06/16/18 1525)  Resp: 20 (06/15/18 0415)  BP: 121/68 (06/16/18 1400)  SpO2: 100 % (06/16/18 1525)  O2 Device (Oxygen Therapy): ventilator (06/16/18 1525) Vital Signs (24h Range):  Temp:  [98 °F (36.7 °C)-100.4 °F (38 °C)] 98 °F (36.7 °C)  Pulse:  [56-94] 69  SpO2:  [80 %-100 %] 100 %  BP: (100-193)/(51-82) 121/68  Arterial Line BP: ()/(45-78) 119/60     Weight: 90.1 kg (198 lb 10.2 oz) (06/16/18 0300)  Body mass index is 30.2 kg/m².  Body surface area is 2.08 meters squared.    I/O last 3 completed shifts:  In: 90450 [I.V.:5008; Blood:350; NG/GT:1730; IV Piggyback:7069]  Out: 03581 [Urine:35; Other:23525]    Physical Exam   HENT:   Head: Atraumatic.   Neck: No JVD present.   Cardiovascular: Normal rate and regular rhythm.  Exam reveals no friction rub.    Pulmonary/Chest: Effort normal and breath sounds normal.    Abdominal: Soft. He exhibits no distension.   Musculoskeletal: He exhibits edema.   Skin: Skin is warm.         Assessment/Plan:     ALEX (acute kidney injury)    MPO-ANCA GPA, presenting with RPGN and pulmonary hemorrhage  S/p pulse steroids, now on scheduled dose 125mg IV solumedrol q6h, and PLEX, also has been started on rituxan, renal supportive therapy with RRT (SLED)    Plan:  -continue SLED for metabolic clearance and volume management                       Thank you for your consult. I will follow-up with patient. Please contact us if you have any additional questions.    Peewee Headley MD  Nephrology  [facility]    Patient seen and examined on SLED with Dr Headley;   I have reviewed and agree with assessment and plan

## 2018-06-16 NOTE — SUBJECTIVE & OBJECTIVE
Interval History/Significant Events: Overnight, he had Rituxan induction for ANCA associated vasculitis and decrease his Methypredisone to 125 mg IV Q 6 Hours. Mild increase in his mechanical ventilation setting and he is grossly volume overload and still anuric.     Review of Systems   Unable to perform ROS: Intubated     Objective:     Vital Signs (Most Recent):  Temp: 98 °F (36.7 °C) (06/16/18 0700)  Pulse: 60 (06/16/18 0815)  Resp: 20 (06/15/18 0415)  BP: (!) 173/75 (06/16/18 0800)  SpO2: 99 % (06/16/18 0815) Vital Signs (24h Range):  Temp:  [98 °F (36.7 °C)-100.4 °F (38 °C)] 98 °F (36.7 °C)  Pulse:  [] 60  SpO2:  [80 %-100 %] 99 %  BP: ()/(42-86) 173/75  Arterial Line BP: (101-177)/(37-78) 152/65   Weight: 90.1 kg (198 lb 10.2 oz)  Body mass index is 30.2 kg/m².      Intake/Output Summary (Last 24 hours) at 06/16/18 0821  Last data filed at 06/16/18 0800   Gross per 24 hour   Intake             9597 ml   Output             6824 ml   Net             2773 ml       Physical Exam   Constitutional: He appears well-developed. No distress.   HENT:   Head: Normocephalic.   Mouth/Throat: No oropharyngeal exudate.   Eyes: Pupils are equal, round, and reactive to light. Right eye exhibits no discharge. Left eye exhibits no discharge.   Neck: Normal range of motion. No JVD present.   Cardiovascular: Regular rhythm.    No murmur heard.  Tachycardic, no murmur appreciated, + 2 lower extremity and pedal edema,    Pulmonary/Chest: Effort normal. No respiratory distress. He has no wheezes. He has rales.   Abdominal: Soft. He exhibits no distension. There is no tenderness.   Genitourinary: Rectal exam shows guaiac negative stool.   Musculoskeletal: He exhibits edema. He exhibits no tenderness or deformity.   Neurological:   Sedated RASS -2    Skin: Skin is warm. No rash noted. No erythema.       Vents:  Vent Mode: A/C (06/16/18 0749)  Ventilator Initiated: Yes (06/10/18 0251)  Set Rate: 18 bmp (06/16/18 0749)  Vt  Set: 510 mL (06/16/18 0749)  PEEP/CPAP: 16 cmH20 (06/16/18 0749)  Oxygen Concentration (%): 60 (06/16/18 0522)  Peak Airway Pressure: 57 cmH2O (06/16/18 0749)  Plateau Pressure: 38 cmH20 (06/16/18 0749)  Total Ve: 10.5 mL (06/16/18 0749)  F/VT Ratio<105 (RSBI): (!) 30.02 (06/15/18 0126)  Lines/Drains/Airways     Central Venous Catheter Line                 Hemodialysis Catheter 06/10/18 2307 right internal jugular 5 days         Percutaneous Central Line Insertion/Assessment - triple lumen  06/10/18 2305 left internal jugular 5 days          Drain                 NG/OG Tube 06/10/18 2309 [place] sump 18 Fr. Left nostril 5 days         Urethral Catheter 06/10/18 2302 Latex 16 Fr. 5 days          Airway                 Airway - Non-Surgical Endotracheal Tube -- days          Arterial Line                 Arterial Line 06/10/18 2304 Right Radial 5 days              Significant Labs:    CBC/Anemia Profile:    Recent Labs  Lab 06/15/18  0358 06/15/18  1609 06/16/18  0413   WBC 15.24* 13.28* 13.66*   HGB 7.3* 6.3* 7.1*   HCT 21.5* 19.4* 22.2*   PLT 96* 82* 58*   MCV 87 89 90   RDW 15.8* 16.1* 15.9*        Chemistries:    Recent Labs  Lab 06/15/18  0358 06/15/18  1400 06/15/18  2203 06/16/18  0413     136 140 138 139  139   K 4.6  4.6 4.3 5.2* 5.2*  5.2*     110 116* 114* 114*  114*   CO2 19*  19* 19* 19* 19*  19*   BUN 13  13 11 12 13  13   CREATININE 1.0  1.0 0.9 0.7 1.0  1.0   CALCIUM 7.6*  7.6* 6.7* 7.5* 7.8*  7.8*   ALBUMIN 2.4*  2.4* 3.4* 3.3* 3.1*  3.1*   PROT 5.8*  --   --  4.8*   BILITOT 1.2*  --   --  1.5*   ALKPHOS 86  --   --  37*   ALT 33  --   --  22   AST 54*  --   --  65*   MG 2.3 1.7 2.4 2.2   PHOS 2.1* 1.8* 2.1* 3.8       Bilirubin:   Recent Labs  Lab 06/12/18  0400 06/13/18  0450 06/14/18  0327 06/15/18  0358 06/16/18  0413   BILITOT 0.7 0.9 1.6* 1.2* 1.5*     CMP:   Recent Labs  Lab 06/15/18  0358 06/15/18  1400 06/15/18  2203 06/16/18  0413     136 140 138 139  139   K  4.6  4.6 4.3 5.2* 5.2*  5.2*     110 116* 114* 114*  114*   CO2 19*  19* 19* 19* 19*  19*   *  226* 166* 174* 179*  179*   BUN 13  13 11 12 13  13   CREATININE 1.0  1.0 0.9 0.7 1.0  1.0   CALCIUM 7.6*  7.6* 6.7* 7.5* 7.8*  7.8*   PROT 5.8*  --   --  4.8*   ALBUMIN 2.4*  2.4* 3.4* 3.3* 3.1*  3.1*   BILITOT 1.2*  --   --  1.5*   ALKPHOS 86  --   --  37*   AST 54*  --   --  65*   ALT 33  --   --  22   ANIONGAP 7*  7* 5* 5* 6*  6*   EGFRNONAA >60.0  >60.0 >60.0 >60.0 >60.0  >60.0     Coagulation:   Recent Labs  Lab 06/15/18  0358 06/16/18  0413   INR 1.0 1.1   APTT 22.5  --      Lactic Acid: No results for input(s): LACTATE in the last 48 hours.    Significant Imaging:  I have reviewed and interpreted all pertinent imaging results/findings within the past 24 hours.

## 2018-06-16 NOTE — ASSESSMENT & PLAN NOTE
MPO-ANCA GPA, presenting with RPGN and pulmonary hemorrhage  S/p pulse steroids, now on scheduled dose 125mg IV solumedrol q6h, and PLEX, also has been started on rituxan, renal supportive therapy with RRT (SLED)    Plan:  -continue SLED for metabolic clearance and volume management

## 2018-06-16 NOTE — ASSESSMENT & PLAN NOTE
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

## 2018-06-16 NOTE — PROGRESS NOTES
Paralytic inititated per MD order. Pt remains on fentanyl, versed, propofol and precedex. TOF obtained per policy ( 4/4 at level 9). Will continue to monitor closely.

## 2018-06-16 NOTE — PROGRESS NOTES
Notified CCM of increase breath stacking and decreasing BP. UF dropped to 450, Propofol decreased to 40 mcg/kg/min. Will continue to monitor closely.

## 2018-06-16 NOTE — PROGRESS NOTES
Critical care team notified of TF residuals of 350. Orders says to hold for residuals >500. Per MD ok to keep tube feeds infusing.

## 2018-06-16 NOTE — PROGRESS NOTES
[facility]  Critical Care Medicine  Progress Note    Patient Name: Jasmeet Raymond  MRN: [id number]  Admission Date: 6/10/2018  Hospital Length of Stay: 6 days  Code Status: Full Code  Attending Provider: Everette Hdz*  Primary Care Provider: Provider Notinsystem   Principal Problem: Diffuse pulmonary alveolar hemorrhage    Subjective:     HPI:  This is Mr. Jasmeet Raymond, 63 year old male with PMHx significant for HTN, DM and arthirtis presented to [facility] and [facility] with symptoms of pneumonia and shortness of breath. On 5/29/2018, he presented with symptoms of bronchitis with denies chills, ear pain, sore throat, chest pain, shortness of breath at that time, received antibiotics. Then on 6/8/2018 he presented again to [facility] and [facility] with shortness of breath and cough, and his CXR showed concern for airway disease. However on the first day of admission at [facility] and [facility], his respiratory symptoms worsen and had acute hypoxic respiratory failure that required intubation. Pulmonary consulted and he underwent Bronchoscopy and  revealed alveolar hemorrhage, with the setting of having hematuria and diffuse alveloar hemorrhage the concern was for presence of pulmonary renal syndrome and he received pulsed dose of Methylprednisolone and nephrology consulted for plasmapheresis. However at [facility], plasmapheresis machine is broken and they recommend to transfer to [facility] [place] for plasmapheresis.     On arrival he was on Nibmex for paralytics, Propofol for sedation, Levophed (eventaully turned off) for pressure support, Flolan (Epoprostenol sodium) inhaled which was switched to Inhaled Nitro. His mechanical ventilation initially was on FiO2 100%, PEEP 16, then weaned his FiO2 with high requirement of oxygenation. He had an signifiacnt drop in his Hb (baseline ~ 12) dated one week prior  presentation to 9 and 8, required one pRBC prior arrival. Repeated CXR showed extensive bilateral infiltrates. Important laboratory investigation showed His , , pro calcitonin 4.4, C3 and C4 complement normal., Trop negative, C3 and C4 showed normal , Hep test is negative, Lactic acid 1.8 > 1.0, HIV negative, Procal 4.4, UA shoowed heamturia and trace leukocytes, Urine culture from 5/29 showed almost pansensitve Cedecea davisae.     Hospital/ICU Course:  06/11/2018 admitted to MICU from [facility] with ARDS  06/12/2018 No acute events overnight. Had PLEX with 4.5 L exchange with FFP, and CRRT started and he became hypotensive, Levophed started during the CRRT. He was weaning off Nitric Oxide and his ABGs showing worsening respi acidosis. Rheumatology, Transfusion medicine and Nephrology on board   06/13/2018 Stable on High ladder PEEP for ARDS and completely off NO, and still on Nimbex for paralytics and sedation with Propofol and Fentanyl. ID, Rheumatology and Nephrology on board.   06/15/2018, Still on high setting   06/15/2018 Started Rituxan after his PLEx session, and decrease in his Methylprednisone. Still on high PEEP ladder for ARDS, frequent clots in ETT suction   06/16/2018 Overnight, he had Rituxan induction for ANCA associated vasculitis and decrease his Methypredisone to 125 mg IV Q 6 Hours. Mild increase in his mechanical ventilation setting and he is grossly volume overload and still anuric.     Interval History/Significant Events: Overnight, he had Rituxan induction for ANCA associated vasculitis and decrease his Methypredisone to 125 mg IV Q 6 Hours. Mild increase in his mechanical ventilation setting and he is grossly volume overload and still anuric.     Review of Systems   Unable to perform ROS: Intubated     Objective:     Vital Signs (Most Recent):  Temp: 98 °F (36.7 °C) (06/16/18 0700)  Pulse: 60 (06/16/18 0815)  Resp: 20 (06/15/18  2185)  BP: (!) 173/75 (06/16/18 0800)  SpO2: 99 % (06/16/18 0815) Vital Signs (24h Range):  Temp:  [98 °F (36.7 °C)-100.4 °F (38 °C)] 98 °F (36.7 °C)  Pulse:  [] 60  SpO2:  [80 %-100 %] 99 %  BP: ()/(42-86) 173/75  Arterial Line BP: (101-177)/(37-78) 152/65   Weight: 90.1 kg (198 lb 10.2 oz)  Body mass index is 30.2 kg/m².      Intake/Output Summary (Last 24 hours) at 06/16/18 0821  Last data filed at 06/16/18 0800   Gross per 24 hour   Intake             9597 ml   Output             6824 ml   Net             2773 ml       Physical Exam   Constitutional: He appears well-developed. No distress.   HENT:   Head: Normocephalic.   Mouth/Throat: No oropharyngeal exudate.   Eyes: Pupils are equal, round, and reactive to light. Right eye exhibits no discharge. Left eye exhibits no discharge.   Neck: Normal range of motion. No JVD present.   Cardiovascular: Regular rhythm.    No murmur heard.  Tachycardic, no murmur appreciated, + 2 lower extremity and pedal edema,    Pulmonary/Chest: Effort normal. No respiratory distress. He has no wheezes. He has rales.   Abdominal: Soft. He exhibits no distension. There is no tenderness.   Genitourinary: Rectal exam shows guaiac negative stool.   Musculoskeletal: He exhibits edema. He exhibits no tenderness or deformity.   Neurological:   Sedated RASS -2    Skin: Skin is warm. No rash noted. No erythema.       Vents:  Vent Mode: A/C (06/16/18 0749)  Ventilator Initiated: Yes (06/10/18 2217)  Set Rate: 18 bmp (06/16/18 0749)  Vt Set: 510 mL (06/16/18 0749)  PEEP/CPAP: 16 cmH20 (06/16/18 0749)  Oxygen Concentration (%): 60 (06/16/18 0522)  Peak Airway Pressure: 57 cmH2O (06/16/18 0749)  Plateau Pressure: 38 cmH20 (06/16/18 0749)  Total Ve: 10.5 mL (06/16/18 0749)  F/VT Ratio<105 (RSBI): (!) 30.02 (06/15/18 0126)  Lines/Drains/Airways     Central Venous Catheter Line                 Hemodialysis Catheter 06/10/18 9228 right internal jugular 5 days         Percutaneous Central  Line Insertion/Assessment - triple lumen  06/10/18 2305 left internal jugular 5 days          Drain                 NG/OG Tube 06/10/18 2309 Teaberry sump 18 Fr. Left nostril 5 days         Urethral Catheter 06/10/18 2302 Latex 16 Fr. 5 days          Airway                 Airway - Non-Surgical Endotracheal Tube -- days          Arterial Line                 Arterial Line 06/10/18 2304 Right Radial 5 days              Significant Labs:    CBC/Anemia Profile:    Recent Labs  Lab 06/15/18  0358 06/15/18  1609 06/16/18  0413   WBC 15.24* 13.28* 13.66*   HGB 7.3* 6.3* 7.1*   HCT 21.5* 19.4* 22.2*   PLT 96* 82* 58*   MCV 87 89 90   RDW 15.8* 16.1* 15.9*        Chemistries:    Recent Labs  Lab 06/15/18  0358 06/15/18  1400 06/15/18  2203 06/16/18  0413     136 140 138 139  139   K 4.6  4.6 4.3 5.2* 5.2*  5.2*     110 116* 114* 114*  114*   CO2 19*  19* 19* 19* 19*  19*   BUN 13  13 11 12 13  13   CREATININE 1.0  1.0 0.9 0.7 1.0  1.0   CALCIUM 7.6*  7.6* 6.7* 7.5* 7.8*  7.8*   ALBUMIN 2.4*  2.4* 3.4* 3.3* 3.1*  3.1*   PROT 5.8*  --   --  4.8*   BILITOT 1.2*  --   --  1.5*   ALKPHOS 86  --   --  37*   ALT 33  --   --  22   AST 54*  --   --  65*   MG 2.3 1.7 2.4 2.2   PHOS 2.1* 1.8* 2.1* 3.8       Bilirubin:   Recent Labs  Lab 06/12/18  0400 06/13/18  0450 06/14/18  0327 06/15/18  0358 06/16/18  0413   BILITOT 0.7 0.9 1.6* 1.2* 1.5*     CMP:   Recent Labs  Lab 06/15/18  0358 06/15/18  1400 06/15/18  2203 06/16/18  0413     136 140 138 139  139   K 4.6  4.6 4.3 5.2* 5.2*  5.2*     110 116* 114* 114*  114*   CO2 19*  19* 19* 19* 19*  19*   *  226* 166* 174* 179*  179*   BUN 13  13 11 12 13  13   CREATININE 1.0  1.0 0.9 0.7 1.0  1.0   CALCIUM 7.6*  7.6* 6.7* 7.5* 7.8*  7.8*   PROT 5.8*  --   --  4.8*   ALBUMIN 2.4*  2.4* 3.4* 3.3* 3.1*  3.1*   BILITOT 1.2*  --   --  1.5*   ALKPHOS 86  --   --  37*   AST 54*  --   --  65*   ALT 33  --   --  22   ANIONGAP 7*  7* 5* 5*  6*  6*   EGFRNONAA >60.0  >60.0 >60.0 >60.0 >60.0  >60.0     Coagulation:   Recent Labs  Lab 06/15/18  0358 06/16/18  0413   INR 1.0 1.1   APTT 22.5  --      Lactic Acid: No results for input(s): LACTATE in the last 48 hours.    Significant Imaging:  I have reviewed and interpreted all pertinent imaging results/findings within the past 24 hours.    Assessment/Plan:     Pulmonary   * Diffuse pulmonary alveolar hemorrhage    - Given his presentation with two weeks of progressive worsening of his SOB and cough, and associated with hematuria and CXR finding of bilateral infiltrate raised the concern for DAH  - On 6/10 he underwent Flexible Bronchoscopy and showed no endobronchial lesion, significant signs of hemorrhage on all of the airways, confirming findings of pulmonary hemorrhage.   - On high oxygenation setting with FiO2 of 50% and PEEP 14, and his PO2 is 57  - Requires on pRBC on 6/12 and another unit on 6/15        Acute hypoxemic respiratory failure    - Please see principal problem for more elaboration         ARDS (adult respiratory distress syndrome)    - Most likely etiology is Diffuse pulmonary alveolar hemorrhage, however severe pneumonia in the setting of leukocytosis and fever and presentation of cough and SOB can not be excluded  - On conservative tidal volume (6-8 ml/kg predicted body weight), balanced respiratory rate, and high oxygenation setting in the setting of DAH going with high PEEP ladder        Cardiac/Vascular   Pure hyperglyceridemia    - Most likely from Propofol and plan to wean off Propofol and started on Insulin infusion   - Improved in his TG to 600 with insulin         Renal/   ALEX (acute kidney injury)    - Differential Diagnoses include Pulmonary renal syndrome.  - Underwent US of kidney and showed Bilateral medical renal disease, Simple renal cysts bilaterally.  - CT abdomen/pelvis, mild interstitial changes in lung bases, 2 stones in the left kidney, 4 stones in the right  kidney, no hydronephrosis, post cholecystectomy. No acute abnormality in the liver, spleen, pancreas or adrenals.   - Will continue trending urine output and renal function, avoid NSAIDs, contrast, nephrotoxins    - Monitor strict I/Os, daily weights, renally dose medications to current GFR  - Still anuric and will continue CRRT with plan to increase UF rate        ID   Septic shock    - See principal problem for more elaboration.   - Currently on low need for pressors, will continue to monitor and ensure MAP > 65 mmHg (during CRRT)  - Initially on broad spectrum antibiotics with Cefepime (renally dose) and Vancomycin. Stopped Vancomycoin and will plan to Stop Cefepime int he next couple of days per ID rec   - Blood culture and Urine Culture showed NGTD         Immunology/Multi System   ANCA-associated vasculitis    - See Pulmorenal syndrome for more elaboration         Pulmonary-renal syndrome    - Most likely the diagnosis in the setting of diffuse alveolar hemorrhage and glomerulonephritis with most likely etiology is underlying autoimmune disorder  - Elevated UPCR 1.28, DENIZ positive 1:320 speckled pattern, MPO+ 82, PR3 neg, Immunoglobulins wnl, Hep C and HIV neg  - Finished  5 days of Methylprednisolone 1000 mg IV daily, started on Rituxan (6/15) and Methylprednisone 125 mg IV Q 6 and Atovaquin for PPx         Hematology   Thrombocytopenia, unspecified    - His 4T score is 2 which refect Low probability for Heparin Induced Thrombocytopenia   - Will continue to observe         Oncology   Acute posthemorrhagic anemia    - Most likely is diffuse pulmonary alveolar hemorrhage  - Frequent check in his CBC, transfuse when Hb < 7        Endocrine   Diabetes mellitus type 2 with complications    - Will keep his blood glucose on the target 140-180  - on Insulin infusion for Hyperglycemia and HyperTG   Recent Labs      06/16/18   0102  06/16/18   0200  06/16/18   0318  06/16/18   0406  06/16/18   0504  06/16/18   0611   06/16/18   0713  06/16/18   0758   POCTGLUCOSE  171*  200*  204*  183*  177*  182*  177*  151*           Other   Severe sepsis with septic shock (CODE)    - See septic shock for more elaborating            Critical Care Daily Checklist:    A: Awake: RASS Goal/Actual Goal: RASS Goal: -4-->deep sedation  Actual: Conteh Agitation Sedation Scale (RASS): Deep sedation   B: Spontaneous Breathing Trial Performed?     C: SAT & SBT Coordinated?  No                      D: Delirium: CAM-ICU Overall CAM-ICU: Negative   E: Early Mobility Performed? No   F: Feeding Goal: Goals: Patient to receive nutrition by RD follow-up  Status: Nutrition Goal Status: goal met   Current Diet Order   Procedures    Diet NPO      AS: Analgesia/Sedation Yes   T: Thromboembolic Prophylaxis No   H: HOB > 300 Yes   U: Stress Ulcer Prophylaxis (if needed) Yes   G: Glucose Control Yes   B: Bowel Function Stool Occurrence: 1   I: Indwelling Catheter (Lines & Liu) Necessity Yes   D: De-escalation of Antimicrobials/Pharmacotherapies Yes    Plan for the day/ETD No    Code Status:  Family/Goals of Care: Full Code  Ongoing        Critical secondary to Patient has a condition that poses threat to life and bodily function: Severe Respiratory Distress and Acute Renal Failure      Critical care was time spent personally by me on the following activities: development of treatment plan with patient or surrogate and bedside caregivers, discussions with consultants, evaluation of patient's response to treatment, examination of patient, ordering and performing treatments and interventions, ordering and review of laboratory studies, ordering and review of radiographic studies, pulse oximetry, re-evaluation of patient's condition. This critical care time did not overlap with that of any other provider or involve time for any procedures.     Tommie Kuhn MD  Critical Care Medicine  [facility]

## 2018-06-16 NOTE — ASSESSMENT & PLAN NOTE
- Differential Diagnoses include Pulmonary renal syndrome.  - Underwent US of kidney and showed Bilateral medical renal disease, Simple renal cysts bilaterally.  - CT abdomen/pelvis, mild interstitial changes in lung bases, 2 stones in the left kidney, 4 stones in the right kidney, no hydronephrosis, post cholecystectomy. No acute abnormality in the liver, spleen, pancreas or adrenals.   - Will continue trending urine output and renal function, avoid NSAIDs, contrast, nephrotoxins    - Monitor strict I/Os, daily weights, renally dose medications to current GFR  - Still anuric and will continue CRRT with plan to increase UF rate

## 2018-06-16 NOTE — PROGRESS NOTES
Updated Dr. Kuhn on current VS, gtt rates, breath stacking continues. Orders received and implemented.

## 2018-06-16 NOTE — PLAN OF CARE
Problem: Patient Care Overview  Goal: Plan of Care Review  Reviewed plan of care with Pt and family. Pt remains intubated, ventilator settings increasing throughout the afternoon. Nimbex initiated to help with asynchronous breath stacking, ABGs improved. Pt remains sedated per protocol with Propofol, precedex, fentanyl, and versed titrated for RASS and comfort. Nimbex titrated for TOF, RR. Insulin adjusted per protocol. Many issues with CRRT line today, Pt clotted off 3 times after 3-4 hour runs, settings adjusted per HD RN and nephrology. Pt noted to have increased residuals, 900 cc total. TF held at this time, bowel sounds present but hypoactive. Family at bedside on and off throughout day, spouse very upset, emotional support provided and  services offered. Currently VSS, will continue to monitor.

## 2018-06-16 NOTE — ASSESSMENT & PLAN NOTE
- Will keep his blood glucose on the target 140-180  - on Insulin infusion for Hyperglycemia and HyperTG   Recent Labs      06/16/18   0102  06/16/18   0200  06/16/18   0318  06/16/18   0406  06/16/18   0504  06/16/18   0611  06/16/18   0713  06/16/18   0758   POCTGLUCOSE  171*  200*  204*  183*  177*  182*  177*  151*

## 2018-06-16 NOTE — PROGRESS NOTES
Notified CCM of acute desaturation with tachypnea, ABG obtained. Vent settings increased to 80 % FiO2 PEEP. Orders received to administer ativan, start versed gtt STAT. Will implement and continue to monitor.

## 2018-06-16 NOTE — ASSESSMENT & PLAN NOTE
- Given his presentation with two weeks of progressive worsening of his SOB and cough, and associated with hematuria and CXR finding of bilateral infiltrate raised the concern for DAH  - On 6/10 he underwent Flexible Bronchoscopy and showed no endobronchial lesion, significant signs of hemorrhage on all of the airways, confirming findings of pulmonary hemorrhage.   - On high oxygenation setting with FiO2 of 50% and PEEP 14, and his PO2 is 57  - Requires on pRBC on 6/12 and another unit on 6/15

## 2018-06-16 NOTE — PROGRESS NOTES
Notified Dr. Kuhn of current respiratory pattern with vent changes. RT and MD at bedside to assess.

## 2018-06-16 NOTE — ASSESSMENT & PLAN NOTE
- Most likely from Propofol and plan to wean off Propofol and started on Insulin infusion   - Improved in his TG to 600 with insulin

## 2018-06-16 NOTE — PROGRESS NOTES
Critical care team notified of SBP in the 170s. Per MD continue to monitor and notify if SBP increases to the 180s.

## 2018-06-16 NOTE — PROGRESS NOTES
Notified Dr. Kuhn of initiation of levophed to keep SBP > 100. Orders received to increase versed to 6mg/hr and continue to monitor closely. Will notify MD with any increasing requirements.

## 2018-06-16 NOTE — SUBJECTIVE & OBJECTIVE
Review of patient's allergies indicates:   Allergen Reactions    Sulfa (sulfonamide antibiotics)      Current Facility-Administered Medications   Medication Frequency    0.9%  NaCl infusion (CRRT USE ONLY) Continuous    0.9%  NaCl infusion (for blood administration) Q24H PRN    atovaquone suspension 750 mg Q12H    calcium chloride 1 g in dextrose 5 % 100 mL IVPB Continuous    ceFEPIme injection 1 g Q8H    chlorhexidine 0.12 % solution 15 mL BID    cisatracurium (NIMBEX) 100 mg in dextrose 5 % 100 mL infusion Continuous    dexmedetomidine (PRECEDEX) 400mcg/100mL 0.9% NaCL infusion Continuous    dextrose 50% injection 12.5 g PRN    dextrose 50% injection 25 g PRN    DEXTROSE-SOD CITRATE-CITRIC AC 2.45-2.2 GRAM- 730 MG/100 ML MISC SOLN Continuous    famotidine tablet 20 mg BID    fentaNYL 2500 mcg in 0.9% sodium chloride 250 mL infusion premix (titrating) Continuous    heparin, porcine (PF) 100 unit/mL injection flush 500 Units PRN    hepatitis B virus vacc.rec(PF) injection 40 mcg vaccine x 1 dose    insulin aspart U-100 pen 0-5 Units Q6H PRN    insulin regular (Humulin R) 100 Units in sodium chloride 0.9% 100 mL infusion Continuous    lorazepam injection 1 mg Q30 Min PRN    magnesium sulfate 2g in water 50mL IVPB (premix) PRN    methylPREDNISolone sodium succinate injection 125 mg Q6H    midazolam (VERSED) 1 mg/mL in dextrose 5 % 100 mL infusion (titrating) Continuous    norepinephrine 4 mg in dextrose 5% 250 mL infusion (premix) (titrating) Continuous    propofol (DIPRIVAN) 10 mg/mL infusion Continuous    sodium chloride 0.9% flush 10 mL PRN    sodium phosphate 20.01 mmol in dextrose 5 % 250 mL IVPB PRN    sodium phosphate 30 mmol in dextrose 5 % 250 mL IVPB PRN    sodium phosphate 39.99 mmol in dextrose 5 % 250 mL IVPB PRN    white petrolatum-mineral oil (LUBIFRESH P.M.) ophthalmic ointment Q8H       Objective:     Vital Signs (Most Recent):  Temp: 98 °F (36.7 °C) (06/16/18  0700)  Pulse: 69 (06/16/18 1525)  Resp: 20 (06/15/18 0415)  BP: 121/68 (06/16/18 1400)  SpO2: 100 % (06/16/18 1525)  O2 Device (Oxygen Therapy): ventilator (06/16/18 1525) Vital Signs (24h Range):  Temp:  [98 °F (36.7 °C)-100.4 °F (38 °C)] 98 °F (36.7 °C)  Pulse:  [56-94] 69  SpO2:  [80 %-100 %] 100 %  BP: (100-193)/(51-82) 121/68  Arterial Line BP: ()/(45-78) 119/60     Weight: 90.1 kg (198 lb 10.2 oz) (06/16/18 0300)  Body mass index is 30.2 kg/m².  Body surface area is 2.08 meters squared.    I/O last 3 completed shifts:  In: 81176 [I.V.:5008; Blood:350; NG/GT:1730; IV Piggyback:7069]  Out: 63746 [Urine:35; Other:19329]    Physical Exam   HENT:   Head: Atraumatic.   Neck: No JVD present.   Cardiovascular: Normal rate and regular rhythm.  Exam reveals no friction rub.    Pulmonary/Chest: Effort normal and breath sounds normal.   Abdominal: Soft. He exhibits no distension.   Musculoskeletal: He exhibits edema.   Skin: Skin is warm.

## 2018-06-17 NOTE — PLAN OF CARE
Problem: Patient Care Overview  Goal: Plan of Care Review  Outcome: Ongoing (interventions implemented as appropriate)  Patient with stable vital signs at present time. Patient on levo to maintain MAP greater than 65. Patient was taken off paralytic earlier per Dr. William and Dr. Hdz at bedside. Dr. Hdz wants to slowly wean off precedex, and leave patient on propofol and fentanyl only to maintain RASS -2 to -3, without altering patient's oxygenation, PO2 status. Patient's family at bedside. Patient's family updated on plan of care for the day per Dr. Hdz. Dr. Hdz, Dr. William, and Dr. Kuhn updated on patient's assessments, vital signs, and lab results throughout the day. Will continue to monitor patient.

## 2018-06-17 NOTE — PROGRESS NOTES
[facility]  Critical Care Medicine  Progress Note    Patient Name: Jasmeet Raymond  MRN: [id number]  Admission Date: 6/10/2018  Hospital Length of Stay: 7 days  Code Status: Full Code  Attending Provider: Everette Hdz*  Primary Care Provider: Provider Notinsystem   Principal Problem: Diffuse pulmonary alveolar hemorrhage    Subjective:     HPI:  This is Mr. Jasmeet Raymond, 63 year old male with PMHx significant for HTN, DM and arthirtis presented to [facility] ([facility] and [facility] with symptoms of pneumonia and shortness of breath. On 5/29/2018, he presented with symptoms of bronchitis with denies chills, ear pain, sore throat, chest pain, shortness of breath at that time, received antibiotics. Then on 6/8/2018 he presented again to [facility] ([facility] and [facility] with shortness of breath and cough, and his CXR showed concern for airway disease. However on the first day of admission at [facility] and [facility], his respiratory symptoms worsen and had acute hypoxic respiratory failure that required intubation. Pulmonary consulted and he underwent Bronchoscopy and  revealed alveolar hemorrhage, with the setting of having hematuria and diffuse alveloar hemorrhage the concern was for presence of pulmonary renal syndrome and he received pulsed dose of Methylprednisolone and nephrology consulted for plasmapheresis. However at [facility], plasmapheresis machine is broken and they recommend to transfer to [facility] main [place] for plasmapheresis.     On arrival he was on Nibmex for paralytics, Propofol for sedation, Levophed (eventaully turned off) for pressure support, Flolan (Epoprostenol sodium) inhaled which was switched to Inhaled Nitro. His mechanical ventilation initially was on FiO2 100%, PEEP 16, then weaned his FiO2 with high requirement of oxygenation. He had an signifiacnt drop in his Hb (baseline ~ 12) dated one week prior  presentation to 9 and 8, required one pRBC prior arrival. Repeated CXR showed extensive bilateral infiltrates. Important laboratory investigation showed His , , pro calcitonin 4.4, C3 and C4 complement normal., Trop negative, C3 and C4 showed normal , Hep test is negative, Lactic acid 1.8 > 1.0, HIV negative, Procal 4.4, UA shoowed heamturia and trace leukocytes, Urine culture from 5/29 showed almost pansensitve Cedecea davisae.     Hospital/ICU Course:  06/11/2018 admitted to MICU from [facility] with ARDS  06/12/2018 No acute events overnight. Had PLEX with 4.5 L exchange with FFP, and CRRT started and he became hypotensive, Levophed started during the CRRT. He was weaning off Nitric Oxide and his ABGs showing worsening respi acidosis. Rheumatology, Transfusion medicine and Nephrology on board   06/13/2018 Stable on High ladder PEEP for ARDS and completely off NO, and still on Nimbex for paralytics and sedation with Propofol and Fentanyl. ID, Rheumatology and Nephrology on board.   06/15/2018, Still on high setting   06/15/2018 Started Rituxan after his PLEx session, and decrease in his Methylprednisone. Still on high PEEP ladder for ARDS, frequent clots in ETT suction   06/16/2018 Overnight, he had Rituxan induction for ANCA associated vasculitis and decrease his Methypredisone to 125 mg IV Q 6 Hours. Mild increase in his mechanical ventilation setting and he is grossly volume overload and still anuric.   06/17/2018 Palaryzed with Nimbex and better in his mechanical ventilation setting. Off Versed and on max Precedex and Profol. Increased in UF rate to 500 and better in his I/O. Repated CXR showed better air space.     Interval History/Significant Events: Was Palaryzed with Nimbex and better in his mechanical ventilation setting. Then De paralyzed around 1000. Off Versed and on max Precedex and Profol. Increased in UF rate to 500 and better in his I/O.  Repated CXR showed better air space.     Review of Systems   Unable to perform ROS: Intubated     Objective:     Vital Signs (Most Recent):  Temp: (!) 44.6 °F (7 °C) (06/17/18 0700)  Pulse: (!) 56 (06/17/18 0809)  Resp: 20 (06/15/18 0415)  BP: 138/68 (06/17/18 0700)  SpO2: 98 % (06/17/18 0809) Vital Signs (24h Range):  Temp:  [44.6 °F (7 °C)-98.7 °F (37.1 °C)] 44.6 °F (7 °C)  Pulse:  [] 56  SpO2:  [80 %-100 %] 98 %  BP: ()/(54-75) 138/68  Arterial Line BP: ()/(40-72) 131/60   Weight: 90.1 kg (198 lb 10.2 oz)  Body mass index is 30.2 kg/m².      Intake/Output Summary (Last 24 hours) at 06/17/18 0826  Last data filed at 06/17/18 0800   Gross per 24 hour   Intake             8733 ml   Output            56539 ml   Net            -2951 ml       Physical Exam   Constitutional: He appears well-developed and well-nourished. No distress.   Intubated FIO2 60% , sedated    HENT:   Head: Normocephalic.   Mouth/Throat: No oropharyngeal exudate.   Eyes: Pupils are equal, round, and reactive to light. Right eye exhibits no discharge. Left eye exhibits no discharge.   Neck: Normal range of motion. No JVD present.   Cardiovascular: Regular rhythm and normal heart sounds.    No murmur heard.  No murmur appreciated, + 2 lower extremity and pedal edema,   No JVD     Pulmonary/Chest: Effort normal. He has no wheezes. He has rales.   Intubated.   Vent Mode: A/C  Oxygen Concentration (%):  (35-80) 35  Resp Rate Total:  (18 br/min-42 br/min) 22 br/min  Vt Set:  (420 mL-510 mL) 480 mL  PEEP/CPAP:  (10 qeJ93-82 cmH20) 10 cmH20  Mean Airway Pressure:  (15 nkO57-48 cmH20) 19 cmH20   Abdominal: Soft. He exhibits no distension. There is no tenderness.   No Bowel sounds,    Genitourinary: Rectal exam shows guaiac negative stool.   Musculoskeletal: He exhibits no edema (all extremities), tenderness or deformity.   Neurological:   Intubated and sedated   Skin: Skin is warm and dry. No rash noted. No erythema.   R SC trialysis    Nursing note and vitals reviewed.      Vents:  Vent Mode: A/C (06/17/18 0809)  Ventilator Initiated: Yes (06/10/18 2217)  Set Rate: 22 bmp (06/17/18 0809)  Vt Set: 480 mL (06/17/18 0809)  PEEP/CPAP: 10 cmH20 (06/17/18 0809)  Oxygen Concentration (%): 35 (06/17/18 0809)  Peak Airway Pressure: 36 cmH2O (06/17/18 0809)  Plateau Pressure: 32 cmH20 (06/17/18 0809)  Total Ve: 10.7 mL (06/17/18 0809)  F/VT Ratio<105 (RSBI): (!) 30.02 (06/15/18 0126)  Lines/Drains/Airways     Central Venous Catheter Line                 Hemodialysis Catheter 06/10/18 2307 right internal jugular 6 days         Percutaneous Central Line Insertion/Assessment - triple lumen  06/10/18 2305 left internal jugular 6 days          Drain                 NG/OG Tube 06/10/18 2309 [place] sump 18 Fr. Left nostril 6 days          Airway                 Airway - Non-Surgical Endotracheal Tube -- days          Arterial Line                 Arterial Line 06/10/18 2304 Right Radial 6 days          Peripheral Intravenous Line                 Peripheral IV - Single Lumen 06/16/18 1905 Right Antecubital less than 1 day              Significant Labs:    CBC/Anemia Profile:    Recent Labs  Lab 06/16/18  1102 06/16/18  1608 06/17/18  0339   WBC 14.98* 23.63* 21.82*   HGB 7.6* 7.9* 8.2*   HCT 23.8* 24.3* 24.6*   PLT 64* 91* 136*   MCV 88 87 86   RDW 16.1* 16.1* 16.1*        Chemistries:    Recent Labs  Lab 06/16/18  0413 06/16/18  1354 06/16/18  2200 06/17/18  0339     139 140 138 138  138   K 5.2*  5.2* 4.7 4.9 4.8  4.8   *  114* 115* 110 109  109   CO2 19*  19* 21* 22* 23  23   BUN 13  13 10 9 8  8   CREATININE 1.0  1.0 0.8 0.8 0.8  0.8   CALCIUM 7.8*  7.8* 8.1* 7.8* 7.7*  7.7*   ALBUMIN 3.1*  3.1* 2.9* 2.9* 2.8*  2.8*   PROT 4.8*  --   --  4.9*   BILITOT 1.5*  --   --  1.0   ALKPHOS 37*  --   --  39*   ALT 22  --   --  21   AST 65*  --   --  29   MG 2.2 1.9  1.9 1.6 1.9   PHOS 3.8 2.5* 2.4* 2.4*       CMP:   Recent Labs  Lab  06/16/18  0413 06/16/18  1354 06/16/18  2200 06/17/18  0339     139 140 138 138  138   K 5.2*  5.2* 4.7 4.9 4.8  4.8   *  114* 115* 110 109  109   CO2 19*  19* 21* 22* 23  23   *  179* 128* 147* 147*  147*   BUN 13  13 10 9 8  8   CREATININE 1.0  1.0 0.8 0.8 0.8  0.8   CALCIUM 7.8*  7.8* 8.1* 7.8* 7.7*  7.7*   PROT 4.8*  --   --  4.9*   ALBUMIN 3.1*  3.1* 2.9* 2.9* 2.8*  2.8*   BILITOT 1.5*  --   --  1.0   ALKPHOS 37*  --   --  39*   AST 65*  --   --  29   ALT 22  --   --  21   ANIONGAP 6*  6* 4* 6* 6*  6*   EGFRNONAA >60.0  >60.0 >60.0 >60.0 >60.0  >60.0     Coagulation:   Recent Labs  Lab 06/17/18  0339   INR 1.0   APTT 24.8     All pertinent labs within the past 24 hours have been reviewed.    Significant Imaging:  I have reviewed all pertinent imaging results/findings within the past 24 hours.  I have reviewed and interpreted all pertinent imaging results/findings within the past 24 hours.    Assessment/Plan:     Pulmonary   * Diffuse pulmonary alveolar hemorrhage    - Given his presentation with two weeks of progressive worsening of his SOB and cough, and associated with hematuria and CXR finding of bilateral infiltrate raised the concern for DAH  - On 6/10 he underwent Flexible Bronchoscopy and showed no endobronchial lesion, significant signs of hemorrhage on all of the airways, confirming findings of pulmonary hemorrhage.   - On high oxygenation setting with FiO2 of 35% and PEEP 10, and his PO2 is 63  - Requires on pRBC on 6/12 and another unit on 6/15        Acute hypoxemic respiratory failure    - Please see principal problem for more elaboration         ARDS (adult respiratory distress syndrome)    - Most likely etiology is Diffuse pulmonary alveolar hemorrhage, however severe pneumonia in the setting of leukocytosis and fever and presentation of cough and SOB can not be excluded  - On conservative tidal volume (6 ml/kg predicted body weight), balanced respiratory  rate, and high oxygenation setting in the setting of DAH going with high PEEP ladder  - De paralyzed         Cardiac/Vascular   Pure hyperglyceridemia    - Most likely from Propofol and plan to wean off Propofol and started on Insulin infusion   - Improved in his TG to 600 >> 48 with insulin infusion         Renal/   ALEX (acute kidney injury)    - Differential Diagnoses include Pulmonary renal syndrome.  - Underwent US of kidney and showed Bilateral medical renal disease, Simple renal cysts bilaterally.  - CT abdomen/pelvis, mild interstitial changes in lung bases, 2 stones in the left kidney, 4 stones in the right kidney, no hydronephrosis, post cholecystectomy. No acute abnormality in the liver, spleen, pancreas or adrenals.   - Will continue trending urine output and renal function, avoid NSAIDs, contrast, nephrotoxins    - Monitor strict I/Os, daily weights, renally dose medications to current GFR  - Still anuric and will continue CRRT with higher rate of         ID   Septic shock    - See principal problem for more elaboration.   - Currently on low need for pressors, will continue to monitor and ensure MAP > 65 mmHg (during CRRT)  - Initially on broad spectrum antibiotics with Cefepime (renally dose) and Vancomycin. Stopped Vancomycoin and will plan to Stop Cefepime int he next couple of days per ID rec   - Blood culture and Urine Culture showed NGTD, discontinued Vanc and plan to discontinue Cefepime in the next couple of days         Immunology/Multi System   ANCA-associated vasculitis    - See Pulmorenal syndrome for more elaboration         Pulmonary-renal syndrome    - Most likely the diagnosis in the setting of diffuse alveolar hemorrhage and glomerulonephritis with most likely etiology is underlying autoimmune disorder  - Elevated UPCR 1.28, DENIZ positive 1:320 speckled pattern, MPO+ 82, PR3 neg, Immunoglobulins wnl, Hep C and HIV neg  - Finished  5 days of Methylprednisolone 1000 mg IV daily,  started on Rituxan (6/15) and Methylprednisone 125 mg IV Q 6 and Atovaquin for PPx         Hematology   Thrombocytopenia, unspecified    - His 4T score is 2 which refect Low probability for Heparin Induced Thrombocytopenia   - Will continue to observe, currently continue to improve         Oncology   Acute posthemorrhagic anemia    - Most likely is diffuse pulmonary alveolar hemorrhage  - Frequent check in his CBC, transfuse when Hb < 7        Endocrine   Diabetes mellitus type 2 with complications    - Will keep his blood glucose on the target 140-180  - on Insulin infusion for Hyperglycemia and HyperTG   Recent Labs      06/16/18   2103  06/16/18   2204  06/16/18   2311  06/17/18   0004  06/17/18   0201  06/17/18   0334  06/17/18   0603  06/17/18   0802   POCTGLUCOSE  155*  153*  149*  167*  171*  154*  173*  144*           Other   Severe sepsis with septic shock (CODE)    - See septic shock for more elaborating            Critical Care Daily Checklist:    A: Awake: RASS Goal/Actual Goal: RASS Goal: -5-->unarousable  Actual: Conteh Agitation Sedation Scale (RASS): Unarousable   B: Spontaneous Breathing Trial Performed?     C: SAT & SBT Coordinated?  No currently paralyzed                       D: Delirium: CAM-ICU Overall CAM-ICU: Negative   E: Early Mobility Performed? No   F: Feeding Goal: Goals: Patient to receive nutrition by RD follow-up  Status: Nutrition Goal Status: goal met   Current Diet Order   Procedures    Diet NPO      AS: Analgesia/Sedation Yes   T: Thromboembolic Prophylaxis No for thrombocytopenia    H: HOB > 300 Yes   U: Stress Ulcer Prophylaxis (if needed) Yes   G: Glucose Control Yes   B: Bowel Function Stool Occurrence: 1   I: Indwelling Catheter (Lines & Liu) Necessity Yes   D: De-escalation of Antimicrobials/Pharmacotherapies Yes    Plan for the day/ETD No    Code Status:  Family/Goals of Care: Full Code  Ongoing        Critical secondary to Patient has a condition that poses threat to  life and bodily function: Severe Respiratory Distress and Acute Renal Failure      Critical care was time spent personally by me on the following activities: development of treatment plan with patient or surrogate and bedside caregivers, discussions with consultants, evaluation of patient's response to treatment, examination of patient, ordering and performing treatments and interventions, ordering and review of laboratory studies, ordering and review of radiographic studies, pulse oximetry, re-evaluation of patient's condition. This critical care time did not overlap with that of any other provider or involve time for any procedures.     Tommie Kuhn MD  Critical Care Medicine  [facility]

## 2018-06-17 NOTE — ASSESSMENT & PLAN NOTE
- Most likely from Propofol and plan to wean off Propofol and started on Insulin infusion   - Improved in his TG to 600 >> 48 with insulin infusion

## 2018-06-17 NOTE — SUBJECTIVE & OBJECTIVE
Interval History/Significant Events: Palaryzed with Nimbex and better in his mechanical ventilation setting. Off Versed and on max Precedex and Profol. Increased in UF rate to 500 and better in his I/O. Repated CXR showed better air space.     Review of Systems   Unable to perform ROS: Intubated     Objective:     Vital Signs (Most Recent):  Temp: (!) 44.6 °F (7 °C) (06/17/18 0700)  Pulse: (!) 56 (06/17/18 0809)  Resp: 20 (06/15/18 0415)  BP: 138/68 (06/17/18 0700)  SpO2: 98 % (06/17/18 0809) Vital Signs (24h Range):  Temp:  [44.6 °F (7 °C)-98.7 °F (37.1 °C)] 44.6 °F (7 °C)  Pulse:  [] 56  SpO2:  [80 %-100 %] 98 %  BP: ()/(54-75) 138/68  Arterial Line BP: ()/(40-72) 131/60   Weight: 90.1 kg (198 lb 10.2 oz)  Body mass index is 30.2 kg/m².      Intake/Output Summary (Last 24 hours) at 06/17/18 0826  Last data filed at 06/17/18 0800   Gross per 24 hour   Intake             8733 ml   Output            49257 ml   Net            -2951 ml       Physical Exam   Constitutional: He appears well-developed and well-nourished. No distress.   Intubated FIO2 60% , sedated    HENT:   Head: Normocephalic.   Mouth/Throat: No oropharyngeal exudate.   Eyes: Pupils are equal, round, and reactive to light. Right eye exhibits no discharge. Left eye exhibits no discharge.   Neck: Normal range of motion. No JVD present.   Cardiovascular: Regular rhythm and normal heart sounds.    No murmur heard.  No murmur appreciated, + 2 lower extremity and pedal edema,   No JVD     Pulmonary/Chest: Effort normal. He has no wheezes. He has rales.   Intubated.   Vent Mode: A/C  Oxygen Concentration (%):  (35-80) 35  Resp Rate Total:  (18 br/min-42 br/min) 22 br/min  Vt Set:  (420 mL-510 mL) 480 mL  PEEP/CPAP:  (10 ztX19-77 cmH20) 10 cmH20  Mean Airway Pressure:  (15 ccU08-83 cmH20) 19 cmH20   Abdominal: Soft. He exhibits no distension. There is no tenderness.   No Bowel sounds,    Genitourinary: Rectal exam shows guaiac negative stool.    Musculoskeletal: He exhibits no edema (all extremities), tenderness or deformity.   Neurological:   Intubated and sedated   Skin: Skin is warm and dry. No rash noted. No erythema.   R SC trialysis   Nursing note and vitals reviewed.      Vents:  Vent Mode: A/C (06/17/18 0809)  Ventilator Initiated: Yes (06/10/18 2217)  Set Rate: 22 bmp (06/17/18 0809)  Vt Set: 480 mL (06/17/18 0809)  PEEP/CPAP: 10 cmH20 (06/17/18 0809)  Oxygen Concentration (%): 35 (06/17/18 0809)  Peak Airway Pressure: 36 cmH2O (06/17/18 0809)  Plateau Pressure: 32 cmH20 (06/17/18 0809)  Total Ve: 10.7 mL (06/17/18 0809)  F/VT Ratio<105 (RSBI): (!) 30.02 (06/15/18 0126)  Lines/Drains/Airways     Central Venous Catheter Line                 Hemodialysis Catheter 06/10/18 2307 right internal jugular 6 days         Percutaneous Central Line Insertion/Assessment - triple lumen  06/10/18 2305 left internal jugular 6 days          Drain                 NG/OG Tube 06/10/18 2309 [place] sump 18 Fr. Left nostril 6 days          Airway                 Airway - Non-Surgical Endotracheal Tube -- days          Arterial Line                 Arterial Line 06/10/18 2304 Right Radial 6 days          Peripheral Intravenous Line                 Peripheral IV - Single Lumen 06/16/18 1905 Right Antecubital less than 1 day              Significant Labs:    CBC/Anemia Profile:    Recent Labs  Lab 06/16/18  1102 06/16/18  1608 06/17/18  0339   WBC 14.98* 23.63* 21.82*   HGB 7.6* 7.9* 8.2*   HCT 23.8* 24.3* 24.6*   PLT 64* 91* 136*   MCV 88 87 86   RDW 16.1* 16.1* 16.1*        Chemistries:    Recent Labs  Lab 06/16/18  0413 06/16/18  1354 06/16/18  2200 06/17/18  0339     139 140 138 138  138   K 5.2*  5.2* 4.7 4.9 4.8  4.8   *  114* 115* 110 109  109   CO2 19*  19* 21* 22* 23  23   BUN 13  13 10 9 8  8   CREATININE 1.0  1.0 0.8 0.8 0.8  0.8   CALCIUM 7.8*  7.8* 8.1* 7.8* 7.7*  7.7*   ALBUMIN 3.1*  3.1* 2.9* 2.9* 2.8*  2.8*   PROT 4.8*  --   --   4.9*   BILITOT 1.5*  --   --  1.0   ALKPHOS 37*  --   --  39*   ALT 22  --   --  21   AST 65*  --   --  29   MG 2.2 1.9  1.9 1.6 1.9   PHOS 3.8 2.5* 2.4* 2.4*       CMP:   Recent Labs  Lab 06/16/18  0413 06/16/18  1354 06/16/18  2200 06/17/18  0339     139 140 138 138  138   K 5.2*  5.2* 4.7 4.9 4.8  4.8   *  114* 115* 110 109  109   CO2 19*  19* 21* 22* 23  23   *  179* 128* 147* 147*  147*   BUN 13  13 10 9 8  8   CREATININE 1.0  1.0 0.8 0.8 0.8  0.8   CALCIUM 7.8*  7.8* 8.1* 7.8* 7.7*  7.7*   PROT 4.8*  --   --  4.9*   ALBUMIN 3.1*  3.1* 2.9* 2.9* 2.8*  2.8*   BILITOT 1.5*  --   --  1.0   ALKPHOS 37*  --   --  39*   AST 65*  --   --  29   ALT 22  --   --  21   ANIONGAP 6*  6* 4* 6* 6*  6*   EGFRNONAA >60.0  >60.0 >60.0 >60.0 >60.0  >60.0     Coagulation:   Recent Labs  Lab 06/17/18  0339   INR 1.0   APTT 24.8     All pertinent labs within the past 24 hours have been reviewed.    Significant Imaging:  I have reviewed all pertinent imaging results/findings within the past 24 hours.  I have reviewed and interpreted all pertinent imaging results/findings within the past 24 hours.

## 2018-06-17 NOTE — ASSESSMENT & PLAN NOTE
- Given his presentation with two weeks of progressive worsening of his SOB and cough, and associated with hematuria and CXR finding of bilateral infiltrate raised the concern for DAH  - On 6/10 he underwent Flexible Bronchoscopy and showed no endobronchial lesion, significant signs of hemorrhage on all of the airways, confirming findings of pulmonary hemorrhage.   - On high oxygenation setting with FiO2 of 35% and PEEP 10, and his PO2 is 63  - Requires on pRBC on 6/12 and another unit on 6/15

## 2018-06-17 NOTE — PROGRESS NOTES
CRRT NOTES    Daily checks done. Bicarb and dialysis solution refilled. CRRT machine settings alignd with CRRT orders. Lines secured and visible. For continuity of care.

## 2018-06-17 NOTE — ASSESSMENT & PLAN NOTE
- Differential Diagnoses include Pulmonary renal syndrome.  - Underwent US of kidney and showed Bilateral medical renal disease, Simple renal cysts bilaterally.  - CT abdomen/pelvis, mild interstitial changes in lung bases, 2 stones in the left kidney, 4 stones in the right kidney, no hydronephrosis, post cholecystectomy. No acute abnormality in the liver, spleen, pancreas or adrenals.   - Will continue trending urine output and renal function, avoid NSAIDs, contrast, nephrotoxins    - Monitor strict I/Os, daily weights, renally dose medications to current GFR  - Still anuric and will continue CRRT with higher rate of

## 2018-06-17 NOTE — NURSING
Notified Dr. Headley with nephrology of patient's ionized calcium of .99, no orders given. Notified Dr. Headley of patient's new CRRT orders with a UF of 300-400 only, and of patient's current UF of 500. Dr. Headley will change order in Epic. Will continue to monitor patient.

## 2018-06-17 NOTE — ASSESSMENT & PLAN NOTE
- Will keep his blood glucose on the target 140-180  - on Insulin infusion for Hyperglycemia and HyperTG   Recent Labs      06/16/18   2103  06/16/18   2204  06/16/18   2311  06/17/18   0004  06/17/18   0201  06/17/18   0334  06/17/18   0603  06/17/18   0802   POCTGLUCOSE  155*  153*  149*  167*  171*  154*  173*  144*

## 2018-06-17 NOTE — PROGRESS NOTES
[facility]  Nephrology  Progress Note    Patient Name: Jasmeet Raymond  MRN: [id number]  Admission Date: 6/10/2018  Hospital Length of Stay: 7 days  Attending Provider: Everette Hdz*   Primary Care Physician: Provider Notinsystem  Principal Problem:Diffuse pulmonary alveolar hemorrhage    Subjective:     HPI: 64 yo AAm with HTN, NIDDM, arthritis on metformin and meloxicam  admitted to [facility] for worsening SOB and ALEX.   Patiend had been treated as an out;patient for 2 weeks for Shortness of breath and bronchitis and UTI. In the hospital he was found to be acidotic, hyperkalemic, serum creatinine 8, and diffuse bilateral pulmonary infiltrates, requiring intubation. UA with proteinuria, hematuria and wbc's, Bronchoscopy revealed hemorrahage. Patient recieved one dialysis as per notes and 1 gm solumedrol, and transferred  to [facility] for plasmapheresis  And further evaluation.  Family is not available at the time of exam and patient is intubated.   HIV, Hepatitis B, C, complements  All within normal limits. DENIZ, ANCA and AntiGBM, results Nnot available        Review of patient's allergies indicates:   Allergen Reactions    Sulfa (sulfonamide antibiotics)      Current Facility-Administered Medications   Medication Frequency    0.9%  NaCl infusion (for blood administration) Q24H PRN    atovaquone suspension 750 mg Q12H    calcium chloride 1 g in dextrose 5 % 100 mL IVPB Continuous    ceFEPIme injection 1 g Q8H    chlorhexidine 0.12 % solution 15 mL BID    cisatracurium (NIMBEX) 100 mg in dextrose 5 % 100 mL infusion Continuous    dexmedetomidine (PRECEDEX) 400mcg/100mL 0.9% NaCL infusion Continuous    dextrose 50% injection 12.5 g PRN    dextrose 50% injection 25 g PRN    DEXTROSE-SOD CITRATE-CITRIC AC 2.45-2.2 GRAM- 730 MG/100 ML MISC SOLN Continuous    famotidine (PF) injection 20 mg BID    fentaNYL 2500 mcg in 0.9% sodium chloride 250 mL infusion premix (titrating)  Continuous    heparin, porcine (PF) 100 unit/mL injection flush 500 Units PRN    hepatitis B virus vacc.rec(PF) injection 40 mcg vaccine x 1 dose    insulin aspart U-100 pen 0-5 Units Q6H PRN    insulin regular (Humulin R) 100 Units in sodium chloride 0.9% 100 mL infusion Continuous    lorazepam injection 1 mg Q30 Min PRN    magnesium sulfate 2g in water 50mL IVPB (premix) PRN    methylPREDNISolone sodium succinate injection 125 mg Q6H    midazolam (VERSED) 1 mg/mL in dextrose 5 % 100 mL infusion (titrating) Continuous    norepinephrine 4 mg in dextrose 5% 250 mL infusion (premix) (titrating) Continuous    propofol (DIPRIVAN) 10 mg/mL infusion Continuous    sodium chloride 0.9% flush 10 mL PRN    sodium phosphate 20.01 mmol in dextrose 5 % 250 mL IVPB PRN    sodium phosphate 30 mmol in dextrose 5 % 250 mL IVPB PRN    sodium phosphate 39.99 mmol in dextrose 5 % 250 mL IVPB PRN    white petrolatum-mineral oil (LUBIFRESH P.M.) ophthalmic ointment Q8H       Objective:     Vital Signs (Most Recent):  Temp: 98.4 °F (36.9 °C) (06/17/18 1100)  Pulse: (!) 52 (06/17/18 1315)  Resp: 20 (06/15/18 0415)  BP: (!) 173/78 (06/17/18 1300)  SpO2: 99 % (06/17/18 1315)  O2 Device (Oxygen Therapy): ventilator (06/17/18 1309) Vital Signs (24h Range):  Temp:  [44.6 °F (7 °C)-98.7 °F (37.1 °C)] 98.4 °F (36.9 °C)  Pulse:  [] 52  SpO2:  [98 %-100 %] 99 %  BP: ()/(54-78) 173/78  Arterial Line BP: ()/(40-72) 168/67     Weight: 90.1 kg (198 lb 10.2 oz) (06/16/18 0300)  Body mass index is 30.2 kg/m².  Body surface area is 2.08 meters squared.    I/O last 3 completed shifts:  In: 68976 [I.V.:4810; NG/GT:705; IV Piggyback:7900]  Out: 29271 [Urine:20; Drains:1400; Other:82889]    Physical Exam   HENT:   Head: Atraumatic.   Neck: No JVD present.   Cardiovascular: Normal rate and regular rhythm.  Exam reveals no friction rub.    Pulmonary/Chest: Effort normal and breath sounds normal.   Abdominal: Soft. He exhibits no  distension.   Musculoskeletal: He exhibits no edema.   Skin: Skin is warm.           Assessment/Plan:     ALEX (acute kidney injury)    MPO-ANCA GPA, presenting with RPGN and pulmonary hemorrhage  S/p pulse steroids, now on scheduled dose 125mg IV solumedrol q6h, and PLEX, also has been started on rituxan, renal supportive therapy with RRT (SLED)    Plan:  -continue SLED for metabolic clearance and volume management                       Thank you for your consult. I will follow-up with patient. Please contact us if you have any additional questions.    Peewee Headley MD  Nephrology  [facility]    Patient seen and examined with Dr Headley;  I have reviewed and agree with assessment and plan

## 2018-06-17 NOTE — NURSING
Dr. William at bedside. Dr. William wants to turn Nimbex off. Dr. William wants patient to remain sedated to RASS -4. Will continue to monitor patient.

## 2018-06-17 NOTE — ASSESSMENT & PLAN NOTE
- His 4T score is 2 which refect Low probability for Heparin Induced Thrombocytopenia   - Will continue to observe, currently continue to improve

## 2018-06-17 NOTE — SUBJECTIVE & OBJECTIVE
Review of patient's allergies indicates:   Allergen Reactions    Sulfa (sulfonamide antibiotics)      Current Facility-Administered Medications   Medication Frequency    0.9%  NaCl infusion (for blood administration) Q24H PRN    atovaquone suspension 750 mg Q12H    calcium chloride 1 g in dextrose 5 % 100 mL IVPB Continuous    ceFEPIme injection 1 g Q8H    chlorhexidine 0.12 % solution 15 mL BID    cisatracurium (NIMBEX) 100 mg in dextrose 5 % 100 mL infusion Continuous    dexmedetomidine (PRECEDEX) 400mcg/100mL 0.9% NaCL infusion Continuous    dextrose 50% injection 12.5 g PRN    dextrose 50% injection 25 g PRN    DEXTROSE-SOD CITRATE-CITRIC AC 2.45-2.2 GRAM- 730 MG/100 ML MISC SOLN Continuous    famotidine (PF) injection 20 mg BID    fentaNYL 2500 mcg in 0.9% sodium chloride 250 mL infusion premix (titrating) Continuous    heparin, porcine (PF) 100 unit/mL injection flush 500 Units PRN    hepatitis B virus vacc.rec(PF) injection 40 mcg vaccine x 1 dose    insulin aspart U-100 pen 0-5 Units Q6H PRN    insulin regular (Humulin R) 100 Units in sodium chloride 0.9% 100 mL infusion Continuous    lorazepam injection 1 mg Q30 Min PRN    magnesium sulfate 2g in water 50mL IVPB (premix) PRN    methylPREDNISolone sodium succinate injection 125 mg Q6H    midazolam (VERSED) 1 mg/mL in dextrose 5 % 100 mL infusion (titrating) Continuous    norepinephrine 4 mg in dextrose 5% 250 mL infusion (premix) (titrating) Continuous    propofol (DIPRIVAN) 10 mg/mL infusion Continuous    sodium chloride 0.9% flush 10 mL PRN    sodium phosphate 20.01 mmol in dextrose 5 % 250 mL IVPB PRN    sodium phosphate 30 mmol in dextrose 5 % 250 mL IVPB PRN    sodium phosphate 39.99 mmol in dextrose 5 % 250 mL IVPB PRN    white petrolatum-mineral oil (LUBIFRESH P.M.) ophthalmic ointment Q8H       Objective:     Vital Signs (Most Recent):  Temp: 98.4 °F (36.9 °C) (06/17/18 1100)  Pulse: (!) 52 (06/17/18 1315)  Resp: 20  (06/15/18 0415)  BP: (!) 173/78 (06/17/18 1300)  SpO2: 99 % (06/17/18 1315)  O2 Device (Oxygen Therapy): ventilator (06/17/18 1309) Vital Signs (24h Range):  Temp:  [44.6 °F (7 °C)-98.7 °F (37.1 °C)] 98.4 °F (36.9 °C)  Pulse:  [] 52  SpO2:  [98 %-100 %] 99 %  BP: ()/(54-78) 173/78  Arterial Line BP: ()/(40-72) 168/67     Weight: 90.1 kg (198 lb 10.2 oz) (06/16/18 0300)  Body mass index is 30.2 kg/m².  Body surface area is 2.08 meters squared.    I/O last 3 completed shifts:  In: 51191 [I.V.:4810; NG/GT:705; IV Piggyback:7900]  Out: 17960 [Urine:20; Drains:1400; Other:18339]    Physical Exam   HENT:   Head: Atraumatic.   Neck: No JVD present.   Cardiovascular: Normal rate and regular rhythm.  Exam reveals no friction rub.    Pulmonary/Chest: Effort normal and breath sounds normal.   Abdominal: Soft. He exhibits no distension.   Musculoskeletal: He exhibits no edema.   Skin: Skin is warm.

## 2018-06-17 NOTE — NURSING
Notified Dr. Kuhn of patient waking up, following commands, and patient with tachypnea, and using abdominal muscles to breathe. Patient's oxygen saturation decreased to 86%. Dr. Kuhn at bedside, made vent changes, patient's oxygen saturation up to 92%, will continue to monitor patient.

## 2018-06-17 NOTE — PLAN OF CARE
Problem: Patient Care Overview  Goal: Plan of Care Review  Outcome: Ongoing (interventions implemented as appropriate)  POC reviewed with family at bedside, pt remains intubated A/C 35% and PEEP 12, rate 22, sedated on Propofol, Fentanyl and Precedex gtt, weaned based on BiS monitor (however numbers seem unreliable at times) and BP (SBP high when family in room and lights on). Remains paralyzed on Nimbex gtt, titrated for a TOF 2/4. On and off Levo gtt, very sensitive. On CRRT  and tolerating well, Mg and Phos replaced. Afebrile overnight, am lab results reviewed. NGT remains to LIWS, 300 mL overnight. Pt unable to tolerate movement and stimulation, unable to turn at this time, BP decreased to 70s/30s with central line dressing changes and movement of extremities. POCT q2h as BG remains stable @ 1.7 Units/hr. Plan for another plasmapharesis on Monday. All questions answered and addressed with spouse, son and family at bedside.

## 2018-06-17 NOTE — ASSESSMENT & PLAN NOTE
- See principal problem for more elaboration.   - Currently on low need for pressors, will continue to monitor and ensure MAP > 65 mmHg (during CRRT)  - Initially on broad spectrum antibiotics with Cefepime (renally dose) and Vancomycin. Stopped Vancomycoin and will plan to Stop Cefepime int he next couple of days per ID rec   - Blood culture and Urine Culture showed NGTD, discontinued Vanc and plan to discontinue Cefepime in the next couple of days

## 2018-06-18 NOTE — PROGRESS NOTES
Weaning down Propofol gtt, currently @ 25 mcg/kg/min, pt with noted increase in HR from 50s to 70s and SBP 140s, sats slowly decreasing from 100% to 92%, pt with increase WOB, abdominal muscle use, and coughing against vent, opened eyes, responded appropriately to squuze hands, however became more tachypneic RR 35 and the vent unable to deliver breaths. Pt placed back on Propofol gtt and kept comfortable.

## 2018-06-18 NOTE — SUBJECTIVE & OBJECTIVE
nterval History/Significant Events: off paralytic. Sedated. Tolerated spontaneous Vent. Finished 4th dose of Plasma Exchange.     Review of Systems   Unable to perform ROS: Intubated     Objective:     Vital Signs (Most Recent):  Temp: 99.2 °F (37.3 °C) (06/18/18 1107)  Pulse: (!) 131 (06/18/18 1319)  Resp: 20 (06/15/18 0415)  BP: (!) 174/70 (06/18/18 1300)  SpO2: 100 % (06/18/18 1319) Vital Signs (24h Range):  Temp:  [98.5 °F (36.9 °C)-99.8 °F (37.7 °C)] 99.2 °F (37.3 °C)  Pulse:  [] 131  SpO2:  [86 %-100 %] 100 %  BP: ()/(47-73) 174/70  Arterial Line BP: ()/(40-85) 169/63   Weight: 90.1 kg (198 lb 10.2 oz)  Body mass index is 30.2 kg/m².      Intake/Output Summary (Last 24 hours) at 06/18/18 1407  Last data filed at 06/18/18 1300   Gross per 24 hour   Intake             9558 ml   Output             9458 ml   Net              100 ml       Physical Exam   Constitutional: He appears well-developed and well-nourished. No distress.   Intubated FIO2 60% , sedated    HENT:   Head: Normocephalic.   Mouth/Throat: No oropharyngeal exudate.   Eyes: Pupils are equal, round, and reactive to light. Right eye exhibits no discharge. Left eye exhibits no discharge.   Neck: Normal range of motion. No JVD present.   Cardiovascular: Regular rhythm and normal heart sounds.    No murmur heard.  No murmur appreciated, + 2 lower extremity and pedal edema,   No JVD     Pulmonary/Chest: Effort normal. He has no wheezes. He has rales.   Abdominal: Soft. He exhibits no distension. There is no tenderness.   Decrease Bowel sounds,    Genitourinary: Rectal exam shows guaiac negative stool.   Musculoskeletal: He exhibits no edema (all extremities), tenderness or deformity.   Neurological:   Intubated and sedated   Skin: Skin is warm and dry. No rash noted. No erythema.   R SC trialysis   Nursing note and vitals reviewed.      Vents:  Vent Mode: Spont (06/18/18 1319)  Ventilator Initiated: Yes (06/10/18 5782)  Set Rate: 22 bmp  (06/18/18 1203)  Vt Set: 480 mL (06/18/18 1203)  Pressure Support: 10 cmH20 (06/18/18 1319)  PEEP/CPAP: 10 cmH20 (06/18/18 1319)  Oxygen Concentration (%): 50 (06/18/18 1319)  Peak Airway Pressure: 21 cmH2O (06/18/18 1319)  Plateau Pressure: 27 cmH20 (06/18/18 1319)  Total Ve: 15.8 mL (06/18/18 1319)  F/VT Ratio<105 (RSBI): (!) 30.02 (06/15/18 0126)  Lines/Drains/Airways     Central Venous Catheter Line                 Hemodialysis Catheter 06/10/18 2307 right internal jugular 7 days         Percutaneous Central Line Insertion/Assessment - triple lumen  06/10/18 2305 left internal jugular 7 days          Drain                 NG/OG Tube 06/10/18 2309 [place] sump 18 Fr. Left nostril 7 days          Airway                 Airway - Non-Surgical Endotracheal Tube -- days          Arterial Line                 Arterial Line 06/10/18 2304 Right Radial 7 days          Peripheral Intravenous Line                 Peripheral IV - Single Lumen 06/16/18 1905 Right Antecubital 1 day              Significant Labs:    CBC/Anemia Profile:    Recent Labs  Lab 06/17/18  0339 06/17/18  1500 06/18/18  0349   WBC 21.82* 16.07* 19.85*   HGB 8.2* 7.7* 8.2*   HCT 24.6* 23.3* 24.7*   * 133* 162   MCV 86 86 86   RDW 16.1* 16.0* 16.4*        Chemistries:    Recent Labs  Lab 06/17/18  0339 06/17/18  1300 06/17/18  2200 06/18/18  0349     138 137 137  137 135*  135*   K 4.8  4.8 4.5 4.7  4.7 4.6  4.6     109 109 106  106 106  106   CO2 23  23 22* 21*  21* 22*  22*   BUN 8  8 9 8  8 13  13   CREATININE 0.8  0.8 0.8 0.7  0.7 0.9  0.9   CALCIUM 7.7*  7.7* 7.4* 7.8*  7.8* 7.6*  7.6*   ALBUMIN 2.8*  2.8* 2.5* 2.7*  2.7* 2.7*  2.7*   PROT 4.9*  --   --  5.1*   BILITOT 1.0  --   --  1.2*   ALKPHOS 39*  --   --  47*   ALT 21  --   --  21   AST 29  --   --  21   MG 1.9 1.8 1.6 2.4   PHOS 2.4* 2.4* 2.2*  2.2* 3.5       CMP:     Recent Labs  Lab 06/17/18  0339 06/17/18  1300 06/17/18  2200 06/18/18  0349      138 137 137  137 135*  135*   K 4.8  4.8 4.5 4.7  4.7 4.6  4.6     109 109 106  106 106  106   CO2 23  23 22* 21*  21* 22*  22*   *  147* 168* 148*  148* 180*  180*   BUN 8  8 9 8  8 13  13   CREATININE 0.8  0.8 0.8 0.7  0.7 0.9  0.9   CALCIUM 7.7*  7.7* 7.4* 7.8*  7.8* 7.6*  7.6*   PROT 4.9*  --   --  5.1*   ALBUMIN 2.8*  2.8* 2.5* 2.7*  2.7* 2.7*  2.7*   BILITOT 1.0  --   --  1.2*   ALKPHOS 39*  --   --  47*   AST 29  --   --  21   ALT 21  --   --  21   ANIONGAP 6*  6* 6* 10  10 7*  7*   EGFRNONAA >60.0  >60.0 >60.0 >60.0  >60.0 >60.0  >60.0     Coagulation:     Recent Labs  Lab 06/18/18  0349   INR 1.1   APTT 24.2     All pertinent labs within the past 24 hours have been reviewed.    Significant Imaging:  I have reviewed all pertinent imaging results/findings within the past 24 hours.  I have reviewed and interpreted all pertinent imaging results/findings within the past 24 hours.

## 2018-06-18 NOTE — ASSESSMENT & PLAN NOTE
MPO-cANCA GPA, presenting with RPGN and pulmonary hemorrhage  S/p pulse steroids, now on scheduled dose 125mg IV solumedrol q6h, and PLEX 4/6, also has been started on rituxan 6/15 (repeat in 2 weeks), renal supportive therapy with RRT (SLED). Urine sediment showed RBC cast.     Plan:  -continue SLED for metabolic clearance and volume management. Citrate for AC/calcium chloride. -500 ml as tolerated.

## 2018-06-18 NOTE — PROGRESS NOTES
Apheresis tx #4 started at 1003 with out difficulty.  Pt intubated and sedated, therefore unable to assess pain and orientation. 4.5 Liter exchange.  Replacement fluids 4.5 Liter 5% Albumin via RIJ cath, patent, dressing CDI. Tx ended at 1107. Cath flushed with NS only per ICU nurse Aline.  Pt tolerated tx well. Next tx 06/20/2018.

## 2018-06-18 NOTE — PROCEDURES
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 The patient was transferred to [facility] for therapeutic plasma exchange and is currently intubated for respiratory failure. Anti-GBM antibodies are negative. C-ANCA (cytoplasmic neutrophil antibodies) are positive (more often associated with GPA than P-ANCA).    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 Journal of Clinical Apheresis 2016; 31:149-162.) This diagnosis supports daily to every other day TPE with FFP until resolution of DAH, then TPE with albumin replacement every 2-3 days for a total of 6 procedures, planned as follows: /W/F.     Today's procedure (#4 of 6) was well tolerated and without complication. Given the calcium gluconate shortages, no calcium repletion was provided during this procedure.  Platelet trends continue to follow expected decreases and increases with weekend break. Anticipate small downward trend this week due to TPE's effect of platelet activation and centrifugal plasma separation technique. Rituximab induction occurred this weekend.  Next scheduled  TPE for Wednesday (6/20).    Pertinent Laboratory Data:   Complete Blood Count:   Lab Results   Component Value Date    HGB 8.2 (L) 06/18/2018    HCT 24.7 (L) 06/18/2018     06/18/2018    WBC 19.85 (H) 06/18/2018     Basic Metabolic Panel:   Lab Results   Component Value Date     (L) 06/18/2018     (L) 06/18/2018    K 4.6 06/18/2018    K 4.6 06/18/2018     06/18/2018     06/18/2018    CO2 22 (L) 06/18/2018    CO2 22 (L) 06/18/2018     (H) 06/18/2018     (H) 06/18/2018    BUN 13 06/18/2018    BUN 13 06/18/2018    CREATININE 0.9 06/18/2018    CREATININE 0.9 06/18/2018    CALCIUM 7.6 (L) 06/18/2018    CALCIUM 7.6 (L) 06/18/2018    ANIONGAP 7 (L) 06/18/2018    ANIONGAP 7 (L) 06/18/2018    ESTGFRAFRICA >60.0 06/18/2018    ESTGFRAFRICA >60.0 06/18/2018    EGFRNONAA >60.0 06/18/2018    EGFRNONAA >60.0 06/18/2018       Pertinent Medications: None contraindicated    Review of patient's allergies indicates:   Allergen Reactions    Sulfa (sulfonamide antibiotics)        Anesthesia: None     Technical Procedures Used: Plasma Exchange: Volume exchanged - 4.5L; Replacement fluid - Fresh Frozen Plasma; Number of procedures 4 of 6; Date of next procedure 6/20/18 (Wed).    Description of the Findings of the Procedure:   Please see Apheresis Nurse flowsheet for details.    The patient was evaluated and all clinical and laboratory data relevant to the treatment was reviewed, and a decision was made to proceed with the Apheresis procedure.    I was available to the clinical staff throughout the procedure.    Significant Surgical Tasks Conducted by the Assistant(s): Not applicable  Complications: None  Estimated Blood Loss (EBL): None  Implants: None   Specimens: None      Yeni Whaley MD, PhD  Section of Transfusion Medicine & Histocompatibility  Department of Pathology and Laboratory Medicine  [facility]  [phone] (HLA & Blood Bank Offices)  06/18/2018

## 2018-06-18 NOTE — PLAN OF CARE
Problem: Patient Care Overview  Goal: Plan of Care Review  Outcome: Ongoing (interventions implemented as appropriate)  POC reviewed with spouse at bedside, pt remains intubated on Vent: A/C 45% and PEEP 10, sats>95%, ABG q4h. Sedated on Propofol, Precedex and Fentanyl gtt, attempting to wean down Propofol per order, On insulin gtt titrated based on q1h accu checks, on TF @ 30 mL/hr with minimal residuals. CRRT  and tolerating well. On and off Levo gtt, off when more awake. Pt bathe and repositioned this am, tolerated fairly well, however requiring a small increase in Levo dose. All am lab results reviewed, all questions answered and addressed, no changes made by MD this am.

## 2018-06-18 NOTE — PROGRESS NOTES
CRRT restarted with new equipment set up. Continuous sled. Orders and machine settings verified. Lines connected and secured.

## 2018-06-18 NOTE — ASSESSMENT & PLAN NOTE
- Most likely from Propofol and plan to wean off Propofol and started on Insulin infusion   - Improved in his TG to 600 >> 438 -->523 with insulin infusion   - wean off propofol

## 2018-06-18 NOTE — PROGRESS NOTES
[facility]  Critical Care Medicine  Progress Note    Patient Name: Jasmeet Raymond  MRN: [id number]  Admission Date: 6/10/2018  Hospital Length of Stay: 8 days  Code Status: Full Code  Attending Provider: Neto Wong MD  Primary Care Provider: Provider Notinsystem   Principal Problem: Diffuse pulmonary alveolar hemorrhage    Subjective:     HPI:  This is Mr. Jasmeet Raymond, 63 year old male with PMHx significant for HTN, DM and arthirtis presented to [facility] and [facility] with symptoms of pneumonia and shortness of breath. On 5/29/2018, he presented with symptoms of bronchitis with denies chills, ear pain, sore throat, chest pain, shortness of breath at that time, received antibiotics. Then on 6/8/2018 he presented again to [facility] and [facility] with shortness of breath and cough, and his CXR showed concern for airway disease. However on the first day of admission at [facility] and [facility], his respiratory symptoms worsen and had acute hypoxic respiratory failure that required intubation. Pulmonary consulted and he underwent Bronchoscopy and  revealed alveolar hemorrhage, with the setting of having hematuria and diffuse alveloar hemorrhage the concern was for presence of pulmonary renal syndrome and he received pulsed dose of Methylprednisolone and nephrology consulted for plasmapheresis. However at [facility], plasmapheresis machine is broken and they recommend to transfer to [facility] for plasmapheresis.     On arrival he was on Nibmex for paralytics, Propofol for sedation, Levophed (eventaully turned off) for pressure support, Flolan (Epoprostenol sodium) inhaled which was switched to Inhaled Nitro. His mechanical ventilation initially was on FiO2 100%, PEEP 16, then weaned his FiO2 with high requirement of oxygenation. He had an signifiacnt drop in his Hb (baseline ~ 12) dated one week prior  presentation to 9 and 8, required one pRBC prior arrival. Repeated CXR showed extensive bilateral infiltrates. Important laboratory investigation showed His , , pro calcitonin 4.4, C3 and C4 complement normal., Trop negative, C3 and C4 showed normal , Hep test is negative, Lactic acid 1.8 > 1.0, HIV negative, Procal 4.4, UA shoowed heamturia and trace leukocytes, Urine culture from 5/29 showed almost pansensitve Cedecea davisae.     Hospital/ICU Course:  06/11/2018 admitted to MICU from [facility] with ARDS  06/12/2018 No acute events overnight. Had PLEX with 4.5 L exchange with FFP, and CRRT started and he became hypotensive, Levophed started during the CRRT. He was weaning off Nitric Oxide and his ABGs showing worsening respi acidosis. Rheumatology, Transfusion medicine and Nephrology on board   06/13/2018 Stable on High ladder PEEP for ARDS and completely off NO, and still on Nimbex for paralytics and sedation with Propofol and Fentanyl. ID, Rheumatology and Nephrology on board.   06/15/2018, Still on high setting   06/15/2018 Started Rituxan after his PLEx session, and decrease in his Methylprednisone. Still on high PEEP ladder for ARDS, frequent clots in ETT suction   06/16/2018 Overnight, he had Rituxan induction for ANCA associated vasculitis and decrease his Methypredisone to 125 mg IV Q 6 Hours. Mild increase in his mechanical ventilation setting and he is grossly volume overload and still anuric.   06/17/2018 Palaryzed with Nimbex and better in his mechanical ventilation setting. Off Versed and on max Precedex and Profol. Increased in UF rate to 500 and better in his I/O. Repated CXR showed better air space.   06/18/2018 off paralytic. received 4th dose of plasma exchange today. Still anuric. Continue on dialysis.wean off sedation     nterval History/Significant Events: off paralytic. Sedated. Tolerated spontaneous Vent. Finished 4th dose of Plasma  Exchange.     Review of Systems   Unable to perform ROS: Intubated     Objective:     Vital Signs (Most Recent):  Temp: 99.2 °F (37.3 °C) (06/18/18 1107)  Pulse: (!) 131 (06/18/18 1319)  Resp: 20 (06/15/18 0415)  BP: (!) 174/70 (06/18/18 1300)  SpO2: 100 % (06/18/18 1319) Vital Signs (24h Range):  Temp:  [98.5 °F (36.9 °C)-99.8 °F (37.7 °C)] 99.2 °F (37.3 °C)  Pulse:  [] 131  SpO2:  [86 %-100 %] 100 %  BP: ()/(47-73) 174/70  Arterial Line BP: ()/(40-85) 169/63   Weight: 90.1 kg (198 lb 10.2 oz)  Body mass index is 30.2 kg/m².      Intake/Output Summary (Last 24 hours) at 06/18/18 1407  Last data filed at 06/18/18 1300   Gross per 24 hour   Intake             9558 ml   Output             9458 ml   Net              100 ml       Physical Exam   Constitutional: He appears well-developed and well-nourished. No distress.   Intubated FIO2 60% , sedated    HENT:   Head: Normocephalic.   Mouth/Throat: No oropharyngeal exudate.   Eyes: Pupils are equal, round, and reactive to light. Right eye exhibits no discharge. Left eye exhibits no discharge.   Neck: Normal range of motion. No JVD present.   Cardiovascular: Regular rhythm and normal heart sounds.    No murmur heard.  No murmur appreciated, + 2 lower extremity and pedal edema,   No JVD     Pulmonary/Chest: Effort normal. He has no wheezes. He has rales.   Abdominal: Soft. He exhibits no distension. There is no tenderness.   Decrease Bowel sounds,    Genitourinary: Rectal exam shows guaiac negative stool.   Musculoskeletal: He exhibits no edema (all extremities), tenderness or deformity.   Neurological:   Intubated and sedated   Skin: Skin is warm and dry. No rash noted. No erythema.   R SC trialysis   Nursing note and vitals reviewed.      Vents:  Vent Mode: Spont (06/18/18 1319)  Ventilator Initiated: Yes (06/10/18 0560)  Set Rate: 22 bmp (06/18/18 1203)  Vt Set: 480 mL (06/18/18 1203)  Pressure Support: 10 cmH20 (06/18/18 1319)  PEEP/CPAP: 10 cmH20  (06/18/18 1319)  Oxygen Concentration (%): 50 (06/18/18 1319)  Peak Airway Pressure: 21 cmH2O (06/18/18 1319)  Plateau Pressure: 27 cmH20 (06/18/18 1319)  Total Ve: 15.8 mL (06/18/18 1319)  F/VT Ratio<105 (RSBI): (!) 30.02 (06/15/18 0126)  Lines/Drains/Airways     Central Venous Catheter Line                 Hemodialysis Catheter 06/10/18 2307 right internal jugular 7 days         Percutaneous Central Line Insertion/Assessment - triple lumen  06/10/18 2305 left internal jugular 7 days          Drain                 NG/OG Tube 06/10/18 2309 Webb sump 18 Fr. Left nostril 7 days          Airway                 Airway - Non-Surgical Endotracheal Tube -- days          Arterial Line                 Arterial Line 06/10/18 2304 Right Radial 7 days          Peripheral Intravenous Line                 Peripheral IV - Single Lumen 06/16/18 1905 Right Antecubital 1 day              Significant Labs:    CBC/Anemia Profile:    Recent Labs  Lab 06/17/18  0339 06/17/18  1500 06/18/18  0349   WBC 21.82* 16.07* 19.85*   HGB 8.2* 7.7* 8.2*   HCT 24.6* 23.3* 24.7*   * 133* 162   MCV 86 86 86   RDW 16.1* 16.0* 16.4*        Chemistries:    Recent Labs  Lab 06/17/18  0339 06/17/18  1300 06/17/18  2200 06/18/18  0349     138 137 137  137 135*  135*   K 4.8  4.8 4.5 4.7  4.7 4.6  4.6     109 109 106  106 106  106   CO2 23  23 22* 21*  21* 22*  22*   BUN 8  8 9 8  8 13  13   CREATININE 0.8  0.8 0.8 0.7  0.7 0.9  0.9   CALCIUM 7.7*  7.7* 7.4* 7.8*  7.8* 7.6*  7.6*   ALBUMIN 2.8*  2.8* 2.5* 2.7*  2.7* 2.7*  2.7*   PROT 4.9*  --   --  5.1*   BILITOT 1.0  --   --  1.2*   ALKPHOS 39*  --   --  47*   ALT 21  --   --  21   AST 29  --   --  21   MG 1.9 1.8 1.6 2.4   PHOS 2.4* 2.4* 2.2*  2.2* 3.5       CMP:     Recent Labs  Lab 06/17/18  0339 06/17/18  1300 06/17/18  2200 06/18/18  0349     138 137 137  137 135*  135*   K 4.8  4.8 4.5 4.7  4.7 4.6  4.6     109 109 106  106 106  106    CO2 23  23 22* 21*  21* 22*  22*   *  147* 168* 148*  148* 180*  180*   BUN 8  8 9 8  8 13  13   CREATININE 0.8  0.8 0.8 0.7  0.7 0.9  0.9   CALCIUM 7.7*  7.7* 7.4* 7.8*  7.8* 7.6*  7.6*   PROT 4.9*  --   --  5.1*   ALBUMIN 2.8*  2.8* 2.5* 2.7*  2.7* 2.7*  2.7*   BILITOT 1.0  --   --  1.2*   ALKPHOS 39*  --   --  47*   AST 29  --   --  21   ALT 21  --   --  21   ANIONGAP 6*  6* 6* 10  10 7*  7*   EGFRNONAA >60.0  >60.0 >60.0 >60.0  >60.0 >60.0  >60.0     Coagulation:     Recent Labs  Lab 06/18/18  0349   INR 1.1   APTT 24.2     All pertinent labs within the past 24 hours have been reviewed.    Significant Imaging:  I have reviewed all pertinent imaging results/findings within the past 24 hours.  I have reviewed and interpreted all pertinent imaging results/findings within the past 24 hours.    Assessment/Plan:     Pulmonary   * Diffuse pulmonary alveolar hemorrhage    - Given his presentation with two weeks of progressive worsening of his SOB and cough, and associated with hematuria and CXR finding of bilateral infiltrate raised the concern for DAH  - On 6/10 he underwent Flexible Bronchoscopy and showed no endobronchial lesion, significant signs of hemorrhage on all of the airways, confirming findings of pulmonary hemorrhage.   - On high oxygenation setting with FiO2 of 50% and PEEP 10  - Requires on pRBC on 6/12 and another unit on 6/15        Acute hypoxemic respiratory failure    - Please see principal problem for more elaboration         ARDS (adult respiratory distress syndrome)    - Most likely etiology is Diffuse pulmonary alveolar hemorrhage, however severe pneumonia in the setting of leukocytosis and fever and presentation of cough and SOB can not be excluded  - On conservative tidal volume (6 ml/kg predicted body weight), balanced respiratory rate, and high oxygenation setting in the setting of DAH going with high PEEP ladder        Cardiac/Vascular   Pure  hyperglyceridemia    - Most likely from Propofol and plan to wean off Propofol and started on Insulin infusion   - Improved in his TG to 600 >> 438 -->523 with insulin infusion   - wean off propofol         Renal/   ALEX (acute kidney injury)    - Differential Diagnoses include Pulmonary renal syndrome.  - Underwent US of kidney and showed Bilateral medical renal disease, Simple renal cysts bilaterally.  - CT abdomen/pelvis, mild interstitial changes in lung bases, 2 stones in the left kidney, 4 stones in the right kidney, no hydronephrosis, post cholecystectomy. No acute abnormality in the liver, spleen, pancreas or adrenals.   - Will continue trending urine output and renal function, avoid NSAIDs, contrast, nephrotoxins    - Monitor strict I/Os, daily weights, renally dose medications to current GFR  - Still anuric and will continue CRRT with higher rate of         ID   Septic shock    - See principal problem for more elaboration.   - Currently on low need for pressors, will continue to monitor and ensure MAP > 65 mmHg (during CRRT)  - Initially on broad spectrum antibiotics with Cefepime (renally dose) and Vancomycin. Stopped Vancomycoin and will plan to Stop Cefepime int he next couple of days per ID rec   - Blood culture and Urine Culture showed NGTD, discontinued Vanc   - d/c Cefepime 6/18/18        Immunology/Multi System   ANCA-associated vasculitis    - See Pulmorenal syndrome for more elaboration         Pulmonary-renal syndrome    - Most likely the diagnosis in the setting of diffuse alveolar hemorrhage and glomerulonephritis with most likely etiology is underlying autoimmune disorder  - Elevated UPCR 1.28, DENIZ positive 1:320 speckled pattern, MPO+ 82, PR3 neg, Immunoglobulins wnl, Hep C and HIV neg  - Finished  5 days of Methylprednisolone 1000 mg IV daily, started on Rituxan (6/15) and Methylprednisone 125 mg IV Q 6 and Atovaquin for PPx   - received 4th dose of plasma exchange 06/18/18         Hematology   Thrombocytopenia, unspecified    - His 4T score is 2 which refect Low probability for Heparin Induced Thrombocytopenia   - Will continue to observe, currently continue to improve         Oncology   Acute posthemorrhagic anemia    - Most likely is diffuse pulmonary alveolar hemorrhage  - Frequent check in his CBC, transfuse when Hb < 7        Endocrine   Diabetes mellitus type 2 with complications    - Will keep his blood glucose on the target 140-180  - on Insulin infusion for Hyperglycemia and HyperTG   Recent Labs      06/16/18   2103  06/16/18   2204  06/16/18   2311  06/17/18   0004  06/17/18   0201  06/17/18   0334  06/17/18   0603  06/17/18   0802   POCTGLUCOSE  155*  153*  149*  167*  171*  154*  173*  144*           Other   Severe sepsis with septic shock (CODE)    - See septic shock for more elaborating               Critical Care Daily Checklist:     A: Awake: RASS Goal/Actual Goal: RASS Goal: -5-->unarousable  Actual: Conteh Agitation Sedation Scale (RASS): Unarousable   B: Spontaneous Breathing Trial Performed?     C: SAT & SBT Coordinated?  No currently paralyzed                       D: Delirium: CAM-ICU Overall CAM-ICU: Negative   E: Early Mobility Performed? No   F: Feeding Goal: Goals: Patient to receive nutrition by RD follow-up  Status: Nutrition Goal Status: goal met       Current Diet Order   Procedures    Diet NPO      AS: Analgesia/Sedation Yes   T: Thromboembolic Prophylaxis No for thrombocytopenia    H: HOB > 300 Yes   U: Stress Ulcer Prophylaxis (if needed) Yes   G: Glucose Control Yes   B: Bowel Function Stool Occurrence: 1   I: Indwelling Catheter (Lines & Liu) Necessity Yes   D: De-escalation of Antimicrobials/Pharmacotherapies Yes     Plan for the day/ETD No     Code Status:  Family/Goals of Care: Full Code  Ongoing          Critical secondary to Patient has a condition that poses threat to life and bodily function: Severe Respiratory Distress and Acute Renal Failure         Bruce Larry MD  Critical Care Medicine  [facility]

## 2018-06-18 NOTE — ASSESSMENT & PLAN NOTE
- Given his presentation with two weeks of progressive worsening of his SOB and cough, and associated with hematuria and CXR finding of bilateral infiltrate raised the concern for DAH  - On 6/10 he underwent Flexible Bronchoscopy and showed no endobronchial lesion, significant signs of hemorrhage on all of the airways, confirming findings of pulmonary hemorrhage.   - On high oxygenation setting with FiO2 of 50% and PEEP 10  - Requires on pRBC on 6/12 and another unit on 6/15

## 2018-06-18 NOTE — PROGRESS NOTES
" [facility]  Adult Nutrition  Progress Note    SUMMARY       Recommendations  Recommendation/Intervention:   As medically able, resume TF and [facility] at a goal rate of 40 mL/hr + 3 packets/day Beneprotein - to provide 1995 kcal/day, 105g protein/day, and 688 mL free fluid/day.    -If renal formula no longer needed, recommend Glucerna 1.5 at a goal rate of 55 mL/hr - to provide 1980 kcal/day, 109g protein/day, and 1002 mL free fluid/day.   RD to monitor.    Goals: Patient to receive nutrition by RD follow-up  Nutrition Goal Status: goal met  Communication of RD Recs:  (POC)    Reason for Assessment  Reason for Assessment: RD follow-up  Diagnosis:  (diffuse pulmonary alveolar hemorrhage)  Relevant Medical History: DM2, HTN  General Information Comments: Patient still intubated, sedated. On CRRT. TF on hold.  Nutrition Discharge Planning: Unable to determine at this time.    Nutrition/Diet History  Food Preferences: AMY  Do you have any cultural, spiritual, Jain conflicts, given your current situation?: amy  Factors Affecting Nutritional Intake: NPO, on mechanical ventilation    Anthropometrics  Temp: 99.2 °F (37.3 °C)  Height: 5' 8" (172.7 cm)  Height (inches): 68 in  Weight Method: Bed Scale  Weight: 90.1 kg (198 lb 10.2 oz)  Weight (lb): 198.64 lb  Ideal Body Weight (IBW), Male: 154 lb  % Ideal Body Weight, Male (lb): 123.38 lb  BMI (Calculated): 28.9  BMI Grade: 25 - 29.9 - overweight    Lab/Procedures/Meds  Pertinent Labs Reviewed: reviewed  Pertinent Labs Comments: Na 135, Glu 180, POCT Glu 151-186, HgbA1c 6.2, Ca 7.6, Alb 2.7, T. bili 1.2, Trig 523  Pertinent Medications Reviewed: reviewed  Pertinent Medications Comments: famotidine, methylprednisolone, precedex, fentnayl, insulin drip, versed, levophed    Physical Findings/Assessment  Overall Physical Appearance: on ventilator support  Tubes: nasogastric tube  Oral/Mouth Cavity: tooth/teeth missing  Skin: " edema    Estimated/Assessed Needs  Weight Used For Calorie Calculations: 86.1 kg (189 lb 13.1 oz) (dosing weigt)  Energy Calorie Requirements (kcal): 1975 kcal/day  Energy Need Method: [facility]  Protein Requirements: 104-130 g/day (1.2-1.5 g/kg)  Weight Used For Protein Calculations: 86.1 kg (189 lb 13.1 oz) (dosing weight)  Fluid Requirements (mL): 1 mL/kcal or per MD  Fluid Need Method: RDA Method  RDA Method (mL): 1975    Nutrition Prescription Ordered  Current Diet Order: NPO  Current Nutrition Support Formula Ordered: [facility]  Current Nutrition Support Rate Ordered: 40 (ml)  Current Nutrition Support Frequency Ordered: mL/hr    Evaluation of Received Nutrient/Fluid Intake  Enteral Calories (kcal): 1920  Enteral Protein (gm): 87  Enteral (Free Water) Fluid (mL): 688  % Kcal Needs: 97  % Protein Needs: 84  I/O: Noted  Energy Calories Required: meeting needs  Protein Required: meeting needs  Fluid Required:  (per MD)  Comments: LBM 6/12  Tolerance:  (on hold)  % Intake of Estimated Energy Needs: 0 - 25 % (TF on hold)  % Meal Intake: NPO    Nutrition Risk  Level of Risk/Frequency of Follow-up: high (2x/week)     Assessment and Plan  * Diffuse pulmonary alveolar hemorrhage    Contributing Nutrition Diagnosis  Inadequate energy intake    Related to (etiology):  Intubated, NPO    Signs and Symptoms (as evidenced by):   Currently meeting < 85% EEN and EPN     Nutrition Diagnosis Status:   Resolved          Monitor and Evaluation  Food and Nutrient Intake: energy intake, enteral nutrition intake  Food and Nutrient Adminstration: enteral and parenteral nutrition administration  Anthropometric Measurements: weight, weight change  Biochemical Data, Medical Tests and Procedures: electrolyte and renal panel, gastrointestinal profile, inflammatory profile  Nutrition-Focused Physical Findings: overall appearance     Nutrition Follow-Up  RD Follow-up?: Yes

## 2018-06-18 NOTE — PLAN OF CARE
Patient remains intubated, sedated, and requiring CRRT.  PLEX # 4 complete.  Patient not medically appropriate for disposition planning.  CM will continue to follow.     06/18/18 1246   Right Care Assessment   Can the patient answer the patient profile reliably? No, cognitively impaired   How often would a person be available to care for the patient? Often   Describe the patient's ability to walk at the present time. Does not walk or unable to take any steps at all   How does the patient rate their overall health at the present time? (ROMA)   Number of comorbid conditions (as recorded on the chart) Three   During the past month, has the patient often been bothered by feeling down, depressed or hopeless? (ROMA)   During the past month, has the patient often been bothered by little interest or pleasure in doing things? (ROMA)   Zahira Nava RN, BSN, [facility]  Case Management  [facility]  Ext. [phone]

## 2018-06-18 NOTE — ASSESSMENT & PLAN NOTE
- Most likely the diagnosis in the setting of diffuse alveolar hemorrhage and glomerulonephritis with most likely etiology is underlying autoimmune disorder  - Elevated UPCR 1.28, DENIZ positive 1:320 speckled pattern, MPO+ 82, PR3 neg, Immunoglobulins wnl, Hep C and HIV neg  - Finished  5 days of Methylprednisolone 1000 mg IV daily, started on Rituxan (6/15) and Methylprednisone 125 mg IV Q 6 and Atovaquin for PPx   - received 4th dose of plasma exchange 06/18/18

## 2018-06-18 NOTE — ASSESSMENT & PLAN NOTE
- See principal problem for more elaboration.   - Currently on low need for pressors, will continue to monitor and ensure MAP > 65 mmHg (during CRRT)  - Initially on broad spectrum antibiotics with Cefepime (renally dose) and Vancomycin. Stopped Vancomycoin and will plan to Stop Cefepime int he next couple of days per ID rec   - Blood culture and Urine Culture showed NGTD, discontinued Vanc   - d/c Cefepime 6/18/18

## 2018-06-18 NOTE — PROGRESS NOTES
[facility]  Nephrology  Progress Note    Patient Name: Jasmeet Raymond  MRN: [id number]  Admission Date: 6/10/2018  Hospital Length of Stay: 8 days  Attending Provider: Neto Wong MD   Primary Care Physician: Provider Notinsystem  Principal Problem:Diffuse pulmonary alveolar hemorrhage    Subjective:     HPI: 64 yo AAm with HTN, NIDDM, arthritis on metformin and meloxicam  admitted to [facility] for worsening SOB and ALEX.   Patiend had been treated as an out;patient for 2 weeks for Shortness of breath and bronchitis and UTI. In the hospital he was found to be acidotic, hyperkalemic, serum creatinine 8, and diffuse bilateral pulmonary infiltrates, requiring intubation. UA with proteinuria, hematuria and wbc's, Bronchoscopy revealed hemorrahage. Patient recieved one dialysis as per notes and 1 gm solumedrol, and transferred  to [facility] for plasmapheresis  And further evaluation.  Family is not available at the time of exam and patient is intubated.   HIV, Hepatitis B, C, complements  All within normal limits. DENIZ, ANCA and AntiGBM, results Nnot available    Interval History: Increase FIO2 requirement 45 to 50 % . Also increase agitation requiring increase sedation. Remains anuric. Net neg 674 ml. CXR  Improvement.     Review of patient's allergies indicates:   Allergen Reactions    Sulfa (sulfonamide antibiotics)      Current Facility-Administered Medications   Medication Frequency    atovaquone suspension 750 mg Q12H    calcium chloride 1 g in dextrose 5 % 100 mL IVPB Continuous    chlorhexidine 0.12 % solution 15 mL BID    dexmedetomidine (PRECEDEX) 400mcg/100mL 0.9% NaCL infusion Continuous    dextrose 50% injection 12.5 g PRN    dextrose 50% injection 25 g PRN    DEXTROSE-SOD CITRATE-CITRIC AC 2.45-2.2 GRAM- 730 MG/100 ML MISC SOLN Continuous    famotidine (PF) injection 20 mg BID    fentaNYL 2500 mcg in 0.9% sodium chloride 250 mL infusion premix (titrating) Continuous     heparin (porcine) injection 2,200 Units Once    heparin (porcine) injection 5,000 Units Q12H    heparin, porcine (PF) 100 unit/mL injection flush 500 Units PRN    hepatitis B virus vacc.rec(PF) injection 40 mcg vaccine x 1 dose    insulin aspart U-100 pen 0-5 Units Q6H PRN    insulin regular (Humulin R) 100 Units in sodium chloride 0.9% 100 mL infusion Continuous    lorazepam injection 4 mg Q1H PRN    magnesium sulfate 2g in water 50mL IVPB (premix) PRN    methylPREDNISolone sodium succinate injection 125 mg Q6H    norepinephrine 4 mg in dextrose 5% 250 mL infusion (premix) (titrating) Continuous    propofol (DIPRIVAN) 10 mg/mL infusion Continuous    sodium chloride 0.9% flush 10 mL PRN    sodium phosphate 20.01 mmol in dextrose 5 % 250 mL IVPB PRN    sodium phosphate 30 mmol in dextrose 5 % 250 mL IVPB PRN    sodium phosphate 39.99 mmol in dextrose 5 % 250 mL IVPB PRN    white petrolatum-mineral oil (LUBIFRESH P.M.) ophthalmic ointment Q8H       Objective:     Vital Signs (Most Recent):  Temp: (!) 101.7 °F (38.7 °C) (06/18/18 1700)  Pulse: (!) 143 (06/18/18 1800)  Resp: 20 (06/15/18 0415)  BP: (!) 159/71 (06/18/18 1600)  SpO2: (!) 82 % (06/18/18 1800)  O2 Device (Oxygen Therapy): ventilator (06/18/18 1536) Vital Signs (24h Range):  Temp:  [98.6 °F (37 °C)-102.2 °F (39 °C)] 101.7 °F (38.7 °C)  Pulse:  [] 143  SpO2:  [82 %-100 %] 82 %  BP: ()/(47-77) 159/71  Arterial Line BP: ()/(41-76) 131/65     Weight: 90.1 kg (198 lb 10.2 oz) (06/16/18 0300)  Body mass index is 30.2 kg/m².  Body surface area is 2.08 meters squared.    I/O last 3 completed shifts:  In: 46631 [I.V.:4755; Other:275; NG/GT:380; IV Piggyback:9355]  Out: 02348 [Drains:300; Other:26332]    Physical Exam   Constitutional: He appears well-developed and well-nourished.   Intubated FIO2 45 to 50% , sedated    HENT:   Head: Normocephalic.   Cardiovascular: Normal heart sounds.  Exam reveals no gallop and no friction rub.     No murmur heard.  Tachycardic, no murmur appreciated, + 2 lower extremity and pedal edema,   No JVD     Pulmonary/Chest:   Intubated. Clear anteriorly   Abdominal: He exhibits no distension.   No Bowel sounds,    Musculoskeletal: He exhibits no edema (all extremities improved).   Neurological:   Intubated and sedated   Skin: Skin is warm and dry.   R SC trialysis   Nursing note and vitals reviewed.      Significant Labs:  All labs within the past 24 hours have been reviewed.     Significant Imaging:  Labs: Reviewed    Assessment/Plan:     ALEX (acute kidney injury)    MPO-cANCA GPA, presenting with RPGN and pulmonary hemorrhage  S/p pulse steroids, now on scheduled dose 125mg IV solumedrol q6h, and PLEX 4/6, also has been started on rituxan 6/15 (repeat in 2 weeks), renal supportive therapy with RRT (SLED). Urine sediment showed RBC cast.     Plan:  -continue SLED for metabolic clearance and volume management. Citrate for AC/calcium chloride. -500 ml as tolerated.                        Thank you for your consult. I will follow-up with patient. Please contact us if you have any additional questions.    Kandice Hernandez, CAROL  Nephrology  [facility]

## 2018-06-18 NOTE — SUBJECTIVE & OBJECTIVE
Interval History: Increase FIO2 requirement 45 to 50 % . Also increase agitation requiring increase sedation. Remains anuric. Net neg 674 ml. CXR  Improvement.     Review of patient's allergies indicates:   Allergen Reactions    Sulfa (sulfonamide antibiotics)      Current Facility-Administered Medications   Medication Frequency    atovaquone suspension 750 mg Q12H    calcium chloride 1 g in dextrose 5 % 100 mL IVPB Continuous    chlorhexidine 0.12 % solution 15 mL BID    dexmedetomidine (PRECEDEX) 400mcg/100mL 0.9% NaCL infusion Continuous    dextrose 50% injection 12.5 g PRN    dextrose 50% injection 25 g PRN    DEXTROSE-SOD CITRATE-CITRIC AC 2.45-2.2 GRAM- 730 MG/100 ML MISC SOLN Continuous    famotidine (PF) injection 20 mg BID    fentaNYL 2500 mcg in 0.9% sodium chloride 250 mL infusion premix (titrating) Continuous    heparin (porcine) injection 2,200 Units Once    heparin (porcine) injection 5,000 Units Q12H    heparin, porcine (PF) 100 unit/mL injection flush 500 Units PRN    hepatitis B virus vacc.rec(PF) injection 40 mcg vaccine x 1 dose    insulin aspart U-100 pen 0-5 Units Q6H PRN    insulin regular (Humulin R) 100 Units in sodium chloride 0.9% 100 mL infusion Continuous    lorazepam injection 4 mg Q1H PRN    magnesium sulfate 2g in water 50mL IVPB (premix) PRN    methylPREDNISolone sodium succinate injection 125 mg Q6H    norepinephrine 4 mg in dextrose 5% 250 mL infusion (premix) (titrating) Continuous    propofol (DIPRIVAN) 10 mg/mL infusion Continuous    sodium chloride 0.9% flush 10 mL PRN    sodium phosphate 20.01 mmol in dextrose 5 % 250 mL IVPB PRN    sodium phosphate 30 mmol in dextrose 5 % 250 mL IVPB PRN    sodium phosphate 39.99 mmol in dextrose 5 % 250 mL IVPB PRN    white petrolatum-mineral oil (LUBIFRESH P.M.) ophthalmic ointment Q8H       Objective:     Vital Signs (Most Recent):  Temp: (!) 101.7 °F (38.7 °C) (06/18/18 1700)  Pulse: (!) 143 (06/18/18 1800)  Resp:  20 (06/15/18 0415)  BP: (!) 159/71 (06/18/18 1600)  SpO2: (!) 82 % (06/18/18 1800)  O2 Device (Oxygen Therapy): ventilator (06/18/18 1536) Vital Signs (24h Range):  Temp:  [98.6 °F (37 °C)-102.2 °F (39 °C)] 101.7 °F (38.7 °C)  Pulse:  [] 143  SpO2:  [82 %-100 %] 82 %  BP: ()/(47-77) 159/71  Arterial Line BP: ()/(41-76) 131/65     Weight: 90.1 kg (198 lb 10.2 oz) (06/16/18 0300)  Body mass index is 30.2 kg/m².  Body surface area is 2.08 meters squared.    I/O last 3 completed shifts:  In: 58514 [I.V.:4755; Other:275; NG/GT:380; IV Piggyback:9355]  Out: 58037 [Drains:300; Other:14171]    Physical Exam   Constitutional: He appears well-developed and well-nourished.   Intubated FIO2 45 to 50% , sedated    HENT:   Head: Normocephalic.   Cardiovascular: Normal heart sounds.  Exam reveals no gallop and no friction rub.    No murmur heard.  Tachycardic, no murmur appreciated, + 2 lower extremity and pedal edema,   No JVD     Pulmonary/Chest:   Intubated. Clear anteriorly   Abdominal: He exhibits no distension.   No Bowel sounds,    Musculoskeletal: He exhibits no edema (all extremities improved).   Neurological:   Intubated and sedated   Skin: Skin is warm and dry.   R SC trialysis   Nursing note and vitals reviewed.      Significant Labs:  All labs within the past 24 hours have been reviewed.     Significant Imaging:  Labs: Reviewed

## 2018-06-18 NOTE — PROGRESS NOTES
[facility]  Rheumatology  Progress Note    Patient Name: Jasmeet Raymond  MRN: [id number]  Admission Date: 6/10/2018  Hospital Length of Stay: 8 days  Code Status: Full Code   Attending Provider: Neto Wong MD  Primary Care Physician: Provider Notinsystem  Principal Problem: Diffuse pulmonary alveolar hemorrhage    Subjective:     HPI:  Mr. Jasmeet Raymond, 63 year old male with PMHx significant for HTN, DM and osteoarthirtis presented to [facility] with symptoms of pneumonia and shortness of breath. On 5/29/2018, he presented with symptoms of bronchitis with denies chills, ear pain, sore throat, chest pain, shortness of breath at that time, received antibiotics. Then on 6/8/2018 he presented again to [facility] with shortness of breath and cough, and his CXR showed concern for airway disease. However on the first day of admission at [facility], his respiratory symptoms worsen and had acute hypoxic respiratory failure that required intubation. Pulmonary consulted and he underwent Bronchoscopy and  revealed alveolar hemorrhage, with the setting of having hematuria and diffuse alveloar hemorrhage the concern was for presence of pulmonary renal syndrome and he received pulsed dose of Methylprednisolone and nephrology consulted for plasmapheresis. However at [facility], plasmapheresis machine is broken and they recommend to transfer [place] [facility] [place]mapheresis. Admitted here 6/11 for ARDS.     On arrival he was on Nibmex for paralytics, Propofol for sedation, Levophed (eventaully turned off) for pressure support, Flolan (Epoprostenol sodium) inhaled which was switched to Inhaled Nitro. His mechanical ventilation initially was on FiO2 100%, PEEP 16, then weaned his FiO2 with high requirement of oxygenation. He had an signifiacnt drop in his Hb (baseline ~ 12) dated one  week prior presentation to 9 and 8, required one pRBC prior arrival. Repeated CXR showed extensive bilateral infiltrates. Important laboratory investigation showed His , , pro calcitonin 4.4, C3 and C4 complement normal., Trop negative, C3 and C4 showed normal , Hep test is negative, Lactic acid 1.8 > 1.0, HIV negative, Procal 4.4, UA shoowed heamturia and trace leukocytes, Urine culture from 5/29 showed almost pansensitve Cedecea davisae.    Rheumatology consulted for pulmonary-renal syndrome, recs for steroids and further investigation.     Interval History: Patient received Rituxan 1g infusion on 6/16/18. Solumedrol IV decreased to 125 mg q6 hours. Required Nimbex yesterday but now off again. Remains sedated. Still on CRRT. PLEX #4 today. Given NGTD on blood and urine cultures, vanc discontinued. Plan to discontinue Cefepime as well.     Current Facility-Administered Medications   Medication Frequency    0.9%  NaCl infusion (for blood administration) Q24H PRN    albumin human 5% bottle 225 g Once    atovaquone suspension 750 mg Q12H    calcium chloride 1 g in dextrose 5 % 100 mL IVPB Continuous    ceFEPIme injection 1 g Q8H    chlorhexidine 0.12 % solution 15 mL BID    cisatracurium (NIMBEX) 100 mg in dextrose 5 % 100 mL infusion Continuous    dexmedetomidine (PRECEDEX) 400mcg/100mL 0.9% NaCL infusion Continuous    dextrose 50% injection 12.5 g PRN    dextrose 50% injection 25 g PRN    DEXTROSE-SOD CITRATE-CITRIC AC 2.45-2.2 GRAM- 730 MG/100 ML MISC SOLN Continuous    famotidine (PF) injection 20 mg BID    fentaNYL 2500 mcg in 0.9% sodium chloride 250 mL infusion premix (titrating) Continuous    heparin (porcine) injection 2,200 Units Once    heparin, porcine (PF) 100 unit/mL injection flush 500 Units PRN    hepatitis B virus vacc.rec(PF) injection 40 mcg vaccine x 1 dose    insulin aspart U-100 pen 0-5 Units Q6H PRN    insulin regular (Humulin R) 100 Units in sodium  chloride 0.9% 100 mL infusion Continuous    lorazepam injection 1 mg Q30 Min PRN    magnesium sulfate 2g in water 50mL IVPB (premix) PRN    methylPREDNISolone sodium succinate injection 125 mg Q6H    midazolam (VERSED) 1 mg/mL in dextrose 5 % 100 mL infusion (titrating) Continuous    norepinephrine 4 mg in dextrose 5% 250 mL infusion (premix) (titrating) Continuous    propofol (DIPRIVAN) 10 mg/mL infusion Continuous    sodium chloride 0.9% flush 10 mL PRN    sodium phosphate 20.01 mmol in dextrose 5 % 250 mL IVPB PRN    sodium phosphate 30 mmol in dextrose 5 % 250 mL IVPB PRN    sodium phosphate 39.99 mmol in dextrose 5 % 250 mL IVPB PRN    white petrolatum-mineral oil (LUBIFRESH P.M.) ophthalmic ointment Q8H     Objective:     Vital Signs (Most Recent):  Temp: 98.7 °F (37.1 °C) (06/18/18 0705)  Pulse: 67 (06/18/18 1015)  Resp: 20 (06/15/18 0415)  BP: 111/63 (06/18/18 1000)  SpO2: 100 % (06/18/18 1015)  O2 Device (Oxygen Therapy): ventilator (06/18/18 0956) Vital Signs (24h Range):  Temp:  [98.4 °F (36.9 °C)-98.7 °F (37.1 °C)] 98.7 °F (37.1 °C)  Pulse:  [] 67  SpO2:  [86 %-100 %] 100 %  BP: ()/(54-78) 111/63  Arterial Line BP: ()/() 108/48     Weight: 90.1 kg (198 lb 10.2 oz) (06/16/18 0300)  Body mass index is 30.2 kg/m².  Body surface area is 2.08 meters squared.      Intake/Output Summary (Last 24 hours) at 06/18/18 1030  Last data filed at 06/18/18 0900   Gross per 24 hour   Intake             9558 ml   Output            20848 ml   Net             -679 ml       Physical Exam   Vitals reviewed.  Constitutional: No distress.   HENT:   Head: Normocephalic and atraumatic.   Right Ear: External ear normal.   Left Ear: External ear normal.   Eyes: Pupils are equal, round, and reactive to light. Right eye exhibits no discharge. Left eye exhibits no discharge.   Neck: Neck supple. No JVD present.   Cardiovascular: Normal rate, regular rhythm and intact distal pulses.    No murmur  heard.  Pulmonary/Chest: No respiratory distress. He has no wheezes. He has rales (b/l, mechanical vent ).   Abdominal: Soft. He exhibits no distension. There is no tenderness.   BS appreciated in LUQ only   Neurological:   Sedated    Skin: Skin is warm and dry. No rash noted. He is not diaphoretic. No erythema.     Psychiatric:   Unable to assess   Musculoskeletal: He exhibits no edema.         Significant Labs:  Results for FELIPE BERNAL (MRN [id number]) as of 6/18/2018 10:33   Ref. Range 6/18/2018 03:49   WBC Latest Ref Range: 3.90 - 12.70 K/uL 19.85 (H)   RBC Latest Ref Range: 4.60 - 6.20 M/uL 2.87 (L)   Hemoglobin Latest Ref Range: 14.0 - 18.0 g/dL 8.2 (L)   Hematocrit Latest Ref Range: 40.0 - 54.0 % 24.7 (L)   MCV Latest Ref Range: 82 - 98 fL 86   MCH Latest Ref Range: 27.0 - 31.0 pg 28.6   MCHC Latest Ref Range: 32.0 - 36.0 g/dL 33.2   RDW Latest Ref Range: 11.5 - 14.5 % 16.4 (H)   Platelets Latest Ref Range: 150 - 350 K/uL 162   MPV Latest Ref Range: 9.2 - 12.9 fL 11.6   Gran% Latest Ref Range: 38.0 - 73.0 % 89.0 (H)   Gran # (ANC) Latest Ref Range: 1.8 - 7.7 K/uL 17.7 (H)   Immature Granulocytes Latest Ref Range: 0.0 - 0.5 % 1.6 (H)   Immature Grans (Abs) Latest Ref Range: 0.00 - 0.04 K/uL 0.31 (H)   Lymph% Latest Ref Range: 18.0 - 48.0 % 2.9 (L)   Lymph # Latest Ref Range: 1.0 - 4.8 K/uL 0.6 (L)   Mono% Latest Ref Range: 4.0 - 15.0 % 6.2   Mono # Latest Ref Range: 0.3 - 1.0 K/uL 1.2 (H)   Eosinophil% Latest Ref Range: 0.0 - 8.0 % 0.2   Eos # Latest Ref Range: 0.0 - 0.5 K/uL 0.0   Basophil% Latest Ref Range: 0.0 - 1.9 % 0.1   Baso # Latest Ref Range: 0.00 - 0.20 K/uL 0.02   nRBC Latest Ref Range: 0 /100 WBC 0   Protime Latest Ref Range: 9.0 - 12.5 sec 11.0   Coumadin Monitoring INR Latest Ref Range: 0.8 - 1.2  1.1   aPTT Latest Ref Range: 21.0 - 32.0 sec 24.2   Results for FELIPE BERNAL (MRN [id number]) as of 6/18/2018 10:33   Ref. Range 6/18/2018 03:49   Sodium Latest Ref Range: 136 - 145 mmol/L 135 (L)    Potassium Latest Ref Range: 3.5 - 5.1 mmol/L 4.6   Chloride Latest Ref Range: 95 - 110 mmol/L 106   CO2 Latest Ref Range: 23 - 29 mmol/L 22 (L)   Anion Gap Latest Ref Range: 8 - 16 mmol/L 7 (L)   BUN, Bld Latest Ref Range: 8 - 23 mg/dL 13   Creatinine Latest Ref Range: 0.5 - 1.4 mg/dL 0.9   eGFR if non African American Latest Ref Range: >60 mL/min/1.73 m^2 >60.0   eGFR if African American Latest Ref Range: >60 mL/min/1.73 m^2 >60.0   Glucose Latest Ref Range: 70 - 110 mg/dL 180 (H)   Calcium Latest Ref Range: 8.7 - 10.5 mg/dL 7.6 (L)   Alkaline Phosphatase Latest Ref Range: 55 - 135 U/L 47 (L)   Total Protein Latest Ref Range: 6.0 - 8.4 g/dL 5.1 (L)   Albumin Latest Ref Range: 3.5 - 5.2 g/dL 2.7 (L)   Total Bilirubin Latest Ref Range: 0.1 - 1.0 mg/dL 1.2 (H)   AST Latest Ref Range: 10 - 40 U/L 21   ALT Latest Ref Range: 10 - 44 U/L 21   Results for FELIPE BERNAL (MRN [id number]) as of 6/18/2018 10:33   Ref. Range 6/10/2018 22:49 6/10/2018 22:51   DENIZ HEP-2 Titer Unknown  Positive 1:320 Sp...   Anti-SSA Antibody Latest Ref Range: 0.00 - 19.99 EU  178.40 (H)   Anti-SSA Interpretation Latest Ref Range: Negative   Positive (A)   Anti-SSB Antibody Latest Ref Range: 0.00 - 19.99 EU  0.87   Anti-SSB Interpretation Latest Ref Range: Negative   Negative   ds DNA Ab Latest Ref Range: Negative 1:10   Negative 1:10   Anti Sm Antibody Latest Ref Range: 0.00 - 19.99 EU  0.60   Anti-Sm Interpretation Latest Ref Range: Negative   Negative   Anti Sm/RNP Antibody Latest Ref Range: 0.00 - 19.99 EU  2.83   Anti-Sm/RNP Interpretation Latest Ref Range: Negative   Negative   Cytoplasmic Neutrophilic Ab Latest Ref Range: <1:20 Titer 1:320 (A)    Perinuclear (P-ANCA) Latest Ref Range: <1:20 Titer <1:20    Results for FELIPE BERNAL (MRN [id number]) as of 6/18/2018 10:33   Ref. Range 6/11/2018 06:01 6/11/2018 08:10 6/14/2018 03:27   ANCA Proteinase 3 Latest Ref Range: <0.4 (Negative) U  <0.2    MPO Latest Ref Range: <=20 UNITS  82 (H)     Complement (C-3) Latest Ref Range: 50 - 180 mg/dL   78   Complement (C-4) Latest Ref Range: 11 - 44 mg/dL   13   Complement,Total, Serum Latest Ref Range: 42 - 95 U/mL   60   IgG - Serum Latest Ref Range: 650 - 1600 mg/dL  972    IgM Latest Ref Range: 50 - 300 mg/dL  57    IgA Latest Ref Range: 40 - 350 mg/dL  154    Protein, Serum Latest Ref Range: 6.0 - 8.4 g/dL  4.9 (L)    Albumin grams/dl Latest Ref Range: 3.35 - 5.55 g/dL  2.16 (L)    Alpha-1 grams/dl Latest Ref Range: 0.17 - 0.41 g/dL  0.63 (H)    Alpha-2 grams/dl Latest Ref Range: 0.43 - 0.99 g/dL  0.71    Beta grams/dl Latest Ref Range: 0.50 - 1.10 g/dL  0.60    Gamma grams/dl Latest Ref Range: 0.67 - 1.58 g/dL  0.80    Pathologist Interpretation SPE Unknown  REVIEWED    Pathologist Interpretation BOB Unknown  REVIEWED    Immunofix Interp. Unknown  SEE COMMENT    GBM Ab Latest Ref Range: <1.0 (Negative) U <0.2           Assessment/Plan:     Pulmonary-renal syndrome    ANCA associated vasculitis  62 yo w/PMH of HTN and NIDDM, OA admitted for acute hypoxic respiratory failure, renal failure, and pulmonary renal syndrome receiving plasmapheresis. Rheumatology consulted for pulmonary renal syndrome, recs on steroids and further investigation.   - Given methylprednisolone 1000 mg IV daily x 5 doses   - Nephrology consulted: renal biopsy when stable  --> elevated UPCR 1.28, DENIZ positive 1:320 speckled pattern, MPO+ 82, c-ANCA +, p-anca neg,  SSA + 178, SSB neg, dsDNA neg, Anti-Sm/RNP neg, Anti-Sm neg, PR3 neg, Immunoglobulins wnl, Hep C and HIV neg, APL labs neg, GBM neg, MARTHA neg, C3/C4 wnl, ACE neg, quant gold indeterminate however T-spot negative.  - Concern for vasculitis given DAH and hematuria. The nini-typical pulmonary-renal syndromes are GPA/MPA, Goodpasture disease, SLE. Immunofluorescence studies can help differentiate. MPO+ can suggest MPA    TB-spot negative, negative drug screen    - Given Solumedrol 1000 mg IV daily x 5 doses (completed on  6/14/18). Started on Solumedrol 125 mg q6h on 6/15/18.   - Rituximab 1 g IV given on 6/15/18. Next dose on 6/29/18.  - Day 4 of PLEX today.    Plan:   - transfusion medicine: PLEX every other day  - Started on IV methylprednisolone 125 mg q6hrs on 6/15/18. Will continue at this dose.  - Received first dose Rituximab 6/15/18 and now on Atovaquone. Repeat Rituximab in 2 weeks.                Nohemi Gonsales MD  Rheumatology  [facility]    Patient seen and examined with fellow.  All elements of history, physical exam and medical decision making independently confirmed by me. Patient with pulmonary-renal syndrome now with positive MPO and +c-ANCA.  Patient negative TB skin test. Renal biopsy when stable. Continue Solumedrol 125 mg q6.  Received Rituxan first dose.  Repeat Rituxan in 2 weeks.  See note for details.

## 2018-06-18 NOTE — SUBJECTIVE & OBJECTIVE
Interval History: Patient received Rituxan 1g infusion on 6/16/18. Solumedrol IV decreased to 125 mg q6 hours. Required Nimbex yesterday but now off again. Remains sedated. Still on CRRT. PLEX #4 today. Given NGTD on blood and urine cultures, vanc discontinued. Plan to discontinue Cefepime as well.     Current Facility-Administered Medications   Medication Frequency    0.9%  NaCl infusion (for blood administration) Q24H PRN    albumin human 5% bottle 225 g Once    atovaquone suspension 750 mg Q12H    calcium chloride 1 g in dextrose 5 % 100 mL IVPB Continuous    ceFEPIme injection 1 g Q8H    chlorhexidine 0.12 % solution 15 mL BID    cisatracurium (NIMBEX) 100 mg in dextrose 5 % 100 mL infusion Continuous    dexmedetomidine (PRECEDEX) 400mcg/100mL 0.9% NaCL infusion Continuous    dextrose 50% injection 12.5 g PRN    dextrose 50% injection 25 g PRN    DEXTROSE-SOD CITRATE-CITRIC AC 2.45-2.2 GRAM- 730 MG/100 ML MISC SOLN Continuous    famotidine (PF) injection 20 mg BID    fentaNYL 2500 mcg in 0.9% sodium chloride 250 mL infusion premix (titrating) Continuous    heparin (porcine) injection 2,200 Units Once    heparin, porcine (PF) 100 unit/mL injection flush 500 Units PRN    hepatitis B virus vacc.rec(PF) injection 40 mcg vaccine x 1 dose    insulin aspart U-100 pen 0-5 Units Q6H PRN    insulin regular (Humulin R) 100 Units in sodium chloride 0.9% 100 mL infusion Continuous    lorazepam injection 1 mg Q30 Min PRN    magnesium sulfate 2g in water 50mL IVPB (premix) PRN    methylPREDNISolone sodium succinate injection 125 mg Q6H    midazolam (VERSED) 1 mg/mL in dextrose 5 % 100 mL infusion (titrating) Continuous    norepinephrine 4 mg in dextrose 5% 250 mL infusion (premix) (titrating) Continuous    propofol (DIPRIVAN) 10 mg/mL infusion Continuous    sodium chloride 0.9% flush 10 mL PRN    sodium phosphate 20.01 mmol in dextrose 5 % 250 mL IVPB PRN    sodium phosphate 30 mmol in dextrose 5 %  250 mL IVPB PRN    sodium phosphate 39.99 mmol in dextrose 5 % 250 mL IVPB PRN    white petrolatum-mineral oil (LUBIFRESH P.M.) ophthalmic ointment Q8H     Objective:     Vital Signs (Most Recent):  Temp: 98.7 °F (37.1 °C) (06/18/18 0705)  Pulse: 67 (06/18/18 1015)  Resp: 20 (06/15/18 0415)  BP: 111/63 (06/18/18 1000)  SpO2: 100 % (06/18/18 1015)  O2 Device (Oxygen Therapy): ventilator (06/18/18 0956) Vital Signs (24h Range):  Temp:  [98.4 °F (36.9 °C)-98.7 °F (37.1 °C)] 98.7 °F (37.1 °C)  Pulse:  [] 67  SpO2:  [86 %-100 %] 100 %  BP: ()/(54-78) 111/63  Arterial Line BP: ()/() 108/48     Weight: 90.1 kg (198 lb 10.2 oz) (06/16/18 0300)  Body mass index is 30.2 kg/m².  Body surface area is 2.08 meters squared.      Intake/Output Summary (Last 24 hours) at 06/18/18 1030  Last data filed at 06/18/18 0900   Gross per 24 hour   Intake             9558 ml   Output            00274 ml   Net             -679 ml       Physical Exam   Vitals reviewed.  Constitutional: No distress.   HENT:   Head: Normocephalic and atraumatic.   Right Ear: External ear normal.   Left Ear: External ear normal.   Eyes: Pupils are equal, round, and reactive to light. Right eye exhibits no discharge. Left eye exhibits no discharge.   Neck: Neck supple. No JVD present.   Cardiovascular: Normal rate, regular rhythm and intact distal pulses.    No murmur heard.  Pulmonary/Chest: No respiratory distress. He has no wheezes. He has rales (b/l, mechanical vent ).   Abdominal: Soft. He exhibits no distension. There is no tenderness.   BS appreciated in LUQ only   Neurological:   Sedated    Skin: Skin is warm and dry. No rash noted. He is not diaphoretic. No erythema.     Psychiatric:   Unable to assess   Musculoskeletal: He exhibits no edema.         Significant Labs:  Results for FELIPE BERNAL (MRN [id number]) as of 6/18/2018 10:33   Ref. Range 6/18/2018 03:49   WBC Latest Ref Range: 3.90 - 12.70 K/uL 19.85 (H)   RBC Latest Ref  Range: 4.60 - 6.20 M/uL 2.87 (L)   Hemoglobin Latest Ref Range: 14.0 - 18.0 g/dL 8.2 (L)   Hematocrit Latest Ref Range: 40.0 - 54.0 % 24.7 (L)   MCV Latest Ref Range: 82 - 98 fL 86   MCH Latest Ref Range: 27.0 - 31.0 pg 28.6   MCHC Latest Ref Range: 32.0 - 36.0 g/dL 33.2   RDW Latest Ref Range: 11.5 - 14.5 % 16.4 (H)   Platelets Latest Ref Range: 150 - 350 K/uL 162   MPV Latest Ref Range: 9.2 - 12.9 fL 11.6   Gran% Latest Ref Range: 38.0 - 73.0 % 89.0 (H)   Gran # (ANC) Latest Ref Range: 1.8 - 7.7 K/uL 17.7 (H)   Immature Granulocytes Latest Ref Range: 0.0 - 0.5 % 1.6 (H)   Immature Grans (Abs) Latest Ref Range: 0.00 - 0.04 K/uL 0.31 (H)   Lymph% Latest Ref Range: 18.0 - 48.0 % 2.9 (L)   Lymph # Latest Ref Range: 1.0 - 4.8 K/uL 0.6 (L)   Mono% Latest Ref Range: 4.0 - 15.0 % 6.2   Mono # Latest Ref Range: 0.3 - 1.0 K/uL 1.2 (H)   Eosinophil% Latest Ref Range: 0.0 - 8.0 % 0.2   Eos # Latest Ref Range: 0.0 - 0.5 K/uL 0.0   Basophil% Latest Ref Range: 0.0 - 1.9 % 0.1   Baso # Latest Ref Range: 0.00 - 0.20 K/uL 0.02   nRBC Latest Ref Range: 0 /100 WBC 0   Protime Latest Ref Range: 9.0 - 12.5 sec 11.0   Coumadin Monitoring INR Latest Ref Range: 0.8 - 1.2  1.1   aPTT Latest Ref Range: 21.0 - 32.0 sec 24.2   Results for FELIPE BERNAL (MRN [id number]) as of 6/18/2018 10:33   Ref. Range 6/18/2018 03:49   Sodium Latest Ref Range: 136 - 145 mmol/L 135 (L)   Potassium Latest Ref Range: 3.5 - 5.1 mmol/L 4.6   Chloride Latest Ref Range: 95 - 110 mmol/L 106   CO2 Latest Ref Range: 23 - 29 mmol/L 22 (L)   Anion Gap Latest Ref Range: 8 - 16 mmol/L 7 (L)   BUN, Bld Latest Ref Range: 8 - 23 mg/dL 13   Creatinine Latest Ref Range: 0.5 - 1.4 mg/dL 0.9   eGFR if non African American Latest Ref Range: >60 mL/min/1.73 m^2 >60.0   eGFR if African American Latest Ref Range: >60 mL/min/1.73 m^2 >60.0   Glucose Latest Ref Range: 70 - 110 mg/dL 180 (H)   Calcium Latest Ref Range: 8.7 - 10.5 mg/dL 7.6 (L)   Alkaline Phosphatase Latest Ref Range: 55  - 135 U/L 47 (L)   Total Protein Latest Ref Range: 6.0 - 8.4 g/dL 5.1 (L)   Albumin Latest Ref Range: 3.5 - 5.2 g/dL 2.7 (L)   Total Bilirubin Latest Ref Range: 0.1 - 1.0 mg/dL 1.2 (H)   AST Latest Ref Range: 10 - 40 U/L 21   ALT Latest Ref Range: 10 - 44 U/L 21   Results for FELIPE BERNAL (MRN [id number]) as of 6/18/2018 10:33   Ref. Range 6/10/2018 22:49 6/10/2018 22:51   DENIZ HEP-2 Titer Unknown  Positive 1:320 Sp...   Anti-SSA Antibody Latest Ref Range: 0.00 - 19.99 EU  178.40 (H)   Anti-SSA Interpretation Latest Ref Range: Negative   Positive (A)   Anti-SSB Antibody Latest Ref Range: 0.00 - 19.99 EU  0.87   Anti-SSB Interpretation Latest Ref Range: Negative   Negative   ds DNA Ab Latest Ref Range: Negative 1:10   Negative 1:10   Anti Sm Antibody Latest Ref Range: 0.00 - 19.99 EU  0.60   Anti-Sm Interpretation Latest Ref Range: Negative   Negative   Anti Sm/RNP Antibody Latest Ref Range: 0.00 - 19.99 EU  2.83   Anti-Sm/RNP Interpretation Latest Ref Range: Negative   Negative   Cytoplasmic Neutrophilic Ab Latest Ref Range: <1:20 Titer 1:320 (A)    Perinuclear (P-ANCA) Latest Ref Range: <1:20 Titer <1:20    Results for FELIPE BERNAL (MRN [id number]) as of 6/18/2018 10:33   Ref. Range 6/11/2018 06:01 6/11/2018 08:10 6/14/2018 03:27   ANCA Proteinase 3 Latest Ref Range: <0.4 (Negative) U  <0.2    MPO Latest Ref Range: <=20 UNITS  82 (H)    Complement (C-3) Latest Ref Range: 50 - 180 mg/dL   78   Complement (C-4) Latest Ref Range: 11 - 44 mg/dL   13   Complement,Total, Serum Latest Ref Range: 42 - 95 U/mL   60   IgG - Serum Latest Ref Range: 650 - 1600 mg/dL  972    IgM Latest Ref Range: 50 - 300 mg/dL  57    IgA Latest Ref Range: 40 - 350 mg/dL  154    Protein, Serum Latest Ref Range: 6.0 - 8.4 g/dL  4.9 (L)    Albumin grams/dl Latest Ref Range: 3.35 - 5.55 g/dL  2.16 (L)    Alpha-1 grams/dl Latest Ref Range: 0.17 - 0.41 g/dL  0.63 (H)    Alpha-2 grams/dl Latest Ref Range: 0.43 - 0.99 g/dL  0.71    Beta grams/dl Latest  Ref Range: 0.50 - 1.10 g/dL  0.60    Gamma grams/dl Latest Ref Range: 0.67 - 1.58 g/dL  0.80    Pathologist Interpretation SPE Unknown  REVIEWED    Pathologist Interpretation BOB Unknown  REVIEWED    Immunofix Interp. Unknown  SEE COMMENT    GBM Ab Latest Ref Range: <1.0 (Negative) U <0.2

## 2018-06-18 NOTE — ASSESSMENT & PLAN NOTE
ANCA associated vasculitis  62 yo w/PMH of HTN and NIDDM, OA admitted for acute hypoxic respiratory failure, renal failure, and pulmonary renal syndrome receiving plasmapheresis. Rheumatology consulted for pulmonary renal syndrome, recs on steroids and further investigation.   - Given methylprednisolone 1000 mg IV daily x 5 doses   - Nephrology consulted: renal biopsy when stable  --> elevated UPCR 1.28, DENIZ positive 1:320 speckled pattern, MPO+ 82, c-ANCA +, p-anca neg,  SSA + 178, SSB neg, dsDNA neg, Anti-Sm/RNP neg, Anti-Sm neg, PR3 neg, Immunoglobulins wnl, Hep C and HIV neg, APL labs neg, GBM neg, MARTHA neg, C3/C4 wnl, ACE neg, quant gold indeterminate however T-spot negative.  - Concern for vasculitis given DAH and hematuria. The nini-typical pulmonary-renal syndromes are GPA/MPA, Goodpasture disease, SLE. Immunofluorescence studies can help differentiate. MPO+ can suggest MPA    TB-spot negative, negative drug screen    - Given methylprednisolone 1000 mg IV daily x 5 doses (completed on 6/14/18). Started on Solumedrol 125 mg q6h on 6/15/18.   - Rituximab 1 g IV given on 6/15/18. Next dose on 6/29/18.  - Day 4 of PLEX today.    Plan:   - transfusion medicine: Daily TPE with FFP   - Started on IV methylprednisolone 125 mg q6hrs on 6/15/18. Will continue at this dose.  - Will start Rituximab for induction, discussed with family at bedside. Family given handout about Vasculitis and Rituximab 6/12.  - Per ID, cleared for Rituximab initiation. Discussed with Dr. Whaley with transfusion medicine, they plan for 6 total sessions of PLEX, next session today, then following sessions next MWF. Started Rituximab and Atovaquone on 6/15/18.   -Resume PLEX today.

## 2018-06-19 NOTE — SUBJECTIVE & OBJECTIVE
Interval History: Yesterday, patient febrile with Tmax 102.6F. Blood cultures drawn and patient started on Vanc and Zosyn. Hemoglobin dropped to 6.5g/dl and patient transfused 1 unit PRBC. Per primary team, drop in hemoglobin from CRRT machine clotting. Also with drop in fibrinogen and haptoglobin. Remains  Intubated and sedated.     Current Facility-Administered Medications   Medication Frequency    0.9%  NaCl infusion (for blood administration) Q24H PRN    atovaquone suspension 750 mg Q12H    calcium chloride 1 g in dextrose 5 % 100 mL IVPB Continuous    chlorhexidine 0.12 % solution 15 mL BID    dexmedetomidine (PRECEDEX) 400mcg/100mL 0.9% NaCL infusion Continuous    dextrose 50% injection 12.5 g PRN    dextrose 50% injection 25 g PRN    DEXTROSE-SOD CITRATE-CITRIC AC 2.45-2.2 GRAM- 730 MG/100 ML MISC SOLN Continuous    famotidine (PF) injection 20 mg BID    fentaNYL 2500 mcg in 0.9% sodium chloride 250 mL infusion premix (titrating) Continuous    heparin (porcine) injection 2,200 Units Once    heparin (porcine) injection 5,000 Units Q12H    heparin, porcine (PF) 100 unit/mL injection flush 500 Units PRN    hepatitis B virus vacc.rec(PF) injection 40 mcg vaccine x 1 dose    insulin aspart U-100 pen 0-5 Units Q6H PRN    insulin regular (Humulin R) 100 Units in sodium chloride 0.9% 100 mL infusion Continuous    lorazepam injection 4 mg Q1H PRN    methylPREDNISolone sodium succinate injection 125 mg Q6H    norepinephrine 4 mg in dextrose 5% 250 mL infusion (premix) (titrating) Continuous    piperacillin-tazobactam 4.5 g in sodium chloride 0.9% 100 mL IVPB (ready to mix system) Q8H    propofol (DIPRIVAN) 10 mg/mL infusion Continuous    sodium chloride 0.9% flush 10 mL PRN    vancomycin (VANCOCIN) 1,250 mg in sodium chloride 0.9% 250 mL IVPB Once    white petrolatum-mineral oil (LUBIFRESH P.M.) ophthalmic ointment Q8H     Objective:     Vital Signs (Most Recent):  Temp: 99.9 °F (37.7 °C)  (06/19/18 1400)  Pulse: 72 (06/19/18 1400)  Resp: (!) 28 (06/19/18 0800)  BP: (!) 159/71 (06/18/18 1600)  SpO2: (!) 93 % (06/19/18 1400)  O2 Device (Oxygen Therapy): ventilator (06/19/18 0820) Vital Signs (24h Range):  Temp:  [99.7 °F (37.6 °C)-102.6 °F (39.2 °C)] 99.9 °F (37.7 °C)  Pulse:  [] 72  Resp:  [28] 28  SpO2:  [82 %-100 %] 93 %  BP: (159)/(71) 159/71  Arterial Line BP: (104-237)/(40-77) 144/60     Weight: 84.7 kg (186 lb 11.7 oz) (06/19/18 0300)  Body mass index is 28.39 kg/m².  Body surface area is 2.02 meters squared.      Intake/Output Summary (Last 24 hours) at 06/19/18 1410  Last data filed at 06/19/18 1400   Gross per 24 hour   Intake             9156 ml   Output             9614 ml   Net             -458 ml       Physical Exam   Vitals reviewed.  Constitutional: He is well-developed, well-nourished, and in no distress. No distress.   HENT:   Head: Normocephalic and atraumatic.   Right Ear: External ear normal.   Left Ear: External ear normal.   Eyes: Pupils are equal, round, and reactive to light. Right eye exhibits no discharge. Left eye exhibits no discharge.   Neck: Neck supple. No JVD present.   Cardiovascular: Normal rate, regular rhythm and intact distal pulses.    No murmur heard.  Pulmonary/Chest: No respiratory distress. He has no wheezes. He has rales (b/l, mechanical vent ).   Abdominal: Soft. He exhibits no distension. There is no tenderness.   BS appreciated in LUQ only   Neurological:   Sedated    Skin: Skin is warm and dry. No rash noted. He is not diaphoretic. No erythema.     Psychiatric:   Unable to assess   Musculoskeletal: He exhibits no edema.   Diffuse soft tissue swelling of hands, feet, and knees- improved from admission.         Significant Labs:  Results for FELIPE BERNAL (MRN [id number]) as of 6/19/2018 14:19   Ref. Range 6/19/2018 12:00   WBC Latest Ref Range: 3.90 - 12.70 K/uL 19.52 (H)   RBC Latest Ref Range: 4.60 - 6.20 M/uL 2.67 (L)   Hemoglobin Latest Ref Range:  14.0 - 18.0 g/dL 7.7 (L)   Hematocrit Latest Ref Range: 40.0 - 54.0 % 23.2 (L)   MCV Latest Ref Range: 82 - 98 fL 87   MCH Latest Ref Range: 27.0 - 31.0 pg 28.8   MCHC Latest Ref Range: 32.0 - 36.0 g/dL 33.2   RDW Latest Ref Range: 11.5 - 14.5 % 16.0 (H)   Platelets Latest Ref Range: 150 - 350 K/uL 113 (L)   MPV Latest Ref Range: 9.2 - 12.9 fL 11.6   Gran% Latest Ref Range: 38.0 - 73.0 % 88.9 (H)   Gran # (ANC) Latest Ref Range: 1.8 - 7.7 K/uL 17.4 (H)   Immature Granulocytes Latest Ref Range: 0.0 - 0.5 % 1.0 (H)   Immature Grans (Abs) Latest Ref Range: 0.00 - 0.04 K/uL 0.19 (H)   Lymph% Latest Ref Range: 18.0 - 48.0 % 3.3 (L)   Lymph # Latest Ref Range: 1.0 - 4.8 K/uL 0.6 (L)   Mono% Latest Ref Range: 4.0 - 15.0 % 6.6   Mono # Latest Ref Range: 0.3 - 1.0 K/uL 1.3 (H)   Eosinophil% Latest Ref Range: 0.0 - 8.0 % 0.1   Eos # Latest Ref Range: 0.0 - 0.5 K/uL 0.0   Basophil% Latest Ref Range: 0.0 - 1.9 % 0.1   Baso # Latest Ref Range: 0.00 - 0.20 K/uL 0.01   nRBC Latest Ref Range: 0 /100 WBC 0     Results for FELIPE BERNAL (MRN [id number]) as of 6/19/2018 14:19   Ref. Range 6/19/2018 12:00   Sodium Latest Ref Range: 136 - 145 mmol/L 137   Potassium Latest Ref Range: 3.5 - 5.1 mmol/L 4.6   Chloride Latest Ref Range: 95 - 110 mmol/L 108   CO2 Latest Ref Range: 23 - 29 mmol/L 23   Anion Gap Latest Ref Range: 8 - 16 mmol/L 6 (L)   BUN, Bld Latest Ref Range: 8 - 23 mg/dL 10   Creatinine Latest Ref Range: 0.5 - 1.4 mg/dL 0.8   eGFR if non African American Latest Ref Range: >60 mL/min/1.73 m^2 >60.0   eGFR if African American Latest Ref Range: >60 mL/min/1.73 m^2 >60.0   Glucose Latest Ref Range: 70 - 110 mg/dL 90   Calcium Latest Ref Range: 8.7 - 10.5 mg/dL 7.8 (L)   Phosphorus Latest Ref Range: 2.7 - 4.5 mg/dL 2.4 (L)   Albumin Latest Ref Range: 3.5 - 5.2 g/dL 3.3 (L)     Results for FELIPE BERNAL (MRN [id number]) as of 6/19/2018 14:19   Ref. Range 6/19/2018 03:59 6/19/2018 06:43   Haptoglobin Latest Ref Range: 30 - 250 mg/dL  <10 (L) <10 (L)   Results for FELIPE BERNAL (MRN [id number]) as of 6/19/2018 14:19   Ref. Range 6/19/2018 06:43   LD Latest Ref Range: 110 - 260 U/L 683 (H)       Results for FELIPE BERNAL (MRN [id number]) as of 6/18/2018 10:33   Ref. Range 6/10/2018 22:49 6/10/2018 22:51   DENIZ HEP-2 Titer Unknown  Positive 1:320 Sp...   Anti-SSA Antibody Latest Ref Range: 0.00 - 19.99 EU  178.40 (H)   Anti-SSA Interpretation Latest Ref Range: Negative   Positive (A)   Anti-SSB Antibody Latest Ref Range: 0.00 - 19.99 EU  0.87   Anti-SSB Interpretation Latest Ref Range: Negative   Negative   ds DNA Ab Latest Ref Range: Negative 1:10   Negative 1:10   Anti Sm Antibody Latest Ref Range: 0.00 - 19.99 EU  0.60   Anti-Sm Interpretation Latest Ref Range: Negative   Negative   Anti Sm/RNP Antibody Latest Ref Range: 0.00 - 19.99 EU  2.83   Anti-Sm/RNP Interpretation Latest Ref Range: Negative   Negative   Cytoplasmic Neutrophilic Ab Latest Ref Range: <1:20 Titer 1:320 (A)    Perinuclear (P-ANCA) Latest Ref Range: <1:20 Titer <1:20    Results for FELIPE BERNAL (MRN [id number]) as of 6/18/2018 10:33   Ref. Range 6/11/2018 06:01 6/11/2018 08:10 6/14/2018 03:27   ANCA Proteinase 3 Latest Ref Range: <0.4 (Negative) U  <0.2    MPO Latest Ref Range: <=20 UNITS  82 (H)    Complement (C-3) Latest Ref Range: 50 - 180 mg/dL   78   Complement (C-4) Latest Ref Range: 11 - 44 mg/dL   13   Complement,Total, Serum Latest Ref Range: 42 - 95 U/mL   60   IgG - Serum Latest Ref Range: 650 - 1600 mg/dL  972    IgM Latest Ref Range: 50 - 300 mg/dL  57    IgA Latest Ref Range: 40 - 350 mg/dL  154    Protein, Serum Latest Ref Range: 6.0 - 8.4 g/dL  4.9 (L)    Albumin grams/dl Latest Ref Range: 3.35 - 5.55 g/dL  2.16 (L)    Alpha-1 grams/dl Latest Ref Range: 0.17 - 0.41 g/dL  0.63 (H)    Alpha-2 grams/dl Latest Ref Range: 0.43 - 0.99 g/dL  0.71    Beta grams/dl Latest Ref Range: 0.50 - 1.10 g/dL  0.60    Gamma grams/dl Latest Ref Range: 0.67 - 1.58 g/dL  0.80     Pathologist Interpretation SPE Unknown  REVIEWED    Pathologist Interpretation BOB Unknown  REVIEWED    Immunofix Interp. Unknown  SEE COMMENT    GBM Ab Latest Ref Range: <1.0 (Negative) U <0.2

## 2018-06-19 NOTE — PROGRESS NOTES
Notified NICKOLAS Eaton NP that since pt was turned for BM, levophed has been turned back on and increased to 0.1 mcg/kg/min to maintain map >65. O2 sats in 80's. ABG done and pt O2 increased and pt placed back on AC from CPAP. UF decreased to 400 ml/hr on CRRT. Dr Eaton at bedside to assess pt. Will continue to monitor.

## 2018-06-19 NOTE — PROGRESS NOTES
Esophageal temperature probe placed. Patient's temperature via esophageal temperature probe is 101.8 degree F. Cooling blanket applied per MD order. Will continue to monitor.

## 2018-06-19 NOTE — PROGRESS NOTES
[facility]  Rheumatology  Progress Note    Patient Name: Jasmeet Raymond  MRN: [id number]  Admission Date: 6/10/2018  Hospital Length of Stay: 9 days  Code Status: Full Code   Attending Provider: Neto Wong MD  Primary Care Physician: Provider Notinsystem  Principal Problem: Diffuse pulmonary alveolar hemorrhage    Subjective:     HPI:  Mr. Jasmeet Raymond, 63 year old male with PMHx significant for HTN, DM and osteoarthirtis presented to [facility] with symptoms of pneumonia and shortness of breath. On 5/29/2018, he presented with symptoms of bronchitis with denies chills, ear pain, sore throat, chest pain, shortness of breath at that time, received antibiotics. Then on 6/8/2018 he presented again to [facility] with shortness of breath and cough, and his CXR showed concern for airway disease. However on the first day of admission at [facility], his respiratory symptoms worsen and had acute hypoxic respiratory failure that required intubation. Pulmonary consulted and he underwent Bronchoscopy and  revealed alveolar hemorrhage, with the setting of having hematuria and diffuse alveloar hemorrhage the concern was for presence of pulmonary renal syndrome and he received pulsed dose of Methylprednisolone and nephrology consulted for plasmapheresis. However at [facility], plasmapheresis machine is broken and they [place] [facility] [place] [facility] [place]. Admitted here 6/11 for ARDS.     On arrival he was on Nibmex for paralytics, Propofol for sedation, Levophed (eventaully turned off) for pressure support, Flolan (Epoprostenol sodium) inhaled which was switched to Inhaled Nitro. His mechanical ventilation initially was on FiO2 100%, PEEP 16, then weaned his FiO2 with high requirement of oxygenation. He had an signifiacnt drop in his Hb (baseline ~ 12) dated one  week prior presentation to 9 and 8, required one pRBC prior arrival. Repeated CXR showed extensive bilateral infiltrates. Important laboratory investigation showed His , , pro calcitonin 4.4, C3 and C4 complement normal., Trop negative, C3 and C4 showed normal , Hep test is negative, Lactic acid 1.8 > 1.0, HIV negative, Procal 4.4, UA shoowed heamturia and trace leukocytes, Urine culture from 5/29 showed almost pansensitve Cedecea davisae.    Rheumatology consulted for pulmonary-renal syndrome, recs for steroids and further investigation.     Interval History: Yesterday, patient febrile with Tmax 102.6F. Blood cultures drawn and patient started on Vanc and Zosyn. Hemoglobin dropped to 6.5g/dl and patient transfused 1 unit PRBC. Per primary team, drop in hemoglobin from CRRT machine clotting. Also with drop in fibrinogen and haptoglobin. Remains  Intubated and sedated.     Current Facility-Administered Medications   Medication Frequency    0.9%  NaCl infusion (for blood administration) Q24H PRN    atovaquone suspension 750 mg Q12H    calcium chloride 1 g in dextrose 5 % 100 mL IVPB Continuous    chlorhexidine 0.12 % solution 15 mL BID    dexmedetomidine (PRECEDEX) 400mcg/100mL 0.9% NaCL infusion Continuous    dextrose 50% injection 12.5 g PRN    dextrose 50% injection 25 g PRN    DEXTROSE-SOD CITRATE-CITRIC AC 2.45-2.2 GRAM- 730 MG/100 ML MISC SOLN Continuous    famotidine (PF) injection 20 mg BID    fentaNYL 2500 mcg in 0.9% sodium chloride 250 mL infusion premix (titrating) Continuous    heparin (porcine) injection 2,200 Units Once    heparin (porcine) injection 5,000 Units Q12H    heparin, porcine (PF) 100 unit/mL injection flush 500 Units PRN    hepatitis B virus vacc.rec(PF) injection 40 mcg vaccine x 1 dose    insulin aspart U-100 pen 0-5 Units Q6H PRN    insulin regular (Humulin R) 100 Units in sodium chloride 0.9% 100 mL infusion Continuous    lorazepam injection 4 mg Q1H  PRN    methylPREDNISolone sodium succinate injection 125 mg Q6H    norepinephrine 4 mg in dextrose 5% 250 mL infusion (premix) (titrating) Continuous    piperacillin-tazobactam 4.5 g in sodium chloride 0.9% 100 mL IVPB (ready to mix system) Q8H    propofol (DIPRIVAN) 10 mg/mL infusion Continuous    sodium chloride 0.9% flush 10 mL PRN    vancomycin (VANCOCIN) 1,250 mg in sodium chloride 0.9% 250 mL IVPB Once    white petrolatum-mineral oil (LUBIFRESH P.M.) ophthalmic ointment Q8H     Objective:     Vital Signs (Most Recent):  Temp: 99.9 °F (37.7 °C) (06/19/18 1400)  Pulse: 72 (06/19/18 1400)  Resp: (!) 28 (06/19/18 0800)  BP: (!) 159/71 (06/18/18 1600)  SpO2: (!) 93 % (06/19/18 1400)  O2 Device (Oxygen Therapy): ventilator (06/19/18 0820) Vital Signs (24h Range):  Temp:  [99.7 °F (37.6 °C)-102.6 °F (39.2 °C)] 99.9 °F (37.7 °C)  Pulse:  [] 72  Resp:  [28] 28  SpO2:  [82 %-100 %] 93 %  BP: (159)/(71) 159/71  Arterial Line BP: (104-237)/(40-77) 144/60     Weight: 84.7 kg (186 lb 11.7 oz) (06/19/18 0300)  Body mass index is 28.39 kg/m².  Body surface area is 2.02 meters squared.      Intake/Output Summary (Last 24 hours) at 06/19/18 1410  Last data filed at 06/19/18 1400   Gross per 24 hour   Intake             9156 ml   Output             9614 ml   Net             -458 ml       Physical Exam   Vitals reviewed.  Constitutional: He is well-developed, well-nourished, and in no distress. No distress.   HENT:   Head: Normocephalic and atraumatic.   Right Ear: External ear normal.   Left Ear: External ear normal.   Eyes: Pupils are equal, round, and reactive to light. Right eye exhibits no discharge. Left eye exhibits no discharge.   Neck: Neck supple. No JVD present.   Cardiovascular: Normal rate, regular rhythm and intact distal pulses.    No murmur heard.  Pulmonary/Chest: No respiratory distress. He has no wheezes. He has rales (b/l, mechanical vent ).   Abdominal: Soft. He exhibits no distension. There is  no tenderness.   BS appreciated in LUQ only   Neurological:   Sedated    Skin: Skin is warm and dry. No rash noted. He is not diaphoretic. No erythema.     Psychiatric:   Unable to assess   Musculoskeletal: He exhibits no edema.   Diffuse soft tissue swelling of hands, feet, and knees- improved from admission.         Significant Labs:  Results for FELIPE BERNAL (MRN [id number]) as of 6/19/2018 14:19   Ref. Range 6/19/2018 12:00   WBC Latest Ref Range: 3.90 - 12.70 K/uL 19.52 (H)   RBC Latest Ref Range: 4.60 - 6.20 M/uL 2.67 (L)   Hemoglobin Latest Ref Range: 14.0 - 18.0 g/dL 7.7 (L)   Hematocrit Latest Ref Range: 40.0 - 54.0 % 23.2 (L)   MCV Latest Ref Range: 82 - 98 fL 87   MCH Latest Ref Range: 27.0 - 31.0 pg 28.8   MCHC Latest Ref Range: 32.0 - 36.0 g/dL 33.2   RDW Latest Ref Range: 11.5 - 14.5 % 16.0 (H)   Platelets Latest Ref Range: 150 - 350 K/uL 113 (L)   MPV Latest Ref Range: 9.2 - 12.9 fL 11.6   Gran% Latest Ref Range: 38.0 - 73.0 % 88.9 (H)   Gran # (ANC) Latest Ref Range: 1.8 - 7.7 K/uL 17.4 (H)   Immature Granulocytes Latest Ref Range: 0.0 - 0.5 % 1.0 (H)   Immature Grans (Abs) Latest Ref Range: 0.00 - 0.04 K/uL 0.19 (H)   Lymph% Latest Ref Range: 18.0 - 48.0 % 3.3 (L)   Lymph # Latest Ref Range: 1.0 - 4.8 K/uL 0.6 (L)   Mono% Latest Ref Range: 4.0 - 15.0 % 6.6   Mono # Latest Ref Range: 0.3 - 1.0 K/uL 1.3 (H)   Eosinophil% Latest Ref Range: 0.0 - 8.0 % 0.1   Eos # Latest Ref Range: 0.0 - 0.5 K/uL 0.0   Basophil% Latest Ref Range: 0.0 - 1.9 % 0.1   Baso # Latest Ref Range: 0.00 - 0.20 K/uL 0.01   nRBC Latest Ref Range: 0 /100 WBC 0     Results for FELIPE BERNAL (MRN [id number]) as of 6/19/2018 14:19   Ref. Range 6/19/2018 12:00   Sodium Latest Ref Range: 136 - 145 mmol/L 137   Potassium Latest Ref Range: 3.5 - 5.1 mmol/L 4.6   Chloride Latest Ref Range: 95 - 110 mmol/L 108   CO2 Latest Ref Range: 23 - 29 mmol/L 23   Anion Gap Latest Ref Range: 8 - 16 mmol/L 6 (L)   BUN, Bld Latest Ref Range: 8 - 23 mg/dL 10    Creatinine Latest Ref Range: 0.5 - 1.4 mg/dL 0.8   eGFR if non African American Latest Ref Range: >60 mL/min/1.73 m^2 >60.0   eGFR if African American Latest Ref Range: >60 mL/min/1.73 m^2 >60.0   Glucose Latest Ref Range: 70 - 110 mg/dL 90   Calcium Latest Ref Range: 8.7 - 10.5 mg/dL 7.8 (L)   Phosphorus Latest Ref Range: 2.7 - 4.5 mg/dL 2.4 (L)   Albumin Latest Ref Range: 3.5 - 5.2 g/dL 3.3 (L)     Results for FELIPE BERNAL (MRN [id number]) as of 6/19/2018 14:19   Ref. Range 6/19/2018 03:59 6/19/2018 06:43   Haptoglobin Latest Ref Range: 30 - 250 mg/dL <10 (L) <10 (L)   Results for FELIPE BERNAL (MRN [id number]) as of 6/19/2018 14:19   Ref. Range 6/19/2018 06:43   LD Latest Ref Range: 110 - 260 U/L 683 (H)       Results for FELIPE BERNAL (MRN [id number]) as of 6/18/2018 10:33   Ref. Range 6/10/2018 22:49 6/10/2018 22:51   DENIZ HEP-2 Titer Unknown  Positive 1:320 Sp...   Anti-SSA Antibody Latest Ref Range: 0.00 - 19.99 EU  178.40 (H)   Anti-SSA Interpretation Latest Ref Range: Negative   Positive (A)   Anti-SSB Antibody Latest Ref Range: 0.00 - 19.99 EU  0.87   Anti-SSB Interpretation Latest Ref Range: Negative   Negative   ds DNA Ab Latest Ref Range: Negative 1:10   Negative 1:10   Anti Sm Antibody Latest Ref Range: 0.00 - 19.99 EU  0.60   Anti-Sm Interpretation Latest Ref Range: Negative   Negative   Anti Sm/RNP Antibody Latest Ref Range: 0.00 - 19.99 EU  2.83   Anti-Sm/RNP Interpretation Latest Ref Range: Negative   Negative   Cytoplasmic Neutrophilic Ab Latest Ref Range: <1:20 Titer 1:320 (A)    Perinuclear (P-ANCA) Latest Ref Range: <1:20 Titer <1:20    Results for FELIPE BERNAL (MRN [id number]) as of 6/18/2018 10:33   Ref. Range 6/11/2018 06:01 6/11/2018 08:10 6/14/2018 03:27   ANCA Proteinase 3 Latest Ref Range: <0.4 (Negative) U  <0.2    MPO Latest Ref Range: <=20 UNITS  82 (H)    Complement (C-3) Latest Ref Range: 50 - 180 mg/dL   78   Complement (C-4) Latest Ref Range: 11 - 44 mg/dL   13   Complement,Total,  Serum Latest Ref Range: 42 - 95 U/mL   60   IgG - Serum Latest Ref Range: 650 - 1600 mg/dL  972    IgM Latest Ref Range: 50 - 300 mg/dL  57    IgA Latest Ref Range: 40 - 350 mg/dL  154    Protein, Serum Latest Ref Range: 6.0 - 8.4 g/dL  4.9 (L)    Albumin grams/dl Latest Ref Range: 3.35 - 5.55 g/dL  2.16 (L)    Alpha-1 grams/dl Latest Ref Range: 0.17 - 0.41 g/dL  0.63 (H)    Alpha-2 grams/dl Latest Ref Range: 0.43 - 0.99 g/dL  0.71    Beta grams/dl Latest Ref Range: 0.50 - 1.10 g/dL  0.60    Gamma grams/dl Latest Ref Range: 0.67 - 1.58 g/dL  0.80    Pathologist Interpretation SPE Unknown  REVIEWED    Pathologist Interpretation BOB Unknown  REVIEWED    Immunofix Interp. Unknown  SEE COMMENT    GBM Ab Latest Ref Range: <1.0 (Negative) U <0.2           Assessment/Plan:     Pulmonary-renal syndrome    ANCA associated vasculitis  62 yo w/PMH of HTN and NIDDM, OA admitted for acute hypoxic respiratory failure, renal failure, and pulmonary renal syndrome receiving plasmapheresis. Rheumatology consulted for pulmonary renal syndrome, recs on steroids and further investigation.   - Given methylprednisolone 1000 mg IV daily x 5 doses   - Nephrology consulted: renal biopsy when stable  --> elevated UPCR 1.28, DNEIZ positive 1:320 speckled pattern, MPO+ 82, c-ANCA +, p-anca neg,  SSA + 178, SSB neg, dsDNA neg, Anti-Sm/RNP neg, Anti-Sm neg, PR3 neg, Immunoglobulins wnl, Hep C and HIV neg, APL labs neg, GBM neg, MARTHA neg, C3/C4 wnl, ACE neg, quant gold indeterminate however T-spot negative.  - Concern for vasculitis given DAH and hematuria. The nini-typical pulmonary-renal syndromes are GPA/MPA, Goodpasture disease, SLE. Immunofluorescence studies can help differentiate. MPO+ can suggest MPA    TB-spot negative, negative drug screen    - Given methylprednisolone 1000 mg IV daily x 5 doses (completed on 6/14/18). Started on Solumedrol 125 mg q6h on 6/15/18.   - Rituximab 1 g IV given on 6/15/18. Next dose on 6/29/18.  - Day 4 of PLEX  today.    6/19/18: patient with acute drop in hemoglobin secondary to CRRT clot. Also with fever up to 102.6F. Sepsis workup started. Patient now on Vanc and Zosyn.     Plan:   - given high fevers, recommend thorough work-up for infection. Consider ID consult if patient fails to improve.  - low haptoglobin and fibro, may be secondary to infection. Continue to monitor.  - transfusion medicine: PLEX every other day  - Started on IV methylprednisolone 125 mg q6hrs on 6/15/18. Will continue at this dose.  - Received first dose Rituximab 6/15/18 and now on Atovaquone. Repeat Rituximab in 2 weeks on 6/29/18.                 Nohemi Gonsales MD  Rheumatology  [facility]    Patient seen and examined with fellow.  All elements of history, physical exam and medical decision making independently confirmed by me. Patient with pulmonary-renal syndrome with positive MPO and +c-ANCA.  Patient with fever overnight and now with anemia.  Concern for sepsis.  Patient on Vanc and Zosyn.  Continue Solumedrol 125 mg q6.  Received Rituxan first dose.  Repeat Rituxan in 2 weeks (6/29/18) if no infection or infection treated.  See note for details.

## 2018-06-19 NOTE — PROGRESS NOTES
Awaiting hypothermia machine and supplies. Ice packs placed under patient's arms and cool cloth applied to forward at this time for elevated temperature of 102.6 degree F until hypothermia machine arrives.

## 2018-06-19 NOTE — ASSESSMENT & PLAN NOTE
- Most likely is diffuse pulmonary alveolar hemorrhage. Although sepsis induced Bone marrow suppression and hemolysis can not be ruled out  - Frequent check in his CBC, transfuse when Hb < 7  - transfuse one pRBC 06/19/18

## 2018-06-19 NOTE — PLAN OF CARE
Problem: Patient Care Overview  Goal: Plan of Care Review  Patient remains intubated on A/C FiO2 55%/ PEEP 10. ABG's performed Q4H and vent weaned as tolerated. Patient on levo gtt titrated to keep MAP >65. Levo currently infusing at 0.04 mcg/kg/min. Patient on precedex gtt at 1.34 mg/kg/hr and fentanyl gtt at 200 mcg/hr. Patient on CRRT, UF at 400mL/hr with citrate and calcium chloride infusions running per MD order. Tube feeds continue to be held, NG tube to low int. suction with roughly 200 mL of green output over the night. Patient's Tmax was 102.6 degrees F. MD aware, blood cultures drawn and patient placed on cooling blanket. Patient's H/H this morning is 6.5/20. One unit of pRBCs ordered. Patient's wife at bedside at beginning of shift. Wife updated on plan of care and questions answered. Plan to leave patient on A/C overnight and assess for readiness to extubate/CPAP trial in the morning. Continue to monitor labs and ABG frequently per orders. Wean vent as tolerated.

## 2018-06-19 NOTE — SUBJECTIVE & OBJECTIVE
Interval History/Significant Events: Patient remains intubated on A/C FiO2 55%/ PEEP 10. Patient on levo gtt titrated to keep MAP >65. Patient on precedex gtt at 1.34 mg/kg/hr and fentanyl gtt at 200 mcg/hr. Patient on CRRT, UF at 400mL/hr with citrate and calcium chloride infusions running per MD order. Tube feeds continue to be held, NG tube to low int. suction with roughly 200 mL of green output over the night. Patient's Tmax was 102.6 degrees F. patient placed on cooling blanket. Patient's H/H this morning is 6.5/20. One unit of pRBCs ordered.     Review of Systems   Unable to perform ROS: Intubated     Objective:     Vital Signs (Most Recent):  Temp: (!) 101.1 °F (38.4 °C) (06/19/18 0800)  Pulse: (!) 114 (06/19/18 0800)  Resp: (!) 28 (06/19/18 0800)  BP: (!) 159/71 (06/18/18 1600)  SpO2: (!) 93 % (06/19/18 0800) Vital Signs (24h Range):  Temp:  [99.2 °F (37.3 °C)-102.6 °F (39.2 °C)] 101.1 °F (38.4 °C)  Pulse:  [] 114  Resp:  [28] 28  SpO2:  [82 %-100 %] 93 %  BP: ()/(47-77) 159/71  Arterial Line BP: (102-237)/(40-77) 132/55   Weight: 84.7 kg (186 lb 11.7 oz)  Body mass index is 28.39 kg/m².      Intake/Output Summary (Last 24 hours) at 06/19/18 0816  Last data filed at 06/19/18 0800   Gross per 24 hour   Intake             9144 ml   Output             8848 ml   Net              296 ml       Physical Exam   Constitutional:   Intubated    Eyes: Pupils are equal, round, and reactive to light.   Cardiovascular: Regular rhythm and normal heart sounds.    No murmur heard.  No murmur appreciated, + 2 lower extremity and pedal edema,   No JVD   Pulmonary/Chest: Effort normal. He has no wheezes. He has rales.   Abdominal: Soft. He exhibits no distension. There is no tenderness.   Decrease Bowel sounds,    Musculoskeletal: He exhibits edema (all extremities).   Neurological:   Intubated and sedated   Skin: Skin is warm and dry. No rash noted. No erythema.   R SC trialysis   Nursing note and vitals  reviewed.      Vents:  Vent Mode: A/C (06/19/18 0725)  Ventilator Initiated: Yes (06/10/18 2217)  Set Rate: 18 bmp (06/19/18 0725)  Vt Set: 510 mL (06/19/18 0725)  Pressure Support: 10 cmH20 (06/18/18 1827)  PEEP/CPAP: 10 cmH20 (06/19/18 0725)  Oxygen Concentration (%): 56 (06/19/18 0800)  Peak Airway Pressure: 15 cmH2O (06/19/18 0725)  Plateau Pressure: 19 cmH20 (06/19/18 0725)  Total Ve: 22.5 mL (06/19/18 0725)  F/VT Ratio<105 (RSBI): (!) 30.02 (06/15/18 0126)  Lines/Drains/Airways     Central Venous Catheter Line                 Hemodialysis Catheter 06/10/18 2307 right internal jugular 8 days         Percutaneous Central Line Insertion/Assessment - triple lumen  06/10/18 2305 left internal jugular 8 days          Drain                 NG/OG Tube 06/10/18 2309 Falls sump 18 Fr. Left nostril 8 days          Airway                 Airway - Non-Surgical Endotracheal Tube -- days          Arterial Line                 Arterial Line 06/10/18 2304 Right Radial 8 days          Peripheral Intravenous Line                 Peripheral IV - Single Lumen 06/16/18 1905 Right Antecubital 2 days              Significant Labs:    CBC/Anemia Profile:    Recent Labs  Lab 06/17/18  1500 06/18/18  0349 06/19/18  0359   WBC 16.07* 19.85* 20.99*   HGB 7.7* 8.2* 6.5*   HCT 23.3* 24.7* 20.0*   * 162 126*   MCV 86 86 85   RDW 16.0* 16.4* 16.3*        Chemistries:  CMP:     Recent Labs  Lab 06/18/18  0349 06/18/18  1401 06/18/18  2156 06/19/18  0359   *  135* 140 140 136  136   K 4.6  4.6 4.0 4.4 4.3  4.3     106 110 111* 107  107   CO2 22*  22* 22* 22* 22*  22*   *  180* 152* 167* 194*  194*   BUN 13  13 13 11 12  12   CREATININE 0.9  0.9 0.9 0.9 0.9  0.9   CALCIUM 7.6*  7.6* 7.1* 7.7* 7.9*  7.9*   PROT 5.1*  --   --  4.7*   ALBUMIN 2.7*  2.7* 4.3 4.2 3.8  3.8   BILITOT 1.2*  --   --  2.1*   ALKPHOS 47*  --   --  26*   AST 21  --   --  33   ALT 21  --   --  16   ANIONGAP 7*  7* 8 7* 7*  7*    EGFRNONAA >60.0  >60.0 >60.0 >60.0 >60.0  >60.0     Coagulation:     Recent Labs  Lab 06/19/18  0359   INR 1.3*   APTT 34.1*     All pertinent labs within the past 24 hours have been reviewed.    Significant Imaging:  I have reviewed all pertinent imaging results/findings within the past 24 hours.

## 2018-06-19 NOTE — PROGRESS NOTES
Dr. Leonardo with critical care team at bedside, updated on patient status, vitals and gtts. Plan of care reviewed with MD. Notified of MD that one set of blood cultures was obtained via peripheral stick, but unable to obtain second set of cultures. Dr. Leonardo stated OK to obtain blood cultures via a-line.

## 2018-06-19 NOTE — PROGRESS NOTES
Notified critical care team that patient's temperature is 102.6 degrees F. MD verbalized acknowledgment, stated OK to place patient on cooling blanket. Blood cultures x 2 ordered earlier today.

## 2018-06-19 NOTE — PROGRESS NOTES
[facility]  Nephrology  Progress Note    Patient Name: Jasmeet Raymond  MRN: [id number]  Admission Date: 6/10/2018  Hospital Length of Stay: 9 days  Attending Provider: Neto Wong MD   Primary Care Physician: Provider Notinsystem  Principal Problem:Diffuse pulmonary alveolar hemorrhage    Subjective:     HPI: 62 yo AAm with HTN, NIDDM, arthritis on metformin and meloxicam  admitted to [facility] for worsening SOB and ALEX.   Patiend had been treated as an out;patient for 2 weeks for Shortness of breath and bronchitis and UTI. In the hospital he was found to be acidotic, hyperkalemic, serum creatinine 8, and diffuse bilateral pulmonary infiltrates, requiring intubation. UA with proteinuria, hematuria and wbc's, Bronchoscopy revealed hemorrahage. Patient recieved one dialysis as per notes and 1 gm solumedrol, and transferred  to [facility] for plasmapheresis  And further evaluation.  Family is not available at the time of exam and patient is intubated.   HIV, Hepatitis B, C, complements  All within normal limits. DENIZ, ANCA and AntiGBM, results Nnot available    Interval History: Remains intubated and sedated. Required increase FIO 60%. Max temp 102.6. Blood culture collected. CVP 2. Total hr intake 65 ml-precedex, fentyanyl, insulin. Net neg 900 ml.     Review of patient's allergies indicates:   Allergen Reactions    Sulfa (sulfonamide antibiotics)      Current Facility-Administered Medications   Medication Frequency    0.9%  NaCl infusion (for blood administration) Q24H PRN    atovaquone suspension 750 mg Q12H    calcium chloride 1 g in dextrose 5 % 100 mL IVPB Continuous    chlorhexidine 0.12 % solution 15 mL BID    dexmedetomidine (PRECEDEX) 400mcg/100mL 0.9% NaCL infusion Continuous    dextrose 50% injection 12.5 g PRN    dextrose 50% injection 25 g PRN    DEXTROSE-SOD CITRATE-CITRIC AC 2.45-2.2 GRAM- 730 MG/100 ML MISC SOLN Continuous    famotidine (PF) injection 20 mg BID     fentaNYL 2500 mcg in 0.9% sodium chloride 250 mL infusion premix (titrating) Continuous    heparin (porcine) injection 2,200 Units Once    heparin (porcine) injection 5,000 Units Q12H    heparin, porcine (PF) 100 unit/mL injection flush 500 Units PRN    hepatitis B virus vacc.rec(PF) injection 40 mcg vaccine x 1 dose    insulin aspart U-100 pen 0-5 Units Q6H PRN    insulin regular (Humulin R) 100 Units in sodium chloride 0.9% 100 mL infusion Continuous    lorazepam injection 4 mg Q1H PRN    methylPREDNISolone sodium succinate injection 125 mg Q6H    norepinephrine 4 mg in dextrose 5% 250 mL infusion (premix) (titrating) Continuous    piperacillin-tazobactam 4.5 g in sodium chloride 0.9% 100 mL IVPB (ready to mix system) Q8H    propofol (DIPRIVAN) 10 mg/mL infusion Continuous    sodium chloride 0.9% flush 10 mL PRN    white petrolatum-mineral oil (LUBIFRESH P.M.) ophthalmic ointment Q8H       Objective:     Vital Signs (Most Recent):  Temp: 99.9 °F (37.7 °C) (06/19/18 1130)  Pulse: 63 (06/19/18 1130)  Resp: (!) 28 (06/19/18 0800)  BP: (!) 159/71 (06/18/18 1600)  SpO2: (!) 93 % (06/19/18 1130)  O2 Device (Oxygen Therapy): ventilator (06/19/18 0820) Vital Signs (24h Range):  Temp:  [99.9 °F (37.7 °C)-102.6 °F (39.2 °C)] 99.9 °F (37.7 °C)  Pulse:  [] 63  Resp:  [28] 28  SpO2:  [82 %-100 %] 93 %  BP: ()/(54-77) 159/71  Arterial Line BP: (104-237)/(40-77) 144/60     Weight: 84.7 kg (186 lb 11.7 oz) (06/19/18 0300)  Body mass index is 28.39 kg/m².  Body surface area is 2.02 meters squared.    I/O last 3 completed shifts:  In: 18150 [I.V.:4381; NG/GT:545; IV Piggyback:8856]  Out: 09039 [Drains:640; Other:70590]    Physical Exam   Constitutional: He is oriented to person, place, and time. He appears well-developed and well-nourished.   Intubated FIO2 60 % , sedated    HENT:   Head: Normocephalic.   Cardiovascular: Normal heart sounds.  Exam reveals no gallop and no friction rub.    No murmur  heard.  Tachycardic, no murmur appreciated.   No JVD     Pulmonary/Chest:   Intubated. Clear anteriorly   Abdominal: He exhibits no distension.   Musculoskeletal: He exhibits no edema or deformity.   Neurological: He is oriented to person, place, and time.   Intubated and sedated   Skin: Skin is warm and dry.   R SC trialysis   Nursing note and vitals reviewed.      Significant Labs:  All labs within the past 24 hours have been reviewed.     Significant Imaging:  Labs: Reviewed    Assessment/Plan:     ALEX (acute kidney injury)    MPO-cANCA GPA, presenting with RPGN and pulmonary hemorrhage  S/p pulse steroids, now on scheduled dose 125mg IV solumedrol q6h, and PLEX 4/6, also has been started on rituxan 6/15 (repeat in 2 weeks), renal supportive therapy with RRT (SLED). Urine sediment showed RBC cast.  -Total hr intake 95 -calcium citrate, precedex, fentanyl. One unit PRBC given .     Plan:  -continue SLED for metabolic clearance and volume management. Citrate for AC/calcium chloride.  -400 ml as tolerated.   -Naphos IV prn                      Thank you for your consult. I will follow-up with patient. Please contact us if you have any additional questions.    Kandice Hernandez NP  Nephrology  [facility]

## 2018-06-19 NOTE — ASSESSMENT & PLAN NOTE
- Most likely from Propofol and plan to wean off Propofol and started on Insulin infusion   - Improved in his TG to 600 >> 438 -->523 --> 180 with insulin infusion   - wean off propofol

## 2018-06-19 NOTE — PROGRESS NOTES
[facility]  Critical Care Medicine  Progress Note    Patient Name: Jasmeet Raymond  MRN: [id number]  Admission Date: 6/10/2018  Hospital Length of Stay: 9 days  Code Status: Full Code  Attending Provider: Neto Wong MD  Primary Care Provider: Provider Notinsystem   Principal Problem: Diffuse pulmonary alveolar hemorrhage    Subjective:     HPI:  This is Mr. Jasmeet Raymond, 63 year old male with PMHx significant for HTN, DM and arthirtis presented to [facility] and [facility] with symptoms of pneumonia and shortness of breath. On 5/29/2018, he presented with symptoms of bronchitis with denies chills, ear pain, sore throat, chest pain, shortness of breath at that time, received antibiotics. Then on 6/8/2018 he presented again to [facility] and [facility] with shortness of breath and cough, and his CXR showed concern for airway disease. However on the first day of admission at [facility] and [facility], his respiratory symptoms worsen and had acute hypoxic respiratory failure that required intubation. Pulmonary consulted and he underwent Bronchoscopy and  revealed alveolar hemorrhage, with the setting of having hematuria and diffuse alveloar hemorrhage the concern was for presence of pulmonary renal syndrome and he received pulsed dose of Methylprednisolone and nephrology consulted for plasmapheresis. However at [facility], plasmapheresis machine is broken and they recommend to transfer to [facility] for plasmapheresis.     On arrival he was on Nibmex for paralytics, Propofol for sedation, Levophed (eventaully turned off) for pressure support, Flolan (Epoprostenol sodium) inhaled which was switched to Inhaled Nitro. His mechanical ventilation initially was on FiO2 100%, PEEP 16, then weaned his FiO2 with high requirement of oxygenation. He had an signifiacnt drop in his Hb (baseline ~ 12) dated one week prior  presentation to 9 and 8, required one pRBC prior arrival. Repeated CXR showed extensive bilateral infiltrates. Important laboratory investigation showed His , , pro calcitonin 4.4, C3 and C4 complement normal., Trop negative, C3 and C4 showed normal , Hep test is negative, Lactic acid 1.8 > 1.0, HIV negative, Procal 4.4, UA shoowed heamturia and trace leukocytes, Urine culture from 5/29 showed almost pansensitve Cedecea davisae.     Hospital/ICU Course:  06/11/2018 admitted to MICU from [facility] with ARDS  06/12/2018 No acute events overnight. Had PLEX with 4.5 L exchange with FFP, and CRRT started and he became hypotensive, Levophed started during the CRRT. He was weaning off Nitric Oxide and his ABGs showing worsening respi acidosis. Rheumatology, Transfusion medicine and Nephrology on board   06/13/2018 Stable on High ladder PEEP for ARDS and completely off NO, and still on Nimbex for paralytics and sedation with Propofol and Fentanyl. ID, Rheumatology and Nephrology on board.   06/15/2018, Still on high setting   06/15/2018 Started Rituxan after his PLEx session, and decrease in his Methylprednisone. Still on high PEEP ladder for ARDS, frequent clots in ETT suction   06/16/2018 Overnight, he had Rituxan induction for ANCA associated vasculitis and decrease his Methypredisone to 125 mg IV Q 6 Hours. Mild increase in his mechanical ventilation setting and he is grossly volume overload and still anuric.   06/17/2018 Palaryzed with Nimbex and better in his mechanical ventilation setting. Off Versed and on max Precedex and Profol. Increased in UF rate to 500 and better in his I/O. Repated CXR showed better air space.   06/18/2018 off paralytic. received 4th dose of plasma exchange today. Still anuric. Continue on dialysis.wean off sedation   06/19/2018 Patient remains intubated on A/C FiO2 55%/ PEEP 10. Patient on levo gtt titrated to keep MAP >65. Patient  on precedex gtt at 1.34 mg/kg/hr and fentanyl gtt at 200 mcg/hr. Patient on CRRT, UF at 400mL/hr with citrate and calcium chloride infusions running per MD order. Tube feeds continue to be held, NG tube to low int. suction with roughly 200 mL of green output over the night. Patient's Tmax was 102.6 degrees F. patient placed on cooling blanket. Patient's H/H this morning is 6.5/20. One unit of pRBCs ordered.     Interval History/Significant Events: Patient remains intubated on A/C FiO2 55%/ PEEP 10. Patient on levo gtt titrated to keep MAP >65. Patient on precedex gtt at 1.34 mg/kg/hr and fentanyl gtt at 200 mcg/hr. Patient on CRRT, UF at 400mL/hr with citrate and calcium chloride infusions running per MD order. Tube feeds continue to be held, NG tube to low int. suction with roughly 200 mL of green output over the night. Patient's Tmax was 102.6 degrees F. patient placed on cooling blanket. Patient's H/H this morning is 6.5/20. One unit of pRBCs ordered.     Review of Systems   Unable to perform ROS: Intubated     Objective:     Vital Signs (Most Recent):  Temp: (!) 101.1 °F (38.4 °C) (06/19/18 0800)  Pulse: (!) 114 (06/19/18 0800)  Resp: (!) 28 (06/19/18 0800)  BP: (!) 159/71 (06/18/18 1600)  SpO2: (!) 93 % (06/19/18 0800) Vital Signs (24h Range):  Temp:  [99.2 °F (37.3 °C)-102.6 °F (39.2 °C)] 101.1 °F (38.4 °C)  Pulse:  [] 114  Resp:  [28] 28  SpO2:  [82 %-100 %] 93 %  BP: ()/(47-77) 159/71  Arterial Line BP: (102-237)/(40-77) 132/55   Weight: 84.7 kg (186 lb 11.7 oz)  Body mass index is 28.39 kg/m².      Intake/Output Summary (Last 24 hours) at 06/19/18 0816  Last data filed at 06/19/18 0800   Gross per 24 hour   Intake             9144 ml   Output             8848 ml   Net              296 ml       Physical Exam   Constitutional:   Intubated    Eyes: Pupils are equal, round, and reactive to light.   Cardiovascular: Regular rhythm and normal heart sounds.    No murmur heard.  No murmur appreciated, +  2 lower extremity and pedal edema,   No JVD   Pulmonary/Chest: Effort normal. He has no wheezes. He has rales.   Abdominal: Soft. He exhibits no distension. There is no tenderness.   Decrease Bowel sounds,    Musculoskeletal: He exhibits edema (all extremities).   Neurological:   Intubated and sedated   Skin: Skin is warm and dry. No rash noted. No erythema.   R SC trialysis   Nursing note and vitals reviewed.      Vents:  Vent Mode: A/C (06/19/18 0725)  Ventilator Initiated: Yes (06/10/18 2217)  Set Rate: 18 bmp (06/19/18 0725)  Vt Set: 510 mL (06/19/18 0725)  Pressure Support: 10 cmH20 (06/18/18 1827)  PEEP/CPAP: 10 cmH20 (06/19/18 0725)  Oxygen Concentration (%): 56 (06/19/18 0800)  Peak Airway Pressure: 15 cmH2O (06/19/18 0725)  Plateau Pressure: 19 cmH20 (06/19/18 0725)  Total Ve: 22.5 mL (06/19/18 0725)  F/VT Ratio<105 (RSBI): (!) 30.02 (06/15/18 0126)  Lines/Drains/Airways     Central Venous Catheter Line                 Hemodialysis Catheter 06/10/18 2307 right internal jugular 8 days         Percutaneous Central Line Insertion/Assessment - triple lumen  06/10/18 2305 left internal jugular 8 days          Drain                 NG/OG Tube 06/10/18 2309 [place] sump 18 Fr. Left nostril 8 days          Airway                 Airway - Non-Surgical Endotracheal Tube -- days          Arterial Line                 Arterial Line 06/10/18 2304 Right Radial 8 days          Peripheral Intravenous Line                 Peripheral IV - Single Lumen 06/16/18 1905 Right Antecubital 2 days              Significant Labs:    CBC/Anemia Profile:    Recent Labs  Lab 06/17/18  1500 06/18/18  0349 06/19/18  0359   WBC 16.07* 19.85* 20.99*   HGB 7.7* 8.2* 6.5*   HCT 23.3* 24.7* 20.0*   * 162 126*   MCV 86 86 85   RDW 16.0* 16.4* 16.3*        Chemistries:  CMP:     Recent Labs  Lab 06/18/18  0349 06/18/18  1401 06/18/18  2156 06/19/18  0359   *  135* 140 140 136  136   K 4.6  4.6 4.0 4.4 4.3  4.3     106 110  111* 107  107   CO2 22*  22* 22* 22* 22*  22*   *  180* 152* 167* 194*  194*   BUN 13  13 13 11 12  12   CREATININE 0.9  0.9 0.9 0.9 0.9  0.9   CALCIUM 7.6*  7.6* 7.1* 7.7* 7.9*  7.9*   PROT 5.1*  --   --  4.7*   ALBUMIN 2.7*  2.7* 4.3 4.2 3.8  3.8   BILITOT 1.2*  --   --  2.1*   ALKPHOS 47*  --   --  26*   AST 21  --   --  33   ALT 21  --   --  16   ANIONGAP 7*  7* 8 7* 7*  7*   EGFRNONAA >60.0  >60.0 >60.0 >60.0 >60.0  >60.0     Coagulation:     Recent Labs  Lab 06/19/18  0359   INR 1.3*   APTT 34.1*     All pertinent labs within the past 24 hours have been reviewed.    Significant Imaging:  I have reviewed all pertinent imaging results/findings within the past 24 hours.    Assessment/Plan:     Pulmonary   * Diffuse pulmonary alveolar hemorrhage    - Given his presentation with two weeks of progressive worsening of his SOB and cough, and associated with hematuria and CXR finding of bilateral infiltrate raised the concern for DAH  - On 6/10 he underwent Flexible Bronchoscopy and showed no endobronchial lesion, significant signs of hemorrhage on all of the airways, confirming findings of pulmonary hemorrhage.   - Requires on pRBC on 6/12, 6/15 and another unit on 6/17        Acute hypoxemic respiratory failure    - Please see principal problem for more elaboration         ARDS (adult respiratory distress syndrome)    - Most likely etiology is Diffuse pulmonary alveolar hemorrhage, however severe pneumonia in the setting of leukocytosis and fever and presentation of cough and SOB can not be excluded  - On conservative tidal volume (6-7 ml/kg predicted body weight), balanced respiratory rate, and high oxygenation setting in the setting of DAH going with high PEEP ladder        Cardiac/Vascular   Pure hyperglyceridemia    - Most likely from Propofol and plan to wean off Propofol and started on Insulin infusion   - Improved in his TG to 600 >> 438 -->523 --> 180 with insulin infusion   - wean off  propofol         Renal/   ALEX (acute kidney injury)    - Differential Diagnoses include Pulmonary renal syndrome.  - Underwent US of kidney and showed Bilateral medical renal disease, Simple renal cysts bilaterally.  - CT abdomen/pelvis, mild interstitial changes in lung bases, 2 stones in the left kidney, 4 stones in the right kidney, no hydronephrosis, post cholecystectomy. No acute abnormality in the liver, spleen, pancreas or adrenals.   - Will continue trending urine output and renal function, avoid NSAIDs, contrast, nephrotoxins    - Monitor strict I/Os, daily weights, renally dose medications to current GFR  - Still anuric and will continue CRRT with higher rate of   - once pt is stable, may consider renal biopsy         ID   Septic shock    - See principal problem for more elaboration.   - Currently on low need for pressors, will continue to monitor and ensure MAP > 65 mmHg (during CRRT)  - Initially on broad spectrum antibiotics with Cefepime (renally dose) and Vancomycin. Stopped Vancomycoin and Cefepime  - spike fever couple time after abx discontinuation. Sent a new set of blood cx and restart him on Vanc and Zosyn 06/18/18  - needed cooling blanket overnight         Immunology/Multi System   ANCA-associated vasculitis    - See Pulmorenal syndrome for more elaboration         Pulmonary-renal syndrome    - Most likely the diagnosis in the setting of diffuse alveolar hemorrhage and glomerulonephritis with most likely etiology is underlying autoimmune disorder  - Elevated UPCR 1.28, DENIZ positive 1:320 speckled pattern, MPO+ 82, PR3 neg, Immunoglobulins wnl, Hep C and HIV neg  - Finished  5 days of Methylprednisolone 1000 mg IV daily, started on Rituxan (6/15) and Methylprednisone 125 mg IV Q 6 and Atovaquin for PPx   - received 4th dose of plasma exchange 06/18/18  - once pt is stable, may consider renal biopsy         Hematology   Thrombocytopenia, unspecified    - likely to be sepsis induced   -  Will continue to observe, currently continue to improve         Oncology   Acute posthemorrhagic anemia    - Most likely is diffuse pulmonary alveolar hemorrhage. Although sepsis induced Bone marrow suppression and hemolysis can not be ruled out  - Frequent check in his CBC, transfuse when Hb < 7  - transfuse one pRBC 06/19/18        Endocrine   Diabetes mellitus type 2 with complications    - Will keep his blood glucose on the target 140-180  - on Insulin infusion for Hyperglycemia and HyperTG   Recent Labs      06/19/18   0200  06/19/18   0306  06/19/18   0405  06/19/18   0505  06/19/18   0557  06/19/18   0559  06/19/18   0705  06/19/18   0814   POCTGLUCOSE  231*  224*  214*  219*  145*  129*  112*  138*                Critical Care Daily Checklist:     A: Awake: RASS Goal/Actual Goal: RASS Goal: -5-->unarousable  Actual: Conteh Agitation Sedation Scale (RASS): Unarousable   B: Spontaneous Breathing Trial Performed?     C: SAT & SBT Coordinated?  yes                    D: Delirium: CAM-ICU Overall CAM-ICU: Negative   E: Early Mobility Performed? No   F: Feeding Goal: Goals: Patient to receive nutrition by RD follow-up  Status: Nutrition Goal Status: goal met         Current Diet Order   Procedures    Diet NPO      AS: Analgesia/Sedation Yes   T: Thromboembolic Prophylaxis No for thrombocytopenia    H: HOB > 300 Yes   U: Stress Ulcer Prophylaxis (if needed) Yes   G: Glucose Control Yes   B: Bowel Function Stool Occurrence: 1   I: Indwelling Catheter (Lines & Liu) Necessity Yes   D: De-escalation of Antimicrobials/Pharmacotherapies no     Plan for the day/ETD SBT, wean off pressor and sedation. Wean off Vent     Code Status:  Family/Goals of Care: Full Code  Ongoing          Critical secondary to Patient has a condition that poses threat to life and bodily function: Severe Respiratory Distress and Acute Renal Failure        Bruce Larry MD  Critical Care Medicine  [facility]

## 2018-06-19 NOTE — ASSESSMENT & PLAN NOTE
- Will keep his blood glucose on the target 140-180  - on Insulin infusion for Hyperglycemia and HyperTG   Recent Labs      06/19/18   0200  06/19/18   0306  06/19/18   0405  06/19/18   0505  06/19/18   0557  06/19/18   0559  06/19/18   0705  06/19/18   0814   POCTGLUCOSE  231*  224*  214*  219*  145*  129*  112*  138*

## 2018-06-19 NOTE — ASSESSMENT & PLAN NOTE
- Most likely the diagnosis in the setting of diffuse alveolar hemorrhage and glomerulonephritis with most likely etiology is underlying autoimmune disorder  - Elevated UPCR 1.28, DENIZ positive 1:320 speckled pattern, MPO+ 82, PR3 neg, Immunoglobulins wnl, Hep C and HIV neg  - Finished  5 days of Methylprednisolone 1000 mg IV daily, started on Rituxan (6/15) and Methylprednisone 125 mg IV Q 6 and Atovaquin for PPx   - received 4th dose of plasma exchange 06/18/18  - once pt is stable, may consider renal biopsy

## 2018-06-19 NOTE — ASSESSMENT & PLAN NOTE
- Given his presentation with two weeks of progressive worsening of his SOB and cough, and associated with hematuria and CXR finding of bilateral infiltrate raised the concern for DAH  - On 6/10 he underwent Flexible Bronchoscopy and showed no endobronchial lesion, significant signs of hemorrhage on all of the airways, confirming findings of pulmonary hemorrhage.   - Requires on pRBC on 6/12, 6/15 and another unit on 6/17

## 2018-06-19 NOTE — SUBJECTIVE & OBJECTIVE
Interval History: Remains intubated and sedated. Required increase FIO 60%. Max temp 102.6. Blood culture collected. CVP 2. Total hr intake 65 ml-precedex, fentyanyl, insulin. Net neg 900 ml.     Review of patient's allergies indicates:   Allergen Reactions    Sulfa (sulfonamide antibiotics)      Current Facility-Administered Medications   Medication Frequency    0.9%  NaCl infusion (for blood administration) Q24H PRN    atovaquone suspension 750 mg Q12H    calcium chloride 1 g in dextrose 5 % 100 mL IVPB Continuous    chlorhexidine 0.12 % solution 15 mL BID    dexmedetomidine (PRECEDEX) 400mcg/100mL 0.9% NaCL infusion Continuous    dextrose 50% injection 12.5 g PRN    dextrose 50% injection 25 g PRN    DEXTROSE-SOD CITRATE-CITRIC AC 2.45-2.2 GRAM- 730 MG/100 ML MISC SOLN Continuous    famotidine (PF) injection 20 mg BID    fentaNYL 2500 mcg in 0.9% sodium chloride 250 mL infusion premix (titrating) Continuous    heparin (porcine) injection 2,200 Units Once    heparin (porcine) injection 5,000 Units Q12H    heparin, porcine (PF) 100 unit/mL injection flush 500 Units PRN    hepatitis B virus vacc.rec(PF) injection 40 mcg vaccine x 1 dose    insulin aspart U-100 pen 0-5 Units Q6H PRN    insulin regular (Humulin R) 100 Units in sodium chloride 0.9% 100 mL infusion Continuous    lorazepam injection 4 mg Q1H PRN    methylPREDNISolone sodium succinate injection 125 mg Q6H    norepinephrine 4 mg in dextrose 5% 250 mL infusion (premix) (titrating) Continuous    piperacillin-tazobactam 4.5 g in sodium chloride 0.9% 100 mL IVPB (ready to mix system) Q8H    propofol (DIPRIVAN) 10 mg/mL infusion Continuous    sodium chloride 0.9% flush 10 mL PRN    white petrolatum-mineral oil (LUBIFRESH P.M.) ophthalmic ointment Q8H       Objective:     Vital Signs (Most Recent):  Temp: 99.9 °F (37.7 °C) (06/19/18 1130)  Pulse: 63 (06/19/18 1130)  Resp: (!) 28 (06/19/18 0800)  BP: (!) 159/71 (06/18/18 1600)  SpO2: (!)  93 % (06/19/18 1130)  O2 Device (Oxygen Therapy): ventilator (06/19/18 0820) Vital Signs (24h Range):  Temp:  [99.9 °F (37.7 °C)-102.6 °F (39.2 °C)] 99.9 °F (37.7 °C)  Pulse:  [] 63  Resp:  [28] 28  SpO2:  [82 %-100 %] 93 %  BP: ()/(54-77) 159/71  Arterial Line BP: (104-237)/(40-77) 144/60     Weight: 84.7 kg (186 lb 11.7 oz) (06/19/18 0300)  Body mass index is 28.39 kg/m².  Body surface area is 2.02 meters squared.    I/O last 3 completed shifts:  In: 34361 [I.V.:4381; NG/GT:545; IV Piggyback:8856]  Out: 81934 [Drains:640; Other:40425]    Physical Exam   Constitutional: He is oriented to person, place, and time. He appears well-developed and well-nourished.   Intubated FIO2 60 % , sedated    HENT:   Head: Normocephalic.   Cardiovascular: Normal heart sounds.  Exam reveals no gallop and no friction rub.    No murmur heard.  Tachycardic, no murmur appreciated.   No JVD     Pulmonary/Chest:   Intubated. Clear anteriorly   Abdominal: He exhibits no distension.   Musculoskeletal: He exhibits no edema or deformity.   Neurological: He is oriented to person, place, and time.   Intubated and sedated   Skin: Skin is warm and dry.   R SC trialysis   Nursing note and vitals reviewed.      Significant Labs:  All labs within the past 24 hours have been reviewed.     Significant Imaging:  Labs: Reviewed

## 2018-06-19 NOTE — ASSESSMENT & PLAN NOTE
- Differential Diagnoses include Pulmonary renal syndrome.  - Underwent US of kidney and showed Bilateral medical renal disease, Simple renal cysts bilaterally.  - CT abdomen/pelvis, mild interstitial changes in lung bases, 2 stones in the left kidney, 4 stones in the right kidney, no hydronephrosis, post cholecystectomy. No acute abnormality in the liver, spleen, pancreas or adrenals.   - Will continue trending urine output and renal function, avoid NSAIDs, contrast, nephrotoxins    - Monitor strict I/Os, daily weights, renally dose medications to current GFR  - Still anuric and will continue CRRT with higher rate of   - once pt is stable, may consider renal biopsy

## 2018-06-19 NOTE — ASSESSMENT & PLAN NOTE
ANCA associated vasculitis  62 yo w/PMH of HTN and NIDDM, OA admitted for acute hypoxic respiratory failure, renal failure, and pulmonary renal syndrome receiving plasmapheresis. Rheumatology consulted for pulmonary renal syndrome, recs on steroids and further investigation.   - Given methylprednisolone 1000 mg IV daily x 5 doses   - Nephrology consulted: renal biopsy when stable  --> elevated UPCR 1.28, DENIZ positive 1:320 speckled pattern, MPO+ 82, c-ANCA +, p-anca neg,  SSA + 178, SSB neg, dsDNA neg, Anti-Sm/RNP neg, Anti-Sm neg, PR3 neg, Immunoglobulins wnl, Hep C and HIV neg, APL labs neg, GBM neg, MARTHA neg, C3/C4 wnl, ACE neg, quant gold indeterminate however T-spot negative.  - Concern for vasculitis given DAH and hematuria. The nini-typical pulmonary-renal syndromes are GPA/MPA, Goodpasture disease, SLE. Immunofluorescence studies can help differentiate. MPO+ can suggest MPA    TB-spot negative, negative drug screen    - Given methylprednisolone 1000 mg IV daily x 5 doses (completed on 6/14/18). Started on Solumedrol 125 mg q6h on 6/15/18.   - Rituximab 1 g IV given on 6/15/18. Next dose on 6/29/18.  - Day 4 of PLEX today.    6/19/18: patient with acute drop in hemoglobin secondary to CRRT clot. Also with fever up to 102.6F. Sepsis workup started. Patient now on Vanc and Zosyn.     Plan:   - given high fevers, recommend thorough work-up for infection. Consider ID consult if patient fails to improve.  - low haptoglobin and fibro, may be secondary to infection. Continue to monitor.  - transfusion medicine: PLEX every other day  - Started on IV methylprednisolone 125 mg q6hrs on 6/15/18. Will continue at this dose.  - Received first dose Rituximab 6/15/18 and now on Atovaquone. Repeat Rituximab in 2 weeks on 6/29/18.

## 2018-06-19 NOTE — PROGRESS NOTES
Dr. Fernández at bedside, updated on patient status, gtts, vent settings. MD reviewed patient's chart and assessed patient. Dr. Fernández increased tidal volume on ventilator, see flowsheet for details. No further orders at this time. Will continue to monitor.

## 2018-06-19 NOTE — ASSESSMENT & PLAN NOTE
MPO-cANCA GPA, presenting with RPGN and pulmonary hemorrhage  S/p pulse steroids, now on scheduled dose 125mg IV solumedrol q6h, and PLEX 4/6, also has been started on rituxan 6/15 (repeat in 2 weeks), renal supportive therapy with RRT (SLED). Urine sediment showed RBC cast.  -Total hr intake 95 -calcium citrate, precedex, fentanyl. One unit PRBC given .     Plan:  -continue SLED for metabolic clearance and volume management. Citrate for AC/calcium chloride.  -400 ml as tolerated.   -Naphos IV prn

## 2018-06-19 NOTE — ASSESSMENT & PLAN NOTE
- Most likely etiology is Diffuse pulmonary alveolar hemorrhage, however severe pneumonia in the setting of leukocytosis and fever and presentation of cough and SOB can not be excluded  - On conservative tidal volume (6-7 ml/kg predicted body weight), balanced respiratory rate, and high oxygenation setting in the setting of DAH going with high PEEP ladder

## 2018-06-19 NOTE — ASSESSMENT & PLAN NOTE
- See principal problem for more elaboration.   - Currently on low need for pressors, will continue to monitor and ensure MAP > 65 mmHg (during CRRT)  - Initially on broad spectrum antibiotics with Cefepime (renally dose) and Vancomycin. Stopped Vancomycoin and Cefepime  - spike fever couple time after abx discontinuation. Sent a new set of blood cx and restart him on Vanc and Zosyn 06/18/18  - needed cooling blanket overnight

## 2018-06-20 PROBLEM — D65 DIC (DISSEMINATED INTRAVASCULAR COAGULATION): Status: ACTIVE | Noted: 2018-01-01

## 2018-06-20 NOTE — PROGRESS NOTES
[facility]  Critical Care Medicine  Progress Note    Patient Name: Jasmeet Raymond  MRN: [id number]  Admission Date: 6/10/2018  Hospital Length of Stay: 10 days  Code Status: Full Code  Attending Provider: Neto Wong MD  Primary Care Provider: Provider Notinsystem   Principal Problem: Diffuse pulmonary alveolar hemorrhage    Subjective:     Interval History/Significant Events:  No acute events overnight.  He is still net positive buy a large margin since his admission.  No recurrent fevers overnight and cultures are all still negative.    Review of Systems   Unable to perform ROS: Intubated     Objective:     Vital Signs (Most Recent):  Temp: (!) 101.7 °F (38.7 °C) (06/20/18 1333)  Pulse: (!) 127 (06/20/18 1333)  Resp: (!) 28 (06/19/18 0800)  BP: 138/61 (06/20/18 1200)  SpO2: 96 % (06/20/18 1333) Vital Signs (24h Range):  Temp:  [97 °F (36.1 °C)-101.8 °F (38.8 °C)] 101.7 °F (38.7 °C)  Pulse:  [] 127  SpO2:  [84 %-100 %] 96 %  BP: ()/(54-74) 138/61  Arterial Line BP: (100-211)/(45-75) 155/64   Weight: 84.7 kg (186 lb 11.7 oz)  Body mass index is 28.39 kg/m².      Intake/Output Summary (Last 24 hours) at 06/20/18 1340  Last data filed at 06/20/18 1100   Gross per 24 hour   Intake             8946 ml   Output             8332 ml   Net              614 ml       Physical Exam   Constitutional: No distress. He is sedated and intubated.   Intubated    Cardiovascular: Normal rate, regular rhythm, normal heart sounds and intact distal pulses.  Exam reveals no gallop and no friction rub.    No murmur heard.  Pulmonary/Chest: Effort normal. He is intubated. No respiratory distress. He has no wheezes. He has no rhonchi. He has rales.   Abdominal: Soft. Bowel sounds are normal. He exhibits no distension. There is no tenderness.   Musculoskeletal: He exhibits edema (2+ LE).   Neurological:   [id number]   Skin: Skin is warm and dry. No rash noted. No erythema.   R SC trialysis   Nursing note and vitals  reviewed.      Vents:  Vent Mode: A/C (06/20/18 1333)  Ventilator Initiated: Yes (06/10/18 2217)  Set Rate: 18 bmp (06/20/18 1333)  Vt Set: 650 mL (06/20/18 0917)  Pressure Support: 10 cmH20 (06/20/18 0856)  PEEP/CPAP: 14 cmH20 (06/20/18 1333)  Oxygen Concentration (%): 70 (06/20/18 1333)  Peak Airway Pressure: 26 cmH2O (06/20/18 1333)  Plateau Pressure: 19 cmH20 (06/20/18 1333)  Total Ve: 11.5 mL (06/20/18 1333)  F/VT Ratio<105 (RSBI): (!) 30.02 (06/15/18 0126)  Lines/Drains/Airways     Central Venous Catheter Line                 Hemodialysis Catheter 06/10/18 2307 right internal jugular 9 days         Percutaneous Central Line Insertion/Assessment - triple lumen  06/10/18 2305 left internal jugular 9 days          Drain                 NG/OG Tube 06/10/18 2309 [place] sump 18 Fr. Left nostril 9 days          Airway                 Airway - Non-Surgical Endotracheal Tube -- days          Arterial Line                 Arterial Line 06/10/18 2304 Right Radial 9 days          Pressure Ulcer                 Pressure Injury 06/20/18 0303 Right Buttocks Stage 2 less than 1 day              Significant Labs:    CBC/Anemia Profile:    Recent Labs  Lab 06/19/18  2345 06/20/18  0518 06/20/18  1158   WBC 19.18* 20.73* 24.24*   HGB 8.1* 7.9* 8.1*   HCT 24.4* 24.6* 24.2*   * 111* 104*   MCV 88 89 87   RDW 15.9* 16.1* 16.1*        Chemistries:    Recent Labs  Lab 06/19/18  0359  06/19/18  2200 06/20/18  0400 06/20/18  1201     136  < > 138  138 139  139  139 139  139   K 4.3  4.3  < > 5.0  5.0 5.1  5.1  5.1 4.4  4.4     107  < > 108  108 108  108  108 108  108   CO2 22*  22*  < > 25  25 24  24  24 22*  22*   BUN 12  12  < > 10  10 11  11  11 16  16   CREATININE 0.9  0.9  < > 0.9  0.9 0.8  0.8  0.8 0.9  0.9   CALCIUM 7.9*  7.9*  < > 7.9*  7.9* 7.6*  7.6*  7.6* 7.2*  7.2*   ALBUMIN 3.8  3.8  < > 3.3*  3.3* 3.2*  3.2*  3.2* 4.4  4.4   PROT 4.7*  --   --  4.7*  --    BILITOT  2.1*  --   --  1.7*  --    ALKPHOS 26*  --   --  54*  --    ALT 16  --   --  56*  --    AST 33  --   --  84*  --    MG 2.0  < > 2.3 1.7 1.9   PHOS 2.8  < > 4.0  4.0 3.0  3.0 3.3  3.3   < > = values in this interval not displayed.    All pertinent labs within the past 24 hours have been reviewed.    Significant Imaging:  CXR: I have reviewed all pertinent results/findings within the past 24 hours and my personal findings are:  No significant interval change    Assessment/Plan:     Pulmonary   * Diffuse pulmonary alveolar hemorrhage    - Given his presentation with two weeks of progressive worsening of his SOB and cough, and associated with hematuria and CXR finding of bilateral infiltrate raised the concern for DAH  - On 6/10 he underwent Flexible Bronchoscopy and showed no endobronchial lesion, significant signs of hemorrhage on all of the airways, confirming findings of pulmonary hemorrhage.   - Requires on pRBC on 6/12, 6/15 and another unit on 6/17        Acute hypoxemic respiratory failure    - Please see principal problem for more elaboration         ARDS (adult respiratory distress syndrome)    - Most likely etiology is Diffuse pulmonary alveolar hemorrhage, however severe pneumonia in the setting of leukocytosis and fever and presentation of cough and SOB can not be excluded  - On conservative tidal volume (6-7 ml/kg predicted body weight), balanced respiratory rate, and high oxygenation setting in the setting of DAH going with high PEEP ladder        Cardiac/Vascular   Pure hyperglyceridemia    - Most likely from Propofol and plan to wean off Propofol and started on Insulin infusion   - Improved in his TG to 600 >> 438 -->523 --> 180 with insulin infusion   - wean off propofol as tolerated        Renal/   ALEX (acute kidney injury)    - Differential Diagnoses include Pulmonary renal syndrome.  - Underwent US of kidney and showed Bilateral medical renal disease, Simple renal cysts bilaterally.  - CT  abdomen/pelvis, mild interstitial changes in lung bases, 2 stones in the left kidney, 4 stones in the right kidney, no hydronephrosis, post cholecystectomy. No acute abnormality in the liver, spleen, pancreas or adrenals.   - Will continue trending urine output and renal function, avoid NSAIDs, contrast, nephrotoxins    - Monitor strict I/Os, daily weights, renally dose medications to current GFR  - Still anuric and will continue CRRT  - increase rate to make net negative 2 L qd  - once pt is stable, may consider renal biopsy         ID   Septic shock    - See principal problem for more elaboration.   - Currently on low need for pressors, will continue to monitor and ensure MAP > 65 mmHg (during CRRT)  - Initially on broad spectrum antibiotics with Cefepime (renally dose) and Vancomycin. Stopped Vancomycoin and Cefepime & started on Zosyn as well as atovaquone  -Cultures (new) show NGTD        Immunology/Multi System   ANCA-associated vasculitis    - See Pulmorenal syndrome for more elaboration         Pulmonary-renal syndrome    - Most likely the diagnosis in the setting of diffuse alveolar hemorrhage and glomerulonephritis with most likely etiology is underlying autoimmune disorder  - Elevated UPCR 1.28, DENIZ positive 1:320 speckled pattern, MPO+ 82, PR3 neg, Immunoglobulins wnl, Hep C and HIV neg  - Finished  5 days of Methylprednisolone 1000 mg IV daily, started on Rituxan (6/15) and Methylprednisone 125 mg IV Q 6 and Atovaquin for PPx   - received 4th dose of plasma exchange 06/18/18  - Repeat PLEX today  - once pt is stable, may consider renal biopsy         Hematology   Thrombocytopenia, unspecified    - likely to be sepsis induced   - Will continue to observe, currently continue to improve         Oncology   Acute posthemorrhagic anemia    - Most likely is diffuse pulmonary alveolar hemorrhage. Although sepsis induced Bone marrow suppression and hemolysis can not be ruled out  - Frequent check in his CBC,  transfuse when Hb < 7  - transfuse one pRBC 06/19/18        Endocrine   Diabetes mellitus type 2 with complications    - Will keep his blood glucose on the target 140-180  - on Insulin infusion for Hyperglycemia and HyperTG   Recent Labs      06/19/18   0200  06/19/18   0306  06/19/18   0405  06/19/18   0505  06/19/18   0557  06/19/18   0559  06/19/18   0705  06/19/18   0814   POCTGLUCOSE  231*  224*  214*  219*  145*  129*  112*  138*              Critical Care Daily Checklist:    A: Awake: RASS Goal/Actual Goal: RASS Goal: 0-->alert and calm  Actual: Conteh Agitation Sedation Scale (RASS): Drowsy   B: Spontaneous Breathing Trial Performed?     C: SAT & SBT Coordinated?  Yes                      D: Delirium: CAM-ICU Overall CAM-ICU: Positive   E: Early Mobility Performed? Yes   F: Feeding Goal: Goals: Patient to receive nutrition by RD follow-up  Status: Nutrition Goal Status: goal met   Current Diet Order   Procedures    Diet NPO      AS: Analgesia/Sedation As above   T: Thromboembolic Prophylaxis Held in setting of active bleed   H: HOB > 300 Yes   U: Stress Ulcer Prophylaxis (if needed) Famotidine   G: Glucose Control As above   B: Bowel Function Stool Occurrence: 1   I: Indwelling Catheter (Lines & Liu) Necessity As above   D: De-escalation of Antimicrobials/Pharmacotherapies No    Plan for the day/ETD Increase fluid removal    Code Status:  Family/Goals of Care: Full Code         Critical secondary to Patient has a condition that poses threat to life and bodily function: Severe Respiratory Distress      Critical care was time spent personally by me on the following activities: development of treatment plan with patient or surrogate and bedside caregivers, discussions with consultants, evaluation of patient's response to treatment, examination of patient, ordering and performing treatments and interventions, ordering and review of laboratory studies, ordering and review of radiographic studies, pulse  oximetry, re-evaluation of patient's condition. This critical care time did not overlap with that of any other provider or involve time for any procedures.     Shemar Stoll MD  Critical Care Medicine  [facility]

## 2018-06-20 NOTE — CONSULTS
[facility]  Hematology/Oncology  Consult Note    Patient Name: Jasmeet Raymond  MRN: [id number]  Admission Date: 6/10/2018  Hospital Length of Stay: 10 days  Code Status: Full Code   Attending Provider: Neto Wong MD  Consulting Provider: Jessy Bellamy MD  Primary Care Physician: Provider Notinsystem  Principal Problem:Diffuse pulmonary alveolar hemorrhage    Inpatient consult to Hematology/Oncology  Consult performed by: JESSY BELLAMY  Consult ordered by: KATHERINE GARZON        Subjective:     HPI:  Patient is a 64yo M with PMHx of HTN and DM2 who presented to OSH c/o SOB and pneumonia. Patient intubated with no family at bedside, and history primary per chart review. Patient's respiratory status decompensated, requiring intubation. He underwent bronchoscopy showing alveolar hemorrhage. His DAH in combination with hematuria was concerning for pulmonary-renal syndrome. He was started on pulse dose solumedrol (now s/p 1g daily x5 and on 125mg q6) and transferred to [facility] for plasma exchange. Since transfer, he has undergone 5 sessions of PLEX. In addition he received rituxan on 6/15/18. Hematology consulted for DIC given coagulopathies including INR 2.5, PTT>150 and fibrinogen <70.    Oncology Treatment Plan:   [No treatment plan]    Medications:  Continuous Infusions:   sodium chloride 0.9%      calcium chloride IVPB 1 g (06/20/18 0000)    dexmedetomidine (PRECEDEX) infusion Stopped (06/20/18 1000)    dextrose-sod citrate-citric ac 250 mL/hr at 06/20/18 1451    fentanyl 300 mcg/hr (06/20/18 1700)    nicardipine 5 mg/hr (06/20/18 1700)    norepinephrine bitartrate-D5W Stopped (06/19/18 1345)    propofol Stopped (06/18/18 1200)     Scheduled Meds:   atovaquone  750 mg Per NG tube Q12H    chlorhexidine  15 mL Mouth/Throat BID    famotidine  20 mg Per NG tube BID    methylPREDNISolone sodium succinate  125 mg Intravenous Q6H    metoclopramide HCl  5 mg Per NG tube Q6H     piperacillin-tazobactam (ZOSYN) IVPB  4.5 g Intravenous Q8H    white petrolatum-mineral oil   Both Eyes Q8H     PRN Meds:sodium chloride, dextrose 50%, glucagon (human recombinant), heparin, porcine (PF), hepatitis B virus vacc.rec(PF), hydrALAZINE, insulin aspart U-100, labetalol, lorazepam, magnesium sulfate IVPB, sodium chloride 0.9%, sodium phosphate IVPB, sodium phosphate IVPB, sodium phosphate IVPB     Review of patient's allergies indicates:   Allergen Reactions    Sulfa (sulfonamide antibiotics)         No past medical history on file.  No past surgical history on file.  Family History     None        Social History Main Topics    Smoking status: Not on file    Smokeless tobacco: Not on file    Alcohol use Not on file    Drug use: Unknown    Sexual activity: Not on file       Review of Systems   Unable to perform ROS: Intubated     Objective:     Vital Signs (Most Recent):  Temp: 99.7 °F (37.6 °C) (06/20/18 1700)  Pulse: 102 (06/20/18 1700)  Resp: (!) 26 (06/20/18 1700)  BP: 133/68 (06/20/18 1600)  SpO2: 97 % (06/20/18 1700) Vital Signs (24h Range):  Temp:  [97 °F (36.1 °C)-101.7 °F (38.7 °C)] 99.7 °F (37.6 °C)  Pulse:  [] 102  Resp:  [26] 26  SpO2:  [84 %-100 %] 97 %  BP: ()/(54-74) 133/68  Arterial Line BP: (100-191)/(45-75) 121/58     Weight: 84.7 kg (186 lb 11.7 oz)  Body mass index is 28.39 kg/m².  Body surface area is 2.02 meters squared.      Intake/Output Summary (Last 24 hours) at 06/20/18 1819  Last data filed at 06/20/18 1700   Gross per 24 hour   Intake             8052 ml   Output             7205 ml   Net              847 ml       Physical Exam   Constitutional: He appears well-developed.   HENT:   Right Ear: External ear normal.   Left Ear: External ear normal.   Eyes: Right eye exhibits no discharge. Left eye exhibits no discharge.   Neck: No tracheal deviation present.   Cardiovascular: Normal rate.  Exam reveals no gallop.    +tachycardic   Pulmonary/Chest: No stridor.    +intubated  +good air movement   Abdominal: Soft. There is no tenderness.   Musculoskeletal: He exhibits edema. He exhibits no tenderness.   Neurological:   +sedated   Skin: Skin is warm and dry. He is not diaphoretic.       Significant Labs:   CBC:   Recent Labs  Lab 06/20/18  0518 06/20/18  1158 06/20/18  1337   WBC 20.73* 24.24* 26.48*   HGB 7.9* 8.1* 8.4*   HCT 24.6* 24.2* 25.1*   * 104* 117*   , CMP:   Recent Labs  Lab 06/19/18  0359  06/20/18  0400 06/20/18  1201 06/20/18  1337     136  < > 139  139  139 139  139 139  139   K 4.3  4.3  < > 5.1  5.1  5.1 4.4  4.4 4.4  4.4     107  < > 108  108  108 108  108 108  108   CO2 22*  22*  < > 24  24  24 22*  22* 26  26   *  194*  < > 127*  127*  127* 183*  183* 195*  195*   BUN 12  12  < > 11  11  11 16  16 20  20   CREATININE 0.9  0.9  < > 0.8  0.8  0.8 0.9  0.9 1.2  1.2   CALCIUM 7.9*  7.9*  < > 7.6*  7.6*  7.6* 7.2*  7.2* 7.4*  7.4*   PROT 4.7*  --  4.7*  --   --    ALBUMIN 3.8  3.8  < > 3.2*  3.2*  3.2* 4.4  4.4 4.3   BILITOT 2.1*  --  1.7*  --   --    ALKPHOS 26*  --  54*  --   --    AST 33  --  84*  --   --    ALT 16  --  56*  --   --    ANIONGAP 7*  7*  < > 7*  7*  7* 9  9 5*  5*   EGFRNONAA >60.0  >60.0  < > >60.0  >60.0  >60.0 >60.0  >60.0 >60.0  >60.0   < > = values in this interval not displayed. and Coagulation:   Recent Labs  Lab 06/19/18  0359  06/20/18  0400 06/20/18  0518 06/20/18  1158 06/20/18  1337   INR 1.3*  < > 1.2 1.2 3.6* 2.5*   APTT 34.1*  --  29.3  --   --  >150.0*   < > = values in this interval not displayed.    Diagnostic Results:  I have reviewed all pertinent imaging results/findings within the past 24 hours.    Assessment/Plan:     DIC (disseminated intravascular coagulation)  Coagulopathy    - WBC 26.48, Hgb 8.4,     - INR 2.5, PTT>150, Fibrinogen <70  - likely related to acute illness as well as plasma exchanges leading to alternations in pro- and  anti-coagulant factors (PLEX using albumin as replacement rather than FFP)  - recommend transfuse cyro to fibrinogen >100  - also recommend FFP transfusions to help with repleting other factors removed by PLEX (such as protein c and s) that is not in cryo, as this patient has several risk factors for thrombosis as well   - recommend monitor daily inr, ptt, ldh, fibrinogen       Thank you for allowing us to participate in the care of this patient.   Please do not hesitate to call with any questions.  We will follow.    Braulio Webb MD (PGY-[id number])  Hematology/Oncology Fellow  Will discuss with Dr. Sánchez (Hematology/Oncology Staff)

## 2018-06-20 NOTE — HPI
Patient is a 64yo M with PMHx of HTN and DM2 who presented to OSH c/o SOB and pneumonia. Patient intubated with no family at bedside, and history primary per chart review. Patient's respiratory status decompensated, requiring intubation. He underwent bronchoscopy showing alveolar hemorrhage. His DAH in combination with hematuria was concerning for pulmonary-renal syndrome. He was started on pulse dose solumedrol (now s/p 1g daily x5 and on 125mg q6) and transferred to [facility] for plasma exchange. Since transfer, he has undergone 5 sessions of PLEX. In addition he received rituxan on 6/15/18. Hematology consulted for DIC given coagulopathies including INR 2.5, PTT>150 and fibrinogen <70.

## 2018-06-20 NOTE — ASSESSMENT & PLAN NOTE
- Differential Diagnoses include Pulmonary renal syndrome.  - Underwent US of kidney and showed Bilateral medical renal disease, Simple renal cysts bilaterally.  - CT abdomen/pelvis, mild interstitial changes in lung bases, 2 stones in the left kidney, 4 stones in the right kidney, no hydronephrosis, post cholecystectomy. No acute abnormality in the liver, spleen, pancreas or adrenals.   - Will continue trending urine output and renal function, avoid NSAIDs, contrast, nephrotoxins    - Monitor strict I/Os, daily weights, renally dose medications to current GFR  - Still anuric and will continue CRRT  - increase rate to make net negative 2 L qd  - once pt is stable, may consider renal biopsy

## 2018-06-20 NOTE — ASSESSMENT & PLAN NOTE
- Most likely the diagnosis in the setting of diffuse alveolar hemorrhage and glomerulonephritis with most likely etiology is underlying autoimmune disorder  - Elevated UPCR 1.28, DENIZ positive 1:320 speckled pattern, MPO+ 82, PR3 neg, Immunoglobulins wnl, Hep C and HIV neg  - Finished  5 days of Methylprednisolone 1000 mg IV daily, started on Rituxan (6/15) and Methylprednisone 125 mg IV Q 6 and Atovaquin for PPx   - received 4th dose of plasma exchange 06/18/18  - Repeat PLEX today  - once pt is stable, may consider renal biopsy

## 2018-06-20 NOTE — PLAN OF CARE
Problem: Patient Care Overview  Goal: Plan of Care Review  Outcome: Ongoing (interventions implemented as appropriate)  Patient remains intubated this AM, O2 75% 14 Peep. A/C Pressure control. Gtts: Precedex, Fentanyl. Cardene on/off PRN to maintain SBP <160. Pt remains on continuous CRRT, UF @400cc/hr. No UOP this shift. Pt intermittently following some simple commands when sedation weaned, however SBP increases to 210s/220s as patient arouses. Pt turned q2hr w/wedge, pressure injury noted to buttocks, see flowsheet for details. Plan for plasmapheresis today & wean vent as tolerated. Pt and family updated with plan of care, all questions answered.

## 2018-06-20 NOTE — ASSESSMENT & PLAN NOTE
- See principal problem for more elaboration.   - Currently on low need for pressors, will continue to monitor and ensure MAP > 65 mmHg (during CRRT)  - Initially on broad spectrum antibiotics with Cefepime (renally dose) and Vancomycin. Stopped Vancomycoin and Cefepime & started on Zosyn as well as atovaquone  -Cultures (new) show NGTD

## 2018-06-20 NOTE — ASSESSMENT & PLAN NOTE
MPO-cANCA GPA, presenting with RPGN and pulmonary hemorrhage  S/p pulse steroids, now on scheduled dose 125mg IV solumedrol q6h, and PLEX 4/6, also has been started on rituxan 6/15 (repeat in 2 weeks), renal supportive therapy with RRT (SLED). Urine sediment showed RBC cast.  -Total hr intake 75 cc/hr -calcium citrate, precedex, fentanyl.      Plan:  -continue SLED for metabolic clearance and volume management. Citrate for AC/calcium chloride. -500 ml as tolerated.   -Naphos IV prn

## 2018-06-20 NOTE — ASSESSMENT & PLAN NOTE
ANCA associated vasculitis  62 yo w/PMH of HTN and NIDDM, OA admitted for acute hypoxic respiratory failure, renal failure, and pulmonary renal syndrome receiving plasmapheresis. Rheumatology consulted for pulmonary renal syndrome, recs on steroids and further investigation.   - Given methylprednisolone 1000 mg IV daily x 5 doses   - Nephrology consulted: renal biopsy when stable  --> elevated UPCR 1.28, DENIZ positive 1:320 speckled pattern, MPO+ 82, c-ANCA +, p-anca neg,  SSA + 178, SSB neg, dsDNA neg, Anti-Sm/RNP neg, Anti-Sm neg, PR3 neg, Immunoglobulins wnl, Hep C and HIV neg, APL labs neg, GBM neg, MARTHA neg, C3/C4 wnl, ACE neg, quant gold indeterminate however T-spot negative.  - Concern for vasculitis given DAH and hematuria. The nini-typical pulmonary-renal syndromes are GPA/MPA, Goodpasture disease, SLE. Immunofluorescence studies can help differentiate. MPO+ can suggest MPA    TB-spot negative, negative drug screen    - Given methylprednisolone 1000 mg IV daily x 5 doses (completed on 6/14/18). Started on Solumedrol 125 mg q6h on 6/15/18.   - Rituximab 1 g IV given on 6/15/18. Next dose on 6/29/18.  - Day 4 of PLEX today.    6/19/18: patient with acute drop in hemoglobin secondary to CRRT clot. Also with fever up to 102.6F. Sepsis workup started. Patient now on Vanc and Zosyn.     6/20/18: WBC count increasing, very low fibrinogen, elevated aPPT, and fevers despite broad spectrum antibiotics. Vent requirements still elevated.     Plan:   - Patient with significant degree of immunosuppression (high doses of steroids and Rituximab) and now concern for infection given rise in WBC count and persistent fevers. Low fibrinogen and elevated aPPT are concerning for DIC. Recommend ID and hem/onc consults for further work-up and management.    - transfusion medicine: PLEX every other day  - Started on IV methylprednisolone 125 mg q6hrs on 6/15/18. Will continue at this dose.   - Received first dose Rituximab 6/15/18  and now on Atovaquone. Repeat Rituximab in 2 weeks on 6/29/18 if no infection present at that time.

## 2018-06-20 NOTE — PROGRESS NOTES
[facility]  Nephrology  Progress Note    Patient Name: Jasmeet Raymond  MRN: [id number]  Admission Date: 6/10/2018  Hospital Length of Stay: 10 days  Attending Provider: Neto Wong MD   Primary Care Physician: Provider Notinsystem  Principal Problem:Diffuse pulmonary alveolar hemorrhage    Subjective:     HPI: 62 yo AAm with HTN, NIDDM, arthritis on metformin and meloxicam  admitted to [facility] for worsening SOB and ALEX.   Patiend had been treated as an out;patient for 2 weeks for Shortness of breath and bronchitis and UTI. In the hospital he was found to be acidotic, hyperkalemic, serum creatinine 8, and diffuse bilateral pulmonary infiltrates, requiring intubation. UA with proteinuria, hematuria and wbc's, Bronchoscopy revealed hemorrahage. Patient recieved one dialysis as per notes and 1 gm solumedrol, and transferred  to [facility] for plasmapheresis  And further evaluation.  Family is not available at the time of exam and patient is intubated.   HIV, Hepatitis B, C, complements  All within normal limits. DENIZ, ANCA and AntiGBM, results Nnot available    Interval History: Oxygen requirement and blood pressure remains labile. Remain anuric. SLED net pos 133 ml. hgb 7.9 stable. PLEX today.     Review of patient's allergies indicates:   Allergen Reactions    Sulfa (sulfonamide antibiotics)      Current Facility-Administered Medications   Medication Frequency    0.9%  NaCl infusion (CRRT USE ONLY) Continuous    0.9%  NaCl infusion (for blood administration) Q24H PRN    atovaquone suspension 750 mg Q12H    calcium chloride 1 g in dextrose 5 % 100 mL IVPB Continuous    chlorhexidine 0.12 % solution 15 mL BID    dexmedetomidine (PRECEDEX) 400mcg/100mL 0.9% NaCL infusion Continuous    dextrose 50% injection 12.5 g PRN    DEXTROSE-SOD CITRATE-CITRIC AC 2.45-2.2 GRAM- 730 MG/100 ML MISC SOLN Continuous    famotidine tablet 20 mg BID    fentaNYL 2500 mcg in 0.9% sodium chloride 250 mL  infusion premix (titrating) Continuous    glucagon (human recombinant) injection 1 mg PRN    heparin (porcine) injection 5,000 Units Q12H    heparin, porcine (PF) 100 unit/mL injection flush 500 Units PRN    hepatitis B virus vacc.rec(PF) injection 40 mcg vaccine x 1 dose    hydrALAZINE injection 10 mg Q4H PRN    insulin aspart U-100 pen 1-10 Units Q4H PRN    labetalol injection 20 mg Q4H PRN    lorazepam injection 4 mg Q1H PRN    magnesium sulfate 2g in water 50mL IVPB (premix) PRN    methylPREDNISolone sodium succinate injection 125 mg Q6H    metoclopramide HCl tablet 5 mg Q6H    niCARdipine 40 mg/200 mL infusion Continuous    norepinephrine 4 mg in dextrose 5% 250 mL infusion (premix) (titrating) Continuous    piperacillin-tazobactam 4.5 g in sodium chloride 0.9% 100 mL IVPB (ready to mix system) Q8H    propofol (DIPRIVAN) 10 mg/mL infusion Continuous    sodium chloride 0.9% flush 10 mL PRN    sodium phosphate 20.01 mmol in dextrose 5 % 250 mL IVPB PRN    sodium phosphate 30 mmol in dextrose 5 % 250 mL IVPB PRN    sodium phosphate 39.99 mmol in dextrose 5 % 250 mL IVPB PRN    white petrolatum-mineral oil (LUBIFRESH P.M.) ophthalmic ointment Q8H       Objective:     Vital Signs (Most Recent):  Temp: (!) 100.9 °F (38.3 °C) (06/20/18 1510)  Pulse: 103 (06/20/18 1510)  Resp: (!) 28 (06/19/18 0800)  BP: 138/61 (06/20/18 1200)  SpO2: (!) 92 % (06/20/18 1510)  O2 Device (Oxygen Therapy): ventilator (06/20/18 0725) Vital Signs (24h Range):  Temp:  [97 °F (36.1 °C)-101.8 °F (38.8 °C)] 100.9 °F (38.3 °C)  Pulse:  [] 103  SpO2:  [84 %-100 %] 92 %  BP: ()/(54-74) 138/61  Arterial Line BP: (100-191)/(45-75) 155/64     Weight: 84.7 kg (186 lb 11.7 oz) (06/19/18 0300)  Body mass index is 28.39 kg/m².  Body surface area is 2.02 meters squared.    I/O last 3 completed shifts:  In: 67546 [I.V.:7462; Blood:417; NG/GT:160; IV Piggyback:6681]  Out: 57731 [Drains:650; Other:41083]    Physical Exam    Constitutional: He is oriented to person, place, and time. He appears well-developed and well-nourished.   Intubated FIO2 70 % , sedated.    HENT:   Head: Normocephalic.   Cardiovascular: Normal heart sounds.  Exam reveals no gallop and no friction rub.    No murmur heard.  Tachycardic, no murmur appreciated.   No JVD     Pulmonary/Chest:   Intubated. Clear anteriorly   Abdominal: He exhibits no distension.   Musculoskeletal: He exhibits no edema or deformity.   Neurological: He is oriented to person, place, and time.   Intubated and sedated   Skin: Skin is warm and dry.   R SC trialysis   Nursing note and vitals reviewed.      Significant Labs:  All labs within the past 24 hours have been reviewed.     Significant Imaging:  Labs: Reviewed    Assessment/Plan:     ALEX (acute kidney injury)    MPO-cANCA GPA, presenting with RPGN and pulmonary hemorrhage  S/p pulse steroids, now on scheduled dose 125mg IV solumedrol q6h, and PLEX 4/6, also has been started on rituxan 6/15 (repeat in 2 weeks), renal supportive therapy with RRT (SLED). Urine sediment showed RBC cast.  -Total hr intake 75 cc/hr -calcium citrate, precedex, fentanyl.      Plan:  -continue SLED for metabolic clearance and volume management. Citrate for AC/calcium chloride. -500 ml as tolerated.   -Naphos IV prn                      Thank you for your consult. I will follow-up with patient. Please contact us if you have any additional questions.    Kandice Hernandez NP  Nephrology  [facility]

## 2018-06-20 NOTE — SUBJECTIVE & OBJECTIVE
Interval History: Patient seen and examined at bedside. Despite broad spectrum antibiotics, patient continues to be febrile with Tmax of 101.8F within the past 24 hours. Still on CRRT and getting PLEX today. Remains intubated and slightly sedated. Minimally responsive to commands.    Current Facility-Administered Medications   Medication Frequency    0.9%  NaCl infusion (CRRT USE ONLY) Continuous    0.9%  NaCl infusion (CRRT USE ONLY) Continuous    0.9%  NaCl infusion (for blood administration) Q24H PRN    atovaquone suspension 750 mg Q12H    calcium chloride 1 g in dextrose 5 % 100 mL IVPB Continuous    chlorhexidine 0.12 % solution 15 mL BID    dexmedetomidine (PRECEDEX) 400mcg/100mL 0.9% NaCL infusion Continuous    dextrose 50% injection 12.5 g PRN    DEXTROSE-SOD CITRATE-CITRIC AC 2.45-2.2 GRAM- 730 MG/100 ML MISC SOLN Continuous    famotidine tablet 20 mg BID    fentaNYL 2500 mcg in 0.9% sodium chloride 250 mL infusion premix (titrating) Continuous    glucagon (human recombinant) injection 1 mg PRN    heparin, porcine (PF) 100 unit/mL injection flush 500 Units PRN    hepatitis B virus vacc.rec(PF) injection 40 mcg vaccine x 1 dose    hydrALAZINE injection 10 mg Q4H PRN    insulin aspart U-100 pen 1-10 Units Q4H PRN    labetalol injection 20 mg Q4H PRN    lorazepam injection 4 mg Q1H PRN    magnesium sulfate 2g in water 50mL IVPB (premix) PRN    magnesium sulfate 2g in water 50mL IVPB (premix) PRN    methylPREDNISolone sodium succinate injection 125 mg Q6H    metoclopramide HCl tablet 5 mg Q6H    niCARdipine 40 mg/200 mL infusion Continuous    norepinephrine 4 mg in dextrose 5% 250 mL infusion (premix) (titrating) Continuous    piperacillin-tazobactam 4.5 g in sodium chloride 0.9% 100 mL IVPB (ready to mix system) Q8H    propofol (DIPRIVAN) 10 mg/mL infusion Continuous    sodium chloride 0.9% flush 10 mL PRN    sodium phosphate 20.01 mmol in dextrose 5 % 250 mL IVPB PRN    sodium  phosphate 20.01 mmol in dextrose 5 % 250 mL IVPB PRN    sodium phosphate 30 mmol in dextrose 5 % 250 mL IVPB PRN    sodium phosphate 30 mmol in dextrose 5 % 250 mL IVPB PRN    sodium phosphate 39.99 mmol in dextrose 5 % 250 mL IVPB PRN    sodium phosphate 39.99 mmol in dextrose 5 % 250 mL IVPB PRN    white petrolatum-mineral oil (LUBIFRESH P.M.) ophthalmic ointment Q8H     Objective:     Vital Signs (Most Recent):  Temp: 99.7 °F (37.6 °C) (06/20/18 1700)  Pulse: 102 (06/20/18 1700)  Resp: (!) 26 (06/20/18 1700)  BP: 133/68 (06/20/18 1600)  SpO2: 97 % (06/20/18 1700)  O2 Device (Oxygen Therapy): ventilator (06/20/18 1700) Vital Signs (24h Range):  Temp:  [97 °F (36.1 °C)-101.8 °F (38.8 °C)] 99.7 °F (37.6 °C)  Pulse:  [] 102  Resp:  [26] 26  SpO2:  [84 %-100 %] 97 %  BP: ()/(54-74) 133/68  Arterial Line BP: (100-191)/(45-75) 121/58     Weight: 84.7 kg (186 lb 11.7 oz) (06/19/18 0300)  Body mass index is 28.39 kg/m².  Body surface area is 2.02 meters squared.      Intake/Output Summary (Last 24 hours) at 06/20/18 1714  Last data filed at 06/20/18 1700   Gross per 24 hour   Intake            27365 ml   Output             7780 ml   Net             4906 ml       Physical Exam   Vitals reviewed.  Constitutional:   intubated   HENT:   Head: Normocephalic and atraumatic.   Right Ear: External ear normal.   Left Ear: External ear normal.   Eyes: Pupils are equal, round, and reactive to light. Right eye exhibits no discharge. Left eye exhibits no discharge.   Neck: Neck supple. No JVD present.   Cardiovascular: Normal rate, regular rhythm and intact distal pulses.    No murmur heard.  Pulmonary/Chest: No respiratory distress. He has no wheezes. He has rales (b/l, mechanical vent ).   Abdominal: Soft. He exhibits no distension. There is no tenderness.   BS appreciated in LUQ only   Neurological:   Minimal sedation  Not responsive to commands   Skin: Skin is warm. No rash noted. No erythema.     Psychiatric:    Unable to assess   Musculoskeletal: He exhibits no edema.   Diffuse soft tissue swelling of hands, feet, and knees    2-3 cm fluid blisters present on right arm         Significant Labs:  Results for FELIPE BERNAL (MRN [id number]) as of 6/20/2018 17:13   Ref. Range 6/20/2018 13:37   WBC Latest Ref Range: 3.90 - 12.70 K/uL 26.48 (H)   RBC Latest Ref Range: 4.60 - 6.20 M/uL 2.86 (L)   Hemoglobin Latest Ref Range: 14.0 - 18.0 g/dL 8.4 (L)   Hematocrit Latest Ref Range: 40.0 - 54.0 % 25.1 (L)   MCV Latest Ref Range: 82 - 98 fL 88   MCH Latest Ref Range: 27.0 - 31.0 pg 29.4   MCHC Latest Ref Range: 32.0 - 36.0 g/dL 33.5   RDW Latest Ref Range: 11.5 - 14.5 % 16.0 (H)   Platelets Latest Ref Range: 150 - 350 K/uL 117 (L)   MPV Latest Ref Range: 9.2 - 12.9 fL 12.0   Gran% Latest Ref Range: 38.0 - 73.0 % 92.6 (H)   Gran # (ANC) Latest Ref Range: 1.8 - 7.7 K/uL 24.5 (H)   Immature Granulocytes Latest Ref Range: 0.0 - 0.5 % 1.2 (H)   Immature Grans (Abs) Latest Ref Range: 0.00 - 0.04 K/uL 0.33 (H)   Lymph% Latest Ref Range: 18.0 - 48.0 % 2.1 (L)   Lymph # Latest Ref Range: 1.0 - 4.8 K/uL 0.6 (L)   Mono% Latest Ref Range: 4.0 - 15.0 % 4.0   Mono # Latest Ref Range: 0.3 - 1.0 K/uL 1.1 (H)   Eosinophil% Latest Ref Range: 0.0 - 8.0 % 0.0   Eos # Latest Ref Range: 0.0 - 0.5 K/uL 0.0   Basophil% Latest Ref Range: 0.0 - 1.9 % 0.1   Baso # Latest Ref Range: 0.00 - 0.20 K/uL 0.03   nRBC Latest Ref Range: 0 /100 WBC 0   Ovalocytes Unknown Occasional   Aniso Unknown Slight   Poik Unknown Slight   Poly Unknown Occasional   Hypo Unknown Occasional   Protime Latest Ref Range: 9.0 - 12.5 sec 24.1 (H)   Coumadin Monitoring INR Latest Ref Range: 0.8 - 1.2  2.5 (H)   aPTT Latest Ref Range: 21.0 - 32.0 sec >150.0 (AA)   Fibrinogen Latest Ref Range: 182 - 366 mg/dL <70 (AA)     Results for FELIPE BERNAL (MRN [id number]) as of 6/20/2018 17:13   Ref. Range 6/20/2018 13:37 6/20/2018 15:34   Sodium Latest Ref Range: 136 - 145 mmol/L 139    Potassium  Latest Ref Range: 3.5 - 5.1 mmol/L 4.4    Chloride Latest Ref Range: 95 - 110 mmol/L 108    CO2 Latest Ref Range: 23 - 29 mmol/L 26    Anion Gap Latest Ref Range: 8 - 16 mmol/L 5 (L)    BUN, Bld Latest Ref Range: 8 - 23 mg/dL 20    Creatinine Latest Ref Range: 0.5 - 1.4 mg/dL 1.2    eGFR if non African American Latest Ref Range: >60 mL/min/1.73 m^2 >60.0    eGFR if African American Latest Ref Range: >60 mL/min/1.73 m^2 >60.0    Glucose Latest Ref Range: 70 - 110 mg/dL 195 (H)    Calcium Latest Ref Range: 8.7 - 10.5 mg/dL 7.4 (L)    Phosphorus Latest Ref Range: 2.7 - 4.5 mg/dL 3.3    Albumin Latest Ref Range: 3.5 - 5.2 g/dL 4.3    LD Latest Ref Range: 110 - 260 U/L  435 (H)     Results for FELIPE BERNAL (MRN [id number]) as of 6/20/2018 17:13   Ref. Range 6/20/2018 13:37   aPTT Latest Ref Range: 21.0 - 32.0 sec >150.0 (AA)   Fibrinogen Latest Ref Range: 182 - 366 mg/dL <70 (AA)     Results for FELIPE BERNAL (MRN [id number]) as of 6/19/2018 14:19   Ref. Range 6/19/2018 03:59 6/19/2018 06:43   Haptoglobin Latest Ref Range: 30 - 250 mg/dL <10 (L) <10 (L)   Results for FELIPE BERNAL (MRN [id number]) as of 6/19/2018 14:19   Ref. Range 6/19/2018 06:43   LD Latest Ref Range: 110 - 260 U/L 683 (H)       Results for FELIPE BERNAL (MRN [id number]) as of 6/18/2018 10:33   Ref. Range 6/10/2018 22:49 6/10/2018 22:51   DENIZ HEP-2 Titer Unknown  Positive 1:320 Sp...   Anti-SSA Antibody Latest Ref Range: 0.00 - 19.99 EU  178.40 (H)   Anti-SSA Interpretation Latest Ref Range: Negative   Positive (A)   Anti-SSB Antibody Latest Ref Range: 0.00 - 19.99 EU  0.87   Anti-SSB Interpretation Latest Ref Range: Negative   Negative   ds DNA Ab Latest Ref Range: Negative 1:10   Negative 1:10   Anti Sm Antibody Latest Ref Range: 0.00 - 19.99 EU  0.60   Anti-Sm Interpretation Latest Ref Range: Negative   Negative   Anti Sm/RNP Antibody Latest Ref Range: 0.00 - 19.99 EU  2.83   Anti-Sm/RNP Interpretation Latest Ref Range: Negative   Negative   Cytoplasmic  Neutrophilic Ab Latest Ref Range: <1:20 Titer 1:320 (A)    Perinuclear (P-ANCA) Latest Ref Range: <1:20 Titer <1:20    Results for FELIPE BERNAL (MRN [id number]) as of 6/18/2018 10:33   Ref. Range 6/11/2018 06:01 6/11/2018 08:10 6/14/2018 03:27   ANCA Proteinase 3 Latest Ref Range: <0.4 (Negative) U  <0.2    MPO Latest Ref Range: <=20 UNITS  82 (H)    Complement (C-3) Latest Ref Range: 50 - 180 mg/dL   78   Complement (C-4) Latest Ref Range: 11 - 44 mg/dL   13   Complement,Total, Serum Latest Ref Range: 42 - 95 U/mL   60   IgG - Serum Latest Ref Range: 650 - 1600 mg/dL  972    IgM Latest Ref Range: 50 - 300 mg/dL  57    IgA Latest Ref Range: 40 - 350 mg/dL  154    Protein, Serum Latest Ref Range: 6.0 - 8.4 g/dL  4.9 (L)    Albumin grams/dl Latest Ref Range: 3.35 - 5.55 g/dL  2.16 (L)    Alpha-1 grams/dl Latest Ref Range: 0.17 - 0.41 g/dL  0.63 (H)    Alpha-2 grams/dl Latest Ref Range: 0.43 - 0.99 g/dL  0.71    Beta grams/dl Latest Ref Range: 0.50 - 1.10 g/dL  0.60    Gamma grams/dl Latest Ref Range: 0.67 - 1.58 g/dL  0.80    Pathologist Interpretation SPE Unknown  REVIEWED    Pathologist Interpretation BOB Unknown  REVIEWED    Immunofix Interp. Unknown  SEE COMMENT    GBM Ab Latest Ref Range: <1.0 (Negative) U <0.2       Blood cultures 6/19/18: NGTD   Resp culture 6/19/18: Candida Albicans

## 2018-06-20 NOTE — ASSESSMENT & PLAN NOTE
- Most likely from Propofol and plan to wean off Propofol and started on Insulin infusion   - Improved in his TG to 600 >> 438 -->523 --> 180 with insulin infusion   - wean off propofol as tolerated

## 2018-06-20 NOTE — SUBJECTIVE & OBJECTIVE
Oncology Treatment Plan:   [No treatment plan]    Medications:  Continuous Infusions:   sodium chloride 0.9%      calcium chloride IVPB 1 g (06/20/18 0000)    dexmedetomidine (PRECEDEX) infusion Stopped (06/20/18 1000)    dextrose-sod citrate-citric ac 250 mL/hr at 06/20/18 1451    fentanyl 300 mcg/hr (06/20/18 1700)    nicardipine 5 mg/hr (06/20/18 1700)    norepinephrine bitartrate-D5W Stopped (06/19/18 1345)    propofol Stopped (06/18/18 1200)     Scheduled Meds:   atovaquone  750 mg Per NG tube Q12H    chlorhexidine  15 mL Mouth/Throat BID    famotidine  20 mg Per NG tube BID    methylPREDNISolone sodium succinate  125 mg Intravenous Q6H    metoclopramide HCl  5 mg Per NG tube Q6H    piperacillin-tazobactam (ZOSYN) IVPB  4.5 g Intravenous Q8H    white petrolatum-mineral oil   Both Eyes Q8H     PRN Meds:sodium chloride, dextrose 50%, glucagon (human recombinant), heparin, porcine (PF), hepatitis B virus vacc.rec(PF), hydrALAZINE, insulin aspart U-100, labetalol, lorazepam, magnesium sulfate IVPB, sodium chloride 0.9%, sodium phosphate IVPB, sodium phosphate IVPB, sodium phosphate IVPB     Review of patient's allergies indicates:   Allergen Reactions    Sulfa (sulfonamide antibiotics)         No past medical history on file.  No past surgical history on file.  Family History     None        Social History Main Topics    Smoking status: Not on file    Smokeless tobacco: Not on file    Alcohol use Not on file    Drug use: Unknown    Sexual activity: Not on file       Review of Systems   Unable to perform ROS: Intubated     Objective:     Vital Signs (Most Recent):  Temp: 99.7 °F (37.6 °C) (06/20/18 1700)  Pulse: 102 (06/20/18 1700)  Resp: (!) 26 (06/20/18 1700)  BP: 133/68 (06/20/18 1600)  SpO2: 97 % (06/20/18 1700) Vital Signs (24h Range):  Temp:  [97 °F (36.1 °C)-101.7 °F (38.7 °C)] 99.7 °F (37.6 °C)  Pulse:  [] 102  Resp:  [26] 26  SpO2:  [84 %-100 %] 97 %  BP: ()/(54-74)  133/68  Arterial Line BP: (100-191)/(45-75) 121/58     Weight: 84.7 kg (186 lb 11.7 oz)  Body mass index is 28.39 kg/m².  Body surface area is 2.02 meters squared.      Intake/Output Summary (Last 24 hours) at 06/20/18 1819  Last data filed at 06/20/18 1700   Gross per 24 hour   Intake             8052 ml   Output             7205 ml   Net              847 ml       Physical Exam   Constitutional: He appears well-developed.   HENT:   Right Ear: External ear normal.   Left Ear: External ear normal.   Eyes: Right eye exhibits no discharge. Left eye exhibits no discharge.   Neck: No tracheal deviation present.   Cardiovascular: Normal rate.  Exam reveals no gallop.    +tachycardic   Pulmonary/Chest: No stridor.   +intubated  +good air movement   Abdominal: Soft. There is no tenderness.   Musculoskeletal: He exhibits edema. He exhibits no tenderness.   Neurological:   +sedated   Skin: Skin is warm and dry. He is not diaphoretic.       Significant Labs:   CBC:   Recent Labs  Lab 06/20/18  0518 06/20/18  1158 06/20/18  1337   WBC 20.73* 24.24* 26.48*   HGB 7.9* 8.1* 8.4*   HCT 24.6* 24.2* 25.1*   * 104* 117*   , CMP:   Recent Labs  Lab 06/19/18  0359  06/20/18  0400 06/20/18  1201 06/20/18  1337     136  < > 139  139  139 139  139 139  139   K 4.3  4.3  < > 5.1  5.1  5.1 4.4  4.4 4.4  4.4     107  < > 108  108  108 108  108 108  108   CO2 22*  22*  < > 24  24  24 22*  22* 26  26   *  194*  < > 127*  127*  127* 183*  183* 195*  195*   BUN 12  12  < > 11  11  11 16  16 20  20   CREATININE 0.9  0.9  < > 0.8  0.8  0.8 0.9  0.9 1.2  1.2   CALCIUM 7.9*  7.9*  < > 7.6*  7.6*  7.6* 7.2*  7.2* 7.4*  7.4*   PROT 4.7*  --  4.7*  --   --    ALBUMIN 3.8  3.8  < > 3.2*  3.2*  3.2* 4.4  4.4 4.3   BILITOT 2.1*  --  1.7*  --   --    ALKPHOS 26*  --  54*  --   --    AST 33  --  84*  --   --    ALT 16  --  56*  --   --    ANIONGAP 7*  7*  < > 7*  7*  7* 9  9 5*  5*    EGFRNONAA >60.0  >60.0  < > >60.0  >60.0  >60.0 >60.0  >60.0 >60.0  >60.0   < > = values in this interval not displayed. and Coagulation:   Recent Labs  Lab 06/19/18  0359  06/20/18  0400 06/20/18  0518 06/20/18  1158 06/20/18  1337   INR 1.3*  < > 1.2 1.2 3.6* 2.5*   APTT 34.1*  --  29.3  --   --  >150.0*   < > = values in this interval not displayed.    Diagnostic Results:  I have reviewed all pertinent imaging results/findings within the past 24 hours.

## 2018-06-20 NOTE — PROGRESS NOTES
[facility]  Rheumatology  Progress Note    Patient Name: Jasmeet Raymond  MRN: [id number]  Admission Date: 6/10/2018  Hospital Length of Stay: 10 days  Code Status: Full Code   Attending Provider: Neto Wong MD  Primary Care Physician: Provider Notinsystem  Principal Problem: Diffuse pulmonary alveolar hemorrhage    Subjective:     HPI:  Mr. Jasmeet Raymond, 63 year old male with PMHx significant for HTN, DM and osteoarthirtis presented to [facility] and [facility] with symptoms of pneumonia and shortness of breath. On 5/29/2018, he presented with symptoms of bronchitis with denies chills, ear pain, sore throat, chest pain, shortness of breath at that time, received antibiotics. Then on 6/8/2018 he presented again to [facility] and [facility] with shortness of breath and cough, and his CXR showed concern for airway disease. However on the first day of admission at [facility], his respiratory symptoms worsen and had acute hypoxic respiratory failure that required intubation. Pulmonary consulted and he underwent Bronchoscopy and  revealed alveolar hemorrhage, with the setting of having hematuria and diffuse alveloar hemorrhage the concern was for presence of pulmonary renal syndrome and he received pulsed dose of Methylprednisolone and nephrology consulted for plasmapheresis. However at [facility], plasmapheresis machine is broken and they recommend to transfer to [facility] [place] for plasmapheresis. Admitted here 6/11 for ARDS.     On arrival he was on Nibmex for paralytics, Propofol for sedation, Levophed (eventaully turned off) for pressure support, Flolan (Epoprostenol sodium) inhaled which was switched to Inhaled Nitro. His mechanical ventilation initially was on FiO2 100%, PEEP 16, then weaned his FiO2 with high requirement of oxygenation. He had an signifiacnt drop in his Hb (baseline ~ 12) dated one  week prior presentation to 9 and 8, required one pRBC prior arrival. Repeated CXR showed extensive bilateral infiltrates. Important laboratory investigation showed His , , pro calcitonin 4.4, C3 and C4 complement normal., Trop negative, C3 and C4 showed normal , Hep test is negative, Lactic acid 1.8 > 1.0, HIV negative, Procal 4.4, UA shoowed heamturia and trace leukocytes, Urine culture from 5/29 showed almost pansensitve Cedecea davisae.    Rheumatology consulted for pulmonary-renal syndrome, recs for steroids and further investigation.     Interval History: Patient seen and examined at bedside. Despite broad spectrum antibiotics, patient continues to be febrile with Tmax of 101.8F within the past 24 hours. WBC also increasing. Still on CRRT and getting PLEX today. Remains intubated and sedated.    Current Facility-Administered Medications   Medication Frequency    0.9%  NaCl infusion (CRRT USE ONLY) Continuous    0.9%  NaCl infusion (CRRT USE ONLY) Continuous    0.9%  NaCl infusion (for blood administration) Q24H PRN    atovaquone suspension 750 mg Q12H    calcium chloride 1 g in dextrose 5 % 100 mL IVPB Continuous    chlorhexidine 0.12 % solution 15 mL BID    dexmedetomidine (PRECEDEX) 400mcg/100mL 0.9% NaCL infusion Continuous    dextrose 50% injection 12.5 g PRN    DEXTROSE-SOD CITRATE-CITRIC AC 2.45-2.2 GRAM- 730 MG/100 ML MISC SOLN Continuous    famotidine tablet 20 mg BID    fentaNYL 2500 mcg in 0.9% sodium chloride 250 mL infusion premix (titrating) Continuous    glucagon (human recombinant) injection 1 mg PRN    heparin, porcine (PF) 100 unit/mL injection flush 500 Units PRN    hepatitis B virus vacc.rec(PF) injection 40 mcg vaccine x 1 dose    hydrALAZINE injection 10 mg Q4H PRN    insulin aspart U-100 pen 1-10 Units Q4H PRN    labetalol injection 20 mg Q4H PRN    lorazepam injection 4 mg Q1H PRN    magnesium sulfate 2g in water 50mL IVPB (premix) PRN    magnesium  sulfate 2g in water 50mL IVPB (premix) PRN    methylPREDNISolone sodium succinate injection 125 mg Q6H    metoclopramide HCl tablet 5 mg Q6H    niCARdipine 40 mg/200 mL infusion Continuous    norepinephrine 4 mg in dextrose 5% 250 mL infusion (premix) (titrating) Continuous    piperacillin-tazobactam 4.5 g in sodium chloride 0.9% 100 mL IVPB (ready to mix system) Q8H    propofol (DIPRIVAN) 10 mg/mL infusion Continuous    sodium chloride 0.9% flush 10 mL PRN    sodium phosphate 20.01 mmol in dextrose 5 % 250 mL IVPB PRN    sodium phosphate 20.01 mmol in dextrose 5 % 250 mL IVPB PRN    sodium phosphate 30 mmol in dextrose 5 % 250 mL IVPB PRN    sodium phosphate 30 mmol in dextrose 5 % 250 mL IVPB PRN    sodium phosphate 39.99 mmol in dextrose 5 % 250 mL IVPB PRN    sodium phosphate 39.99 mmol in dextrose 5 % 250 mL IVPB PRN    white petrolatum-mineral oil (LUBIFRESH P.M.) ophthalmic ointment Q8H     Objective:     Vital Signs (Most Recent):  Temp: 99.7 °F (37.6 °C) (06/20/18 1700)  Pulse: 102 (06/20/18 1700)  Resp: (!) 26 (06/20/18 1700)  BP: 133/68 (06/20/18 1600)  SpO2: 97 % (06/20/18 1700)  O2 Device (Oxygen Therapy): ventilator (06/20/18 1700) Vital Signs (24h Range):  Temp:  [97 °F (36.1 °C)-101.8 °F (38.8 °C)] 99.7 °F (37.6 °C)  Pulse:  [] 102  Resp:  [26] 26  SpO2:  [84 %-100 %] 97 %  BP: ()/(54-74) 133/68  Arterial Line BP: (100-191)/(45-75) 121/58     Weight: 84.7 kg (186 lb 11.7 oz) (06/19/18 0300)  Body mass index is 28.39 kg/m².  Body surface area is 2.02 meters squared.      Intake/Output Summary (Last 24 hours) at 06/20/18 1714  Last data filed at 06/20/18 1700   Gross per 24 hour   Intake            61838 ml   Output             7780 ml   Net             4906 ml       Physical Exam   Vitals reviewed.  Constitutional:   intubated   HENT:   Head: Normocephalic and atraumatic.   Right Ear: External ear normal.   Left Ear: External ear normal.   Eyes: Pupils are equal, round, and  reactive to light. Right eye exhibits no discharge. Left eye exhibits no discharge.   Neck: Neck supple. No JVD present.   Cardiovascular: Normal rate, regular rhythm and intact distal pulses.    No murmur heard.  Pulmonary/Chest: No respiratory distress. He has no wheezes. He has rales (b/l, mechanical vent ).   Abdominal: Soft. He exhibits no distension. There is no tenderness.   BS appreciated in LUQ only   Neurological:   Minimal sedation  Not responsive to commands   Skin: Skin is warm. No rash noted. No erythema.     Psychiatric:   Unable to assess   Musculoskeletal: He exhibits no edema.   Diffuse soft tissue swelling of hands, feet, and knees    2-3 cm fluid blisters present on right arm         Significant Labs:  Results for FELIPE BERNAL (MRN [id number]) as of 6/20/2018 17:13   Ref. Range 6/20/2018 13:37   WBC Latest Ref Range: 3.90 - 12.70 K/uL 26.48 (H)   RBC Latest Ref Range: 4.60 - 6.20 M/uL 2.86 (L)   Hemoglobin Latest Ref Range: 14.0 - 18.0 g/dL 8.4 (L)   Hematocrit Latest Ref Range: 40.0 - 54.0 % 25.1 (L)   MCV Latest Ref Range: 82 - 98 fL 88   MCH Latest Ref Range: 27.0 - 31.0 pg 29.4   MCHC Latest Ref Range: 32.0 - 36.0 g/dL 33.5   RDW Latest Ref Range: 11.5 - 14.5 % 16.0 (H)   Platelets Latest Ref Range: 150 - 350 K/uL 117 (L)   MPV Latest Ref Range: 9.2 - 12.9 fL 12.0   Gran% Latest Ref Range: 38.0 - 73.0 % 92.6 (H)   Gran # (ANC) Latest Ref Range: 1.8 - 7.7 K/uL 24.5 (H)   Immature Granulocytes Latest Ref Range: 0.0 - 0.5 % 1.2 (H)   Immature Grans (Abs) Latest Ref Range: 0.00 - 0.04 K/uL 0.33 (H)   Lymph% Latest Ref Range: 18.0 - 48.0 % 2.1 (L)   Lymph # Latest Ref Range: 1.0 - 4.8 K/uL 0.6 (L)   Mono% Latest Ref Range: 4.0 - 15.0 % 4.0   Mono # Latest Ref Range: 0.3 - 1.0 K/uL 1.1 (H)   Eosinophil% Latest Ref Range: 0.0 - 8.0 % 0.0   Eos # Latest Ref Range: 0.0 - 0.5 K/uL 0.0   Basophil% Latest Ref Range: 0.0 - 1.9 % 0.1   Baso # Latest Ref Range: 0.00 - 0.20 K/uL 0.03   nRBC Latest Ref Range:  0 /100 WBC 0   Ovalocytes Unknown Occasional   Aniso Unknown Slight   Poik Unknown Slight   Poly Unknown Occasional   Hypo Unknown Occasional   Protime Latest Ref Range: 9.0 - 12.5 sec 24.1 (H)   Coumadin Monitoring INR Latest Ref Range: 0.8 - 1.2  2.5 (H)   aPTT Latest Ref Range: 21.0 - 32.0 sec >150.0 (AA)   Fibrinogen Latest Ref Range: 182 - 366 mg/dL <70 (AA)     Results for FELIPE BERNAL (MRN [id number]) as of 6/20/2018 17:13   Ref. Range 6/20/2018 13:37 6/20/2018 15:34   Sodium Latest Ref Range: 136 - 145 mmol/L 139    Potassium Latest Ref Range: 3.5 - 5.1 mmol/L 4.4    Chloride Latest Ref Range: 95 - 110 mmol/L 108    CO2 Latest Ref Range: 23 - 29 mmol/L 26    Anion Gap Latest Ref Range: 8 - 16 mmol/L 5 (L)    BUN, Bld Latest Ref Range: 8 - 23 mg/dL 20    Creatinine Latest Ref Range: 0.5 - 1.4 mg/dL 1.2    eGFR if non African American Latest Ref Range: >60 mL/min/1.73 m^2 >60.0    eGFR if African American Latest Ref Range: >60 mL/min/1.73 m^2 >60.0    Glucose Latest Ref Range: 70 - 110 mg/dL 195 (H)    Calcium Latest Ref Range: 8.7 - 10.5 mg/dL 7.4 (L)    Phosphorus Latest Ref Range: 2.7 - 4.5 mg/dL 3.3    Albumin Latest Ref Range: 3.5 - 5.2 g/dL 4.3    LD Latest Ref Range: 110 - 260 U/L  435 (H)     Results for FELIPE BERNAL (MRN [id number]) as of 6/20/2018 17:13   Ref. Range 6/20/2018 13:37   aPTT Latest Ref Range: 21.0 - 32.0 sec >150.0 (AA)   Fibrinogen Latest Ref Range: 182 - 366 mg/dL <70 (AA)     Results for FELIPE BERNAL (MRN [id number]) as of 6/19/2018 14:19   Ref. Range 6/19/2018 03:59 6/19/2018 06:43   Haptoglobin Latest Ref Range: 30 - 250 mg/dL <10 (L) <10 (L)   Results for FELIPE BERNAL (MRN [id number]) as of 6/19/2018 14:19   Ref. Range 6/19/2018 06:43   LD Latest Ref Range: 110 - 260 U/L 683 (H)       Results for FELIPE BERNAL (MRN [id number]) as of 6/18/2018 10:33   Ref. Range 6/10/2018 22:49 6/10/2018 22:51   DENIZ HEP-2 Titer Unknown  Positive 1:320 Sp...   Anti-SSA Antibody Latest Ref Range: 0.00 -  19.99 EU  178.40 (H)   Anti-SSA Interpretation Latest Ref Range: Negative   Positive (A)   Anti-SSB Antibody Latest Ref Range: 0.00 - 19.99 EU  0.87   Anti-SSB Interpretation Latest Ref Range: Negative   Negative   ds DNA Ab Latest Ref Range: Negative 1:10   Negative 1:10   Anti Sm Antibody Latest Ref Range: 0.00 - 19.99 EU  0.60   Anti-Sm Interpretation Latest Ref Range: Negative   Negative   Anti Sm/RNP Antibody Latest Ref Range: 0.00 - 19.99 EU  2.83   Anti-Sm/RNP Interpretation Latest Ref Range: Negative   Negative   Cytoplasmic Neutrophilic Ab Latest Ref Range: <1:20 Titer 1:320 (A)    Perinuclear (P-ANCA) Latest Ref Range: <1:20 Titer <1:20    Results for FELIPE BERNAL (MRN [id number]) as of 6/18/2018 10:33   Ref. Range 6/11/2018 06:01 6/11/2018 08:10 6/14/2018 03:27   ANCA Proteinase 3 Latest Ref Range: <0.4 (Negative) U  <0.2    MPO Latest Ref Range: <=20 UNITS  82 (H)    Complement (C-3) Latest Ref Range: 50 - 180 mg/dL   78   Complement (C-4) Latest Ref Range: 11 - 44 mg/dL   13   Complement,Total, Serum Latest Ref Range: 42 - 95 U/mL   60   IgG - Serum Latest Ref Range: 650 - 1600 mg/dL  972    IgM Latest Ref Range: 50 - 300 mg/dL  57    IgA Latest Ref Range: 40 - 350 mg/dL  154    Protein, Serum Latest Ref Range: 6.0 - 8.4 g/dL  4.9 (L)    Albumin grams/dl Latest Ref Range: 3.35 - 5.55 g/dL  2.16 (L)    Alpha-1 grams/dl Latest Ref Range: 0.17 - 0.41 g/dL  0.63 (H)    Alpha-2 grams/dl Latest Ref Range: 0.43 - 0.99 g/dL  0.71    Beta grams/dl Latest Ref Range: 0.50 - 1.10 g/dL  0.60    Gamma grams/dl Latest Ref Range: 0.67 - 1.58 g/dL  0.80    Pathologist Interpretation SPE Unknown  REVIEWED    Pathologist Interpretation BOB Unknown  REVIEWED    Immunofix Interp. Unknown  SEE COMMENT    GBM Ab Latest Ref Range: <1.0 (Negative) U <0.2       Blood cultures 6/19/18: NGTD   Resp culture 6/19/18: Millie Albicans    Assessment/Plan:     Pulmonary-renal syndrome    ANCA associated vasculitis  62 yo w/PMH of HTN  and NIDDM, OA admitted for acute hypoxic respiratory failure, renal failure, and pulmonary renal syndrome receiving plasmapheresis. Rheumatology consulted for pulmonary renal syndrome, recs on steroids and further investigation.   - Given methylprednisolone 1000 mg IV daily x 5 doses   - Nephrology consulted: renal biopsy when stable  --> elevated UPCR 1.28, DENIZ positive 1:320 speckled pattern, MPO+ 82, c-ANCA +, p-anca neg,  SSA + 178, SSB neg, dsDNA neg, Anti-Sm/RNP neg, Anti-Sm neg, PR3 neg, Immunoglobulins wnl, Hep C and HIV neg, APL labs neg, GBM neg, MARTHA neg, C3/C4 wnl, ACE neg, quant gold indeterminate however T-spot negative.  - Concern for vasculitis given DAH and hematuria. The nini-typical pulmonary-renal syndromes are GPA/MPA, Goodpasture disease, SLE. Immunofluorescence studies can help differentiate. MPO+ can suggest MPA    TB-spot negative, negative drug screen    - Given methylprednisolone 1000 mg IV daily x 5 doses (completed on 6/14/18). Started on Solumedrol 125 mg q6h on 6/15/18.   - Rituximab 1 g IV given on 6/15/18. Next dose on 6/29/18.  - Day 4 of PLEX today.    6/19/18: patient with acute drop in hemoglobin secondary to CRRT clot. Also with fever up to 102.6F. Sepsis workup started. Patient now on Vanc and Zosyn.     6/20/18: WBC count increasing, very low fibrinogen, elevated aPPT, and fevers despite broad spectrum antibiotics. Vent requirements still elevated.     Plan:   - Patient with significant degree of immunosuppression (high doses of steroids and Rituximab) and now concern for infection given rise in WBC count and persistent fevers. Low fibrinogen and elevated aPPT are concerning for DIC. Recommend ID and hem/onc consults for further work-up and management.    - transfusion medicine: PLEX every other day  - Started on IV methylprednisolone 125 mg q6hrs on 6/15/18. Will continue at this dose.   - Received first dose Rituximab 6/15/18 and now on Atovaquone. Repeat Rituximab in 2  weeks on 6/29/18 if no infection present at that time.                  Nohemi Gonsales MD  Rheumatology  [facility]    Patient seen and examined with fellow.  All elements of history, physical exam and medical decision making independently confirmed by me. Patient with pulmonary-renal syndrome with positive MPO and +c-ANCA.  Patient with fever, increase in WBC and anemia.  Concern for sepsis and DIC. Patient on broad spectrum antibiotics.  Hesistant to lower steroid in patient with active vasculitis.  Will await ID and heme/onc input. Continue Solumedrol 125 mg q6.  Received Rituxan first dose.  Repeat Rituxan in 2 weeks (6/29/18) if no infection or infection treated.  See note for details.

## 2018-06-20 NOTE — PROGRESS NOTES
Apheresis tx #5 started at 1035 with out difficulty.  Pt intubated and sedated, therefore unable to assess pain and orientation. 4.5 Liter exchange.  Replacement fluids 4.5 Liter 5% Albumin via RIJ cath, patent, dressing CDI. Tx ended at 1143. Cath flushed with NS only per ICU nurse Jennifer.  Pt tolerated tx well. Next tx 06/22/2018.

## 2018-06-20 NOTE — SUBJECTIVE & OBJECTIVE
Interval History/Significant Events:  No acute events overnight.  He is still net positive buy a large margin since his admission.  No recurrent fevers overnight and cultures are all still negative.    Review of Systems   Unable to perform ROS: Intubated     Objective:     Vital Signs (Most Recent):  Temp: (!) 101.7 °F (38.7 °C) (06/20/18 1333)  Pulse: (!) 127 (06/20/18 1333)  Resp: (!) 28 (06/19/18 0800)  BP: 138/61 (06/20/18 1200)  SpO2: 96 % (06/20/18 1333) Vital Signs (24h Range):  Temp:  [97 °F (36.1 °C)-101.8 °F (38.8 °C)] 101.7 °F (38.7 °C)  Pulse:  [] 127  SpO2:  [84 %-100 %] 96 %  BP: ()/(54-74) 138/61  Arterial Line BP: (100-211)/(45-75) 155/64   Weight: 84.7 kg (186 lb 11.7 oz)  Body mass index is 28.39 kg/m².      Intake/Output Summary (Last 24 hours) at 06/20/18 1340  Last data filed at 06/20/18 1100   Gross per 24 hour   Intake             8946 ml   Output             8332 ml   Net              614 ml       Physical Exam   Constitutional: No distress. He is sedated and intubated.   Intubated    Cardiovascular: Normal rate, regular rhythm, normal heart sounds and intact distal pulses.  Exam reveals no gallop and no friction rub.    No murmur heard.  Pulmonary/Chest: Effort normal. He is intubated. No respiratory distress. He has no wheezes. He has no rhonchi. He has rales.   Abdominal: Soft. Bowel sounds are normal. He exhibits no distension. There is no tenderness.   Musculoskeletal: He exhibits edema (2+ LE).   Neurological:   [id number]   Skin: Skin is warm and dry. No rash noted. No erythema.   R SC trialysis   Nursing note and vitals reviewed.      Vents:  Vent Mode: A/C (06/20/18 1333)  Ventilator Initiated: Yes (06/10/18 2217)  Set Rate: 18 bmp (06/20/18 1333)  Vt Set: 650 mL (06/20/18 0917)  Pressure Support: 10 cmH20 (06/20/18 0856)  PEEP/CPAP: 14 cmH20 (06/20/18 1333)  Oxygen Concentration (%): 70 (06/20/18 1333)  Peak Airway Pressure: 26 cmH2O (06/20/18 1333)  Plateau Pressure: 19  cmH20 (06/20/18 1333)  Total Ve: 11.5 mL (06/20/18 1333)  F/VT Ratio<105 (RSBI): (!) 30.02 (06/15/18 0126)  Lines/Drains/Airways     Central Venous Catheter Line                 Hemodialysis Catheter 06/10/18 2307 right internal jugular 9 days         Percutaneous Central Line Insertion/Assessment - triple lumen  06/10/18 2305 left internal jugular 9 days          Drain                 NG/OG Tube 06/10/18 2309 [place] sump 18 Fr. Left nostril 9 days          Airway                 Airway - Non-Surgical Endotracheal Tube -- days          Arterial Line                 Arterial Line 06/10/18 2304 Right Radial 9 days          Pressure Ulcer                 Pressure Injury 06/20/18 0303 Right Buttocks Stage 2 less than 1 day              Significant Labs:    CBC/Anemia Profile:    Recent Labs  Lab 06/19/18  2345 06/20/18  0518 06/20/18  1158   WBC 19.18* 20.73* 24.24*   HGB 8.1* 7.9* 8.1*   HCT 24.4* 24.6* 24.2*   * 111* 104*   MCV 88 89 87   RDW 15.9* 16.1* 16.1*        Chemistries:    Recent Labs  Lab 06/19/18  0359  06/19/18  2200 06/20/18  0400 06/20/18  1201     136  < > 138  138 139  139  139 139  139   K 4.3  4.3  < > 5.0  5.0 5.1  5.1  5.1 4.4  4.4     107  < > 108  108 108  108  108 108  108   CO2 22*  22*  < > 25  25 24  24  24 22*  22*   BUN 12  12  < > 10  10 11  11  11 16  16   CREATININE 0.9  0.9  < > 0.9  0.9 0.8  0.8  0.8 0.9  0.9   CALCIUM 7.9*  7.9*  < > 7.9*  7.9* 7.6*  7.6*  7.6* 7.2*  7.2*   ALBUMIN 3.8  3.8  < > 3.3*  3.3* 3.2*  3.2*  3.2* 4.4  4.4   PROT 4.7*  --   --  4.7*  --    BILITOT 2.1*  --   --  1.7*  --    ALKPHOS 26*  --   --  54*  --    ALT 16  --   --  56*  --    AST 33  --   --  84*  --    MG 2.0  < > 2.3 1.7 1.9   PHOS 2.8  < > 4.0  4.0 3.0  3.0 3.3  3.3   < > = values in this interval not displayed.    All pertinent labs within the past 24 hours have been reviewed.    Significant Imaging:  CXR: I have reviewed all pertinent  results/findings within the past 24 hours and my personal findings are:  No significant interval change

## 2018-06-20 NOTE — SUBJECTIVE & OBJECTIVE
Interval History: Oxygen requirement and blood pressure remains labile. Remain anuric. SLED net pos 133 ml. hgb 7.9 stable. PLEX today.     Review of patient's allergies indicates:   Allergen Reactions    Sulfa (sulfonamide antibiotics)      Current Facility-Administered Medications   Medication Frequency    0.9%  NaCl infusion (CRRT USE ONLY) Continuous    0.9%  NaCl infusion (for blood administration) Q24H PRN    atovaquone suspension 750 mg Q12H    calcium chloride 1 g in dextrose 5 % 100 mL IVPB Continuous    chlorhexidine 0.12 % solution 15 mL BID    dexmedetomidine (PRECEDEX) 400mcg/100mL 0.9% NaCL infusion Continuous    dextrose 50% injection 12.5 g PRN    DEXTROSE-SOD CITRATE-CITRIC AC 2.45-2.2 GRAM- 730 MG/100 ML MISC SOLN Continuous    famotidine tablet 20 mg BID    fentaNYL 2500 mcg in 0.9% sodium chloride 250 mL infusion premix (titrating) Continuous    glucagon (human recombinant) injection 1 mg PRN    heparin (porcine) injection 5,000 Units Q12H    heparin, porcine (PF) 100 unit/mL injection flush 500 Units PRN    hepatitis B virus vacc.rec(PF) injection 40 mcg vaccine x 1 dose    hydrALAZINE injection 10 mg Q4H PRN    insulin aspart U-100 pen 1-10 Units Q4H PRN    labetalol injection 20 mg Q4H PRN    lorazepam injection 4 mg Q1H PRN    magnesium sulfate 2g in water 50mL IVPB (premix) PRN    methylPREDNISolone sodium succinate injection 125 mg Q6H    metoclopramide HCl tablet 5 mg Q6H    niCARdipine 40 mg/200 mL infusion Continuous    norepinephrine 4 mg in dextrose 5% 250 mL infusion (premix) (titrating) Continuous    piperacillin-tazobactam 4.5 g in sodium chloride 0.9% 100 mL IVPB (ready to mix system) Q8H    propofol (DIPRIVAN) 10 mg/mL infusion Continuous    sodium chloride 0.9% flush 10 mL PRN    sodium phosphate 20.01 mmol in dextrose 5 % 250 mL IVPB PRN    sodium phosphate 30 mmol in dextrose 5 % 250 mL IVPB PRN    sodium phosphate 39.99 mmol in dextrose 5 % 250 mL  IVPB PRN    white petrolatum-mineral oil (LUBIFRESH P.M.) ophthalmic ointment Q8H       Objective:     Vital Signs (Most Recent):  Temp: (!) 100.9 °F (38.3 °C) (06/20/18 1510)  Pulse: 103 (06/20/18 1510)  Resp: (!) 28 (06/19/18 0800)  BP: 138/61 (06/20/18 1200)  SpO2: (!) 92 % (06/20/18 1510)  O2 Device (Oxygen Therapy): ventilator (06/20/18 0725) Vital Signs (24h Range):  Temp:  [97 °F (36.1 °C)-101.8 °F (38.8 °C)] 100.9 °F (38.3 °C)  Pulse:  [] 103  SpO2:  [84 %-100 %] 92 %  BP: ()/(54-74) 138/61  Arterial Line BP: (100-191)/(45-75) 155/64     Weight: 84.7 kg (186 lb 11.7 oz) (06/19/18 0300)  Body mass index is 28.39 kg/m².  Body surface area is 2.02 meters squared.    I/O last 3 completed shifts:  In: 05308 [I.V.:7462; Blood:417; NG/GT:160; IV Piggyback:6681]  Out: 28672 [Drains:650; Other:58017]    Physical Exam   Constitutional: He is oriented to person, place, and time. He appears well-developed and well-nourished.   Intubated FIO2 70 % , sedated.    HENT:   Head: Normocephalic.   Cardiovascular: Normal heart sounds.  Exam reveals no gallop and no friction rub.    No murmur heard.  Tachycardic, no murmur appreciated.   No JVD     Pulmonary/Chest:   Intubated. Clear anteriorly   Abdominal: He exhibits no distension.   Musculoskeletal: He exhibits no edema or deformity.   Neurological: He is oriented to person, place, and time.   Intubated and sedated   Skin: Skin is warm and dry.   R SC trialysis   Nursing note and vitals reviewed.      Significant Labs:  All labs within the past 24 hours have been reviewed.     Significant Imaging:  Labs: Reviewed

## 2018-06-20 NOTE — PROCEDURES
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 The patient was transferred to [facility] for therapeutic plasma exchange and is currently intubated for respiratory failure. Anti-GBM antibodies are negative. C-ANCA (cytoplasmic neutrophil antibodies) are positive (more often associated with GPA than P-ANCA).     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 Journal of Clinical Apheresis 2016; 31:149-162.) This diagnosis supports daily to every other day TPE with FFP until resolution of DAH, then TPE with albumin replacement every 2-3 days for a total of 6 procedures, planned as follows:      Five have been performed, #6 is planned for 6/22/18.    Today's procedure (#5 of 6) was well tolerated and without complication. Given the calcium gluconate shortages, no calcium repletion was provided during this procedure.  Platelet trends continue to follow expected decreases and increases with weekend break. Anticipate small downward trend this week due to TPE's effect of platelet activation and centrifugal plasma  separation technique. Rituximab induction occurred this weekend.  Next scheduled TPE for  Thursday (6/21). Tomorrow will complete his series.     Pertinent Laboratory Data:   Complete Blood Count:   Lab Results   Component Value Date    HGB 8.1 (L) 06/20/2018    HCT 24.2 (L) 06/20/2018     (L) 06/20/2018    WBC 24.24 (H) 06/20/2018     Basic Metabolic Panel:   Lab Results   Component Value Date     06/20/2018     06/20/2018     06/20/2018    K 5.1 06/20/2018    K 5.1 06/20/2018    K 5.1 06/20/2018     06/20/2018     06/20/2018     06/20/2018    CO2 24 06/20/2018    CO2 24 06/20/2018    CO2 24 06/20/2018     (H) 06/20/2018     (H) 06/20/2018     (H) 06/20/2018    BUN 11 06/20/2018    BUN 11 06/20/2018    BUN 11 06/20/2018    CREATININE 0.8 06/20/2018    CREATININE 0.8 06/20/2018    CREATININE 0.8 06/20/2018    CALCIUM 7.6 (L) 06/20/2018    CALCIUM 7.6 (L) 06/20/2018    CALCIUM 7.6 (L) 06/20/2018    ANIONGAP 7 (L) 06/20/2018    ANIONGAP 7 (L) 06/20/2018    ANIONGAP 7 (L) 06/20/2018    ESTGFRAFRICA >60.0 06/20/2018    ESTGFRAFRICA >60.0 06/20/2018    ESTGFRAFRICA >60.0 06/20/2018    EGFRNONAA >60.0 06/20/2018    EGFRNONAA >60.0 06/20/2018    EGFRNONAA >60.0 06/20/2018       Pertinent Medications: None contraindicated for PLEX    Review of patient's allergies indicates:   Allergen Reactions    Sulfa (sulfonamide antibiotics)        Anesthesia: None     Technical Procedures Used: Plasma Exchange: Volume exchanged - 4.5 L; Replacement fluid - Albumin; Number of procedures 6; Date of next procedure 6/22/18.    Description of the Findings of the Procedure:     Please see Apheresis Nurse flowsheet for details.    The patient was evaluated and all clinical and laboratory data relevant to the treatment was reviewed, and a decision was made to proceed with the Apheresis procedure.    I was available to the clinical staff throughout the procedure.    Significant  Surgical Tasks Conducted by the Assistant(s): Not applicable    Complications: None    Estimated Blood Loss (EBL): None    Implants: None     Specimens: None    ADDENDUM: 6th and final TPE date changed to Thursday 6/21.   Yeni Whaley MD, PhD  4:13 PM 06/20/2018

## 2018-06-20 NOTE — PROGRESS NOTES
CRRT: daily checks done, machine temp set to 36.5C, UF rate 450ml/hr,   Access: right IJ temporary catheter with good flow.  On Citrate 250ml/hr pre filter, Calcium Chloride 30ml/hr.

## 2018-06-21 PROBLEM — D65 DIC (DISSEMINATED INTRAVASCULAR COAGULATION): Status: ACTIVE | Noted: 2018-01-01

## 2018-06-21 NOTE — SUBJECTIVE & OBJECTIVE
Interval History: Remains intubated FIO 65% and sedated-fentanyl. On and off Levo/nicardpine-currently on nicardipine . Net pos 818 ml. Total hr intake 325 ml-citrate, ca cl, TF, Fentanyl, nicardipine.     Review of patient's allergies indicates:   Allergen Reactions    Sulfa (sulfonamide antibiotics)      Current Facility-Administered Medications   Medication Frequency    0.9%  NaCl infusion (CRRT USE ONLY) Continuous    atovaquone suspension 750 mg Q12H    calcium chloride 1 g in dextrose 5 % 100 mL IVPB Continuous    chlorhexidine 0.12 % solution 15 mL BID    dextrose 50% injection 12.5 g PRN    famotidine tablet 20 mg BID    fentaNYL 2500 mcg in 0.9% sodium chloride 250 mL infusion premix (titrating) Continuous    glucagon (human recombinant) injection 1 mg PRN    heparin, porcine (PF) 100 unit/mL injection flush 500 Units PRN    hepatitis B virus vacc.rec(PF) injection 40 mcg vaccine x 1 dose    hydrALAZINE injection 10 mg Q4H PRN    insulin aspart U-100 pen 1-10 Units Q4H PRN    labetalol injection 20 mg Q4H PRN    lorazepam injection 4 mg Q1H PRN    magnesium sulfate 2g in water 50mL IVPB (premix) PRN    methylPREDNISolone sodium succinate injection 125 mg Q6H    metoclopramide HCl tablet 5 mg Q6H    niCARdipine 40 mg/200 mL infusion Continuous    piperacillin-tazobactam 4.5 g in sodium chloride 0.9% 100 mL IVPB (ready to mix system) Q8H    sodium chloride 0.9% flush 10 mL PRN    sodium phosphate 20.01 mmol in dextrose 5 % 250 mL IVPB PRN    sodium phosphate 30 mmol in dextrose 5 % 250 mL IVPB PRN    sodium phosphate 39.99 mmol in dextrose 5 % 250 mL IVPB PRN    vancomycin (VANCOCIN) 2,500 mg in dextrose 5 % 500 mL IVPB Once    white petrolatum-mineral oil (LUBIFRESH P.M.) ophthalmic ointment Q8H       Objective:     Vital Signs (Most Recent):  Temp: (!) 100.6 °F (38.1 °C) (06/21/18 1104)  Pulse: (!) 115 (06/21/18 1104)  Resp: 17 (06/21/18 1104)  BP: (!) 163/75 (06/21/18 1000)  SpO2:  97 % (06/21/18 1104)  O2 Device (Oxygen Therapy): ventilator (06/21/18 1104) Vital Signs (24h Range):  Temp:  [98.1 °F (36.7 °C)-101.7 °F (38.7 °C)] 100.6 °F (38.1 °C)  Pulse:  [] 115  Resp:  [17-29] 17  SpO2:  [85 %-99 %] 97 %  BP: (125-178)/(56-95) 163/75  Arterial Line BP: (116-165)/(50-72) 147/61     Weight: 84.7 kg (186 lb 11.7 oz) (06/19/18 0300)  Body mass index is 28.39 kg/m².  Body surface area is 2.02 meters squared.    I/O last 3 completed shifts:  In: 45614 [I.V.:3876; Blood:377; NG/GT:370; IV Piggyback:8120]  Out: 95939 [Drains:150; Other:77003]    Physical Exam   Constitutional: He is oriented to person, place, and time. He appears well-developed and well-nourished.   Intubated FIO2 65 % , sedated.    HENT:   Head: Normocephalic.   Cardiovascular: Normal heart sounds.  Exam reveals no gallop and no friction rub.    No murmur heard.  Tachycardic, no murmur appreciated.   No JVD     Pulmonary/Chest:   Intubated. Clear anteriorly   Abdominal: He exhibits no distension.   Musculoskeletal: He exhibits no deformity. Edema: BUE, Blisters on left arm.    Neurological: He is oriented to person, place, and time.   Intubated and sedated   Skin: Skin is warm and dry.   R SC trialysis   Nursing note and vitals reviewed.      Significant Labs:  All labs within the past 24 hours have been reviewed.     Significant Imaging:  Labs: Reviewed

## 2018-06-21 NOTE — PLAN OF CARE
Patient resting comfortably on vent/AC, FIO2: 70%, PEEP: 14. Cardene infusing to maintain MAP<160. HR  sinus rhythm. Sedation/pain controlled with fentanyl gtt - ativan IVP given x1. Plasmapheresis done today. Patient tolerating CRRT , citrate and calcium chloride infusing. 1 unit of cryo given for fibrinogen <70. Patient on cooling blanket for intermittent fever throughout day, TMax 102 degrees. Plan to continue with CRRT, give one FFP, wean vent as tolerating and keep patient comfortable overnight. Patient and wife updated regarding plan of care - wife's questions answered by RN and Dr. Wong. Will continue to monitor.

## 2018-06-21 NOTE — CONSULTS
[facility]  Infectious Disease  Consult Note    Patient Name: Jasmeet Raymond  MRN: [id number]  Admission Date: 6/10/2018  Hospital Length of Stay: 11 days  Attending Physician: Neto Wong MD  Primary Care Provider: Provider Not[facility]ystem     Isolation Status: No active isolations    Patient information was obtained from past medical records.      Consults  Assessment/Plan:     Septic shock    63 year old male with PMHx significant for HTN, DM and arthirtis on whom ID is consulted for possible infection. Infectious work up has been negative so far.    -karlee is resp culture is normal and does not warrant therapy  -all other cultures negative  -continue vanco and zosyn for now  -reculture if he is febrile again  -leukocytosis is likely elevated due to steroids   -ID will follow along            Thank you for your consult. I will follow-up with patient. Please contact us if you have any additional questions.    Peewee Antoine MD  Infectious Disease  [facility]    Subjective:     Principal Problem: Diffuse pulmonary alveolar hemorrhage    HPI: Patient is intubated. History is from primary team    63 year old male with PMHx significant for HTN, DM and arthirtis presented to [facility] with symptoms of pneumonia and shortness of breath. On 5/29/2018, he presented with symptoms of bronchitis with denies chills, ear pain, sore throat, chest pain, shortness of breath at that time, received antibiotics. Then on 6/8/2018 he presented again to [facility] with shortness of breath and cough, and his CXR showed concern for airway disease. However on the first day of admission at [facility], his respiratory symptoms worsen and had acute hypoxic respiratory failure that required intubation. Pulmonary consulted and he underwent Bronchoscopy and  revealed alveolar hemorrhage, with the  setting of having hematuria and diffuse alveloar hemorrhage the concern was for presence of pulmonary renal syndrome and he received pulsed dose of Methylprednisolone and nephrology consulted for plasmapheresis. However at [facility], plasmapheresis machine is broken and they recommend to transfer to [facility] [place] for plasmapheresis.      On arrival he was on Nibmex for paralytics, Propofol for sedation, Levophed (eventaully turned off) for pressure support, Flolan (Epoprostenol sodium) inhaled which was switched to Inhaled Nitro. His mechanical ventilation initially was on FiO2 100%, PEEP 16, then weaned his FiO2 with high requirement of oxygenation. He had an signifiacnt drop in his Hb (baseline ~ 12) dated one week prior presentation to 9 and 8, required one pRBC prior arrival. Repeated CXR showed extensive bilateral infiltrates. Important laboratory investigation showed His , , pro calcitonin 4.4, C3 and C4 complement normal., Trop negative, C3 and C4 showed normal , Hep test is negative, Lactic acid 1.8 > 1.0, HIV negative, Procal 4.4, UA shoowed heamturia and trace leukocytes, Urine culture from 5/29 showed almost pansensitve Cedecea davisae.      Hospital/ICU Course:  06/11/2018 admitted to MICU from [facility] with ARDS  06/12/2018 No acute events overnight. Had PLEX with 4.5 L exchange with FFP, and CRRT started and he became hypotensive, Levophed started during the CRRT. He was weaning off Nitric Oxide and his ABGs showing worsening respi acidosis. Rheumatology, Transfusion medicine and Nephrology on board   06/13/2018 Stable on High ladder PEEP for ARDS and completely off NO, and still on Nimbex for paralytics and sedation with Propofol and Fentanyl. ID, Rheumatology and Nephrology on board.   06/15/2018, Still on high setting   06/15/2018 Started Rituxan after his PLEx session, and decrease in his Methylprednisone. Still on high  PEEP ladder for ARDS, frequent clots in ETT suction   06/16/2018 Overnight, he had Rituxan induction for ANCA associated vasculitis and decrease his Methypredisone to 125 mg IV Q 6 Hours. Mild increase in his mechanical ventilation setting and he is grossly volume overload and still anuric.   06/17/2018 Palaryzed with Nimbex and better in his mechanical ventilation setting. Off Versed and on max Precedex and Profol. Increased in UF rate to 500 and better in his I/O. Repated CXR showed better air space.   06/18/2018 off paralytic. received 4th dose of plasma exchange today. Still anuric. Continue on dialysis.wean off sedation   06/19/2018 Patient remains intubated on A/C FiO2 55%/ PEEP 10. Patient on levo gtt titrated to keep MAP >65. Patient on precedex gtt at 1.34 mg/kg/hr and fentanyl gtt at 200 mcg/hr. Patient on CRRT, UF at 400mL/hr with citrate and calcium chloride infusions running per MD order. Tube feeds continue to be held, NG tube to low int. suction with roughly 200 mL of green output over the night. Patient's Tmax was 102.6 degrees F. patient placed on cooling blanket. Patient's H/H this morning is 6.5/20. One unit of pRBCs ordered.     No past medical history on file.    No past surgical history on file.    Review of patient's allergies indicates:   Allergen Reactions    Sulfa (sulfonamide antibiotics)        Medications:  No prescriptions prior to admission.     Antibiotics     Start     Stop Route Frequency Ordered    06/18/18 2000  piperacillin-tazobactam 4.5 g in sodium chloride 0.9% 100 mL IVPB (ready to mix system)      -- IV Every 8 hours (non-standard times) 06/1955        Antifungals     None        Antivirals     None           Immunization History   Administered Date(s) Administered    Pneumococcal Conjugate - 13 Valent 06/14/2018    Tdap 06/14/2018       Family History     None        Social History     Social History    Marital status:      Spouse name: N/A    Number of  children: N/A    Years of education: N/A     Social History Main Topics    Smoking status: Not on file    Smokeless tobacco: Not on file    Alcohol use Not on file    Drug use: Unknown    Sexual activity: Not on file     Other Topics Concern    Not on file     Social History Narrative    No narrative on file     Review of Systems   Unable to perform ROS: Intubated     Objective:     Vital Signs (Most Recent):  Temp: (!) 100.6 °F (38.1 °C) (06/21/18 1200)  Pulse: (!) 116 (06/21/18 1200)  Resp: 17 (06/21/18 1104)  BP: (!) 129/59 (06/21/18 1200)  SpO2: 97 % (06/21/18 1200) Vital Signs (24h Range):  Temp:  [98.1 °F (36.7 °C)-101.7 °F (38.7 °C)] 100.6 °F (38.1 °C)  Pulse:  [] 116  Resp:  [17-29] 17  SpO2:  [85 %-99 %] 97 %  BP: (129-178)/(59-95) 129/59  Arterial Line BP: (116-168)/(50-72) 150/62     Weight: 84.7 kg (186 lb 11.7 oz)  Body mass index is 28.39 kg/m².    Estimated Creatinine Clearance: 97.5 mL/min (based on SCr of 0.8 mg/dL).    Physical Exam   Constitutional: He is oriented to person, place, and time. He appears well-developed and well-nourished.   HENT:   Head: Normocephalic.   Cardiovascular: Normal heart sounds.  Exam reveals no gallop and no friction rub.    No murmur heard.  Pulmonary/Chest:   Intubated. Clear anteriorly   Abdominal: He exhibits no distension.   Musculoskeletal: He exhibits no deformity. Edema: BUE, Blisters on left arm.    Neurological: He is oriented to person, place, and time.   Intubated and sedated   Skin: Skin is warm and dry.   R SC trialysis   Nursing note and vitals reviewed.      Significant Labs: All pertinent labs within the past 24 hours have been reviewed.    Significant Imaging: I have reviewed all pertinent imaging results/findings within the past 24 hours.

## 2018-06-21 NOTE — PLAN OF CARE
Problem: Patient Care Overview  Goal: Plan of Care Review  Outcome: Ongoing (interventions implemented as appropriate)  Patient on cardene to maintain SBP less than 160. Patient on CRRT. Patient currently on plasmapheresis. Patient with wife at bedside. Patient's wife updated on plan of care for the day. Dr. Louie and  updated on patient's assessments, lab results, and vital signs. Will continue to monitor patient.

## 2018-06-21 NOTE — PROGRESS NOTES
[facility]  Rheumatology  Progress Note    Patient Name: Jasmeet Raymond  MRN: [id number]  Admission Date: 6/10/2018  Hospital Length of Stay: 11 days  Code Status: Full Code   Attending Provider: Neto Wong MD  Primary Care Physician: Provider Notinsystem  Principal Problem: Diffuse pulmonary alveolar hemorrhage    Subjective:     HPI:  Mr. Jasmeet Raymond, 63 year old male with PMHx significant for HTN, DM and osteoarthirtis presented to [facility] and [facility] with symptoms of pneumonia and shortness of breath. On 5/29/2018, he presented with symptoms of bronchitis with denies chills, ear pain, sore throat, chest pain, shortness of breath at that time, received antibiotics. Then on 6/8/2018 he presented again to [facility] and [facility] with shortness of breath and cough, and his CXR showed concern for airway disease. However on the first day of admission at [facility] and [facility], his respiratory symptoms worsen and had acute hypoxic respiratory failure that required intubation. Pulmonary consulted and he underwent Bronchoscopy and  revealed alveolar hemorrhage, with the setting of having hematuria and diffuse alveloar hemorrhage the concern was for presence of pulmonary renal syndrome and he received pulsed dose of Methylprednisolone and nephrology consulted for plasmapheresis. However at [facility], plasmapheresis machine is broken and they recommend to transfer to [facility] main [place] for plasmapheresis. Admitted here 6/11 for ARDS.     On arrival he was on Nibmex for paralytics, Propofol for sedation, Levophed (eventaully turned off) for pressure support, Flolan (Epoprostenol sodium) inhaled which was switched to Inhaled Nitro. His mechanical ventilation initially was on FiO2 100%, PEEP 16, then weaned his FiO2 with high requirement of oxygenation. He had an signifiacnt drop in his Hb (baseline ~ 12) dated one  week prior presentation to 9 and 8, required one pRBC prior arrival. Repeated CXR showed extensive bilateral infiltrates. Important laboratory investigation showed His , , pro calcitonin 4.4, C3 and C4 complement normal., Trop negative, C3 and C4 showed normal , Hep test is negative, Lactic acid 1.8 > 1.0, HIV negative, Procal 4.4, UA shoowed heamturia and trace leukocytes, Urine culture from 5/29 showed almost pansensitve Cedecea davisae.    Rheumatology consulted for pulmonary-renal syndrome, recs for steroids and further investigation.     Interval History: Patient seen and examined at bedside. Despite broad spectrum antibiotics, patient continues to be febrile with Tmax of 101.1F within the past 24 hours. Still on CRRT and getting PLEX tomm. Remains intubated and slightly sedated. Minimally responsive to commands.Seen by ID and hem/onc.    Current Facility-Administered Medications   Medication Frequency    0.9%  NaCl infusion (CRRT USE ONLY) Continuous    0.9%  NaCl infusion (CRRT USE ONLY) Continuous    acetaminophen tablet 650 mg Once    albumin human 5% bottle 225 g Once    atovaquone suspension 750 mg Q12H    calcium chloride 1 g in dextrose 5 % 100 mL IVPB Continuous    calcium gluconate 2,000 mg in sodium chloride 0.9% 100 mL IVPB Once    chlorhexidine 0.12 % solution 15 mL BID    dextrose 50% injection 12.5 g PRN    famotidine tablet 20 mg BID    fentaNYL 2500 mcg in 0.9% sodium chloride 250 mL infusion premix (titrating) Continuous    glucagon (human recombinant) injection 1 mg PRN    heparin, porcine (PF) 100 unit/mL injection flush 500 Units PRN    hepatitis B virus vacc.rec(PF) injection 40 mcg vaccine x 1 dose    hydrALAZINE injection 10 mg Q4H PRN    insulin aspart U-100 pen 1-10 Units Q4H PRN    labetalol injection 20 mg Q4H PRN    lorazepam injection 4 mg Q1H PRN    magnesium sulfate 2g in water 50mL IVPB (premix) PRN    magnesium sulfate 2g in water 50mL  IVPB (premix) PRN    methylPREDNISolone sodium succinate injection 125 mg Q6H    metoclopramide HCl tablet 5 mg Q6H    niCARdipine 40 mg/200 mL infusion Continuous    piperacillin-tazobactam 4.5 g in sodium chloride 0.9% 100 mL IVPB (ready to mix system) Q8H    sodium chloride 0.9% flush 10 mL PRN    sodium phosphate 20.01 mmol in dextrose 5 % 250 mL IVPB PRN    sodium phosphate 20.01 mmol in dextrose 5 % 250 mL IVPB PRN    sodium phosphate 30 mmol in dextrose 5 % 250 mL IVPB PRN    sodium phosphate 30 mmol in dextrose 5 % 250 mL IVPB PRN    sodium phosphate 39.99 mmol in dextrose 5 % 250 mL IVPB PRN    sodium phosphate 39.99 mmol in dextrose 5 % 250 mL IVPB PRN    white petrolatum-mineral oil (LUBIFRESH P.M.) ophthalmic ointment Q8H     Objective:     Vital Signs (Most Recent):  Temp: (!) 101.1 °F (38.4 °C) (06/21/18 1548)  Pulse: (!) 129 (06/21/18 1548)  Resp: (!) 27 (06/21/18 1548)  BP: (!) 186/85 (06/21/18 1400)  SpO2: (!) 92 % (06/21/18 1548)  O2 Device (Oxygen Therapy): ventilator (06/21/18 1548) Vital Signs (24h Range):  Temp:  [98.1 °F (36.7 °C)-101.1 °F (38.4 °C)] 101.1 °F (38.4 °C)  Pulse:  [] 129  Resp:  [17-29] 27  SpO2:  [85 %-99 %] 92 %  BP: (129-186)/(59-95) 186/85  Arterial Line BP: (116-178)/(50-77) 149/69     Weight: 84.7 kg (186 lb 11.7 oz) (06/19/18 0300)  Body mass index is 28.39 kg/m².  Body surface area is 2.02 meters squared.      Intake/Output Summary (Last 24 hours) at 06/21/18 1558  Last data filed at 06/21/18 1500   Gross per 24 hour   Intake             8581 ml   Output            13553 ml   Net            -1980 ml       Physical Exam   Vitals reviewed.  Constitutional:   intubated   HENT:   Head: Normocephalic and atraumatic.   Right Ear: External ear normal.   Left Ear: External ear normal.   Eyes: Pupils are equal, round, and reactive to light. Right eye exhibits no discharge. Left eye exhibits no discharge.   Neck: Neck supple. No JVD present.   Cardiovascular:  Normal rate, regular rhythm and intact distal pulses.    No murmur heard.  Pulmonary/Chest: No respiratory distress. He has no wheezes. He has rales (b/l, mechanical vent ).   Abdominal: Soft. He exhibits no distension. There is no tenderness.   BS appreciated in LUQ only   Neurological:   Minimal sedation  Not responsive to commands   Skin: Skin is warm. No rash noted. No erythema.     Psychiatric:   Unable to assess   Musculoskeletal: He exhibits no edema.   Diffuse soft tissue swelling of hands, feet, and knees    2-3 cm fluid blisters present on right arm         Significant Labs:    Results for FELIPE BERNAL (MRN [id number]) as of 6/21/2018 16:00   Ref. Range 6/21/2018 11:22   WBC Latest Ref Range: 3.90 - 12.70 K/uL 21.12 (H)   RBC Latest Ref Range: 4.60 - 6.20 M/uL 2.59 (L)   Hemoglobin Latest Ref Range: 14.0 - 18.0 g/dL 7.5 (L)   Hematocrit Latest Ref Range: 40.0 - 54.0 % 22.8 (L)   MCV Latest Ref Range: 82 - 98 fL 88   MCH Latest Ref Range: 27.0 - 31.0 pg 29.0   MCHC Latest Ref Range: 32.0 - 36.0 g/dL 32.9   RDW Latest Ref Range: 11.5 - 14.5 % 16.5 (H)   Platelets Latest Ref Range: 150 - 350 K/uL 112 (L)   MPV Latest Ref Range: 9.2 - 12.9 fL 11.7   Gran% Latest Ref Range: 38.0 - 73.0 % 94.7 (H)   Gran # (ANC) Latest Ref Range: 1.8 - 7.7 K/uL 20.0 (H)   Immature Granulocytes Latest Ref Range: 0.0 - 0.5 % 1.3 (H)   Immature Grans (Abs) Latest Ref Range: 0.00 - 0.04 K/uL 0.27 (H)   Lymph% Latest Ref Range: 18.0 - 48.0 % 1.4 (L)   Lymph # Latest Ref Range: 1.0 - 4.8 K/uL 0.3 (L)   Mono% Latest Ref Range: 4.0 - 15.0 % 2.5 (L)   Mono # Latest Ref Range: 0.3 - 1.0 K/uL 0.5   Eosinophil% Latest Ref Range: 0.0 - 8.0 % 0.0   Eos # Latest Ref Range: 0.0 - 0.5 K/uL 0.0   Basophil% Latest Ref Range: 0.0 - 1.9 % 0.1   Baso # Latest Ref Range: 0.00 - 0.20 K/uL 0.03   nRBC Latest Ref Range: 0 /100 WBC 0   Ovalocytes Unknown Occasional   Aniso Unknown Slight   Poik Unknown Slight   Poly Unknown Occasional   Hypo Unknown  Occasional   Platelet Estimate Unknown Decreased (A)   Protime Latest Ref Range: 9.0 - 12.5 sec 12.3   Coumadin Monitoring INR Latest Ref Range: 0.8 - 1.2  1.2   Fibrinogen Latest Ref Range: 182 - 366 mg/dL 195     Results for FELIPE BERNAL (MRN [id number]) as of 6/21/2018 16:00   Ref. Range 6/21/2018 14:26   Sodium Latest Ref Range: 136 - 145 mmol/L 140   Potassium Latest Ref Range: 3.5 - 5.1 mmol/L 4.8   Chloride Latest Ref Range: 95 - 110 mmol/L 109   CO2 Latest Ref Range: 23 - 29 mmol/L 25   Anion Gap Latest Ref Range: 8 - 16 mmol/L 6 (L)   BUN, Bld Latest Ref Range: 8 - 23 mg/dL 10   Creatinine Latest Ref Range: 0.5 - 1.4 mg/dL 0.7   eGFR if non African American Latest Ref Range: >60 mL/min/1.73 m^2 >60.0   eGFR if African American Latest Ref Range: >60 mL/min/1.73 m^2 >60.0   Glucose Latest Ref Range: 70 - 110 mg/dL 190 (H)   Calcium Latest Ref Range: 8.7 - 10.5 mg/dL 8.0 (L)   Phosphorus Latest Ref Range: 2.7 - 4.5 mg/dL 1.5 (L)   Albumin Latest Ref Range: 3.5 - 5.2 g/dL 3.6     Results for FELIPE BENRAL (MRN [id number]) as of 6/21/2018 16:00   Ref. Range 6/20/2018 13:37 6/20/2018 18:05 6/20/2018 23:53 6/21/2018 05:15 6/21/2018 11:22   Fibrinogen Latest Ref Range: 182 - 366 mg/dL <70 (AA) 108 (L) 149 (L) 167 (L) 195     Results for FELIPE BERNAL (MRN [id number]) as of 6/19/2018 14:19   Ref. Range 6/19/2018 03:59 6/19/2018 06:43   Haptoglobin Latest Ref Range: 30 - 250 mg/dL <10 (L) <10 (L)     Results for FELIPE BERNAL (MRN [id number]) as of 6/21/2018 16:00   Ref. Range 6/19/2018 06:43 6/20/2018 15:34   LD Latest Ref Range: 110 - 260 U/L 683 (H) 435 (H)     Results for FELIPE BERNAL (MRN [id number]) as of 6/18/2018 10:33   Ref. Range 6/10/2018 22:49 6/10/2018 22:51   DENIZ HEP-2 Titer Unknown  Positive 1:320 Sp...   Anti-SSA Antibody Latest Ref Range: 0.00 - 19.99 EU  178.40 (H)   Anti-SSA Interpretation Latest Ref Range: Negative   Positive (A)   Anti-SSB Antibody Latest Ref Range: 0.00 - 19.99 EU  0.87   Anti-SSB  Interpretation Latest Ref Range: Negative   Negative   ds DNA Ab Latest Ref Range: Negative 1:10   Negative 1:10   Anti Sm Antibody Latest Ref Range: 0.00 - 19.99 EU  0.60   Anti-Sm Interpretation Latest Ref Range: Negative   Negative   Anti Sm/RNP Antibody Latest Ref Range: 0.00 - 19.99 EU  2.83   Anti-Sm/RNP Interpretation Latest Ref Range: Negative   Negative   Cytoplasmic Neutrophilic Ab Latest Ref Range: <1:20 Titer 1:320 (A)    Perinuclear (P-ANCA) Latest Ref Range: <1:20 Titer <1:20    Results for FELIPE BERNAL (MRN [id number]) as of 6/18/2018 10:33   Ref. Range 6/11/2018 06:01 6/11/2018 08:10 6/14/2018 03:27   ANCA Proteinase 3 Latest Ref Range: <0.4 (Negative) U  <0.2    MPO Latest Ref Range: <=20 UNITS  82 (H)    Complement (C-3) Latest Ref Range: 50 - 180 mg/dL   78   Complement (C-4) Latest Ref Range: 11 - 44 mg/dL   13   Complement,Total, Serum Latest Ref Range: 42 - 95 U/mL   60   IgG - Serum Latest Ref Range: 650 - 1600 mg/dL  972    IgM Latest Ref Range: 50 - 300 mg/dL  57    IgA Latest Ref Range: 40 - 350 mg/dL  154    Protein, Serum Latest Ref Range: 6.0 - 8.4 g/dL  4.9 (L)    Albumin grams/dl Latest Ref Range: 3.35 - 5.55 g/dL  2.16 (L)    Alpha-1 grams/dl Latest Ref Range: 0.17 - 0.41 g/dL  0.63 (H)    Alpha-2 grams/dl Latest Ref Range: 0.43 - 0.99 g/dL  0.71    Beta grams/dl Latest Ref Range: 0.50 - 1.10 g/dL  0.60    Gamma grams/dl Latest Ref Range: 0.67 - 1.58 g/dL  0.80    Pathologist Interpretation SPE Unknown  REVIEWED    Pathologist Interpretation BOB Unknown  REVIEWED    Immunofix Interp. Unknown  SEE COMMENT    GBM Ab Latest Ref Range: <1.0 (Negative) U <0.2       Blood cultures 6/19/18: NGTD   Resp culture 6/19/18: Millie Albicans    Assessment/Plan:     Pulmonary-renal syndrome    ANCA associated vasculitis  62 yo w/PMH of HTN and NIDDM, OA admitted for acute hypoxic respiratory failure, renal failure, and pulmonary renal syndrome receiving plasmapheresis. Rheumatology consulted for  pulmonary renal syndrome, recs on steroids and further investigation.   - Given methylprednisolone 1000 mg IV daily x 5 doses   - Nephrology consulted: renal biopsy when stable  --> elevated UPCR 1.28, DENIZ positive 1:320 speckled pattern, MPO+ 82, c-ANCA +, p-anca neg,  SSA + 178, SSB neg, dsDNA neg, Anti-Sm/RNP neg, Anti-Sm neg, PR3 neg, Immunoglobulins wnl, Hep C and HIV neg, APL labs neg, GBM neg, MARTHA neg, C3/C4 wnl, ACE neg, quant gold indeterminate however T-spot negative.  - Concern for vasculitis given DAH and hematuria. The nini-typical pulmonary-renal syndromes are GPA/MPA, Goodpasture disease, SLE. Immunofluorescence studies can help differentiate. MPO+ can suggest MPA    TB-spot negative, negative drug screen    - Given methylprednisolone 1000 mg IV daily x 5 doses (completed on 6/14/18). Started on Solumedrol 125 mg q6h on 6/15/18.   - Rituximab 1 g IV given on 6/15/18. Next dose on 6/29/18.  - Day 4 of PLEX today.    6/19/18: patient with acute drop in hemoglobin secondary to CRRT clot. Also with fever up to 102.6F. Sepsis workup started. Patient now on Vanc and Zosyn.     6/20/18: WBC count increasing, very low fibrinogen, elevated aPPT, and fevers despite broad spectrum antibiotics. Vent requirements still elevated.     6/21/18: Seen by ID- recommended to continue Vanc/Zosyn, culture when febrile. Also seen by Hem/Onc who feel that coagulopathy may be secondary to PLEX. Recommend cryoprecipitate and FFP. Patient still with high vent requirements. Fibrinogen improving.    Plan:   - appreciate hem/onc and ID input.   - Will need ID clearance prior to next dose of Rituximab which is scheduled for 6/29/18.   - transfusion medicine: PLEX every other day  - Started on IV methylprednisolone 125 mg q6hrs on 6/15/18. Will continue at this dose.   - Received first dose Rituximab 6/15/18 and now on Atovaquone. Repeat Rituximab in 2 weeks on 6/29/18 if no infection present at that time or if infection treated.                   Nohemi Gonsales MD  Rheumatology  [facility]    Patient seen and examined with fellow.  All elements of history, physical exam and medical decision making independently confirmed by me. Patient with pulmonary-renal syndrome with positive MPO and +c-ANCA.  Patient continues to have fever, increase in WBC and anemia.  Concern for sepsis and DIC. Patient on broad spectrum antibiotics.  Hesistant to lower steroid in patient with active vasculitis.  Appreciate ID and heme/onc input. Continue Solumedrol 125 mg q6.  Repeat Rituxan in 2 weeks (6/29/18) if no infection or infection treated.  See note for details.

## 2018-06-21 NOTE — ASSESSMENT & PLAN NOTE
- Most likely from Propofol and plan to wean off Propofol and started on Insulin infusion   - Improved in his TG to 600 >> 438 -->523 --> 180 with insulin infusion   - weaned off propofol and stopped insulin gtt

## 2018-06-21 NOTE — ASSESSMENT & PLAN NOTE
- Likely due to be secondary to plasma exchange but infection can not be ruled out  - received one unit of FFP and Cryo 06/20/18  - check DIC lab q 6hs

## 2018-06-21 NOTE — SUBJECTIVE & OBJECTIVE
Interval History: Patient seen and examined at bedside. Despite broad spectrum antibiotics, patient continues to be febrile with Tmax of 101.1F within the past 24 hours. Still on CRRT and getting PLEX tomm. Remains intubated and slightly sedated. Minimally responsive to commands.Seen by ID and hem/onc.    Current Facility-Administered Medications   Medication Frequency    0.9%  NaCl infusion (CRRT USE ONLY) Continuous    0.9%  NaCl infusion (CRRT USE ONLY) Continuous    acetaminophen tablet 650 mg Once    albumin human 5% bottle 225 g Once    atovaquone suspension 750 mg Q12H    calcium chloride 1 g in dextrose 5 % 100 mL IVPB Continuous    calcium gluconate 2,000 mg in sodium chloride 0.9% 100 mL IVPB Once    chlorhexidine 0.12 % solution 15 mL BID    dextrose 50% injection 12.5 g PRN    famotidine tablet 20 mg BID    fentaNYL 2500 mcg in 0.9% sodium chloride 250 mL infusion premix (titrating) Continuous    glucagon (human recombinant) injection 1 mg PRN    heparin, porcine (PF) 100 unit/mL injection flush 500 Units PRN    hepatitis B virus vacc.rec(PF) injection 40 mcg vaccine x 1 dose    hydrALAZINE injection 10 mg Q4H PRN    insulin aspart U-100 pen 1-10 Units Q4H PRN    labetalol injection 20 mg Q4H PRN    lorazepam injection 4 mg Q1H PRN    magnesium sulfate 2g in water 50mL IVPB (premix) PRN    magnesium sulfate 2g in water 50mL IVPB (premix) PRN    methylPREDNISolone sodium succinate injection 125 mg Q6H    metoclopramide HCl tablet 5 mg Q6H    niCARdipine 40 mg/200 mL infusion Continuous    piperacillin-tazobactam 4.5 g in sodium chloride 0.9% 100 mL IVPB (ready to mix system) Q8H    sodium chloride 0.9% flush 10 mL PRN    sodium phosphate 20.01 mmol in dextrose 5 % 250 mL IVPB PRN    sodium phosphate 20.01 mmol in dextrose 5 % 250 mL IVPB PRN    sodium phosphate 30 mmol in dextrose 5 % 250 mL IVPB PRN    sodium phosphate 30 mmol in dextrose 5 % 250 mL IVPB PRN    sodium  phosphate 39.99 mmol in dextrose 5 % 250 mL IVPB PRN    sodium phosphate 39.99 mmol in dextrose 5 % 250 mL IVPB PRN    white petrolatum-mineral oil (LUBIFRESH P.M.) ophthalmic ointment Q8H     Objective:     Vital Signs (Most Recent):  Temp: (!) 101.1 °F (38.4 °C) (06/21/18 1548)  Pulse: (!) 129 (06/21/18 1548)  Resp: (!) 27 (06/21/18 1548)  BP: (!) 186/85 (06/21/18 1400)  SpO2: (!) 92 % (06/21/18 1548)  O2 Device (Oxygen Therapy): ventilator (06/21/18 1548) Vital Signs (24h Range):  Temp:  [98.1 °F (36.7 °C)-101.1 °F (38.4 °C)] 101.1 °F (38.4 °C)  Pulse:  [] 129  Resp:  [17-29] 27  SpO2:  [85 %-99 %] 92 %  BP: (129-186)/(59-95) 186/85  Arterial Line BP: (116-178)/(50-77) 149/69     Weight: 84.7 kg (186 lb 11.7 oz) (06/19/18 0300)  Body mass index is 28.39 kg/m².  Body surface area is 2.02 meters squared.      Intake/Output Summary (Last 24 hours) at 06/21/18 1558  Last data filed at 06/21/18 1500   Gross per 24 hour   Intake             8581 ml   Output            53119 ml   Net            -1980 ml       Physical Exam   Vitals reviewed.  Constitutional:   intubated   HENT:   Head: Normocephalic and atraumatic.   Right Ear: External ear normal.   Left Ear: External ear normal.   Eyes: Pupils are equal, round, and reactive to light. Right eye exhibits no discharge. Left eye exhibits no discharge.   Neck: Neck supple. No JVD present.   Cardiovascular: Normal rate, regular rhythm and intact distal pulses.    No murmur heard.  Pulmonary/Chest: No respiratory distress. He has no wheezes. He has rales (b/l, mechanical vent ).   Abdominal: Soft. He exhibits no distension. There is no tenderness.   BS appreciated in LUQ only   Neurological:   Minimal sedation  Not responsive to commands   Skin: Skin is warm. No rash noted. No erythema.     Psychiatric:   Unable to assess   Musculoskeletal: He exhibits no edema.   Diffuse soft tissue swelling of hands, feet, and knees    2-3 cm fluid blisters present on right arm          Significant Labs:    Results for FELIPE BERNAL (MRN [id number]) as of 6/21/2018 16:00   Ref. Range 6/21/2018 11:22   WBC Latest Ref Range: 3.90 - 12.70 K/uL 21.12 (H)   RBC Latest Ref Range: 4.60 - 6.20 M/uL 2.59 (L)   Hemoglobin Latest Ref Range: 14.0 - 18.0 g/dL 7.5 (L)   Hematocrit Latest Ref Range: 40.0 - 54.0 % 22.8 (L)   MCV Latest Ref Range: 82 - 98 fL 88   MCH Latest Ref Range: 27.0 - 31.0 pg 29.0   MCHC Latest Ref Range: 32.0 - 36.0 g/dL 32.9   RDW Latest Ref Range: 11.5 - 14.5 % 16.5 (H)   Platelets Latest Ref Range: 150 - 350 K/uL 112 (L)   MPV Latest Ref Range: 9.2 - 12.9 fL 11.7   Gran% Latest Ref Range: 38.0 - 73.0 % 94.7 (H)   Gran # (ANC) Latest Ref Range: 1.8 - 7.7 K/uL 20.0 (H)   Immature Granulocytes Latest Ref Range: 0.0 - 0.5 % 1.3 (H)   Immature Grans (Abs) Latest Ref Range: 0.00 - 0.04 K/uL 0.27 (H)   Lymph% Latest Ref Range: 18.0 - 48.0 % 1.4 (L)   Lymph # Latest Ref Range: 1.0 - 4.8 K/uL 0.3 (L)   Mono% Latest Ref Range: 4.0 - 15.0 % 2.5 (L)   Mono # Latest Ref Range: 0.3 - 1.0 K/uL 0.5   Eosinophil% Latest Ref Range: 0.0 - 8.0 % 0.0   Eos # Latest Ref Range: 0.0 - 0.5 K/uL 0.0   Basophil% Latest Ref Range: 0.0 - 1.9 % 0.1   Baso # Latest Ref Range: 0.00 - 0.20 K/uL 0.03   nRBC Latest Ref Range: 0 /100 WBC 0   Ovalocytes Unknown Occasional   Aniso Unknown Slight   Poik Unknown Slight   Poly Unknown Occasional   Hypo Unknown Occasional   Platelet Estimate Unknown Decreased (A)   Protime Latest Ref Range: 9.0 - 12.5 sec 12.3   Coumadin Monitoring INR Latest Ref Range: 0.8 - 1.2  1.2   Fibrinogen Latest Ref Range: 182 - 366 mg/dL 195     Results for FELIPE BERNAL (MRN [id number]) as of 6/21/2018 16:00   Ref. Range 6/21/2018 14:26   Sodium Latest Ref Range: 136 - 145 mmol/L 140   Potassium Latest Ref Range: 3.5 - 5.1 mmol/L 4.8   Chloride Latest Ref Range: 95 - 110 mmol/L 109   CO2 Latest Ref Range: 23 - 29 mmol/L 25   Anion Gap Latest Ref Range: 8 - 16 mmol/L 6 (L)   BUN, Bld Latest Ref  Range: 8 - 23 mg/dL 10   Creatinine Latest Ref Range: 0.5 - 1.4 mg/dL 0.7   eGFR if non African American Latest Ref Range: >60 mL/min/1.73 m^2 >60.0   eGFR if African American Latest Ref Range: >60 mL/min/1.73 m^2 >60.0   Glucose Latest Ref Range: 70 - 110 mg/dL 190 (H)   Calcium Latest Ref Range: 8.7 - 10.5 mg/dL 8.0 (L)   Phosphorus Latest Ref Range: 2.7 - 4.5 mg/dL 1.5 (L)   Albumin Latest Ref Range: 3.5 - 5.2 g/dL 3.6     Results for FELIPE BERNAL (MRN [id number]) as of 6/21/2018 16:00   Ref. Range 6/20/2018 13:37 6/20/2018 18:05 6/20/2018 23:53 6/21/2018 05:15 6/21/2018 11:22   Fibrinogen Latest Ref Range: 182 - 366 mg/dL <70 (AA) 108 (L) 149 (L) 167 (L) 195     Results for FELIPE BERNAL (MRN [id number]) as of 6/19/2018 14:19   Ref. Range 6/19/2018 03:59 6/19/2018 06:43   Haptoglobin Latest Ref Range: 30 - 250 mg/dL <10 (L) <10 (L)     Results for FELIPE BERNAL (MRN [id number]) as of 6/21/2018 16:00   Ref. Range 6/19/2018 06:43 6/20/2018 15:34   LD Latest Ref Range: 110 - 260 U/L 683 (H) 435 (H)     Results for FELIPE BERNAL (MRN [id number]) as of 6/18/2018 10:33   Ref. Range 6/10/2018 22:49 6/10/2018 22:51   DENIZ HEP-2 Titer Unknown  Positive 1:320 Sp...   Anti-SSA Antibody Latest Ref Range: 0.00 - 19.99 EU  178.40 (H)   Anti-SSA Interpretation Latest Ref Range: Negative   Positive (A)   Anti-SSB Antibody Latest Ref Range: 0.00 - 19.99 EU  0.87   Anti-SSB Interpretation Latest Ref Range: Negative   Negative   ds DNA Ab Latest Ref Range: Negative 1:10   Negative 1:10   Anti Sm Antibody Latest Ref Range: 0.00 - 19.99 EU  0.60   Anti-Sm Interpretation Latest Ref Range: Negative   Negative   Anti Sm/RNP Antibody Latest Ref Range: 0.00 - 19.99 EU  2.83   Anti-Sm/RNP Interpretation Latest Ref Range: Negative   Negative   Cytoplasmic Neutrophilic Ab Latest Ref Range: <1:20 Titer 1:320 (A)    Perinuclear (P-ANCA) Latest Ref Range: <1:20 Titer <1:20    Results for FELIPE BERNAL (MRN [id number]) as of 6/18/2018 10:33   Ref. Range  6/11/2018 06:01 6/11/2018 08:10 6/14/2018 03:27   ANCA Proteinase 3 Latest Ref Range: <0.4 (Negative) U  <0.2    MPO Latest Ref Range: <=20 UNITS  82 (H)    Complement (C-3) Latest Ref Range: 50 - 180 mg/dL   78   Complement (C-4) Latest Ref Range: 11 - 44 mg/dL   13   Complement,Total, Serum Latest Ref Range: 42 - 95 U/mL   60   IgG - Serum Latest Ref Range: 650 - 1600 mg/dL  972    IgM Latest Ref Range: 50 - 300 mg/dL  57    IgA Latest Ref Range: 40 - 350 mg/dL  154    Protein, Serum Latest Ref Range: 6.0 - 8.4 g/dL  4.9 (L)    Albumin grams/dl Latest Ref Range: 3.35 - 5.55 g/dL  2.16 (L)    Alpha-1 grams/dl Latest Ref Range: 0.17 - 0.41 g/dL  0.63 (H)    Alpha-2 grams/dl Latest Ref Range: 0.43 - 0.99 g/dL  0.71    Beta grams/dl Latest Ref Range: 0.50 - 1.10 g/dL  0.60    Gamma grams/dl Latest Ref Range: 0.67 - 1.58 g/dL  0.80    Pathologist Interpretation SPE Unknown  REVIEWED    Pathologist Interpretation BOB Unknown  REVIEWED    Immunofix Interp. Unknown  SEE COMMENT    GBM Ab Latest Ref Range: <1.0 (Negative) U <0.2       Blood cultures 6/19/18: NGTD   Resp culture 6/19/18: Candida Albicans

## 2018-06-21 NOTE — PROCEDURES
[facility]  Transfusion Medicine  Procedure Note    SUMMARY   Therapeutic Plasma Exchange (Apheresis)  Date/Time: 6/21/2018 6:48 PM  Performed by: WAQAR GONZALES  Authorized by: WAQAR GONZALES       Date of Procedure: 6/21/2018     Procedure: Plasma Exchange    Provider: Destinee Gonzales MD     Pre-Procedure Diagnosis: Diffuse pulmonary alveolar hemorrhage  Post-Procedure Diagnosis: Diffuse pulmonary alveolar hemorrhage    Follow-up Assessment: 63 year old male presents with bronchoscopy-proven diffuse alevolar hemorrhage with accompanying hematuria and renal failure (Cr 8.8) 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 The patient was  transferred to [facility] for therapeutic plasma exchange and is currently intubated for respiratory failure. This is the last of 6 procedures.    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 Journal of Clinical Apheresis 2016; 31:149-162.)      Today's procedure was well tolerated and without complication.   Pertinent Laboratory Data:   Complete Blood Count:   Lab Results   Component Value Date    HGB 7.3 (L) 06/21/2018    HCT 22.7 (L) 06/21/2018     (L) 06/21/2018    WBC 21.54 (H) 06/21/2018     Basic Metabolic Panel:   Lab Results   Component Value Date     06/21/2018     06/21/2018    K 4.8 06/21/2018    K 4.8 06/21/2018     06/21/2018     06/21/2018    CO2 25 06/21/2018    CO2 25 06/21/2018     (H) 06/21/2018     (H) 06/21/2018    BUN 10 06/21/2018    BUN 10 06/21/2018    CREATININE 0.7 06/21/2018    CREATININE 0.7 06/21/2018    CALCIUM 8.0 (L) 06/21/2018    CALCIUM 8.0 (L) 06/21/2018    ANIONGAP 6 (L)  06/21/2018    ANIONGAP 6 (L) 06/21/2018    ESTGFRAFRICA >60.0 06/21/2018    ESTGFRAFRICA >60.0 06/21/2018    EGFRNONAA >60.0 06/21/2018    EGFRNONAA >60.0 06/21/2018       Pertinent Medications: None contraindicated    Review of patient's allergies indicates:   Allergen Reactions    Sulfa (sulfonamide antibiotics)        Anesthesia: None     Technical Procedures Used: Plasma Exchange: Volume exchanged - 4.5L; Replacement fluid - 5% albumin; Number of procedures 6; Date of next procedure: The planned series of 6 is complete..    Description of the Findings of the Procedure:   Please see Apheresis Nurse flowsheet for details.    The patient was evaluated and all clinical and laboratory data relevant to the treatment was reviewed, and a decision was made to proceed with the Apheresis procedure. He was febrile and somewhat hypertensive prior to the exchange.    I was available to the clinical staff throughout the procedure.    Significant Surgical Tasks Conducted by the Assistant(s): Not applicable  Complications: None  Estimated Blood Loss (EBL): None  Implants: None   Specimens: None      ALONA Gonzales MD JD  Section of Transfusion Medicine & Histocompatibility  Department of Pathology and Laboratory Medicine  [facility]  [phone] (HLA & Blood Bank Offices)  06/21/2018

## 2018-06-21 NOTE — ASSESSMENT & PLAN NOTE
ANCA associated vasculitis  64 yo w/PMH of HTN and NIDDM, OA admitted for acute hypoxic respiratory failure, renal failure, and pulmonary renal syndrome receiving plasmapheresis. Rheumatology consulted for pulmonary renal syndrome, recs on steroids and further investigation.   - Given methylprednisolone 1000 mg IV daily x 5 doses   - Nephrology consulted: renal biopsy when stable  --> elevated UPCR 1.28, DENIZ positive 1:320 speckled pattern, MPO+ 82, c-ANCA +, p-anca neg,  SSA + 178, SSB neg, dsDNA neg, Anti-Sm/RNP neg, Anti-Sm neg, PR3 neg, Immunoglobulins wnl, Hep C and HIV neg, APL labs neg, GBM neg, MARTHA neg, C3/C4 wnl, ACE neg, quant gold indeterminate however T-spot negative.  - Concern for vasculitis given DAH and hematuria. The nini-typical pulmonary-renal syndromes are GPA/MPA, Goodpasture disease, SLE. Immunofluorescence studies can help differentiate. MPO+ can suggest MPA    TB-spot negative, negative drug screen    - Given methylprednisolone 1000 mg IV daily x 5 doses (completed on 6/14/18). Started on Solumedrol 125 mg q6h on 6/15/18.   - Rituximab 1 g IV given on 6/15/18. Next dose on 6/29/18.  - Day 4 of PLEX today.    6/19/18: patient with acute drop in hemoglobin secondary to CRRT clot. Also with fever up to 102.6F. Sepsis workup started. Patient now on Vanc and Zosyn.     6/20/18: WBC count increasing, very low fibrinogen, elevated aPPT, and fevers despite broad spectrum antibiotics. Vent requirements still elevated.     6/21/18: Seen by ID- recommended to continue Vanc/Zosyn, culture when febrile. Also seen by Hem/Onc who feel that coagulopathy may be secondary to PLEX. Recommend cryoprecipitate and FFP. Patient still with high vent requirements. Fibrinogen improving.    Plan:   - appreciate hem/onc and ID input.   - Will need ID clearance prior to next dose of Rituximab which is scheduled for 6/29/18.   - transfusion medicine: PLEX every other day  - Started on IV methylprednisolone 125 mg q6hrs  on 6/15/18. Will continue at this dose.   - Received first dose Rituximab 6/15/18 and now on Atovaquone. Repeat Rituximab in 2 weeks on 6/29/18 if no infection present at that time or if infection treated.

## 2018-06-21 NOTE — PROGRESS NOTES
Apheresis tx #6 started at 1725 with out difficulty.  Pt intubated and sedated, therefore unable to assess pain and orientation. 4.5 Liter exchange.  Replacement fluids 4.5 Liter 5% Albumin via RIJ cath, patent, dressing CDI. Tx ended at 1832. Cath flushed with NS only per ICU nurse Re.  Pt tolerated tx well. Today's treatment completes this series.

## 2018-06-21 NOTE — PLAN OF CARE
Patient remains intubated and sedated with support from Levo and Nicardipine gtts.  Nephrology following, remains on CRRT.  Rheumatology following, PLEX every other day, completed # 5.  Patient remains inappropriate for disposition planning.  CM will continue to follow.     06/21/18 1104   Right Care Assessment   Can the patient answer the patient profile reliably? No, cognitively impaired   How often would a person be available to care for the patient? Often   Describe the patient's ability to walk at the present time. Does not walk or unable to take any steps at all   How does the patient rate their overall health at the present time? (ROMA)   Number of comorbid conditions (as recorded on the chart) Three   During the past month, has the patient often been bothered by feeling down, depressed or hopeless? (ROMA)   During the past month, has the patient often been bothered by little interest or pleasure in doing things? (ROMA)   Zahira Nava RN, BSN, CCM  Case Management  [facility]  Ext. [phone]

## 2018-06-21 NOTE — ASSESSMENT & PLAN NOTE
63 year old male with PMHx significant for HTN, DM and arthirtis on whom ID is consulted for possible infection. Infectious work up has been negative so far.    -karlee is resp culture is normal and does not warrant therapy  -all other cultures negative  -continue vanco and zosyn for now  -reculture if he is febrile again  -leukocytosis is likely elevated due to steroids   -ID will follow along

## 2018-06-21 NOTE — ASSESSMENT & PLAN NOTE
MPO-cANCA GPA, presenting with RPGN and pulmonary hemorrhage  S/p pulse steroids, now on scheduled dose 125mg IV solumedrol q6h, and PLEX 5/6, also has been started on rituxan 6/15 (repeat in 2 weeks 6/29), renal supportive therapy with RRT (SLED). Urine sediment showed RBC cast.  -Total hr intake 325 cc/hr     Plan:  -continue SLED for metabolic clearance and volume management. Citrate for AC/calcium chloride.  ml as tolerated.   -Naphos IV prn

## 2018-06-21 NOTE — PROGRESS NOTES
[facility]  Nephrology  Progress Note    Patient Name: Jasmeet Raymond  MRN: [id number]  Admission Date: 6/10/2018  Hospital Length of Stay: 11 days  Attending Provider: Neto Wong MD   Primary Care Physician: Provider Notinsystem  Principal Problem:Diffuse pulmonary alveolar hemorrhage    Subjective:     HPI: 64 yo AAm with HTN, NIDDM, arthritis on metformin and meloxicam  admitted to [facility] for worsening SOB and ALEX.   Patiend had been treated as an out;patient for 2 weeks for Shortness of breath and bronchitis and UTI. In the hospital he was found to be acidotic, hyperkalemic, serum creatinine 8, and diffuse bilateral pulmonary infiltrates, requiring intubation. UA with proteinuria, hematuria and wbc's, Bronchoscopy revealed hemorrahage. Patient recieved one dialysis as per notes and 1 gm solumedrol, and transferred  to [facility] for plasmapheresis  And further evaluation.  Family is not available at the time of exam and patient is intubated.   HIV, Hepatitis B, C, complements  All within normal limits. DENIZ, ANCA and AntiGBM, results Nnot available    Interval History: Remains intubated FIO 65% and sedated-fentanyl. On and off Levo/nicardpine-currently on nicardipine . Net pos 818 ml. Total hr intake 325 ml-citrate, ca cl, TF, Fentanyl, nicardipine.     Review of patient's allergies indicates:   Allergen Reactions    Sulfa (sulfonamide antibiotics)      Current Facility-Administered Medications   Medication Frequency    0.9%  NaCl infusion (CRRT USE ONLY) Continuous    atovaquone suspension 750 mg Q12H    calcium chloride 1 g in dextrose 5 % 100 mL IVPB Continuous    chlorhexidine 0.12 % solution 15 mL BID    dextrose 50% injection 12.5 g PRN    famotidine tablet 20 mg BID    fentaNYL 2500 mcg in 0.9% sodium chloride 250 mL infusion premix (titrating) Continuous    glucagon (human recombinant) injection 1 mg PRN    heparin, porcine (PF) 100 unit/mL injection flush 500 Units PRN     hepatitis B virus vacc.rec(PF) injection 40 mcg vaccine x 1 dose    hydrALAZINE injection 10 mg Q4H PRN    insulin aspart U-100 pen 1-10 Units Q4H PRN    labetalol injection 20 mg Q4H PRN    lorazepam injection 4 mg Q1H PRN    magnesium sulfate 2g in water 50mL IVPB (premix) PRN    methylPREDNISolone sodium succinate injection 125 mg Q6H    metoclopramide HCl tablet 5 mg Q6H    niCARdipine 40 mg/200 mL infusion Continuous    piperacillin-tazobactam 4.5 g in sodium chloride 0.9% 100 mL IVPB (ready to mix system) Q8H    sodium chloride 0.9% flush 10 mL PRN    sodium phosphate 20.01 mmol in dextrose 5 % 250 mL IVPB PRN    sodium phosphate 30 mmol in dextrose 5 % 250 mL IVPB PRN    sodium phosphate 39.99 mmol in dextrose 5 % 250 mL IVPB PRN    vancomycin (VANCOCIN) 2,500 mg in dextrose 5 % 500 mL IVPB Once    white petrolatum-mineral oil (LUBIFRESH P.M.) ophthalmic ointment Q8H       Objective:     Vital Signs (Most Recent):  Temp: (!) 100.6 °F (38.1 °C) (06/21/18 1104)  Pulse: (!) 115 (06/21/18 1104)  Resp: 17 (06/21/18 1104)  BP: (!) 163/75 (06/21/18 1000)  SpO2: 97 % (06/21/18 1104)  O2 Device (Oxygen Therapy): ventilator (06/21/18 1104) Vital Signs (24h Range):  Temp:  [98.1 °F (36.7 °C)-101.7 °F (38.7 °C)] 100.6 °F (38.1 °C)  Pulse:  [] 115  Resp:  [17-29] 17  SpO2:  [85 %-99 %] 97 %  BP: (125-178)/(56-95) 163/75  Arterial Line BP: (116-165)/(50-72) 147/61     Weight: 84.7 kg (186 lb 11.7 oz) (06/19/18 0300)  Body mass index is 28.39 kg/m².  Body surface area is 2.02 meters squared.    I/O last 3 completed shifts:  In: 43559 [I.V.:3876; Blood:377; NG/GT:370; IV Piggyback:8120]  Out: 11800 [Drains:150; Other:22312]    Physical Exam   Constitutional: He is oriented to person, place, and time. He appears well-developed and well-nourished.   Intubated FIO2 65 % , sedated.    HENT:   Head: Normocephalic.   Cardiovascular: Normal heart sounds.  Exam reveals no gallop and no friction rub.    No  murmur heard.  Tachycardic, no murmur appreciated.   No JVD     Pulmonary/Chest:   Intubated. Clear anteriorly   Abdominal: He exhibits no distension.   Musculoskeletal: He exhibits no deformity. Edema: BUE, Blisters on left arm.    Neurological: He is oriented to person, place, and time.   Intubated and sedated   Skin: Skin is warm and dry.   R SC trialysis   Nursing note and vitals reviewed.      Significant Labs:  All labs within the past 24 hours have been reviewed.     Significant Imaging:  Labs: Reviewed    Assessment/Plan:     ALEX (acute kidney injury)    MPO-cANCA GPA, presenting with RPGN and pulmonary hemorrhage  S/p pulse steroids, now on scheduled dose 125mg IV solumedrol q6h, and PLEX 5/6, also has been started on rituxan 6/15 (repeat in 2 weeks 6/29), renal supportive therapy with RRT (SLED). Urine sediment showed RBC cast.  -Total hr intake 325 cc/hr     Plan:  -continue SLED for metabolic clearance and volume management. Citrate for AC/calcium chloride.  ml as tolerated.   -Naphos IV prn                      Thank you for your consult. I will follow-up with patient. Please contact us if you have any additional questions.    Kandice Hernandez NP  Nephrology  [facility]

## 2018-06-21 NOTE — SUBJECTIVE & OBJECTIVE
Interval History/Significant Events:  No acute events overnight. No recurrent fevers overnight and cultures are all still negative. On Fentanyl gtt. Orion gtt PRN to keep SBP < 160      Review of Systems   Unable to perform ROS: Intubated     Objective:     Vital Signs (Most Recent):  Temp: 99.9 °F (37.7 °C) (06/21/18 0745)  Pulse: 106 (06/21/18 0745)  Resp: (!) 23 (06/21/18 0723)  BP: (!) 161/70 (06/21/18 0600)  SpO2: 95 % (06/21/18 0745) Vital Signs (24h Range):  Temp:  [98.1 °F (36.7 °C)-101.7 °F (38.7 °C)] 99.9 °F (37.7 °C)  Pulse:  [] 106  Resp:  [23-26] 23  SpO2:  [85 %-100 %] 95 %  BP: (125-179)/(56-95) 161/70  Arterial Line BP: (116-191)/(50-75) 148/61   Weight: 84.7 kg (186 lb 11.7 oz)  Body mass index is 28.39 kg/m².      Intake/Output Summary (Last 24 hours) at 06/21/18 0849  Last data filed at 06/21/18 0800   Gross per 24 hour   Intake             8491 ml   Output             8146 ml   Net              345 ml       Physical Exam   Constitutional: No distress. He is sedated and intubated.   Intubated    Cardiovascular: Normal rate, regular rhythm, normal heart sounds and intact distal pulses.  Exam reveals no gallop and no friction rub.    No murmur heard.  Pulmonary/Chest: Effort normal. He is intubated. No respiratory distress. He has no wheezes. He has no rhonchi. He has rales.   Abdominal: Soft. Bowel sounds are normal. He exhibits no distension. There is no tenderness.   Musculoskeletal: He exhibits edema (2+ LE).   Neurological:   [id number]   Skin: Skin is warm and dry. No rash noted. No erythema.   R SC trialysis   Nursing note and vitals reviewed.      Vents:  Vent Mode: A/C (06/21/18 0723)  Ventilator Initiated: Yes (06/10/18 2217)  Set Rate: 22 bmp (06/21/18 0735)  Vt Set: 650 mL (06/20/18 0917)  Pressure Support: 10 cmH20 (06/20/18 0856)  PEEP/CPAP: 14 cmH20 (06/21/18 0723)  Oxygen Concentration (%): 66 (06/21/18 0745)  Peak Airway Pressure: 26 cmH2O (06/21/18 0723)  Plateau Pressure: 19  cmH20 (06/21/18 0723)  Total Ve: 10.2 mL (06/21/18 0723)  F/VT Ratio<105 (RSBI): (!) 54.76 (06/21/18 0723)  Lines/Drains/Airways     Central Venous Catheter Line                 Hemodialysis Catheter 06/10/18 2307 right internal jugular 10 days         Percutaneous Central Line Insertion/Assessment - triple lumen  06/10/18 2305 left internal jugular 10 days          Drain                 NG/OG Tube 06/10/18 2309 Whiteside sump 18 Fr. Left nostril 10 days          Airway                 Airway - Non-Surgical Endotracheal Tube -- days          Arterial Line                 Arterial Line 06/10/18 2304 Right Radial 10 days          Pressure Ulcer                 Pressure Injury 06/20/18 0303 Right Buttocks Stage 2 1 day              Significant Labs:    CBC/Anemia Profile:    Recent Labs  Lab 06/20/18  1805 06/20/18  2353 06/21/18  0515   WBC 26.51* 26.82* 22.25*   HGB 8.0* 8.1* 7.7*   HCT 23.9* 24.5* 23.4*   * 125* 108*   MCV 87 88 89   RDW 16.0* 16.4* 16.4*   RETIC  --   --  3.7*        Chemistries:    Recent Labs  Lab 06/20/18  0400  06/20/18  1337 06/20/18  2145 06/21/18  0400     139  139  < > 139  139 139  139 140  140  140  140   K 5.1  5.1  5.1  < > 4.4  4.4 4.7  4.7 4.7  4.7  4.7  4.7     108  108  < > 108  108 108  108 108  108  108  108   CO2 24  24  24  < > 26  26 25  25 24  24  24  24   BUN 11  11  11  < > 20  20 14  14 13  13  13  13   CREATININE 0.8  0.8  0.8  < > 1.2  1.2 0.8  0.8 0.8  0.8  0.8  0.8   CALCIUM 7.6*  7.6*  7.6*  < > 7.4*  7.4* 7.9*  7.9* 7.8*  7.8*  7.8*  7.8*   ALBUMIN 3.2*  3.2*  3.2*  < > 4.3 4.0  4.0 3.8  3.8  3.8  3.8   PROT 4.7*  --   --   --  4.9*   BILITOT 1.7*  --   --   --  2.6*   ALKPHOS 54*  --   --   --  54*   ALT 56*  --   --   --  26   AST 84*  --   --   --  60*   MG 1.7  < > 2.0 2.0 1.9  1.9   PHOS 3.0  3.0  < > 3.3 3.0  3.0 3.5  3.5  3.5   < > = values in this interval not displayed.    All  pertinent labs within the past 24 hours have been reviewed.    Significant Imaging:  CXR: I have reviewed all pertinent results/findings within the past 24 hours and my personal findings are:  No significant interval change

## 2018-06-21 NOTE — NURSING
Notified Dr. Louie of patient's H/H of 7.3/22.7. Notified Dr. Louie of patient's temperature decreasing to 100.9 post acetaminophen admin. No orders received. Will continue to monitor patient.

## 2018-06-21 NOTE — PLAN OF CARE
Problem: Patient Care Overview  Goal: Plan of Care Review  Outcome: Ongoing (interventions implemented as appropriate)  Patient remains intubated this AM, O2 65% 14 Peep. A/C Pressure control. Gtts: Fentanyl. Cardene on/off PRN to maintain SBP <160. Pt remains on continuous CRRT, UF @450cc/hr. No UOP this shift. Tube feeds remain at 10cc/hr, minimal residuals. Pt intermittently following some simple commands. Pt turned q2hr w/wedge. Plan to continue to wean vent as tolerated. Pt and family updated with plan of care, all questions answered.

## 2018-06-21 NOTE — SUBJECTIVE & OBJECTIVE
No past medical history on file.    No past surgical history on file.    Review of patient's allergies indicates:   Allergen Reactions    Sulfa (sulfonamide antibiotics)        Medications:  No prescriptions prior to admission.     Antibiotics     Start     Stop Route Frequency Ordered    06/18/18 2000  piperacillin-tazobactam 4.5 g in sodium chloride 0.9% 100 mL IVPB (ready to mix system)      -- IV Every 8 hours (non-standard times) 06/1953        Antifungals     None        Antivirals     None           Immunization History   Administered Date(s) Administered    Pneumococcal Conjugate - 13 Valent 06/14/2018    Tdap 06/14/2018       Family History     None        Social History     Social History    Marital status:      Spouse name: N/A    Number of children: N/A    Years of education: N/A     Social History Main Topics    Smoking status: Not on file    Smokeless tobacco: Not on file    Alcohol use Not on file    Drug use: Unknown    Sexual activity: Not on file     Other Topics Concern    Not on file     Social History Narrative    No narrative on file     Review of Systems   Unable to perform ROS: Intubated     Objective:     Vital Signs (Most Recent):  Temp: (!) 100.6 °F (38.1 °C) (06/21/18 1200)  Pulse: (!) 116 (06/21/18 1200)  Resp: 17 (06/21/18 1104)  BP: (!) 129/59 (06/21/18 1200)  SpO2: 97 % (06/21/18 1200) Vital Signs (24h Range):  Temp:  [98.1 °F (36.7 °C)-101.7 °F (38.7 °C)] 100.6 °F (38.1 °C)  Pulse:  [] 116  Resp:  [17-29] 17  SpO2:  [85 %-99 %] 97 %  BP: (129-178)/(59-95) 129/59  Arterial Line BP: (116-168)/(50-72) 150/62     Weight: 84.7 kg (186 lb 11.7 oz)  Body mass index is 28.39 kg/m².    Estimated Creatinine Clearance: 97.5 mL/min (based on SCr of 0.8 mg/dL).    Physical Exam   Constitutional: He is oriented to person, place, and time. He appears well-developed and well-nourished.   HENT:   Head: Normocephalic.   Cardiovascular: Normal heart sounds.  Exam reveals  no gallop and no friction rub.    No murmur heard.  Pulmonary/Chest:   Intubated. Clear anteriorly   Abdominal: He exhibits no distension.   Musculoskeletal: He exhibits no deformity. Edema: BUE, Blisters on left arm.    Neurological: He is oriented to person, place, and time.   Intubated and sedated   Skin: Skin is warm and dry.   R SC trialysis   Nursing note and vitals reviewed.      Significant Labs: All pertinent labs within the past 24 hours have been reviewed.    Significant Imaging: I have reviewed all pertinent imaging results/findings within the past 24 hours.

## 2018-06-21 NOTE — PROGRESS NOTES
CRRT: daily checks done, orders verified. Patient has right IJ temporary catheter with good flow. UF rate set to 450ml/hr. On Citrate pre filter and Calcium post filter.

## 2018-06-21 NOTE — ASSESSMENT & PLAN NOTE
- See principal problem for more elaboration.   - Currently on low need for pressors, will continue to monitor and ensure MAP > 65 mmHg (during CRRT)  - on vacn and Zosyn  -Cultures (new) show NGTD

## 2018-06-21 NOTE — ASSESSMENT & PLAN NOTE
- Differential Diagnoses include Pulmonary renal syndrome.  - Underwent US of kidney and showed Bilateral medical renal disease, Simple renal cysts bilaterally.  - CT abdomen/pelvis, mild interstitial changes in lung bases, 2 stones in the left kidney, 4 stones in the right kidney, no hydronephrosis, post cholecystectomy. No acute abnormality in the liver, spleen, pancreas or adrenals.   - Will continue trending urine output and renal function, avoid NSAIDs, contrast, nephrotoxins    - Monitor strict I/Os, daily weights, renally dose medications to current GFR  - Still anuric and will continue CRRT  -  cc/h   - once pt is stable, may consider renal biopsy

## 2018-06-21 NOTE — PROGRESS NOTES
[facility]  Critical Care Medicine  Progress Note    Patient Name: Jasmeet Raymond  MRN: [id number]  Admission Date: 6/10/2018  Hospital Length of Stay: 11 days  Code Status: Full Code  Attending Provider: Neto Wong MD  Primary Care Provider: Provider Notinsystem   Principal Problem: Diffuse pulmonary alveolar hemorrhage    Subjective:     HPI:  This is Mr. Jasmeet Raymond, 63 year old male with PMHx significant for HTN, DM and arthirtis presented to [facility] and [facility] with symptoms of pneumonia and shortness of breath. On 5/29/2018, he presented with symptoms of bronchitis with denies chills, ear pain, sore throat, chest pain, shortness of breath at that time, received antibiotics. Then on 6/8/2018 he presented again to [facility] and [facility] with shortness of breath and cough, and his CXR showed concern for airway disease. However on the first day of admission at [facility] and [facility], his respiratory symptoms worsen and had acute hypoxic respiratory failure that required intubation. Pulmonary consulted and he underwent Bronchoscopy and  revealed alveolar hemorrhage, with the setting of having hematuria and diffuse alveloar hemorrhage the concern was for presence of pulmonary renal syndrome and he received pulsed dose of Methylprednisolone and nephrology consulted for plasmapheresis. However at [facility], plasmapheresis machine is broken and they recommend to transfer to [facility] for plasmapheresis.     On arrival he was on Nibmex for paralytics, Propofol for sedation, Levophed (eventaully turned off) for pressure support, Flolan (Epoprostenol sodium) inhaled which was switched to Inhaled Nitro. His mechanical ventilation initially was on FiO2 100%, PEEP 16, then weaned his FiO2 with high requirement of oxygenation. He had an signifiacnt drop in his Hb (baseline ~ 12) dated one week prior  presentation to 9 and 8, required one pRBC prior arrival. Repeated CXR showed extensive bilateral infiltrates. Important laboratory investigation showed His , , pro calcitonin 4.4, C3 and C4 complement normal., Trop negative, C3 and C4 showed normal , Hep test is negative, Lactic acid 1.8 > 1.0, HIV negative, Procal 4.4, UA shoowed heamturia and trace leukocytes, Urine culture from 5/29 showed almost pansensitve Cedecea davisae.     Hospital/ICU Course:  06/11/2018 admitted to MICU from [facility] with ARDS  06/12/2018 No acute events overnight. Had PLEX with 4.5 L exchange with FFP, and CRRT started and he became hypotensive, Levophed started during the CRRT. He was weaning off Nitric Oxide and his ABGs showing worsening respi acidosis. Rheumatology, Transfusion medicine and Nephrology on board   06/13/2018 Stable on High ladder PEEP for ARDS and completely off NO, and still on Nimbex for paralytics and sedation with Propofol and Fentanyl. ID, Rheumatology and Nephrology on board.   06/15/2018, Still on high setting   06/15/2018 Started Rituxan after his PLEx session, and decrease in his Methylprednisone. Still on high PEEP ladder for ARDS, frequent clots in ETT suction   06/16/2018 Overnight, he had Rituxan induction for ANCA associated vasculitis and decrease his Methypredisone to 125 mg IV Q 6 Hours. Mild increase in his mechanical ventilation setting and he is grossly volume overload and still anuric.   06/17/2018 Palaryzed with Nimbex and better in his mechanical ventilation setting. Off Versed and on max Precedex and Profol. Increased in UF rate to 500 and better in his I/O. Repated CXR showed better air space.   06/18/2018 off paralytic. received 4th dose of plasma exchange today. Still anuric. Continue on dialysis.wean off sedation   06/19/2018 Patient remains intubated on A/C FiO2 55%/ PEEP 10. Patient on levo gtt titrated to keep MAP >65. Patient  on precedex gtt at 1.34 mg/kg/hr and fentanyl gtt at 200 mcg/hr. Patient on CRRT, UF at 400mL/hr with citrate and calcium chloride infusions running per MD order. Tube feeds continue to be held, NG tube to low int. suction with roughly 200 mL of green output over the night. Patient's Tmax was 102.6 degrees F. patient placed on cooling blanket. Patient's H/H this morning is 6.5/20. One unit of pRBCs ordered.     Interval History/Significant Events:  No acute events overnight. No recurrent fevers overnight and cultures are all still negative. On Fentanyl gtt. Orion gtt PRN to keep SBP < 160      Review of Systems   Unable to perform ROS: Intubated     Objective:     Vital Signs (Most Recent):  Temp: 99.9 °F (37.7 °C) (06/21/18 0745)  Pulse: 106 (06/21/18 0745)  Resp: (!) 23 (06/21/18 0723)  BP: (!) 161/70 (06/21/18 0600)  SpO2: 95 % (06/21/18 0745) Vital Signs (24h Range):  Temp:  [98.1 °F (36.7 °C)-101.7 °F (38.7 °C)] 99.9 °F (37.7 °C)  Pulse:  [] 106  Resp:  [23-26] 23  SpO2:  [85 %-100 %] 95 %  BP: (125-179)/(56-95) 161/70  Arterial Line BP: (116-191)/(50-75) 148/61   Weight: 84.7 kg (186 lb 11.7 oz)  Body mass index is 28.39 kg/m².      Intake/Output Summary (Last 24 hours) at 06/21/18 0849  Last data filed at 06/21/18 0800   Gross per 24 hour   Intake             8491 ml   Output             8146 ml   Net              345 ml       Physical Exam   Constitutional: No distress. He is sedated and intubated.   Intubated    Cardiovascular: Normal rate, regular rhythm, normal heart sounds and intact distal pulses.  Exam reveals no gallop and no friction rub.    No murmur heard.  Pulmonary/Chest: Effort normal. He is intubated. No respiratory distress. He has no wheezes. He has no rhonchi. He has rales.   Abdominal: Soft. Bowel sounds are normal. He exhibits no distension. There is no tenderness.   Musculoskeletal: He exhibits edema (2+ LE).   Neurological:   [id number]   Skin: Skin is warm and dry. No rash noted. No  erythema.   R SC trialysis   Nursing note and vitals reviewed.      Vents:  Vent Mode: A/C (06/21/18 0723)  Ventilator Initiated: Yes (06/10/18 2217)  Set Rate: 22 bmp (06/21/18 0735)  Vt Set: 650 mL (06/20/18 0917)  Pressure Support: 10 cmH20 (06/20/18 0856)  PEEP/CPAP: 14 cmH20 (06/21/18 0723)  Oxygen Concentration (%): 66 (06/21/18 0745)  Peak Airway Pressure: 26 cmH2O (06/21/18 0723)  Plateau Pressure: 19 cmH20 (06/21/18 0723)  Total Ve: 10.2 mL (06/21/18 0723)  F/VT Ratio<105 (RSBI): (!) 54.76 (06/21/18 0723)  Lines/Drains/Airways     Central Venous Catheter Line                 Hemodialysis Catheter 06/10/18 2307 right internal jugular 10 days         Percutaneous Central Line Insertion/Assessment - triple lumen  06/10/18 2305 left internal jugular 10 days          Drain                 NG/OG Tube 06/10/18 2309 [place] sump 18 Fr. Left nostril 10 days          Airway                 Airway - Non-Surgical Endotracheal Tube -- days          Arterial Line                 Arterial Line 06/10/18 2304 Right Radial 10 days          Pressure Ulcer                 Pressure Injury 06/20/18 0303 Right Buttocks Stage 2 1 day              Significant Labs:    CBC/Anemia Profile:    Recent Labs  Lab 06/20/18  1805 06/20/18  2353 06/21/18  0515   WBC 26.51* 26.82* 22.25*   HGB 8.0* 8.1* 7.7*   HCT 23.9* 24.5* 23.4*   * 125* 108*   MCV 87 88 89   RDW 16.0* 16.4* 16.4*   RETIC  --   --  3.7*        Chemistries:    Recent Labs  Lab 06/20/18  0400  06/20/18  1337 06/20/18  2145 06/21/18  0400     139  139  < > 139  139 139  139 140  140  140  140   K 5.1  5.1  5.1  < > 4.4  4.4 4.7  4.7 4.7  4.7  4.7  4.7     108  108  < > 108  108 108  108 108  108  108  108   CO2 24  24  24  < > 26  26 25  25 24  24  24  24   BUN 11  11  11  < > 20  20 14  14 13  13  13  13   CREATININE 0.8  0.8  0.8  < > 1.2  1.2 0.8  0.8 0.8  0.8  0.8  0.8   CALCIUM 7.6*  7.6*  7.6*  < > 7.4*   7.4* 7.9*  7.9* 7.8*  7.8*  7.8*  7.8*   ALBUMIN 3.2*  3.2*  3.2*  < > 4.3 4.0  4.0 3.8  3.8  3.8  3.8   PROT 4.7*  --   --   --  4.9*   BILITOT 1.7*  --   --   --  2.6*   ALKPHOS 54*  --   --   --  54*   ALT 56*  --   --   --  26   AST 84*  --   --   --  60*   MG 1.7  < > 2.0 2.0 1.9  1.9   PHOS 3.0  3.0  < > 3.3 3.0  3.0 3.5  3.5  3.5   < > = values in this interval not displayed.    All pertinent labs within the past 24 hours have been reviewed.    Significant Imaging:  CXR: I have reviewed all pertinent results/findings within the past 24 hours and my personal findings are:  No significant interval change    Assessment/Plan:     Pulmonary   * Diffuse pulmonary alveolar hemorrhage    - Given his presentation with two weeks of progressive worsening of his SOB and cough, and associated with hematuria and CXR finding of bilateral infiltrate raised the concern for DAH  - On 6/10 he underwent Flexible Bronchoscopy and showed no endobronchial lesion, significant signs of hemorrhage on all of the airways, confirming findings of pulmonary hemorrhage.   - Requires on pRBC on 6/12, 6/15 and another unit on 6/17        Acute hypoxemic respiratory failure    - Please see principal problem for more elaboration         ARDS (adult respiratory distress syndrome)    - Most likely etiology is Diffuse pulmonary alveolar hemorrhage, however severe pneumonia in the setting of leukocytosis and fever and presentation of cough and SOB can not be excluded  - On conservative tidal volume (6-7 ml/kg predicted body weight), balanced respiratory rate, and high oxygenation setting in the setting of DAH going with high PEEP ladder        Cardiac/Vascular   Pure hyperglyceridemia    - Most likely from Propofol and plan to wean off Propofol and started on Insulin infusion   - Improved in his TG to 600 >> 438 -->523 --> 180 with insulin infusion   - weaned off propofol and stopped insulin gtt         Renal/   ALEX (acute kidney  injury)    - Differential Diagnoses include Pulmonary renal syndrome.  - Underwent US of kidney and showed Bilateral medical renal disease, Simple renal cysts bilaterally.  - CT abdomen/pelvis, mild interstitial changes in lung bases, 2 stones in the left kidney, 4 stones in the right kidney, no hydronephrosis, post cholecystectomy. No acute abnormality in the liver, spleen, pancreas or adrenals.   - Will continue trending urine output and renal function, avoid NSAIDs, contrast, nephrotoxins    - Monitor strict I/Os, daily weights, renally dose medications to current GFR  - Still anuric and will continue CRRT  -  cc/h   - once pt is stable, may consider renal biopsy         ID   Septic shock    - See principal problem for more elaboration.   - Currently on low need for pressors, will continue to monitor and ensure MAP > 65 mmHg (during CRRT)  - on vacn and Zosyn  -Cultures (new) show NGTD        Immunology/Multi System   ANCA-associated vasculitis    - See Pulmorenal syndrome for more elaboration         Pulmonary-renal syndrome    - Most likely the diagnosis in the setting of diffuse alveolar hemorrhage and glomerulonephritis with most likely etiology is underlying autoimmune disorder  - Elevated UPCR 1.28, DENIZ positive 1:320 speckled pattern, MPO+ 82, PR3 neg, Immunoglobulins wnl, Hep C and HIV neg  - Finished  5 days of Methylprednisolone 1000 mg IV daily, started on Rituxan (6/15) and Methylprednisone 125 mg IV Q 6 and Atovaquin for PPx   - received 4th dose of plasma exchange 06/18/18  - Repeat PLEX today  - once pt is stable, may consider renal biopsy         Hematology   DIC (disseminated intravascular coagulation)    - Likely due to be secondary to plasma exchange but infection can not be ruled out  - received one unit of FFP and Cryo 06/20/18  - check DIC lab q 6hs           Thrombocytopenia, unspecified    - likely to be sepsis induced   - Will continue to observe, currently continue to improve          Oncology   Acute posthemorrhagic anemia    - Most likely is diffuse pulmonary alveolar hemorrhage. Although sepsis induced Bone marrow suppression and hemolysis can not be ruled out  - Frequent check in his CBC, transfuse when Hb < 7  - transfuse one pRBC 06/19/18        Endocrine   Diabetes mellitus type 2 with complications    - Will keep his blood glucose on the target 140-180  - on Insulin infusion for Hyperglycemia and HyperTG   Recent Labs      06/19/18   0200  06/19/18   0306  06/19/18   0405  06/19/18   0505  06/19/18   0557  06/19/18   0559  06/19/18   0705  06/19/18   0814   POCTGLUCOSE  231*  224*  214*  219*  145*  129*  112*  138*               Critical Care Daily Checklist:     A: Awake: RASS Goal/Actual Goal: RASS Goal: 0-->alert and calm  Actual: Conteh Agitation Sedation Scale (RASS): Drowsy   B: Spontaneous Breathing Trial Performed?    C: SAT & SBT Coordinated?  Yes                      D: Delirium: CAM-ICU Overall CAM-ICU: Positive   E: Early Mobility Performed? Turning the pt q 2 hs   F: Feeding Goal: Goals: Patient to receive nutrition by RD follow-up  Status: Nutrition Goal Status: goal met       Current Diet Order   Procedures    Diet NPO. Om Tube feed       AS: Analgesia/Sedation As above   T: Thromboembolic Prophylaxis Held in setting of active bleed   H: HOB > 300 Yes   U: Stress Ulcer Prophylaxis (if needed) Famotidine   G: Glucose Control As above   B: Bowel Function Stool Occurrence: 1   I: Indwelling Catheter (Lines & Liu) Necessity As above   D: De-escalation of Antimicrobials/Pharmacotherapies No     Plan for the day/ETD Increase fluid removal. Wean off sedation and Vent      Code Status:  Family/Goals of Care: Full Code            Critical secondary to Patient has a condition that poses threat to life and bodily function: Severe Respiratory Distress        Bruce Larry MD  Critical Care Medicine  [facility]

## 2018-06-22 NOTE — SUBJECTIVE & OBJECTIVE
Interval History/Significant Events:  continues to wean vent and pressor. Continues CRRT with increased fluid removal. He is on 500 cc/h UF. Start to follow commands and moves his head. Continues to have fevers overnight and cultures are all still negative. On Fentanyl gtt. Orion gtt and Precedex PRN to keep SBP < 160    Review of Systems   Unable to perform ROS: Intubated     Objective:     Vital Signs (Most Recent):  Temp: 98.8 °F (37.1 °C) (06/22/18 0731)  Pulse: 106 (06/22/18 0915)  Resp: (!) 27 (06/21/18 1548)  BP: (!) 165/70 (06/22/18 0200)  SpO2: 95 % (06/22/18 0915) Vital Signs (24h Range):  Temp:  [98.1 °F (36.7 °C)-101.1 °F (38.4 °C)] 98.8 °F (37.1 °C)  Pulse:  [106-150] 106  Resp:  [17-27] 27  SpO2:  [89 %-98 %] 95 %  BP: (129-193)/(59-85) 165/70  Arterial Line BP: (102-178)/(51-77) 102/52   Weight: 89 kg (196 lb 3.4 oz)  Body mass index is 29.83 kg/m².      Intake/Output Summary (Last 24 hours) at 06/22/18 0944  Last data filed at 06/22/18 0900   Gross per 24 hour   Intake             8769 ml   Output             9315 ml   Net             -546 ml       Physical Exam   Constitutional: No distress. He is sedated and intubated.   Intubated    Cardiovascular: Normal rate, regular rhythm, normal heart sounds and intact distal pulses.  Exam reveals no gallop and no friction rub.    No murmur heard.  Pulmonary/Chest: Effort normal. He is intubated. No respiratory distress. He has no wheezes. He has no rhonchi. He has rales.   Abdominal: Soft. Bowel sounds are normal. He exhibits no distension. There is no tenderness.   Musculoskeletal: He exhibits edema (2+ LE).   Neurological:   E4VTM1   Skin: Skin is warm and dry. No rash noted. No erythema.   R SC trialysis   Nursing note and vitals reviewed.      Vents:  Vent Mode: A/C (06/22/18 0731)  Ventilator Initiated: Yes (06/10/18 0147)  Set Rate: 22 bmp (06/22/18 0731)  Vt Set: 650 mL (06/20/18 0917)  Pressure Support: 12 cmH20 (06/21/18 1347)  PEEP/CPAP: 14 cmH20  (06/22/18 0731)  Oxygen Concentration (%): 71 (06/22/18 0915)  Peak Airway Pressure: 26 cmH2O (06/22/18 0731)  Plateau Pressure: 19 cmH20 (06/22/18 0731)  Total Ve: 13.8 mL (06/22/18 0731)  F/VT Ratio<105 (RSBI): (!) 43.76 (06/21/18 1548)  Lines/Drains/Airways     Central Venous Catheter Line                 Hemodialysis Catheter 06/10/18 2307 right internal jugular 11 days         Percutaneous Central Line Insertion/Assessment - triple lumen  06/10/18 2305 left internal jugular 11 days          Drain                 NG/OG Tube 06/10/18 2309 [place] sump 18 Fr. Left nostril 11 days          Airway                 Airway - Non-Surgical Endotracheal Tube -- days          Arterial Line                 Arterial Line 06/10/18 2304 Right Radial 11 days          Pressure Ulcer                 Pressure Injury 06/20/18 0303 Right Buttocks Stage 2 2 days              Significant Labs:    CBC/Anemia Profile:    Recent Labs  Lab 06/21/18  0515  06/21/18  1837 06/22/18  0001 06/22/18  0303   WBC 22.25*  < > 18.28* 16.96* 17.57*   HGB 7.7*  < > 6.6* 7.4* 8.1*   HCT 23.4*  < > 20.5* 22.7* 23.8*   *  < > 97* 97* 98*   MCV 89  < > 88 86 85   RDW 16.4*  < > 16.3* 16.2* 16.5*   RETIC 3.7*  --   --   --   --    < > = values in this interval not displayed.     Chemistries:    Recent Labs  Lab 06/21/18  0400 06/21/18  1426 06/21/18  2128 06/22/18  0303     140  140  140 140  140 137  137 138  138   K 4.7  4.7  4.7  4.7 4.8  4.8 4.2  4.2 4.2  4.2     108  108  108 109  109 106  106 108  108   CO2 24  24  24  24 25  25 20*  20* 20*  20*   BUN 13  13  13  13 10  10 15  15 15  15   CREATININE 0.8  0.8  0.8  0.8 0.7  0.7 0.9  0.9 0.8  0.8   CALCIUM 7.8*  7.8*  7.8*  7.8* 8.0*  8.0* 7.5*  7.5* 7.1*  7.1*   ALBUMIN 3.8  3.8  3.8  3.8 3.6  3.6 4.2  4.2 4.0  4.0   PROT 4.9*  --   --  4.5*   BILITOT 2.6*  --   --  2.2*   ALKPHOS 54*  --   --  45*   ALT 26  --   --  14   AST 60*  --    --  40   MG 1.9  1.9 1.8 2.0 1.9   PHOS 3.5  3.5  3.5 1.5*  1.5* 3.4  3.4 2.6*       All pertinent labs within the past 24 hours have been reviewed.    Significant Imaging:  CXR: I have reviewed all pertinent results/findings within the past 24 hours and my personal findings are:  No significant interval change

## 2018-06-22 NOTE — SUBJECTIVE & OBJECTIVE
Interval History: No events    Review of Systems   Unable to perform ROS: Intubated     Objective:     Vital Signs (Most Recent):  Temp: 99.7 °F (37.6 °C) (06/22/18 1130)  Pulse: (!) 133 (06/22/18 1200)  Resp: (!) 27 (06/21/18 1548)  BP: (!) 165/70 (06/22/18 0200)  SpO2: 98 % (06/22/18 1200) Vital Signs (24h Range):  Temp:  [83.7 °F (28.7 °C)-101.1 °F (38.4 °C)] 99.7 °F (37.6 °C)  Pulse:  [106-150] 133  Resp:  [27] 27  SpO2:  [89 %-98 %] 98 %  BP: (135-193)/(59-85) 165/70  Arterial Line BP: ()/(47-77) 163/71     Weight: 89 kg (196 lb 3.4 oz)  Body mass index is 29.83 kg/m².    Estimated Creatinine Clearance: 99.7 mL/min (based on SCr of 0.8 mg/dL).    Physical Exam   Constitutional: He appears well-developed and well-nourished.   Intubated FIO2 70 % , sedated.    HENT:   Head: Normocephalic.   Cardiovascular: Normal heart sounds.  Exam reveals no gallop and no friction rub.    No murmur heard.  Tachycardic, no murmur appreciated.   No JVD     Pulmonary/Chest:   Intubated. Clear anteriorly   Abdominal: Soft. He exhibits no distension.   Musculoskeletal: He exhibits no deformity. Edema: BUE, Blisters on left arm.    Neurological:   Intubated and sedated   Skin: Skin is warm and dry.   R SC trialysis   Nursing note and vitals reviewed.      Significant Labs: All pertinent labs within the past 24 hours have been reviewed.    Significant Imaging: I have reviewed all pertinent imaging results/findings within the past 24 hours.

## 2018-06-22 NOTE — PROGRESS NOTES
[facility]  Critical Care Medicine  Progress Note    Patient Name: Jasmeet Raymond  MRN: [id number]  Admission Date: 6/10/2018  Hospital Length of Stay: 12 days  Code Status: Full Code  Attending Provider: Neto Wong MD  Primary Care Provider: Provider Notinsystem   Principal Problem: Diffuse pulmonary alveolar hemorrhage    Subjective:     HPI:  This is Mr. Jasmeet Raymond, 63 year old male with PMHx significant for HTN, DM and arthirtis presented to [facility] and [facility] with symptoms of pneumonia and shortness of breath. On 5/29/2018, he presented with symptoms of bronchitis with denies chills, ear pain, sore throat, chest pain, shortness of breath at that time, received antibiotics. Then on 6/8/2018 he presented again to [facility] and [facility] with shortness of breath and cough, and his CXR showed concern for airway disease. However on the first day of admission at [facility] and [facility], his respiratory symptoms worsen and had acute hypoxic respiratory failure that required intubation. Pulmonary consulted and he underwent Bronchoscopy and  revealed alveolar hemorrhage, with the setting of having hematuria and diffuse alveloar hemorrhage the concern was for presence of pulmonary renal syndrome and he received pulsed dose of Methylprednisolone and nephrology consulted for plasmapheresis. However at [facility], plasmapheresis machine is broken and they recommend to transfer to [facility] for plasmapheresis.     On arrival he was on Nibmex for paralytics, Propofol for sedation, Levophed (eventaully turned off) for pressure support, Flolan (Epoprostenol sodium) inhaled which was switched to Inhaled Nitro. His mechanical ventilation initially was on FiO2 100%, PEEP 16, then weaned his FiO2 with high requirement of oxygenation. He had an signifiacnt drop in his Hb (baseline ~ 12) dated one week prior  presentation to 9 and 8, required one pRBC prior arrival. Repeated CXR showed extensive bilateral infiltrates. Important laboratory investigation showed His , , pro calcitonin 4.4, C3 and C4 complement normal., Trop negative, C3 and C4 showed normal , Hep test is negative, Lactic acid 1.8 > 1.0, HIV negative, Procal 4.4, UA shoowed heamturia and trace leukocytes, Urine culture from 5/29 showed almost pansensitve Cedecea davisae.     Hospital/ICU Course:  06/11/2018 admitted to MICU from [facility] with ARDS  06/12/2018 No acute events overnight. Had PLEX with 4.5 L exchange with FFP, and CRRT started and he became hypotensive, Levophed started during the CRRT. He was weaning off Nitric Oxide and his ABGs showing worsening respi acidosis. Rheumatology, Transfusion medicine and Nephrology on board   06/13/2018 Stable on High ladder PEEP for ARDS and completely off NO, and still on Nimbex for paralytics and sedation with Propofol and Fentanyl. ID, Rheumatology and Nephrology on board.   06/15/2018, Still on high setting   06/15/2018 Started Rituxan after his PLEx session, and decrease in his Methylprednisone. Still on high PEEP ladder for ARDS, frequent clots in ETT suction   06/16/2018 Overnight, he had Rituxan induction for ANCA associated vasculitis and decrease his Methypredisone to 125 mg IV Q 6 Hours. Mild increase in his mechanical ventilation setting and he is grossly volume overload and still anuric.   06/17/2018 Palaryzed with Nimbex and better in his mechanical ventilation setting. Off Versed and on max Precedex and Profol. Increased in UF rate to 500 and better in his I/O. Repated CXR showed better air space.   06/18/2018 off paralytic. received 4th dose of plasma exchange today. Still anuric. Continue on dialysis.wean off sedation   06/19/2018 Patient remains intubated on A/C FiO2 55%/ PEEP 10. Patient on levo gtt titrated to keep MAP >65. Patient  on precedex gtt at 1.34 mg/kg/hr and fentanyl gtt at 200 mcg/hr. Patient on CRRT, UF at 400mL/hr with citrate and calcium chloride infusions running per MD order. Tube feeds continue to be held, NG tube to low int. suction with roughly 200 mL of green output over the night. Patient's Tmax was 102.6 degrees F. patient placed on cooling blanket. Patient's H/H this morning is 6.5/20. One unit of pRBCs ordered.   06/20-06/22 : continues to wean vent and pressor. Continues CRRT with increase fluid removal. He is on 500 cc/h UF. Start to follow commands and moves his head.    Interval History/Significant Events:  continues to wean vent and pressor. Continues CRRT with increased fluid removal. He is on 500 cc/h UF. Start to follow commands and moves his head. Continues to have fevers overnight and cultures are all still negative. On Fentanyl gtt. Orion gtt and Precedex PRN to keep SBP < 160    Review of Systems   Unable to perform ROS: Intubated     Objective:     Vital Signs (Most Recent):  Temp: 98.8 °F (37.1 °C) (06/22/18 0731)  Pulse: 106 (06/22/18 0915)  Resp: (!) 27 (06/21/18 1548)  BP: (!) 165/70 (06/22/18 0200)  SpO2: 95 % (06/22/18 0915) Vital Signs (24h Range):  Temp:  [98.1 °F (36.7 °C)-101.1 °F (38.4 °C)] 98.8 °F (37.1 °C)  Pulse:  [106-150] 106  Resp:  [17-27] 27  SpO2:  [89 %-98 %] 95 %  BP: (129-193)/(59-85) 165/70  Arterial Line BP: (102-178)/(51-77) 102/52   Weight: 89 kg (196 lb 3.4 oz)  Body mass index is 29.83 kg/m².      Intake/Output Summary (Last 24 hours) at 06/22/18 0944  Last data filed at 06/22/18 0900   Gross per 24 hour   Intake             8769 ml   Output             9315 ml   Net             -546 ml       Physical Exam   Constitutional: No distress. He is sedated and intubated.   Intubated    Cardiovascular: Normal rate, regular rhythm, normal heart sounds and intact distal pulses.  Exam reveals no gallop and no friction rub.    No murmur heard.  Pulmonary/Chest: Effort normal. He is  intubated. No respiratory distress. He has no wheezes. He has no rhonchi. He has rales.   Abdominal: Soft. Bowel sounds are normal. He exhibits no distension. There is no tenderness.   Musculoskeletal: He exhibits edema (2+ LE).   Neurological:   [id number]   Skin: Skin is warm and dry. No rash noted. No erythema.   R SC trialysis   Nursing note and vitals reviewed.      Vents:  Vent Mode: A/C (06/22/18 0731)  Ventilator Initiated: Yes (06/10/18 2217)  Set Rate: 22 bmp (06/22/18 0731)  Vt Set: 650 mL (06/20/18 0917)  Pressure Support: 12 cmH20 (06/21/18 1347)  PEEP/CPAP: 14 cmH20 (06/22/18 0731)  Oxygen Concentration (%): 71 (06/22/18 0915)  Peak Airway Pressure: 26 cmH2O (06/22/18 0731)  Plateau Pressure: 19 cmH20 (06/22/18 0731)  Total Ve: 13.8 mL (06/22/18 0731)  F/VT Ratio<105 (RSBI): (!) 43.76 (06/21/18 1548)  Lines/Drains/Airways     Central Venous Catheter Line                 Hemodialysis Catheter 06/10/18 2307 right internal jugular 11 days         Percutaneous Central Line Insertion/Assessment - triple lumen  06/10/18 2305 left internal jugular 11 days          Drain                 NG/OG Tube 06/10/18 2309 [place] sump 18 Fr. Left nostril 11 days          Airway                 Airway - Non-Surgical Endotracheal Tube -- days          Arterial Line                 Arterial Line 06/10/18 2304 Right Radial 11 days          Pressure Ulcer                 Pressure Injury 06/20/18 0303 Right Buttocks Stage 2 2 days              Significant Labs:    CBC/Anemia Profile:    Recent Labs  Lab 06/21/18  0515  06/21/18  1837 06/22/18  0001 06/22/18  0303   WBC 22.25*  < > 18.28* 16.96* 17.57*   HGB 7.7*  < > 6.6* 7.4* 8.1*   HCT 23.4*  < > 20.5* 22.7* 23.8*   *  < > 97* 97* 98*   MCV 89  < > 88 86 85   RDW 16.4*  < > 16.3* 16.2* 16.5*   RETIC 3.7*  --   --   --   --    < > = values in this interval not displayed.     Chemistries:    Recent Labs  Lab 06/21/18  0400 06/21/18  1426 06/21/18  2128 06/22/18  0303      140  140  140 140  140 137  137 138  138   K 4.7  4.7  4.7  4.7 4.8  4.8 4.2  4.2 4.2  4.2     108  108  108 109  109 106  106 108  108   CO2 24  24  24  24 25  25 20*  20* 20*  20*   BUN 13  13  13  13 10  10 15  15 15  15   CREATININE 0.8  0.8  0.8  0.8 0.7  0.7 0.9  0.9 0.8  0.8   CALCIUM 7.8*  7.8*  7.8*  7.8* 8.0*  8.0* 7.5*  7.5* 7.1*  7.1*   ALBUMIN 3.8  3.8  3.8  3.8 3.6  3.6 4.2  4.2 4.0  4.0   PROT 4.9*  --   --  4.5*   BILITOT 2.6*  --   --  2.2*   ALKPHOS 54*  --   --  45*   ALT 26  --   --  14   AST 60*  --   --  40   MG 1.9  1.9 1.8 2.0 1.9   PHOS 3.5  3.5  3.5 1.5*  1.5* 3.4  3.4 2.6*       All pertinent labs within the past 24 hours have been reviewed.    Significant Imaging:  CXR: I have reviewed all pertinent results/findings within the past 24 hours and my personal findings are:  No significant interval change    Assessment/Plan:     Pulmonary   * Diffuse pulmonary alveolar hemorrhage    - Given his presentation with two weeks of progressive worsening of his SOB and cough, and associated with hematuria and CXR finding of bilateral infiltrate raised the concern for DAH  - On 6/10 he underwent Flexible Bronchoscopy and showed no endobronchial lesion, significant signs of hemorrhage on all of the airways, confirming findings of pulmonary hemorrhage.   - Requires on pRBC on 6/12, 6/15 and another unit on 6/17        Acute hypoxemic respiratory failure    - Please see principal problem for more elaboration         ARDS (adult respiratory distress syndrome)    - Most likely etiology is Diffuse pulmonary alveolar hemorrhage, however severe pneumonia in the setting of leukocytosis and fever and presentation of cough and SOB can not be excluded  - On conservative tidal volume (6-7 ml/kg predicted body weight), balanced respiratory rate, and high oxygenation setting in the setting of DAH going with high PEEP ladder        Cardiac/Vascular   Pure  hyperglyceridemia    - Most likely from Propofol and plan to wean off Propofol and started on Insulin infusion   - Improved in his TG to 600 >> 438 -->523 --> 180 with insulin infusion   - weaned off propofol and stopped insulin gtt         Renal/   ALEX (acute kidney injury)    - Differential Diagnoses include Pulmonary renal syndrome.  - Underwent US of kidney and showed Bilateral medical renal disease, Simple renal cysts bilaterally.  - CT abdomen/pelvis, mild interstitial changes in lung bases, 2 stones in the left kidney, 4 stones in the right kidney, no hydronephrosis, post cholecystectomy. No acute abnormality in the liver, spleen, pancreas or adrenals.   - Will continue trending urine output and renal function, avoid NSAIDs, contrast, nephrotoxins    - Monitor strict I/Os, daily weights, renally dose medications to current GFR  - Still anuric and will continue CRRT  -  cc/h. Increased to 500 then 550 cc/h   - once pt is stable, may consider renal biopsy         ID   Septic shock    - See principal problem for more elaboration.   - Currently on low need for pressors, will continue to monitor and ensure MAP > 65 mmHg (during CRRT)  - on vacn and Zosyn  -Cultures (new) show NGTD        Immunology/Multi System   ANCA-associated vasculitis    - See Pulmorenal syndrome for more elaboration         Pulmonary-renal syndrome    - Most likely the diagnosis in the setting of diffuse alveolar hemorrhage and glomerulonephritis with most likely etiology is underlying autoimmune disorder  - Elevated UPCR 1.28, DENIZ positive 1:320 speckled pattern, MPO+ 82, PR3 neg, Immunoglobulins wnl, Hep C and HIV neg  - Finished  5 days of Methylprednisolone 1000 mg IV daily, started on Rituxan (6/15) and Methylprednisone 125 mg IV Q 6 and Atovaquin for PPx   - received 4th dose of plasma exchange 06/18/18  - Repeat PLEX today  - once pt is stable, may consider renal biopsy         Hematology   DIC (disseminated intravascular  coagulation)    - Likely due to be secondary to plasma exchange but infection can not be ruled out  - received one unit of FFP and Cryo 06/20/18  - received one unite of pRBC and Cryo  - check DIC lab q 6hs           Thrombocytopenia, unspecified    - likely to be sepsis induced   - Will continue to observe, currently continue to improve         Oncology   Acute posthemorrhagic anemia    - Most likely is diffuse pulmonary alveolar hemorrhage. Although sepsis induced Bone marrow suppression and hemolysis can not be ruled out  - Frequent check in his CBC, transfuse when Hb < 7  - transfuse one pRBC 06/19/18        Endocrine   Diabetes mellitus type 2 with complications    - Will keep his blood glucose on the target 140-180  - on Insulin infusion for Hyperglycemia and HyperTG   Recent Labs      06/19/18   0200  06/19/18   0306  06/19/18   0405  06/19/18   0505  06/19/18   0557  06/19/18   0559  06/19/18   0705  06/19/18   0814   POCTGLUCOSE  231*  224*  214*  219*  145*  129*  112*  138*                     Critical Care Daily Checklist:     A: Awake: RASS Goal/Actual Goal: RASS Goal: 0-->alert and calm  Actual: Conteh Agitation Sedation Scale (RASS): Drowsy   B: Spontaneous Breathing Trial Performed?     C: SAT & SBT Coordinated?  Yes                      D: Delirium: CAM-ICU Overall CAM-ICU: Positive   E: Early Mobility Performed? Turning the pt q 2 hs   F: Feeding Goal: Goals: Patient to receive nutrition by RD follow-up  Status: Nutrition Goal Status: goal met         Current Diet Order   Procedures    Diet NPO. Om Tube feed       AS: Analgesia/Sedation As above   T: Thromboembolic Prophylaxis Held in setting of active bleed   H: HOB > 300 Yes   U: Stress Ulcer Prophylaxis (if needed) Famotidine   G: Glucose Control As above   B: Bowel Function Stool Occurrence: 0   I: Indwelling Catheter (Lines & Liu) Necessity As above   D: De-escalation of Antimicrobials/Pharmacotherapies No     Plan for the day/ETD  Increase fluid removal. Wean off sedation and Vent      Code Status:  Family/Goals of Care: Full Code            Critical secondary to Patient has a condition that poses threat to life and bodily function: Severe Respiratory Distress        Bruce Larry MD  Critical Care Medicine  [facility]

## 2018-06-22 NOTE — ASSESSMENT & PLAN NOTE
ANCA associated vasculitis  64 yo w/PMH of HTN and NIDDM, OA admitted for acute hypoxic respiratory failure, renal failure, and pulmonary renal syndrome receiving plasmapheresis. Rheumatology consulted for pulmonary renal syndrome, recs on steroids and further investigation.   - Given methylprednisolone 1000 mg IV daily x 5 doses   - Nephrology consulted: renal biopsy when stable  --> elevated UPCR 1.28, DENIZ positive 1:320 speckled pattern, MPO+ 82, c-ANCA +, p-anca neg,  SSA + 178, SSB neg, dsDNA neg, Anti-Sm/RNP neg, Anti-Sm neg, PR3 neg, Immunoglobulins wnl, Hep C and HIV neg, APL labs neg, GBM neg, MARTHA neg, C3/C4 wnl, ACE neg, quant gold indeterminate however T-spot negative.  - Concern for vasculitis given DAH and hematuria. The nini-typical pulmonary-renal syndromes are GPA/MPA, Goodpasture disease, SLE. Immunofluorescence studies can help differentiate. MPO+ can suggest MPA    TB-spot negative, negative drug screen    - Given methylprednisolone 1000 mg IV daily x 5 doses (completed on 6/14/18). Started on Solumedrol 125 mg q6h on 6/15/18.   - Rituximab 1 g IV given on 6/15/18. Next dose on 6/29/18.  - PLEX since 6/11/18    Patient febrile up to 102.6F on 6/19/18. Started on Vanc/zosyn. Cultures from 6/19/18 and 6/21/18 showing NGTD. Also concern for DIC given low fibrinogen and elevated aPPT. Seen by ID who recommended continuing broad spectrum antibiotics and re-culturing when patient febrile. Also seen by Hem/Onc who felt that coagulopathy secondary to PLEX. Recommended cryoprecipitate and FFP as needed.     6/22/18: Patient still with low grade fevers. Blood cultures from 6/19 and 6/21 still with NGTD. Patient remains on Vanc/Zosyn. Per ID patient should be continued on Vanc/Zosyn until 6/27/18. Given fluctuations in hemoglobin, critical care team performed BAL today. Rheumatology team present during BAL and no evidence of diffuse alveolar hemorrhage.     Plan:   - Decrease dose of Solumedrol 125 mg IV  q6h to 125 mg q8h IV. Will consider lowering dose to q12h next week.  - Will need ID clearance prior to next dose of Rituximab which is scheduled for 6/29/18.   - transfusion medicine: PLEX every other day  - Started on IV methylprednisolone 125 mg q6hrs on 6/15/18. Will continue at this dose.   - Received first dose Rituximab 6/15/18 and now on Atovaquone. Repeat Rituximab in 2 weeks on 6/29/18 if no infection present at that time or if infection treated.

## 2018-06-22 NOTE — PROGRESS NOTES
[facility]  Nephrology  Progress Note    Patient Name: Jasmeet Raymond  MRN: [id number]  Admission Date: 6/10/2018  Hospital Length of Stay: 12 days  Attending Provider: Neto Wong MD   Primary Care Physician: Provider Notinsystem  Principal Problem:Diffuse pulmonary alveolar hemorrhage    Subjective:     HPI: 64 yo AAm with HTN, NIDDM, arthritis on metformin and meloxicam  admitted to [facility] for worsening SOB and ALEX.   Patiend had been treated as an out;patient for 2 weeks for Shortness of breath and bronchitis and UTI. In the hospital he was found to be acidotic, hyperkalemic, serum creatinine 8, and diffuse bilateral pulmonary infiltrates, requiring intubation. UA with proteinuria, hematuria and wbc's, Bronchoscopy revealed hemorrahage. Patient recieved one dialysis as per notes and 1 gm solumedrol, and transferred  to [facility] for plasmapheresis  And further evaluation.  Family is not available at the time of exam and patient is intubated.   HIV, Hepatitis B, C, complements  All within normal limits. DENIZ, ANCA and AntiGBM, results Nnot available    Interval History: Increase FIO 70% from 65 % this am. SLED net neg 385. Max temp 100.6. BC remains negative. Plan for PLEX today.     Review of patient's allergies indicates:   Allergen Reactions    Sulfa (sulfonamide antibiotics)      Current Facility-Administered Medications   Medication Frequency    0.9%  NaCl infusion (CRRT USE ONLY) Continuous    0.9%  NaCl infusion (for blood administration) Q24H PRN    atovaquone suspension 750 mg Q12H    calcium chloride 1 g in dextrose 5 % 100 mL IVPB Continuous    chlorhexidine 0.12 % solution 15 mL BID    dexmedetomidine (PRECEDEX) 400mcg/100mL 0.9% NaCL infusion Continuous    dextrose 50% injection 12.5 g PRN    famotidine tablet 20 mg BID    fentaNYL 2500 mcg in 0.9% sodium chloride 250 mL infusion premix (titrating) Continuous    glucagon (human recombinant) injection 1 mg PRN     heparin, porcine (PF) 100 unit/mL injection flush 500 Units PRN    hepatitis B virus vacc.rec(PF) injection 40 mcg vaccine x 1 dose    hydrALAZINE injection 10 mg Q4H PRN    insulin aspart U-100 pen 1-10 Units Q4H PRN    labetalol injection 20 mg Q4H PRN    lorazepam injection 4 mg Q1H PRN    magnesium sulfate 2g in water 50mL IVPB (premix) PRN    methylPREDNISolone sodium succinate injection 125 mg Q6H    metoclopramide HCl tablet 5 mg Q6H    niCARdipine 40 mg/200 mL infusion Continuous    piperacillin-tazobactam 4.5 g in sodium chloride 0.9% 100 mL IVPB (ready to mix system) Q8H    sodium chloride 0.9% flush 10 mL PRN    sodium phosphate 20.01 mmol in dextrose 5 % 250 mL IVPB PRN    sodium phosphate 30 mmol in dextrose 5 % 250 mL IVPB PRN    sodium phosphate 39.99 mmol in dextrose 5 % 250 mL IVPB PRN    white petrolatum-mineral oil (LUBIFRESH P.M.) ophthalmic ointment Q8H       Objective:     Vital Signs (Most Recent):  Temp: 98.8 °F (37.1 °C) (06/22/18 0731)  Pulse: (!) 123 (06/22/18 0800)  Resp: (!) 27 (06/21/18 1548)  BP: (!) 165/70 (06/22/18 0200)  SpO2: (!) 94 % (06/22/18 0800)  O2 Device (Oxygen Therapy): ventilator (06/22/18 0800) Vital Signs (24h Range):  Temp:  [98.1 °F (36.7 °C)-101.1 °F (38.4 °C)] 98.8 °F (37.1 °C)  Pulse:  [110-150] 123  Resp:  [17-27] 27  SpO2:  [89 %-98 %] 94 %  BP: (129-193)/(59-85) 165/70  Arterial Line BP: (104-178)/(51-77) 135/64     Weight: 89 kg (196 lb 3.4 oz) (06/22/18 0300)  Body mass index is 29.83 kg/m².  Body surface area is 2.07 meters squared.    I/O last 3 completed shifts:  In: 53498 [I.V.:5677; Blood:725; Other:2987; NG/GT:430; IV Piggyback:3396]  Out: 48778 [Other:07675]    Physical Exam   Constitutional: He appears well-developed and well-nourished.   Intubated FIO2 70 % , sedated.    HENT:   Head: Normocephalic.   Cardiovascular: Normal heart sounds.  Exam reveals no gallop and no friction rub.    No murmur heard.  Tachycardic, no murmur  appreciated.   No JVD     Pulmonary/Chest:   Intubated. Clear anteriorly   Abdominal: Soft. He exhibits no distension.   Musculoskeletal: He exhibits no deformity. Edema: BUE, Blisters on left arm.    Neurological:   Intubated and sedated   Skin: Skin is warm and dry.   R SC trialysis   Nursing note and vitals reviewed.      Significant Labs:  All labs within the past 24 hours have been reviewed.     Significant Imaging:  Labs: Reviewed    Assessment/Plan:     ALEX (acute kidney injury)    MPO-cANCA GPA, presenting with RPGN and pulmonary hemorrhage  S/p pulse steroids, now on scheduled dose 125mg IV solumedrol q6h, and PLEX 6/6, also has been started on rituxan 6/15 (repeat in 2 weeks 6/29), renal supportive therapy with RRT (SLED). Urine sediment showed RBC cast.  -Total hr intake 265 ml-TF, fent, precedex, ca cl, citrate   -Completed PLEX 6/6 6/21.      Plan:  -continue SLED for metabolic clearance and volume management.  ml ( net  ml/hr). Increase   -Naphos IV prn                      Thank you for your consult. I will follow-up with patient. Please contact us if you have any additional questions.    Kandice Hernandez NP  Nephrology  [facility]

## 2018-06-22 NOTE — PROCEDURES
[facility]  Bronchoscopy   Procedure Note    SUMMARY     Date of Procedure: 6/22/2018    Procedure: Bronchoscopy, Diagnostic    Provider: Tomer Fernández MD    Assisting Provider: --    Pre-Procedure Diagnosis: Rule out DAH     Post-Procedure Diagnosis: DAH ruled out    Indication: Rule out DAH      Description of the Findings of the Procedure:     Patient was consented for the procedure with all risk and benefit of the procedure explained in detail.  Patient was given the opportunity to ask questions and all concerns were answered.  The bronchocope was inserted into the main airway via the endotracheal tube. An anatomical survey was done of the main airways and the subsegmental bronchus.  120ml of saline was flushed into RML with subsequent aspiration. Another 60ml of saline was flushed and aspirated. Only clear fluid returned. No blood or blood-tinged fluid.    Complications: None; patient tolerated the procedure well.    Estimated Blood Loss (EBL): None       Condition: Critical        Disposition: ICU - intubated and critically ill.    Attestation: I performed the procedure.     Toby Fernández MD  Fellow, [employer] [facility]  Cell [phone]

## 2018-06-22 NOTE — PROGRESS NOTES
Dr. Sethi and critical care team at bedside for rounds. MD updated on VS, vent settings, I/O, CRRT, TF, gtts, neuro status, and overall pt staus. Dr. sethi placed pt on spontaneous, gave order to continue to increase TF until goal of 40. Plan for bronch and echo this afternoon. Plan discussed with pt's spouse at bedside and she demonstrates understanding. All orders will be carried out. Will continue to monitor closely.

## 2018-06-22 NOTE — PROGRESS NOTES
" [facility]  Adult Nutrition  Progress Note    SUMMARY       Recommendations  Recommendation/Intervention:   Continue current TF + 3 packets/day Beneprotein - meets EEN and EPN.    -If renal formula no longer needed, recommend Glucerna 1.5 at a goal rate of 55 mL/hr - to provide 1980 kcal/day, 109g protein/day, and 1002 mL free fluid/day.   RD to monitor.    Goals: Patient to receive nutrition by RD follow-up  Nutrition Goal Status: goal met  Communication of RD Recs:  (POC)    Reason for Assessment  Reason for Assessment: RD follow-up  Diagnosis:  (diffuse pulmonary alveolar hemorrhage)  Relevant Medical History: DM2, HTN  General Information Comments: Patient still intubated, sedated. On CRRT-SLED. On TF, currently at 20 mL/hr. (Patient appears nourished, unable to determine if patient meets criteria for malnutrition at this time.)  Nutrition Discharge Planning: Unable to determine at this time.    Nutrition/Diet History  Food Preferences: ROMA  Do you have any cultural, spiritual, Quaker conflicts, given your current situation?: roma  Factors Affecting Nutritional Intake: NPO, on mechanical ventilation    Anthropometrics  Temp: 99.7 °F (37.6 °C)  Height: 5' 8" (172.7 cm)  Height (inches): 68 in  Weight Method: Bed Scale  Weight: 89 kg (196 lb 3.4 oz)  Weight (lb): 196.21 lb  Ideal Body Weight (IBW), Male: 154 lb  % Ideal Body Weight, Male (lb): 127.41 lb  BMI (Calculated): 29.9  BMI Grade: 25 - 29.9 - overweight    Lab/Procedures/Meds  Pertinent Labs Reviewed: reviewed  Pertinent Labs Comments: Glu 204, POCT Glu 199-226, HgbA1c 6.2, Ca 7.1, T. bili 2.2  Pertinent Medications Reviewed: reviewed  Pertinent Medications Comments: famotidine, methylprednisolone, reglan, precedex, fentanyl, cardene    Physical Findings/Assessment  Overall Physical Appearance: on ventilator support  Tubes: nasogastric tube  Oral/Mouth Cavity: tooth/teeth missing  Skin: edema, pressure ulcer(s) (stage " 2)    Estimated/Assessed Needs  Weight Used For Calorie Calculations: 86.1 kg (189 lb 13.1 oz) (dosing weight)  Energy Calorie Requirements (kcal): 1972 kcal/day  Energy Need Method: [facility]  Protein Requirements: 104-130 g/day (1.2-1.5 g/kg)  Weight Used For Protein Calculations: 86.1 kg (189 lb 13.1 oz) (dosing weight)  Fluid Requirements (mL): 1 mL/kcal or per MD  Fluid Need Method: RDA Method  RDA Method (mL): 1972    Nutrition Prescription Ordered  Current Diet Order: NPO  Nutrition Order Comments: + 3 packets Beneprotein/day  Current Nutrition Support Formula Ordered: Novasource Renal  Current Nutrition Support Rate Ordered: 40 (ml)  Current Nutrition Support Frequency Ordered: mL/hr    Evaluation of Received Nutrient/Fluid Intake  Enteral Calories (kcal): 1920  Enteral Protein (gm): 87  Enteral (Free Water) Fluid (mL): 688  Other Calories (kcal): 75  Total Calories (kcal): 1995  % Kcal Needs: 101  Other Protein (gm): 18  Total Protein (gm): 105  % Protein Needs: 100  I/O: Noted  Energy Calories Required: meeting needs  Protein Required: meeting needs  Fluid Required:  (per MD)  Comments: LBM 6/19  Tolerance:  (on hold)  % Intake of Estimated Energy Needs: 75 - 100 %  % Meal Intake: NPO    Nutrition Risk  Level of Risk/Frequency of Follow-up: high (2x/week)     Assessment and Plan  * Diffuse pulmonary alveolar hemorrhage    Contributing Nutrition Diagnosis  Inadequate energy intake    Related to (etiology):  Intubated, NPO    Signs and Symptoms (as evidenced by):   Currently meeting < 85% EEN and EPN     Nutrition Diagnosis Status:   Resolved          Monitor and Evaluation  Food and Nutrient Intake: energy intake, enteral nutrition intake  Food and Nutrient Adminstration: enteral and parenteral nutrition administration  Anthropometric Measurements: weight, weight change  Biochemical Data, Medical Tests and Procedures: electrolyte and renal panel, gastrointestinal profile, inflammatory  profile  Nutrition-Focused Physical Findings: overall appearance     Nutrition Follow-Up  RD Follow-up?: Yes

## 2018-06-22 NOTE — NURSING
PT HR increased to 160's, Dr. Leonardo notified, ordered to restart precedex at minimal dose. Precedex restarted, HR now 120's, pt calm. Will continue to monitor.

## 2018-06-22 NOTE — ASSESSMENT & PLAN NOTE
- Differential Diagnoses include Pulmonary renal syndrome.  - Underwent US of kidney and showed Bilateral medical renal disease, Simple renal cysts bilaterally.  - CT abdomen/pelvis, mild interstitial changes in lung bases, 2 stones in the left kidney, 4 stones in the right kidney, no hydronephrosis, post cholecystectomy. No acute abnormality in the liver, spleen, pancreas or adrenals.   - Will continue trending urine output and renal function, avoid NSAIDs, contrast, nephrotoxins    - Monitor strict I/Os, daily weights, renally dose medications to current GFR  - Still anuric and will continue CRRT  -  cc/h. Increased to 500 then 550 cc/h   - once pt is stable, may consider renal biopsy

## 2018-06-22 NOTE — ASSESSMENT & PLAN NOTE
- Likely due to be secondary to plasma exchange but infection can not be ruled out  - received one unit of FFP and Cryo 06/20/18  - received one unite of pRBC and Cryo  - check DIC lab q 6hs

## 2018-06-22 NOTE — ASSESSMENT & PLAN NOTE
MPO-cANCA GPA, presenting with RPGN and pulmonary hemorrhage  S/p pulse steroids, now on scheduled dose 125mg IV solumedrol q6h, and PLEX 5/6, also has been started on rituxan 6/15 (repeat in 2 weeks 6/29), renal supportive therapy with RRT (SLED). Urine sediment showed RBC cast.  -Total hr intake 265 ml-TF, fent, precedex, ca cl, citrate   -Plan for PLEX -treatment #6.     Plan:  -continue SLED for metabolic clearance and volume management.  ml ( net  ml/hr). Increase   -Naphos IV prn

## 2018-06-22 NOTE — SUBJECTIVE & OBJECTIVE
Interval History: Increase FIO 70% from 65 % this am. SLED net neg 385. Max temp 100.6. BC remains negative. Plan for PLEX today.     Review of patient's allergies indicates:   Allergen Reactions    Sulfa (sulfonamide antibiotics)      Current Facility-Administered Medications   Medication Frequency    0.9%  NaCl infusion (CRRT USE ONLY) Continuous    0.9%  NaCl infusion (for blood administration) Q24H PRN    atovaquone suspension 750 mg Q12H    calcium chloride 1 g in dextrose 5 % 100 mL IVPB Continuous    chlorhexidine 0.12 % solution 15 mL BID    dexmedetomidine (PRECEDEX) 400mcg/100mL 0.9% NaCL infusion Continuous    dextrose 50% injection 12.5 g PRN    famotidine tablet 20 mg BID    fentaNYL 2500 mcg in 0.9% sodium chloride 250 mL infusion premix (titrating) Continuous    glucagon (human recombinant) injection 1 mg PRN    heparin, porcine (PF) 100 unit/mL injection flush 500 Units PRN    hepatitis B virus vacc.rec(PF) injection 40 mcg vaccine x 1 dose    hydrALAZINE injection 10 mg Q4H PRN    insulin aspart U-100 pen 1-10 Units Q4H PRN    labetalol injection 20 mg Q4H PRN    lorazepam injection 4 mg Q1H PRN    magnesium sulfate 2g in water 50mL IVPB (premix) PRN    methylPREDNISolone sodium succinate injection 125 mg Q6H    metoclopramide HCl tablet 5 mg Q6H    niCARdipine 40 mg/200 mL infusion Continuous    piperacillin-tazobactam 4.5 g in sodium chloride 0.9% 100 mL IVPB (ready to mix system) Q8H    sodium chloride 0.9% flush 10 mL PRN    sodium phosphate 20.01 mmol in dextrose 5 % 250 mL IVPB PRN    sodium phosphate 30 mmol in dextrose 5 % 250 mL IVPB PRN    sodium phosphate 39.99 mmol in dextrose 5 % 250 mL IVPB PRN    white petrolatum-mineral oil (LUBIFRESH P.M.) ophthalmic ointment Q8H       Objective:     Vital Signs (Most Recent):  Temp: 98.8 °F (37.1 °C) (06/22/18 0731)  Pulse: (!) 123 (06/22/18 0800)  Resp: (!) 27 (06/21/18 1548)  BP: (!) 165/70 (06/22/18 0200)  SpO2: (!) 94  % (06/22/18 0800)  O2 Device (Oxygen Therapy): ventilator (06/22/18 0800) Vital Signs (24h Range):  Temp:  [98.1 °F (36.7 °C)-101.1 °F (38.4 °C)] 98.8 °F (37.1 °C)  Pulse:  [110-150] 123  Resp:  [17-27] 27  SpO2:  [89 %-98 %] 94 %  BP: (129-193)/(59-85) 165/70  Arterial Line BP: (104-178)/(51-77) 135/64     Weight: 89 kg (196 lb 3.4 oz) (06/22/18 0300)  Body mass index is 29.83 kg/m².  Body surface area is 2.07 meters squared.    I/O last 3 completed shifts:  In: 48956 [I.V.:5677; Blood:725; Other:2987; NG/GT:430; IV Piggyback:3396]  Out: 34251 [Other:99382]    Physical Exam   Constitutional: He appears well-developed and well-nourished.   Intubated FIO2 70 % , sedated.    HENT:   Head: Normocephalic.   Cardiovascular: Normal heart sounds.  Exam reveals no gallop and no friction rub.    No murmur heard.  Tachycardic, no murmur appreciated.   No JVD     Pulmonary/Chest:   Intubated. Clear anteriorly   Abdominal: Soft. He exhibits no distension.   Musculoskeletal: He exhibits no deformity. Edema: BUE, Blisters on left arm.    Neurological:   Intubated and sedated   Skin: Skin is warm and dry.   R SC trialysis   Nursing note and vitals reviewed.      Significant Labs:  All labs within the past 24 hours have been reviewed.     Significant Imaging:  Labs: Reviewed

## 2018-06-22 NOTE — ASSESSMENT & PLAN NOTE
63 year old male with PMHx significant for HTN, DM and arthirtis on whom ID is consulted for possible infection. Infectious work up has been negative so far.    -karlee is resp culture is normal and does not warrant therapy  -all other cultures negative  -reculture if he is febrile again  -leukocytosis is likely elevated due to steroids   -he has been on 13 days of antibiotics total  -would continue vanco and zosyn until 6/27 and then stop unless source of infection is found

## 2018-06-22 NOTE — PROGRESS NOTES
CRRT:  Machine low flow error.  High venous pressure alarm, rinsedback by primary nurse, unable to return blood completely. Clotted venous chamber.    CRRT restarted via right IJ temporary catheter. Machine changed. Daily checks done, orders verified. UF rate set to 500ml/hr  Patient is on Citrate 250ml/hr pre filter, Calcium 30ml/hr post filter

## 2018-06-22 NOTE — PROGRESS NOTES
[facility]  Rheumatology  Progress Note    Patient Name: Jasmeet Raymond  MRN: [id number]  Admission Date: 6/10/2018  Hospital Length of Stay: 12 days  Code Status: Full Code   Attending Provider: Neto Wong MD  Primary Care Physician: Provider Notinsystem  Principal Problem: Diffuse pulmonary alveolar hemorrhage    Subjective:     HPI:  Mr. Jasmeet Raymond, 63 year old male with PMHx significant for HTN, DM and osteoarthirtis presented to [facility] with symptoms of pneumonia and shortness of breath. On 5/29/2018, he presented with symptoms of bronchitis with denies chills, ear pain, sore throat, chest pain, shortness of breath at that time, received antibiotics. Then on 6/8/2018 he presented again to [facility] and [facility] with shortness of breath and cough, and his CXR showed concern for airway disease. However on the first day of admission at [facility], his respiratory symptoms worsen and had acute hypoxic respiratory failure that required intubation. Pulmonary consulted and he underwent Bronchoscopy and  revealed alveolar hemorrhage, with the setting of having hematuria and diffuse alveloar hemorrhage the concern was for presence of pulmonary renal syndrome and he received pulsed dose of Methylprednisolone and nephrology consulted for plasmapheresis. However at [facility], plasmapheresis machine is broken and they recommend to transfer to [facility] [place] for plasmapheresis. Admitted here 6/11 for ARDS.     On arrival he was on Nibmex for paralytics, Propofol for sedation, Levophed (eventaully turned off) for pressure support, Flolan (Epoprostenol sodium) inhaled which was switched to Inhaled Nitro. His mechanical ventilation initially was on FiO2 100%, PEEP 16, then weaned his FiO2 with high requirement of oxygenation. He had an signifiacnt drop in his Hb (baseline ~ 12) dated one  week prior presentation to 9 and 8, required one pRBC prior arrival. Repeated CXR showed extensive bilateral infiltrates. Important laboratory investigation showed His , , pro calcitonin 4.4, C3 and C4 complement normal., Trop negative, C3 and C4 showed normal , Hep test is negative, Lactic acid 1.8 > 1.0, HIV negative, Procal 4.4, UA shoowed heamturia and trace leukocytes, Urine culture from 5/29 showed almost pansensitve Cedecea davisae.    Rheumatology consulted for pulmonary-renal syndrome, recs for steroids and further investigation.     Interval History: Patient seen and examined at bedside. Still with low grade fevers. On CRRT and getting PLEX treatments. Remains intubated and sedated.    Current Facility-Administered Medications   Medication Frequency    0.9%  NaCl infusion (CRRT USE ONLY) Continuous    0.9%  NaCl infusion (CRRT USE ONLY) Continuous    0.9%  NaCl infusion (for blood administration) Q24H PRN    atovaquone suspension 750 mg Q12H    calcium chloride 1 g in dextrose 5 % 100 mL IVPB Continuous    chlorhexidine 0.12 % solution 15 mL BID    dexmedetomidine (PRECEDEX) 400mcg/100mL 0.9% NaCL infusion Continuous    dextrose 50% injection 12.5 g PRN    famotidine tablet 20 mg BID    fentaNYL 2500 mcg in 0.9% sodium chloride 250 mL infusion premix (titrating) Continuous    glucagon (human recombinant) injection 1 mg PRN    heparin, porcine (PF) 100 unit/mL injection flush 500 Units PRN    hepatitis B virus vacc.rec(PF) injection 40 mcg vaccine x 1 dose    hydrALAZINE injection 10 mg Q4H PRN    insulin aspart U-100 pen 1-10 Units Q4H PRN    labetalol injection 20 mg Q4H PRN    lorazepam injection 4 mg Q1H PRN    magnesium sulfate 2g in water 50mL IVPB (premix) PRN    magnesium sulfate 2g in water 50mL IVPB (premix) PRN    methylPREDNISolone sodium succinate injection 125 mg Q6H    metoclopramide HCl tablet 5 mg Q6H    niCARdipine 40 mg/200 mL infusion Continuous     piperacillin-tazobactam 4.5 g in sodium chloride 0.9% 100 mL IVPB (ready to mix system) Q8H    sodium chloride 0.9% flush 10 mL PRN    sodium phosphate 20.01 mmol in dextrose 5 % 250 mL IVPB PRN    sodium phosphate 20.01 mmol in dextrose 5 % 250 mL IVPB PRN    sodium phosphate 30 mmol in dextrose 5 % 250 mL IVPB PRN    sodium phosphate 30 mmol in dextrose 5 % 250 mL IVPB PRN    sodium phosphate 39.99 mmol in dextrose 5 % 250 mL IVPB PRN    sodium phosphate 39.99 mmol in dextrose 5 % 250 mL IVPB PRN    vancomycin (VANCOCIN) 3,000 mg in dextrose 5 % 500 mL IVPB Once    white petrolatum-mineral oil (LUBIFRESH P.M.) ophthalmic ointment Q8H     Objective:     Vital Signs (Most Recent):  Temp: 98.8 °F (37.1 °C) (06/22/18 0731)  Pulse: 106 (06/22/18 1015)  Resp: (!) 27 (06/21/18 1548)  BP: (!) 165/70 (06/22/18 0200)  SpO2: 95 % (06/22/18 1015)  O2 Device (Oxygen Therapy): ventilator (06/22/18 0900) Vital Signs (24h Range):  Temp:  [98.1 °F (36.7 °C)-101.1 °F (38.4 °C)] 98.8 °F (37.1 °C)  Pulse:  [106-150] 106  Resp:  [27] 27  SpO2:  [89 %-98 %] 95 %  BP: (129-193)/(59-85) 165/70  Arterial Line BP: ()/(47-77) 99/47     Weight: 89 kg (196 lb 3.4 oz) (06/22/18 0300)  Body mass index is 29.83 kg/m².  Body surface area is 2.07 meters squared.      Intake/Output Summary (Last 24 hours) at 06/22/18 1122  Last data filed at 06/22/18 1000   Gross per 24 hour   Intake             8699 ml   Output             8587 ml   Net              112 ml       Physical Exam   Vitals reviewed.  Constitutional:   intubated   HENT:   Head: Normocephalic and atraumatic.   Right Ear: External ear normal.   Left Ear: External ear normal.   Eyes: Pupils are equal, round, and reactive to light. Right eye exhibits no discharge. Left eye exhibits no discharge.   Neck: Neck supple. No JVD present.   Cardiovascular: Normal rate, regular rhythm and intact distal pulses.    No murmur heard.  Pulmonary/Chest: No respiratory distress. He has  no wheezes.   Abdominal: Soft. He exhibits no distension. There is no tenderness.   Neurological:   sedated   Skin: Skin is warm. No rash noted. No erythema.     Psychiatric:   Unable to assess   Musculoskeletal: He exhibits no edema.   Diffuse soft tissue swelling of hands, feet, and knees    2-3 cm fluid blisters present on right arm         Significant Labs:  Results for FELIPE BERNAL (MRN [id number]) as of 6/22/2018 11:20   Ref. Range 6/22/2018 03:03   WBC Latest Ref Range: 3.90 - 12.70 K/uL 17.57 (H)   RBC Latest Ref Range: 4.60 - 6.20 M/uL 2.80 (L)   Hemoglobin Latest Ref Range: 14.0 - 18.0 g/dL 8.1 (L)   Hematocrit Latest Ref Range: 40.0 - 54.0 % 23.8 (L)   MCV Latest Ref Range: 82 - 98 fL 85   MCH Latest Ref Range: 27.0 - 31.0 pg 28.9   MCHC Latest Ref Range: 32.0 - 36.0 g/dL 34.0   RDW Latest Ref Range: 11.5 - 14.5 % 16.5 (H)   Platelets Latest Ref Range: 150 - 350 K/uL 98 (L)   MPV Latest Ref Range: 9.2 - 12.9 fL 12.1   Gran% Latest Ref Range: 38.0 - 73.0 % 95.7 (H)   Gran # (ANC) Latest Ref Range: 1.8 - 7.7 K/uL 16.8 (H)   Immature Granulocytes Latest Ref Range: 0.0 - 0.5 % 1.3 (H)   Immature Grans (Abs) Latest Ref Range: 0.00 - 0.04 K/uL 0.22 (H)   Lymph% Latest Ref Range: 18.0 - 48.0 % 1.5 (L)   Lymph # Latest Ref Range: 1.0 - 4.8 K/uL 0.3 (L)   Mono% Latest Ref Range: 4.0 - 15.0 % 1.4 (L)   Mono # Latest Ref Range: 0.3 - 1.0 K/uL 0.2 (L)   Eosinophil% Latest Ref Range: 0.0 - 8.0 % 0.0   Eos # Latest Ref Range: 0.0 - 0.5 K/uL 0.0   Basophil% Latest Ref Range: 0.0 - 1.9 % 0.1   Baso # Latest Ref Range: 0.00 - 0.20 K/uL 0.01   nRBC Latest Ref Range: 0 /100 WBC 0   Protime Latest Ref Range: 9.0 - 12.5 sec 14.8 (H)   Coumadin Monitoring INR Latest Ref Range: 0.8 - 1.2  1.5 (H)   aPTT Latest Ref Range: 21.0 - 32.0 sec 58.4 (H)   Fibrinogen Latest Ref Range: 182 - 366 mg/dL 111 (L)   Results for FELIPE BERNAL (MRN [id number]) as of 6/22/2018 11:20   Ref. Range 6/22/2018 03:03   Sodium Latest Ref Range: 136 - 145  mmol/L 138   Potassium Latest Ref Range: 3.5 - 5.1 mmol/L 4.2   Chloride Latest Ref Range: 95 - 110 mmol/L 108   CO2 Latest Ref Range: 23 - 29 mmol/L 20 (L)   Anion Gap Latest Ref Range: 8 - 16 mmol/L 10   BUN, Bld Latest Ref Range: 8 - 23 mg/dL 15   Creatinine Latest Ref Range: 0.5 - 1.4 mg/dL 0.8   eGFR if non African American Latest Ref Range: >60 mL/min/1.73 m^2 >60.0   eGFR if African American Latest Ref Range: >60 mL/min/1.73 m^2 >60.0   Glucose Latest Ref Range: 70 - 110 mg/dL 204 (H)   Calcium Latest Ref Range: 8.7 - 10.5 mg/dL 7.1 (L)   Alkaline Phosphatase Latest Ref Range: 55 - 135 U/L 45 (L)   Total Protein Latest Ref Range: 6.0 - 8.4 g/dL 4.5 (L)   Albumin Latest Ref Range: 3.5 - 5.2 g/dL 4.0   Total Bilirubin Latest Ref Range: 0.1 - 1.0 mg/dL 2.2 (H)   AST Latest Ref Range: 10 - 40 U/L 40   ALT Latest Ref Range: 10 - 44 U/L 14     Results for FELIPE BERNAL (MRN [id number]) as of 6/22/2018 11:20   Ref. Range 6/21/2018 05:15 6/21/2018 11:22 6/21/2018 18:37 6/22/2018 00:01 6/22/2018 03:03   Fibrinogen Latest Ref Range: 182 - 366 mg/dL 167 (L) 195 <70 (AA) 96 (LL) 111 (L)     Results for FELIPE BERNAL (MRN [id number]) as of 6/22/2018 11:20   Ref. Range 6/21/2018 05:15   Retic Latest Ref Range: 0.4 - 2.0 % 3.7 (H)     Results for FELIPE BERNAL (MRN [id number]) as of 6/19/2018 14:19   Ref. Range 6/19/2018 03:59 6/19/2018 06:43   Haptoglobin Latest Ref Range: 30 - 250 mg/dL <10 (L) <10 (L)     Results for FELIPE BERNAL (MRN [id number]) as of 6/21/2018 16:00   Ref. Range 6/19/2018 06:43 6/20/2018 15:34   LD Latest Ref Range: 110 - 260 U/L 683 (H) 435 (H)     Results for FELIPE BERNAL (MRN [id number]) as of 6/18/2018 10:33   Ref. Range 6/10/2018 22:49 6/10/2018 22:51   DENIZ HEP-2 Titer Unknown  Positive 1:320 Sp...   Anti-SSA Antibody Latest Ref Range: 0.00 - 19.99 EU  178.40 (H)   Anti-SSA Interpretation Latest Ref Range: Negative   Positive (A)   Anti-SSB Antibody Latest Ref Range: 0.00 - 19.99 EU  0.87   Anti-SSB  Interpretation Latest Ref Range: Negative   Negative   ds DNA Ab Latest Ref Range: Negative 1:10   Negative 1:10   Anti Sm Antibody Latest Ref Range: 0.00 - 19.99 EU  0.60   Anti-Sm Interpretation Latest Ref Range: Negative   Negative   Anti Sm/RNP Antibody Latest Ref Range: 0.00 - 19.99 EU  2.83   Anti-Sm/RNP Interpretation Latest Ref Range: Negative   Negative   Cytoplasmic Neutrophilic Ab Latest Ref Range: <1:20 Titer 1:320 (A)    Perinuclear (P-ANCA) Latest Ref Range: <1:20 Titer <1:20    Results for FELIPE BERNAL (MRN [id number]) as of 6/18/2018 10:33   Ref. Range 6/11/2018 06:01 6/11/2018 08:10 6/14/2018 03:27   ANCA Proteinase 3 Latest Ref Range: <0.4 (Negative) U  <0.2    MPO Latest Ref Range: <=20 UNITS  82 (H)    Complement (C-3) Latest Ref Range: 50 - 180 mg/dL   78   Complement (C-4) Latest Ref Range: 11 - 44 mg/dL   13   Complement,Total, Serum Latest Ref Range: 42 - 95 U/mL   60   IgG - Serum Latest Ref Range: 650 - 1600 mg/dL  972    IgM Latest Ref Range: 50 - 300 mg/dL  57    IgA Latest Ref Range: 40 - 350 mg/dL  154    Protein, Serum Latest Ref Range: 6.0 - 8.4 g/dL  4.9 (L)    Albumin grams/dl Latest Ref Range: 3.35 - 5.55 g/dL  2.16 (L)    Alpha-1 grams/dl Latest Ref Range: 0.17 - 0.41 g/dL  0.63 (H)    Alpha-2 grams/dl Latest Ref Range: 0.43 - 0.99 g/dL  0.71    Beta grams/dl Latest Ref Range: 0.50 - 1.10 g/dL  0.60    Gamma grams/dl Latest Ref Range: 0.67 - 1.58 g/dL  0.80    Pathologist Interpretation SPE Unknown  REVIEWED    Pathologist Interpretation BOB Unknown  REVIEWED    Immunofix Interp. Unknown  SEE COMMENT    GBM Ab Latest Ref Range: <1.0 (Negative) U <0.2       Blood cultures 6/19/18: NGTD   Blood cultures 6/21/18: NGTD  Blood cultures 6/22/18: pending  Resp culture 6/19/18: Millie Albicans    Assessment/Plan:     Pulmonary-renal syndrome    ANCA associated vasculitis  64 yo w/PMH of HTN and NIDDM, OA admitted for acute hypoxic respiratory failure, renal failure, and pulmonary renal  syndrome receiving plasmapheresis. Rheumatology consulted for pulmonary renal syndrome, recs on steroids and further investigation.   - Given methylprednisolone 1000 mg IV daily x 5 doses   - Nephrology consulted: renal biopsy when stable  --> elevated UPCR 1.28, DENIZ positive 1:320 speckled pattern, MPO+ 82, c-ANCA +, p-anca neg,  SSA + 178, SSB neg, dsDNA neg, Anti-Sm/RNP neg, Anti-Sm neg, PR3 neg, Immunoglobulins wnl, Hep C and HIV neg, APL labs neg, GBM neg, MARTHA neg, C3/C4 wnl, ACE neg, quant gold indeterminate however T-spot negative.  - Concern for vasculitis given DAH and hematuria. The nini-typical pulmonary-renal syndromes are GPA/MPA, Goodpasture disease, SLE. Immunofluorescence studies can help differentiate. MPO+ can suggest MPA    TB-spot negative, negative drug screen    - Given methylprednisolone 1000 mg IV daily x 5 doses (completed on 6/14/18). Started on Solumedrol 125 mg q6h on 6/15/18.   - Rituximab 1 g IV given on 6/15/18. Next dose on 6/29/18.  - PLEX since 6/11/18    Patient febrile up to 102.6F on 6/19/18. Started on Vanc/zosyn. Cultures from 6/19/18 and 6/21/18 showing NGTD. Also concern for DIC given low fibrinogen and elevated aPPT. Seen by ID who recommended continuing broad spectrum antibiotics and re-culturing when patient febrile. Also seen by Hem/Onc who felt that coagulopathy secondary to PLEX. Recommended cryoprecipitate and FFP as needed.     6/22/18: Patient still with low grade fevers. Blood cultures from 6/19 and 6/21 still with NGTD. Patient remains on Vanc/Zosyn. Per ID patient should be continued on Vanc/Zosyn until 6/27/18. Given fluctuations in hemoglobin, critical care team performed BAL today. Rheumatology team present during BAL and no evidence of diffuse alveolar hemorrhage.     Plan:   - Decrease dose of Solumedrol 125 mg IV q6h to 125 mg q8h IV. Will consider lowering dose to q12h next week.  - Will need ID clearance prior to next dose of Rituximab which is scheduled  for 6/29/18.   - transfusion medicine: PLEX every other day   - Received first dose Rituximab 6/15/18 and now on Atovaquone. Repeat Rituximab in 2 weeks on 6/29/18 if no infection present at that time or if infection treated.                  Nohemi Gonsales MD  Rheumatology  [facility]    Patient seen and examined with fellow.  All elements of history, physical exam and medical decision making independently confirmed by me. Patient with pulmonary-renal syndrome with positive MPO and +c-ANCA.  Patient continues to have fever, increase in WBC and anemia.  Concern for sepsis and DIC. Patient on broad spectrum antibiotics.  BAL done today without blood.  Appreciate ID and heme/onc input. Decrease Solumedrol 125 mg q8 and consider further taper if now issues over the weekend.  Repeat Rituxan in 2 weeks (6/29/18) if no infection or infection treated.  See note for details.

## 2018-06-22 NOTE — PLAN OF CARE
Problem: Patient Care Overview  Goal: Plan of Care Review  Outcome: Ongoing (interventions implemented as appropriate)  VSS. Pt on cardene anf fentanyl gtt. Pt HR increased to 160-170's, Dr. Leonardo notified and ordered to restart precedex, precedex gtt at 0.2mcg/kg/h. Cardene at 5 mcg/h. CRRT UF at 500, pt tolerating well. Machine exchange at 11:46pm. 1 unit of cryo and 1 unit of PRBC given last night. Pt is awake, follows commands, nods, movement in all extremities noted. Plan of car reviewed, will continue to monitor.      Problem: Diabetes, Type 2 (Adult)  Goal: Signs and Symptoms of Listed Potential Problems Will be Absent, Minimized or Managed (Diabetes, Type 2)  Signs and symptoms of listed potential problems will be absent, minimized or managed by discharge/transition of care (reference Diabetes, Type 2 (Adult) CPG).   Outcome: Ongoing (interventions implemented as appropriate)  .      Problem: Fall Risk (Adult)  Goal: Identify Related Risk Factors and Signs and Symptoms  Related risk factors and signs and symptoms are identified upon initiation of Human Response Clinical Practice Guideline (CPG)   Outcome: Ongoing (interventions implemented as appropriate)  .    Problem: Pressure Ulcer Risk (Gilberto Scale) (Adult,Obstetrics,Pediatric)  Goal: Identify Related Risk Factors and Signs and Symptoms  Related risk factors and signs and symptoms are identified upon initiation of Human Response Clinical Practice Guideline (CPG)   Outcome: Ongoing (interventions implemented as appropriate)  .    Problem: Renal Replacement, Continuous (Adult)  Goal: Signs and Symptoms of Listed Potential Problems Will be Absent, Minimized or Managed (Renal Replacement, Continuous)  Signs and symptoms of listed potential problems will be absent, minimized or managed by discharge/transition of care (reference Renal Replacement, Continuous (Adult) CPG).   Outcome: Ongoing (interventions implemented as appropriate)  .    Problem: Airway,  Artificial (Adult)  Goal: Signs and Symptoms of Listed Potential Problems Will be Absent, Minimized or Managed (Airway, Artificial)  Signs and symptoms of listed potential problems will be absent, minimized or managed by discharge/transition of care (reference Airway, Artificial (Adult) CPG).   Outcome: Ongoing (interventions implemented as appropriate)  .

## 2018-06-22 NOTE — PROGRESS NOTES
[facility]  Infectious Disease  Progress Note    Patient Name: Jasmeet Raymond  MRN: [id number]  Admission Date: 6/10/2018  Length of Stay: 12 days  Attending Physician: Neto Wong MD  Primary Care Provider: Provider Notinsystem    Isolation Status: No active isolations  Assessment/Plan:      Septic shock    63 year old male with PMHx significant for HTN, DM and arthirtis on whom ID is consulted for possible infection. Infectious work up has been negative so far.    -karlee is resp culture is normal and does not warrant therapy  -all other cultures negative  -reculture if he is febrile again  -leukocytosis is likely elevated due to steroids   -he has been on 13 days of antibiotics total  -would continue vanco and zosyn until 6/27 and then stop unless source of infection is found                Thank you for your consult. I will follow-up with patient. Please contact us if you have any additional questions.    Peewee Antoine MD  Infectious Disease  [facility]    Subjective:     Principal Problem:Diffuse pulmonary alveolar hemorrhage    HPI: Patient is intubated. History is from primary team    63 year old male with PMHx significant for HTN, DM and arthirtis presented to [facility] with symptoms of pneumonia and shortness of breath. On 5/29/2018, he presented with symptoms of bronchitis with denies chills, ear pain, sore throat, chest pain, shortness of breath at that time, received antibiotics. Then on 6/8/2018 he presented again to [facility] with shortness of breath and cough, and his CXR showed concern for airway disease. However on the first day of admission at [facility], his respiratory symptoms worsen and had acute hypoxic respiratory failure that required intubation. Pulmonary consulted and he underwent Bronchoscopy and  revealed alveolar hemorrhage, with the  setting of having hematuria and diffuse alveloar hemorrhage the concern was for presence of pulmonary renal syndrome and he received pulsed dose of Methylprednisolone and nephrology consulted for plasmapheresis. However at [facility], plasmapheresis machine is broken and they recommend to transfer to [facility] [place] for plasmapheresis.      On arrival he was on Nibmex for paralytics, Propofol for sedation, Levophed (eventaully turned off) for pressure support, Flolan (Epoprostenol sodium) inhaled which was switched to Inhaled Nitro. His mechanical ventilation initially was on FiO2 100%, PEEP 16, then weaned his FiO2 with high requirement of oxygenation. He had an signifiacnt drop in his Hb (baseline ~ 12) dated one week prior presentation to 9 and 8, required one pRBC prior arrival. Repeated CXR showed extensive bilateral infiltrates. Important laboratory investigation showed His , , pro calcitonin 4.4, C3 and C4 complement normal., Trop negative, C3 and C4 showed normal , Hep test is negative, Lactic acid 1.8 > 1.0, HIV negative, Procal 4.4, UA shoowed heamturia and trace leukocytes, Urine culture from 5/29 showed almost pansensitve Cedecea davisae.      Hospital/ICU Course:  06/11/2018 admitted to MICU from [facility] with ARDS  06/12/2018 No acute events overnight. Had PLEX with 4.5 L exchange with FFP, and CRRT started and he became hypotensive, Levophed started during the CRRT. He was weaning off Nitric Oxide and his ABGs showing worsening respi acidosis. Rheumatology, Transfusion medicine and Nephrology on board   06/13/2018 Stable on High ladder PEEP for ARDS and completely off NO, and still on Nimbex for paralytics and sedation with Propofol and Fentanyl. ID, Rheumatology and Nephrology on board.   06/15/2018, Still on high setting   06/15/2018 Started Rituxan after his PLEx session, and decrease in his Methylprednisone. Still on high  PEEP ladder for ARDS, frequent clots in ETT suction   06/16/2018 Overnight, he had Rituxan induction for ANCA associated vasculitis and decrease his Methypredisone to 125 mg IV Q 6 Hours. Mild increase in his mechanical ventilation setting and he is grossly volume overload and still anuric.   06/17/2018 Palaryzed with Nimbex and better in his mechanical ventilation setting. Off Versed and on max Precedex and Profol. Increased in UF rate to 500 and better in his I/O. Repated CXR showed better air space.   06/18/2018 off paralytic. received 4th dose of plasma exchange today. Still anuric. Continue on dialysis.wean off sedation   06/19/2018 Patient remains intubated on A/C FiO2 55%/ PEEP 10. Patient on levo gtt titrated to keep MAP >65. Patient on precedex gtt at 1.34 mg/kg/hr and fentanyl gtt at 200 mcg/hr. Patient on CRRT, UF at 400mL/hr with citrate and calcium chloride infusions running per MD order. Tube feeds continue to be held, NG tube to low int. suction with roughly 200 mL of green output over the night. Patient's Tmax was 102.6 degrees F. patient placed on cooling blanket. Patient's H/H this morning is 6.5/20. One unit of pRBCs ordered.   Interval History: No events    Review of Systems   Unable to perform ROS: Intubated     Objective:     Vital Signs (Most Recent):  Temp: 99.7 °F (37.6 °C) (06/22/18 1130)  Pulse: (!) 133 (06/22/18 1200)  Resp: (!) 27 (06/21/18 1548)  BP: (!) 165/70 (06/22/18 0200)  SpO2: 98 % (06/22/18 1200) Vital Signs (24h Range):  Temp:  [83.7 °F (28.7 °C)-101.1 °F (38.4 °C)] 99.7 °F (37.6 °C)  Pulse:  [106-150] 133  Resp:  [27] 27  SpO2:  [89 %-98 %] 98 %  BP: (135-193)/(59-85) 165/70  Arterial Line BP: ()/(47-77) 163/71     Weight: 89 kg (196 lb 3.4 oz)  Body mass index is 29.83 kg/m².    Estimated Creatinine Clearance: 99.7 mL/min (based on SCr of 0.8 mg/dL).    Physical Exam   Constitutional: He appears well-developed and well-nourished.   Intubated FIO2 70 % , sedated.     HENT:   Head: Normocephalic.   Cardiovascular: Normal heart sounds.  Exam reveals no gallop and no friction rub.    No murmur heard.  Tachycardic, no murmur appreciated.   No JVD     Pulmonary/Chest:   Intubated. Clear anteriorly   Abdominal: Soft. He exhibits no distension.   Musculoskeletal: He exhibits no deformity. Edema: BUE, Blisters on left arm.    Neurological:   Intubated and sedated   Skin: Skin is warm and dry.   R SC trialysis   Nursing note and vitals reviewed.      Significant Labs: All pertinent labs within the past 24 hours have been reviewed.    Significant Imaging: I have reviewed all pertinent imaging results/findings within the past 24 hours.

## 2018-06-22 NOTE — PROGRESS NOTES
Dr. Wong and Dr. Fernández at bedside to perform bronchoscopy. Consents obtained, time out performed, propofol ordered. RN and RT to remain at bedside. Will continue to monitor closely.

## 2018-06-22 NOTE — SUBJECTIVE & OBJECTIVE
Interval History: Patient seen and examined at bedside. Still with low grade fevers. On CRRT and getting PLEX treatments. Remains intubated and sedated.    Current Facility-Administered Medications   Medication Frequency    0.9%  NaCl infusion (CRRT USE ONLY) Continuous    0.9%  NaCl infusion (CRRT USE ONLY) Continuous    0.9%  NaCl infusion (for blood administration) Q24H PRN    atovaquone suspension 750 mg Q12H    calcium chloride 1 g in dextrose 5 % 100 mL IVPB Continuous    chlorhexidine 0.12 % solution 15 mL BID    dexmedetomidine (PRECEDEX) 400mcg/100mL 0.9% NaCL infusion Continuous    dextrose 50% injection 12.5 g PRN    famotidine tablet 20 mg BID    fentaNYL 2500 mcg in 0.9% sodium chloride 250 mL infusion premix (titrating) Continuous    glucagon (human recombinant) injection 1 mg PRN    heparin, porcine (PF) 100 unit/mL injection flush 500 Units PRN    hepatitis B virus vacc.rec(PF) injection 40 mcg vaccine x 1 dose    hydrALAZINE injection 10 mg Q4H PRN    insulin aspart U-100 pen 1-10 Units Q4H PRN    labetalol injection 20 mg Q4H PRN    lorazepam injection 4 mg Q1H PRN    magnesium sulfate 2g in water 50mL IVPB (premix) PRN    magnesium sulfate 2g in water 50mL IVPB (premix) PRN    methylPREDNISolone sodium succinate injection 125 mg Q6H    metoclopramide HCl tablet 5 mg Q6H    niCARdipine 40 mg/200 mL infusion Continuous    piperacillin-tazobactam 4.5 g in sodium chloride 0.9% 100 mL IVPB (ready to mix system) Q8H    sodium chloride 0.9% flush 10 mL PRN    sodium phosphate 20.01 mmol in dextrose 5 % 250 mL IVPB PRN    sodium phosphate 20.01 mmol in dextrose 5 % 250 mL IVPB PRN    sodium phosphate 30 mmol in dextrose 5 % 250 mL IVPB PRN    sodium phosphate 30 mmol in dextrose 5 % 250 mL IVPB PRN    sodium phosphate 39.99 mmol in dextrose 5 % 250 mL IVPB PRN    sodium phosphate 39.99 mmol in dextrose 5 % 250 mL IVPB PRN    vancomycin (VANCOCIN) 3,000 mg in dextrose 5 % 500  mL IVPB Once    white petrolatum-mineral oil (LUBIFRESH P.M.) ophthalmic ointment Q8H     Objective:     Vital Signs (Most Recent):  Temp: 98.8 °F (37.1 °C) (06/22/18 0731)  Pulse: 106 (06/22/18 1015)  Resp: (!) 27 (06/21/18 1548)  BP: (!) 165/70 (06/22/18 0200)  SpO2: 95 % (06/22/18 1015)  O2 Device (Oxygen Therapy): ventilator (06/22/18 0900) Vital Signs (24h Range):  Temp:  [98.1 °F (36.7 °C)-101.1 °F (38.4 °C)] 98.8 °F (37.1 °C)  Pulse:  [106-150] 106  Resp:  [27] 27  SpO2:  [89 %-98 %] 95 %  BP: (129-193)/(59-85) 165/70  Arterial Line BP: ()/(47-77) 99/47     Weight: 89 kg (196 lb 3.4 oz) (06/22/18 0300)  Body mass index is 29.83 kg/m².  Body surface area is 2.07 meters squared.      Intake/Output Summary (Last 24 hours) at 06/22/18 1122  Last data filed at 06/22/18 1000   Gross per 24 hour   Intake             8699 ml   Output             8587 ml   Net              112 ml       Physical Exam   Vitals reviewed.  Constitutional:   intubated   HENT:   Head: Normocephalic and atraumatic.   Right Ear: External ear normal.   Left Ear: External ear normal.   Eyes: Pupils are equal, round, and reactive to light. Right eye exhibits no discharge. Left eye exhibits no discharge.   Neck: Neck supple. No JVD present.   Cardiovascular: Normal rate, regular rhythm and intact distal pulses.    No murmur heard.  Pulmonary/Chest: No respiratory distress. He has no wheezes.   Abdominal: Soft. He exhibits no distension. There is no tenderness.   Neurological:   sedated   Skin: Skin is warm. No rash noted. No erythema.     Psychiatric:   Unable to assess   Musculoskeletal: He exhibits no edema.   Diffuse soft tissue swelling of hands, feet, and knees    2-3 cm fluid blisters present on right arm         Significant Labs:  Results for FELIPE BERNAL (MRN [id number]) as of 6/22/2018 11:20   Ref. Range 6/22/2018 03:03   WBC Latest Ref Range: 3.90 - 12.70 K/uL 17.57 (H)   RBC Latest Ref Range: 4.60 - 6.20 M/uL 2.80 (L)    Hemoglobin Latest Ref Range: 14.0 - 18.0 g/dL 8.1 (L)   Hematocrit Latest Ref Range: 40.0 - 54.0 % 23.8 (L)   MCV Latest Ref Range: 82 - 98 fL 85   MCH Latest Ref Range: 27.0 - 31.0 pg 28.9   MCHC Latest Ref Range: 32.0 - 36.0 g/dL 34.0   RDW Latest Ref Range: 11.5 - 14.5 % 16.5 (H)   Platelets Latest Ref Range: 150 - 350 K/uL 98 (L)   MPV Latest Ref Range: 9.2 - 12.9 fL 12.1   Gran% Latest Ref Range: 38.0 - 73.0 % 95.7 (H)   Gran # (ANC) Latest Ref Range: 1.8 - 7.7 K/uL 16.8 (H)   Immature Granulocytes Latest Ref Range: 0.0 - 0.5 % 1.3 (H)   Immature Grans (Abs) Latest Ref Range: 0.00 - 0.04 K/uL 0.22 (H)   Lymph% Latest Ref Range: 18.0 - 48.0 % 1.5 (L)   Lymph # Latest Ref Range: 1.0 - 4.8 K/uL 0.3 (L)   Mono% Latest Ref Range: 4.0 - 15.0 % 1.4 (L)   Mono # Latest Ref Range: 0.3 - 1.0 K/uL 0.2 (L)   Eosinophil% Latest Ref Range: 0.0 - 8.0 % 0.0   Eos # Latest Ref Range: 0.0 - 0.5 K/uL 0.0   Basophil% Latest Ref Range: 0.0 - 1.9 % 0.1   Baso # Latest Ref Range: 0.00 - 0.20 K/uL 0.01   nRBC Latest Ref Range: 0 /100 WBC 0   Protime Latest Ref Range: 9.0 - 12.5 sec 14.8 (H)   Coumadin Monitoring INR Latest Ref Range: 0.8 - 1.2  1.5 (H)   aPTT Latest Ref Range: 21.0 - 32.0 sec 58.4 (H)   Fibrinogen Latest Ref Range: 182 - 366 mg/dL 111 (L)   Results for FELIPE BERNAL (MRN [id number]) as of 6/22/2018 11:20   Ref. Range 6/22/2018 03:03   Sodium Latest Ref Range: 136 - 145 mmol/L 138   Potassium Latest Ref Range: 3.5 - 5.1 mmol/L 4.2   Chloride Latest Ref Range: 95 - 110 mmol/L 108   CO2 Latest Ref Range: 23 - 29 mmol/L 20 (L)   Anion Gap Latest Ref Range: 8 - 16 mmol/L 10   BUN, Bld Latest Ref Range: 8 - 23 mg/dL 15   Creatinine Latest Ref Range: 0.5 - 1.4 mg/dL 0.8   eGFR if non African American Latest Ref Range: >60 mL/min/1.73 m^2 >60.0   eGFR if African American Latest Ref Range: >60 mL/min/1.73 m^2 >60.0   Glucose Latest Ref Range: 70 - 110 mg/dL 204 (H)   Calcium Latest Ref Range: 8.7 - 10.5 mg/dL 7.1 (L)    Alkaline Phosphatase Latest Ref Range: 55 - 135 U/L 45 (L)   Total Protein Latest Ref Range: 6.0 - 8.4 g/dL 4.5 (L)   Albumin Latest Ref Range: 3.5 - 5.2 g/dL 4.0   Total Bilirubin Latest Ref Range: 0.1 - 1.0 mg/dL 2.2 (H)   AST Latest Ref Range: 10 - 40 U/L 40   ALT Latest Ref Range: 10 - 44 U/L 14     Results for FELIPE BERNAL (MRN [id number]) as of 6/22/2018 11:20   Ref. Range 6/21/2018 05:15 6/21/2018 11:22 6/21/2018 18:37 6/22/2018 00:01 6/22/2018 03:03   Fibrinogen Latest Ref Range: 182 - 366 mg/dL 167 (L) 195 <70 (AA) 96 (LL) 111 (L)     Results for FELIPE BERNAL (MRN [id number]) as of 6/22/2018 11:20   Ref. Range 6/21/2018 05:15   Retic Latest Ref Range: 0.4 - 2.0 % 3.7 (H)     Results for FELIPE BERNAL (MRN [id number]) as of 6/19/2018 14:19   Ref. Range 6/19/2018 03:59 6/19/2018 06:43   Haptoglobin Latest Ref Range: 30 - 250 mg/dL <10 (L) <10 (L)     Results for FELIPE BERNAL (MRN [id number]) as of 6/21/2018 16:00   Ref. Range 6/19/2018 06:43 6/20/2018 15:34   LD Latest Ref Range: 110 - 260 U/L 683 (H) 435 (H)     Results for FELIPE BERNAL (MRN [id number]) as of 6/18/2018 10:33   Ref. Range 6/10/2018 22:49 6/10/2018 22:51   DENIZ HEP-2 Titer Unknown  Positive 1:320 Sp...   Anti-SSA Antibody Latest Ref Range: 0.00 - 19.99 EU  178.40 (H)   Anti-SSA Interpretation Latest Ref Range: Negative   Positive (A)   Anti-SSB Antibody Latest Ref Range: 0.00 - 19.99 EU  0.87   Anti-SSB Interpretation Latest Ref Range: Negative   Negative   ds DNA Ab Latest Ref Range: Negative 1:10   Negative 1:10   Anti Sm Antibody Latest Ref Range: 0.00 - 19.99 EU  0.60   Anti-Sm Interpretation Latest Ref Range: Negative   Negative   Anti Sm/RNP Antibody Latest Ref Range: 0.00 - 19.99 EU  2.83   Anti-Sm/RNP Interpretation Latest Ref Range: Negative   Negative   Cytoplasmic Neutrophilic Ab Latest Ref Range: <1:20 Titer 1:320 (A)    Perinuclear (P-ANCA) Latest Ref Range: <1:20 Titer <1:20    Results for FELIPE BERNAL (MRN [id number]) as of 6/18/2018  10:33   Ref. Range 6/11/2018 06:01 6/11/2018 08:10 6/14/2018 03:27   ANCA Proteinase 3 Latest Ref Range: <0.4 (Negative) U  <0.2    MPO Latest Ref Range: <=20 UNITS  82 (H)    Complement (C-3) Latest Ref Range: 50 - 180 mg/dL   78   Complement (C-4) Latest Ref Range: 11 - 44 mg/dL   13   Complement,Total, Serum Latest Ref Range: 42 - 95 U/mL   60   IgG - Serum Latest Ref Range: 650 - 1600 mg/dL  972    IgM Latest Ref Range: 50 - 300 mg/dL  57    IgA Latest Ref Range: 40 - 350 mg/dL  154    Protein, Serum Latest Ref Range: 6.0 - 8.4 g/dL  4.9 (L)    Albumin grams/dl Latest Ref Range: 3.35 - 5.55 g/dL  2.16 (L)    Alpha-1 grams/dl Latest Ref Range: 0.17 - 0.41 g/dL  0.63 (H)    Alpha-2 grams/dl Latest Ref Range: 0.43 - 0.99 g/dL  0.71    Beta grams/dl Latest Ref Range: 0.50 - 1.10 g/dL  0.60    Gamma grams/dl Latest Ref Range: 0.67 - 1.58 g/dL  0.80    Pathologist Interpretation SPE Unknown  REVIEWED    Pathologist Interpretation BOB Unknown  REVIEWED    Immunofix Interp. Unknown  SEE COMMENT    GBM Ab Latest Ref Range: <1.0 (Negative) U <0.2       Blood cultures 6/19/18: NGTD   Blood cultures 6/21/18: NGTD  Blood cultures 6/22/18: pending  Resp culture 6/19/18: Candida Albicans

## 2018-06-23 PROBLEM — E78.1 PURE HYPERGLYCERIDEMIA: Status: RESOLVED | Noted: 2018-01-01 | Resolved: 2018-01-01

## 2018-06-23 NOTE — PROGRESS NOTES
Dr. Leonardo called to bedside and notified that Pt O2 saturation in low 80's. STAT x-ray ordered. Vent settings adjusted. Pt now on 80% FiO2 and 14 of PEEP. VSS. Will continue to monitor.

## 2018-06-23 NOTE — PROGRESS NOTES
Critical care resident called and notified that levophed requirements have increased in past hour. Pt currently on levo @ 0.12 mcg/kg/min. Tube feeds held. VSS. Will continue to monitor.

## 2018-06-23 NOTE — PROGRESS NOTES
Critical care resident notified that pt temp is 102.6 F. Acetaminophen ordered. Pt on cooling blanket. VSS. Will continue to monitor.

## 2018-06-23 NOTE — NURSING
CRRT SLED restarted after return from CT scan with disinfected machine (see flow sheet for details).

## 2018-06-23 NOTE — PROGRESS NOTES
Dr. Stoll with [facility] notified of pt bradying to high 40s then back to 60 SR. BP stable on 0.04mcg/kg/min of levo but pt with very labile BPs.  At the same time pt clotted off on CRRT and was not able to get venous side back. Order for STAT CBC. Will carry out and continue to monitor closely.

## 2018-06-23 NOTE — PROGRESS NOTES
[facility]  Critical Care Medicine  Progress Note    Patient Name: Jasmeet Raymond  MRN: [id number]  Admission Date: 6/10/2018  Hospital Length of Stay: 13 days  Code Status: Full Code  Attending Provider: Neto Wong MD  Primary Care Provider: Provider Notinsystem   Principal Problem: <principal problem not specified>    Subjective:     HPI:  This is Mr. Jasmeet Raymond, 63 year old male with PMHx significant for HTN, DM and arthirtis presented to [facility] and [facility] with symptoms of pneumonia and shortness of breath. On 5/29/2018, he presented with symptoms of bronchitis with denies chills, ear pain, sore throat, chest pain, shortness of breath at that time, received antibiotics. Then on 6/8/2018 he presented again to [facility] and [facility] with shortness of breath and cough, and his CXR showed concern for airway disease. However on the first day of admission at [facility] and [facility], his respiratory symptoms worsen and had acute hypoxic respiratory failure that required intubation. Pulmonary consulted and he underwent Bronchoscopy and  revealed alveolar hemorrhage, with the setting of having hematuria and diffuse alveloar hemorrhage the concern was for presence of pulmonary renal syndrome and he received pulsed dose of Methylprednisolone and nephrology consulted for plasmapheresis. However at [facility], plasmapheresis machine is broken and they recommend to transfer to [facility] [place] for plasmapheresis.     On arrival he was on Nibmex for paralytics, Propofol for sedation, Levophed (eventaully turned off) for pressure support, Flolan (Epoprostenol sodium) inhaled which was switched to Inhaled Nitro. His mechanical ventilation initially was on FiO2 100%, PEEP 16, then weaned his FiO2 with high requirement of oxygenation. He had an signifiacnt drop in his Hb (baseline ~ 12) dated one week prior presentation  to 9 and 8, required one pRBC prior arrival. Repeated CXR showed extensive bilateral infiltrates. Important laboratory investigation showed His , , pro calcitonin 4.4, C3 and C4 complement normal., Trop negative, C3 and C4 showed normal , Hep test is negative, Lactic acid 1.8 > 1.0, HIV negative, Procal 4.4, UA shoowed heamturia and trace leukocytes, Urine culture from 5/29 showed almost pansensitve Cedecea davisae.     Hospital/ICU Course:  06/11/2018 admitted to MICU from [facility] [place] [facility] with ARDS  06/12/2018 No acute events overnight. Had PLEX with 4.5 L exchange with FFP, and CRRT started and he became hypotensive, Levophed started during the CRRT. He was weaning off Nitric Oxide and his ABGs showing worsening respi acidosis. Rheumatology, Transfusion medicine and Nephrology on board   06/13/2018 Stable on High ladder PEEP for ARDS and completely off NO, and still on Nimbex for paralytics and sedation with Propofol and Fentanyl. ID, Rheumatology and Nephrology on board.   06/15/2018, Still on high setting   06/15/2018 Started Rituxan after his PLEx session, and decrease in his Methylprednisone. Still on high PEEP ladder for ARDS, frequent clots in ETT suction   06/16/2018 Overnight, he had Rituxan induction for ANCA associated vasculitis and decrease his Methypredisone to 125 mg IV Q 6 Hours. Mild increase in his mechanical ventilation setting and he is grossly volume overload and still anuric.   06/17/2018 Palaryzed with Nimbex and better in his mechanical ventilation setting. Off Versed and on max Precedex and Profol. Increased in UF rate to 500 and better in his I/O. Repated CXR showed better air space.   06/18/2018 off paralytic. received 4th dose of plasma exchange today. Still anuric. Continue on dialysis.wean off sedation   06/19/2018 Patient remains intubated on A/C FiO2 55%/ PEEP 10. Patient on levo gtt titrated to keep MAP >65. Patient on precedex  gtt at 1.34 mg/kg/hr and fentanyl gtt at 200 mcg/hr. Patient on CRRT, UF at 400mL/hr with citrate and calcium chloride infusions running per MD order. Tube feeds continue to be held, NG tube to low int. suction with roughly 200 mL of green output over the night. Patient's Tmax was 102.6 degrees F. patient placed on cooling blanket. Patient's H/H this morning is 6.5/20. One unit of pRBCs ordered.   06/20-06/22 : continues to wean vent and pressor. Continues CRRT with increase fluid removal. He is on 500 cc/h UF. Start to follow commands and moves his head.  06/23/2018 spiked fever 102. Continues on vanc and Zosyn. Bcx NGTD. Increased Oxygen requirement to FiO2 80% and Levo increase to 0.12 mcg/kg/min. Continues on aggressive CRRT     Interval History/Significant Events:  spiked fever 102. Continues on vanc and Zosyn. Bcx NGTD. Increased Oxygen requirement to FiO2 80% and Levo increase to 0.12 mcg/kg/min. Continues on aggressive CRRT     Review of Systems   Unable to perform ROS: Intubated     Objective:     Vital Signs (Most Recent):  Temp: 98.6 °F (37 °C) (06/23/18 0700)  Pulse: 75 (06/23/18 0800)  Resp: (!) 22 (06/23/18 0747)  BP: (!) 115/59 (06/22/18 2215)  SpO2: (!) 92 % (06/23/18 0800) Vital Signs (24h Range):  Temp:  [83.7 °F (28.7 °C)-102.6 °F (39.2 °C)] 98.6 °F (37 °C)  Pulse:  [] 75  Resp:  [20-22] 22  SpO2:  [80 %-100 %] 92 %  BP: (115-135)/(59-87) 115/59  Arterial Line BP: ()/(44-79) 118/50   Weight: 89 kg (196 lb 3.4 oz)  Body mass index is 29.83 kg/m².      Intake/Output Summary (Last 24 hours) at 06/23/18 0824  Last data filed at 06/23/18 0800   Gross per 24 hour   Intake             8450 ml   Output            36417 ml   Net            -3123 ml       Physical Exam   Constitutional: No distress. He is sedated and intubated.   Intubated    Cardiovascular: Normal rate, regular rhythm, normal heart sounds and intact distal pulses.  Exam reveals no gallop and no friction rub.    No murmur  heard.  Pulmonary/Chest: Effort normal. He is intubated. No respiratory distress. He has no wheezes. He has no rhonchi. He has rales.   Abdominal: Soft. Bowel sounds are normal. He exhibits no distension. There is no tenderness.   Musculoskeletal: He exhibits edema (2+ LE).   Neurological:   [id number]   Skin: Skin is warm and dry. No rash noted. No erythema.   R SC trialysis   Nursing note and vitals reviewed.      Vents:  Vent Mode: A/C (06/23/18 0747)  Ventilator Initiated: Yes (06/10/18 2217)  Set Rate: 18 bmp (06/23/18 0747)  Vt Set: 650 mL (06/20/18 0917)  Pressure Support: 12 cmH20 (06/21/18 1347)  PEEP/CPAP: 14 cmH20 (06/23/18 0747)  Oxygen Concentration (%): 80 (06/23/18 0800)  Peak Airway Pressure: 27 cmH2O (06/23/18 0747)  Plateau Pressure: 51 cmH20 (06/23/18 0747)  Total Ve: 25.3 mL (06/23/18 0747)  F/VT Ratio<105 (RSBI): (!) 22.7 (06/23/18 0747)  Lines/Drains/Airways     Central Venous Catheter Line                 Hemodialysis Catheter 06/10/18 2307 right internal jugular 12 days         Percutaneous Central Line Insertion/Assessment - triple lumen  06/10/18 2305 left internal jugular 12 days          Drain                 NG/OG Tube 06/10/18 2309 [place] sump 18 Fr. Left nostril 12 days          Airway                 Airway - Non-Surgical Endotracheal Tube -- days          Arterial Line                 Arterial Line 06/10/18 2304 Right Radial 12 days          Pressure Ulcer                 Pressure Injury 06/20/18 0303 Right Buttocks Stage 2 3 days              Significant Labs:    CBC/Anemia Profile:    Recent Labs  Lab 06/22/18  1805 06/22/18  2359 06/23/18  0400   WBC 16.37* 15.71* 13.73*   HGB 8.3* 7.6* 7.7*   HCT 25.0* 22.4* 23.2*   * 108* 99*   MCV 85 83 84   RDW 17.4* 17.2* 17.2*        Chemistries:    Recent Labs  Lab 06/22/18  0303 06/22/18  1430 06/22/18  2151 06/23/18  0400     138 139 137 139  139   K 4.2  4.2 4.4 4.4 4.8  4.8     108 108 108 109  109   CO2 20*  20* 25  23 23  23   BUN 15  15 12 14 12  12   CREATININE 0.8  0.8 0.8 0.8 0.8  0.8   CALCIUM 7.1*  7.1* 7.8* 7.6* 8.0*  8.0*   ALBUMIN 4.0  4.0 3.5 3.2* 3.4*  3.4*   PROT 4.5*  --   --  5.1*   BILITOT 2.2*  --   --  1.9*   ALKPHOS 45*  --   --  88   ALT 14  --   --  25   AST 40  --   --  63*   MG 1.9 1.8 2.1 1.9   PHOS 2.6* 2.1* 2.4* 2.2*       All pertinent labs within the past 24 hours have been reviewed.    Significant Imaging:  CXR: I have reviewed all pertinent results/findings within the past 24 hours and my personal findings are:  No significant interval change    Assessment/Plan:     Pulmonary   Acute hypoxemic respiratory failure    - initially was due to DAH based on bronch finding on 6/11. Pt showed initial improvement after starting PLEX and Rituxin but then started to show increase in O2 requirement   - repeated Bronch on 6/22/18 showed no DAH   - last CXR showed bilateral basal opacification likely to be due to pulmonary edema vs ARDS?   - plan to repeat Echo and continue aggressive CRRT and fluid removal         ARDS (adult respiratory distress syndrome)    - Most likely etiology is Diffuse pulmonary alveolar hemorrhage, however severe pneumonia in the setting of leukocytosis and fever and presentation of cough and SOB can not be excluded  - On conservative tidal volume (6-7 ml/kg predicted body weight), balanced respiratory rate, and high oxygenation setting in the setting of DAH going with high PEEP ladder        Diffuse pulmonary alveolar hemorrhage    - Given his presentation with two weeks of progressive worsening of his SOB and cough, and associated with hematuria and CXR finding of bilateral infiltrate raised the concern for DAH  - On 6/10 he underwent Flexible Bronchoscopy and showed no endobronchial lesion, significant signs of hemorrhage on all of the airways, confirming findings of pulmonary hemorrhage.   - Requires on pRBC on 6/12, 6/15 and another unit on 6/17  - repeated bronchoscopy on  6/22/18 showed no DAH        Renal/   ALEX (acute kidney injury)    - Differential Diagnoses include Pulmonary renal syndrome.  - Underwent US of kidney and showed Bilateral medical renal disease, Simple renal cysts bilaterally.  - CT abdomen/pelvis, mild interstitial changes in lung bases, 2 stones in the left kidney, 4 stones in the right kidney, no hydronephrosis, post cholecystectomy. No acute abnormality in the liver, spleen, pancreas or adrenals.   - Will continue trending urine output and renal function, avoid NSAIDs, contrast, nephrotoxins    - Monitor strict I/Os, daily weights, renally dose medications to current GFR  - Still anuric and will continue CRRT  -  cc/h. Increased to 500 then 550 cc/h   - negative 3 L in last 24 hs   - once pt is stable, may consider renal biopsy         ID   Septic shock    - See principal problem for more elaboration.   - Currently on pressors, will continue to monitor and ensure MAP > 65 mmHg (during CRRT)  - continue on vacn and Zosyn  -Cultures (new) show NGTD  - ID on Board        Immunology/Multi System   ANCA-associated vasculitis    - See Pulmorenal syndrome for more elaboration         Pulmonary-renal syndrome    - Most likely the diagnosis in the setting of diffuse alveolar hemorrhage and glomerulonephritis with most likely etiology is underlying autoimmune disorder  - Elevated UPCR 1.28, DENIZ positive 1:320 speckled pattern, MPO+ 82, PR3 neg, Immunoglobulins wnl, Hep C and HIV neg  - Finished  5 days of Methylprednisolone 1000 mg IV daily, started on Rituxan (6/15) and Methylprednisone 125 mg IV Q 6 and Atovaquin for PPx   - received 4th dose of plasma exchange 06/18/18  - s/p 6 cycles of PLEX   - once pt is stable, may consider renal biopsy         Hematology   DIC (disseminated intravascular coagulation)    - Likely due to be secondary to plasma exchange but infection can not be ruled out  - received one unit of FFP and Cryo 06/20/18  - received one unite of  pRBC and Cryo  - check DIC lab q 6hs. Been stable   - improving, monitor           Thrombocytopenia, unspecified    - likely to be sepsis vs PLEX  induced   - Will continue to observe, currently continue to improve         Oncology   Acute posthemorrhagic anemia    - Most likely is diffuse pulmonary alveolar hemorrhage. Although sepsis induced Bone marrow suppression and hemolysis can not be ruled out  - Frequent check in his CBC, transfuse when Hb < 7  - transfuse one pRBC 06/19/18        Endocrine   Diabetes mellitus type 2 with complications    - Will keep his blood glucose on the target 140-180  - on Insulin infusion for Hyperglycemia and HyperTG   Recent Labs      06/19/18   0200  06/19/18   0306  06/19/18   0405  06/19/18   0505  06/19/18   0557  06/19/18   0559  06/19/18   0705  06/19/18   0814   POCTGLUCOSE  231*  224*  214*  219*  145*  129*  112*  138*               Critical Care Daily Checklist:     A: Awake: RASS Goal/Actual Goal: RASS Goal: 0-->alert and calm  Actual: Conteh Agitation Sedation Scale (RASS): Drowsy   B: Spontaneous Breathing Trial Performed?     C: SAT & SBT Coordinated?  Yes                      D: Delirium: CAM-ICU Overall CAM-ICU: Positive   E: Early Mobility Performed? Turning the pt q 2 hs   F: Feeding Goal: Goals: Patient to receive nutrition by RD follow-up  Status: Nutrition Goal Status: goal met         Current Diet Order   Procedures    Diet NPO. On Tube feed       AS: Analgesia/Sedation As above   T: Thromboembolic Prophylaxis Held in setting of active bleed   H: HOB > 300 Yes   U: Stress Ulcer Prophylaxis (if needed) Famotidine   G: Glucose Control As above   B: Bowel Function Stool Occurrence: 0   I: Indwelling Catheter (Lines & Liu) Necessity As above   D: De-escalation of Antimicrobials/Pharmacotherapies No     Plan for the day/ETD Increase fluid removal. Wean off sedation and Vent      Code Status:  Family/Goals of Care: Full Code            Critical secondary to  Patient has a condition that poses threat to life and bodily function: Acute Hypoxic Respiratory Failure        Bruce Larry MD  Critical Care Medicine  [facility]

## 2018-06-23 NOTE — ASSESSMENT & PLAN NOTE
- Most likely the diagnosis in the setting of diffuse alveolar hemorrhage and glomerulonephritis with most likely etiology is underlying autoimmune disorder  - Elevated UPCR 1.28, DENIZ positive 1:320 speckled pattern, MPO+ 82, PR3 neg, Immunoglobulins wnl, Hep C and HIV neg  - Finished  5 days of Methylprednisolone 1000 mg IV daily, started on Rituxan (6/15) and Methylprednisone 125 mg IV Q 6 and Atovaquin for PPx   - received 4th dose of plasma exchange 06/18/18  - s/p 6 cycles of PLEX   - once pt is stable, may consider renal biopsy

## 2018-06-23 NOTE — ASSESSMENT & PLAN NOTE
- Given his presentation with two weeks of progressive worsening of his SOB and cough, and associated with hematuria and CXR finding of bilateral infiltrate raised the concern for DAH  - On 6/10 he underwent Flexible Bronchoscopy and showed no endobronchial lesion, significant signs of hemorrhage on all of the airways, confirming findings of pulmonary hemorrhage.   - Requires on pRBC on 6/12, 6/15 and another unit on 6/17  - repeated bronchoscopy on 6/22/18 showed no DAH

## 2018-06-23 NOTE — ASSESSMENT & PLAN NOTE
- See principal problem for more elaboration.   - Currently on pressors, will continue to monitor and ensure MAP > 65 mmHg (during CRRT)  - continue on vacn and Zosyn  -Cultures (new) show NGTD  - ID on Board

## 2018-06-23 NOTE — ASSESSMENT & PLAN NOTE
- initially was due to DAH based on bronch finding on 6/11. Pt showed initial improvement after starting PLEX and Rituxin but then started to show increase in O2 requirement   - repeated Bronch on 6/22/18 showed no DAH   - last CXR showed bilateral basal opacification likely to be due to pulmonary edema vs ARDS?   - plan to repeat Echo and continue aggressive CRRT and fluid removal

## 2018-06-23 NOTE — PROGRESS NOTES
CRRT daily checks done. Water and machine checked, both within normal limits. Machine settings verified with prescription. UF rate at 550.

## 2018-06-23 NOTE — ASSESSMENT & PLAN NOTE
- Likely due to be secondary to plasma exchange but infection can not be ruled out  - received one unit of FFP and Cryo 06/20/18  - received one unite of pRBC and Cryo  - check DIC lab q 6hs. Been stable   - improving, monitor

## 2018-06-23 NOTE — PLAN OF CARE
Problem: Patient Care Overview  Goal: Plan of Care Review  Outcome: Ongoing (interventions implemented as appropriate)  POC reviewed with pt and spouse. Pt opens eyes to voice. Pt does not withdrawal on extremities X 4. Pt does not follow commands. Pt on levo @ .04 mcg/kg/min to keep MAP >65 (titrated per protocol), Precedex @ 1.4 mcg/kg/hr and fentanyl @ 300 mcg/hr for sedation and pain control. Pt on vent. Setting A/C 80% FiO2 and 14 of PEEP. Breath sounds course. Pt anuric and on CRRT. Pt had no bowel movements throughout shift. Pt on tube feed at 20 ml/hr. Pt had large blister on right forearm and stage 2 pressure injury on the sacrum. No new skin break down throughout shift. Pt VSS. Family to remain at bedside. See flow sheets for details. Will continue to monitor.

## 2018-06-23 NOTE — SUBJECTIVE & OBJECTIVE
Interval History/Significant Events:  spiked fever 102. Continues on vanc and Zosyn. Bcx NGTD. Increased Oxygen requirement to FiO2 80% and Levo increase to 0.12 mcg/kg/min. Continues on aggressive CRRT     Review of Systems   Unable to perform ROS: Intubated     Objective:     Vital Signs (Most Recent):  Temp: 98.6 °F (37 °C) (06/23/18 0700)  Pulse: 75 (06/23/18 0800)  Resp: (!) 22 (06/23/18 0747)  BP: (!) 115/59 (06/22/18 2215)  SpO2: (!) 92 % (06/23/18 0800) Vital Signs (24h Range):  Temp:  [83.7 °F (28.7 °C)-102.6 °F (39.2 °C)] 98.6 °F (37 °C)  Pulse:  [] 75  Resp:  [20-22] 22  SpO2:  [80 %-100 %] 92 %  BP: (115-135)/(59-87) 115/59  Arterial Line BP: ()/(44-79) 118/50   Weight: 89 kg (196 lb 3.4 oz)  Body mass index is 29.83 kg/m².      Intake/Output Summary (Last 24 hours) at 06/23/18 0824  Last data filed at 06/23/18 0800   Gross per 24 hour   Intake             8450 ml   Output            86958 ml   Net            -3123 ml       Physical Exam   Constitutional: No distress. He is sedated and intubated.   Intubated    Cardiovascular: Normal rate, regular rhythm, normal heart sounds and intact distal pulses.  Exam reveals no gallop and no friction rub.    No murmur heard.  Pulmonary/Chest: Effort normal. He is intubated. No respiratory distress. He has no wheezes. He has no rhonchi. He has rales.   Abdominal: Soft. Bowel sounds are normal. He exhibits no distension. There is no tenderness.   Musculoskeletal: He exhibits edema (2+ LE).   Neurological:   [id number]   Skin: Skin is warm and dry. No rash noted. No erythema.   R SC trialysis   Nursing note and vitals reviewed.      Vents:  Vent Mode: A/C (06/23/18 0747)  Ventilator Initiated: Yes (06/10/18 2217)  Set Rate: 18 bmp (06/23/18 0747)  Vt Set: 650 mL (06/20/18 0917)  Pressure Support: 12 cmH20 (06/21/18 1347)  PEEP/CPAP: 14 cmH20 (06/23/18 0747)  Oxygen Concentration (%): 80 (06/23/18 0800)  Peak Airway Pressure: 27 cmH2O (06/23/18 0747)  Plateau  Pressure: 51 cmH20 (06/23/18 0747)  Total Ve: 25.3 mL (06/23/18 0747)  F/VT Ratio<105 (RSBI): (!) 22.7 (06/23/18 0747)  Lines/Drains/Airways     Central Venous Catheter Line                 Hemodialysis Catheter 06/10/18 2307 right internal jugular 12 days         Percutaneous Central Line Insertion/Assessment - triple lumen  06/10/18 2305 left internal jugular 12 days          Drain                 NG/OG Tube 06/10/18 2309 [place] sump 18 Fr. Left nostril 12 days          Airway                 Airway - Non-Surgical Endotracheal Tube -- days          Arterial Line                 Arterial Line 06/10/18 2304 Right Radial 12 days          Pressure Ulcer                 Pressure Injury 06/20/18 0303 Right Buttocks Stage 2 3 days              Significant Labs:    CBC/Anemia Profile:    Recent Labs  Lab 06/22/18  1805 06/22/18  2359 06/23/18  0400   WBC 16.37* 15.71* 13.73*   HGB 8.3* 7.6* 7.7*   HCT 25.0* 22.4* 23.2*   * 108* 99*   MCV 85 83 84   RDW 17.4* 17.2* 17.2*        Chemistries:    Recent Labs  Lab 06/22/18  0303 06/22/18  1430 06/22/18  2151 06/23/18  0400     138 139 137 139  139   K 4.2  4.2 4.4 4.4 4.8  4.8     108 108 108 109  109   CO2 20*  20* 25 23 23  23   BUN 15  15 12 14 12  12   CREATININE 0.8  0.8 0.8 0.8 0.8  0.8   CALCIUM 7.1*  7.1* 7.8* 7.6* 8.0*  8.0*   ALBUMIN 4.0  4.0 3.5 3.2* 3.4*  3.4*   PROT 4.5*  --   --  5.1*   BILITOT 2.2*  --   --  1.9*   ALKPHOS 45*  --   --  88   ALT 14  --   --  25   AST 40  --   --  63*   MG 1.9 1.8 2.1 1.9   PHOS 2.6* 2.1* 2.4* 2.2*       All pertinent labs within the past 24 hours have been reviewed.    Significant Imaging:  CXR: I have reviewed all pertinent results/findings within the past 24 hours and my personal findings are:  No significant interval change

## 2018-06-23 NOTE — ASSESSMENT & PLAN NOTE
- likely to be sepsis vs PLEX  induced   - Will continue to observe, currently continue to improve

## 2018-06-24 NOTE — PLAN OF CARE
Problem: Patient Care Overview  Goal: Plan of Care Review  Pt with profound BP and HR lability today and not tolerating stimulation of any kind. Fentanyl and Precedex at max rates. Vent settings remain AC 80% Fi02 14 PEEP with marginal but adequate Sp02.  CTA of chest, Echo, BLE US all completed today. CRRT restarted s/p CTA. Pt afebrile. Not following commands but arousing to voice and SBP increasing to 200s while awake so low stimulation maintained throughout day as necessary for pt safety. MDs aware. Family updated on pt's clinical status. Will continue to monitor closely.

## 2018-06-24 NOTE — PROGRESS NOTES
Dr. Davies called and notified that pt tube feed residual was 160 cc at 0300 assessment. Tube feeds running at 20 cc/hr since 0000 on 6/24/18. Keeping tube feeds 2 20 ml/hr. Will not advance until next residual check. VSS. Will continue to monitor.

## 2018-06-24 NOTE — ASSESSMENT & PLAN NOTE
- initially etiology was Diffuse pulmonary alveolar hemorrhage, but improved after 6 cycles of PLEX and Rituxin   - on top of DAH but may has PNA and pulmonary edema.   - now, ARDS is in the differential.   - pt on low tidal volume Vent

## 2018-06-24 NOTE — ASSESSMENT & PLAN NOTE
- Differential Diagnoses include Pulmonary renal syndrome.  - Underwent US of kidney and showed Bilateral medical renal disease, Simple renal cysts bilaterally.  - CT abdomen/pelvis, mild interstitial changes in lung bases, 2 stones in the left kidney, 4 stones in the right kidney, no hydronephrosis, post cholecystectomy. No acute abnormality in the liver, spleen, pancreas or adrenals.   - Will continue trending urine output and renal function, avoid NSAIDs, contrast, nephrotoxins    - Monitor strict I/Os, daily weights, renally dose medications to current GFR  - Still anuric and will continue CRRT and adjust UF as tolerated   - once pt is stable, may consider renal biopsy

## 2018-06-24 NOTE — PROGRESS NOTES
[facility]  Nephrology  Progress Note    Patient Name: Jasmeet Raymond  MRN: [id number]  Admission Date: 6/10/2018  Hospital Length of Stay: 13 days  Attending Provider: Neto Wong MD   Primary Care Physician: Provider Notinsystem  Principal Problem:<principal problem not specified>    Subjective:     HPI: 62 yo AAm with HTN, NIDDM, arthritis on metformin and meloxicam  admitted to [facility] for worsening SOB and ALEX.   Patiend had been treated as an out;patient for 2 weeks for Shortness of breath and bronchitis and UTI. In the hospital he was found to be acidotic, hyperkalemic, serum creatinine 8, and diffuse bilateral pulmonary infiltrates, requiring intubation. UA with proteinuria, hematuria and wbc's, Bronchoscopy revealed hemorrahage. Patient recieved one dialysis as per notes and 1 gm solumedrol, and transferred  [place] [facility] [place]  And further evaluation.  Family is not available at the time of exam and patient is intubated.   HIV, Hepatitis B, C, complements  All within normal limits. DENIZ, ANCA and AntiGBM, results Nnot available    Interval History: Increase FIO 80% from 70 % this am.  Tolerating SLED net neg 3 lts. CVP 5     Review of patient's allergies indicates:   Allergen Reactions    Sulfa (sulfonamide antibiotics)      Current Facility-Administered Medications   Medication Frequency    0.9%  NaCl infusion (for blood administration) Q24H PRN    0.9%  NaCl infusion (for blood administration) Q24H PRN    albuterol-ipratropium 2.5 mg-0.5 mg/3 mL nebulizer solution 3 mL Q4H    atovaquone suspension 750 mg Q12H    calcium chloride 1 g in dextrose 5 % 100 mL IVPB Continuous    calcium chloride 1 g in dextrose 5 % 100 mL IVPB Continuous    chlorhexidine 0.12 % solution 15 mL BID    dexmedetomidine (PRECEDEX) 400mcg/100mL 0.9% NaCL infusion Continuous    dextrose 50% injection 12.5 g PRN    DEXTROSE-SOD CITRATE-CITRIC AC 2.45-2.2 GRAM- 730 MG/100 ML MISC SOLN PRN     DEXTROSE-SOD CITRATE-CITRIC AC 2.45-2.2 GRAM- 730 MG/100 ML MISC SOLN Continuous    famotidine tablet 20 mg BID    fentaNYL 2500 mcg in 0.9% sodium chloride 250 mL infusion premix (titrating) Continuous    glucagon (human recombinant) injection 1 mg PRN    heparin (porcine) injection 5,000 Units Q8H    heparin, porcine (PF) 100 unit/mL injection flush 500 Units PRN    hepatitis B virus vacc.rec(PF) injection 40 mcg vaccine x 1 dose    hydrALAZINE injection 10 mg Q4H PRN    insulin aspart U-100 pen 1-10 Units Q4H PRN    labetalol injection 20 mg Q4H PRN    lorazepam injection 4 mg Q1H PRN    magnesium sulfate 2g in water 50mL IVPB (premix) PRN    methylPREDNISolone sodium succinate injection 125 mg Q8H    metoclopramide HCl tablet 5 mg Q6H    niCARdipine 40 mg/200 mL infusion Continuous    norepinephrine 4 mg in dextrose 5% 250 mL infusion (premix) (titrating) Continuous    piperacillin-tazobactam 4.5 g in sodium chloride 0.9% 100 mL IVPB (ready to mix system) Q8H    propofol (DIPRIVAN) 10 mg/mL infusion Continuous    sodium chloride 0.9% flush 10 mL PRN    sodium phosphate 20.01 mmol in dextrose 5 % 250 mL IVPB PRN    sodium phosphate 30 mmol in dextrose 5 % 250 mL IVPB PRN    sodium phosphate 39.99 mmol in dextrose 5 % 250 mL IVPB PRN    white petrolatum-mineral oil (LUBIFRESH P.M.) ophthalmic ointment Q8H       Objective:     Vital Signs (Most Recent):  Temp: 99 °F (37.2 °C) (06/23/18 2100)  Pulse: 70 (06/23/18 2317)  Resp: (!) 24 (06/23/18 2317)  BP: (!) 115/59 (06/22/18 2215)  SpO2: 98 % (06/23/18 2317)  O2 Device (Oxygen Therapy): ventilator (06/23/18 2317) Vital Signs (24h Range):  Temp:  [98.4 °F (36.9 °C)-99.7 °F (37.6 °C)] 99 °F (37.2 °C)  Pulse:  [] 70  Resp:  [20-33] 24  SpO2:  [87 %-100 %] 98 %  Arterial Line BP: ()/(42-76) 167/61     Weight: 89 kg (196 lb 3.4 oz) (06/22/18 0300)  Body mass index is 29.83 kg/m².  Body surface area is 2.07 meters squared.    I/O last  3 completed shifts:  In: 51508 [I.V.:7114; NG/GT:730; IV Piggyback:3758]  Out: 57107 [Other:37973]    Physical Exam   Constitutional: He appears well-developed and well-nourished.   Intubated FIO2 70 % , sedated.    HENT:   Head: Normocephalic.   Cardiovascular: Normal heart sounds.  Exam reveals no gallop and no friction rub.    No murmur heard.  Tachycardic, no murmur appreciated.   No JVD     Pulmonary/Chest:   Intubated. Clear anteriorly   Abdominal: Soft. He exhibits no distension.   Musculoskeletal: He exhibits no deformity. Edema: BUE, Blisters on left arm.    Neurological:   Intubated and sedated   Skin: Skin is warm and dry.   R SC trialysis   Nursing note and vitals reviewed.      Significant Labs:  All labs within the past 24 hours have been reviewed.     Significant Imaging:  Labs: Reviewed    Assessment/Plan:     ALEX (acute kidney injury)    MPO-cANCA GPA, presenting with RPGN and pulmonary hemorrhage  S/p pulse steroids, now on scheduled dose 125mg IV solumedrol q6h, and PLEX 5/6, also has been started on rituxan 6/15 (repeat in 2 weeks 6/29), renal supportive therapy with RRT (SLED). Urine sediment showed RBC cast.  -Total hr intake 265 ml-TF, fent, precedex, ca cl, citrate   -s/p PLEX -treatment #6.     Plan:  - Will continue SLED for metabolic clearance and volume management. Decrease   - 400  ml attempting to keep slightly negative.   -Naphos IV prn  - RTF q 8 hrs                      Thank you for your consult. I will follow-up with patient. Please contact us if you have any additional questions.    Venkata De Oliveira MD  Nephrology  [facility]

## 2018-06-24 NOTE — SUBJECTIVE & OBJECTIVE
Interval History: Increase FIO 80% from 70 % this am.  Tolerating SLED net neg 3 lts. CVP 5     Review of patient's allergies indicates:   Allergen Reactions    Sulfa (sulfonamide antibiotics)      Current Facility-Administered Medications   Medication Frequency    0.9%  NaCl infusion (for blood administration) Q24H PRN    0.9%  NaCl infusion (for blood administration) Q24H PRN    albuterol-ipratropium 2.5 mg-0.5 mg/3 mL nebulizer solution 3 mL Q4H    atovaquone suspension 750 mg Q12H    calcium chloride 1 g in dextrose 5 % 100 mL IVPB Continuous    calcium chloride 1 g in dextrose 5 % 100 mL IVPB Continuous    chlorhexidine 0.12 % solution 15 mL BID    dexmedetomidine (PRECEDEX) 400mcg/100mL 0.9% NaCL infusion Continuous    dextrose 50% injection 12.5 g PRN    DEXTROSE-SOD CITRATE-CITRIC AC 2.45-2.2 GRAM- 730 MG/100 ML MISC SOLN PRN    DEXTROSE-SOD CITRATE-CITRIC AC 2.45-2.2 GRAM- 730 MG/100 ML MISC SOLN Continuous    famotidine tablet 20 mg BID    fentaNYL 2500 mcg in 0.9% sodium chloride 250 mL infusion premix (titrating) Continuous    glucagon (human recombinant) injection 1 mg PRN    heparin (porcine) injection 5,000 Units Q8H    heparin, porcine (PF) 100 unit/mL injection flush 500 Units PRN    hepatitis B virus vacc.rec(PF) injection 40 mcg vaccine x 1 dose    hydrALAZINE injection 10 mg Q4H PRN    insulin aspart U-100 pen 1-10 Units Q4H PRN    labetalol injection 20 mg Q4H PRN    lorazepam injection 4 mg Q1H PRN    magnesium sulfate 2g in water 50mL IVPB (premix) PRN    methylPREDNISolone sodium succinate injection 125 mg Q8H    metoclopramide HCl tablet 5 mg Q6H    niCARdipine 40 mg/200 mL infusion Continuous    norepinephrine 4 mg in dextrose 5% 250 mL infusion (premix) (titrating) Continuous    piperacillin-tazobactam 4.5 g in sodium chloride 0.9% 100 mL IVPB (ready to mix system) Q8H    propofol (DIPRIVAN) 10 mg/mL infusion Continuous    sodium chloride 0.9% flush 10 mL PRN     sodium phosphate 20.01 mmol in dextrose 5 % 250 mL IVPB PRN    sodium phosphate 30 mmol in dextrose 5 % 250 mL IVPB PRN    sodium phosphate 39.99 mmol in dextrose 5 % 250 mL IVPB PRN    white petrolatum-mineral oil (LUBIFRESH P.M.) ophthalmic ointment Q8H       Objective:     Vital Signs (Most Recent):  Temp: 99 °F (37.2 °C) (06/23/18 2100)  Pulse: 70 (06/23/18 2317)  Resp: (!) 24 (06/23/18 2317)  BP: (!) 115/59 (06/22/18 2215)  SpO2: 98 % (06/23/18 2317)  O2 Device (Oxygen Therapy): ventilator (06/23/18 2317) Vital Signs (24h Range):  Temp:  [98.4 °F (36.9 °C)-99.7 °F (37.6 °C)] 99 °F (37.2 °C)  Pulse:  [] 70  Resp:  [20-33] 24  SpO2:  [87 %-100 %] 98 %  Arterial Line BP: ()/(42-76) 167/61     Weight: 89 kg (196 lb 3.4 oz) (06/22/18 0300)  Body mass index is 29.83 kg/m².  Body surface area is 2.07 meters squared.    I/O last 3 completed shifts:  In: 68801 [I.V.:7114; NG/GT:730; IV Piggyback:3758]  Out: 79706 [Other:10040]    Physical Exam   Constitutional: He appears well-developed and well-nourished.   Intubated FIO2 70 % , sedated.    HENT:   Head: Normocephalic.   Cardiovascular: Normal heart sounds.  Exam reveals no gallop and no friction rub.    No murmur heard.  Tachycardic, no murmur appreciated.   No JVD     Pulmonary/Chest:   Intubated. Clear anteriorly   Abdominal: Soft. He exhibits no distension.   Musculoskeletal: He exhibits no deformity. Edema: BUE, Blisters on left arm.    Neurological:   Intubated and sedated   Skin: Skin is warm and dry.   R SC trialysis   Nursing note and vitals reviewed.      Significant Labs:  All labs within the past 24 hours have been reviewed.     Significant Imaging:  Labs: Reviewed

## 2018-06-24 NOTE — PROGRESS NOTES
ETT not moved and oral care not done with 1900 rounds due to pt BP being high and not tolerating stimulation of any kind. RN aware. Will check back later. Will continue to monitor pt closely.

## 2018-06-24 NOTE — PROGRESS NOTES
[facility]  Critical Care Medicine  Progress Note    Patient Name: Jasmeet Raymond  MRN: [id number]  Admission Date: 6/10/2018  Hospital Length of Stay: 14 days  Code Status: Full Code  Attending Provider: Neto Wong MD  Primary Care Provider: Provider Notinsystem   Principal Problem: <principal problem not specified>    Subjective:     HPI:  This is Mr. Jasmeet Raymond, 63 year old male with PMHx significant for HTN, DM and arthirtis presented to [facility] with symptoms of pneumonia and shortness of breath. On 5/29/2018, he presented with symptoms of bronchitis with denies chills, ear pain, sore throat, chest pain, shortness of breath at that time, received antibiotics. Then on 6/8/2018 he presented again to [facility] with shortness of breath and cough, and his CXR showed concern for airway disease. However on the first day of admission at [facility], his respiratory symptoms worsen and had acute hypoxic respiratory failure that required intubation. Pulmonary consulted and he underwent Bronchoscopy and  revealed alveolar hemorrhage, with the setting of having hematuria and diffuse alveloar hemorrhage the concern was for presence of pulmonary renal syndrome and he received pulsed dose of Methylprednisolone and nephrology consulted for plasmapheresis. However at [facility], plasmapheresis machine is broken and they recommend to transfer to [facility] [place] for plasmapheresis.     On arrival he was on Nibmex for paralytics, Propofol for sedation, Levophed (eventaully turned off) for pressure support, Flolan (Epoprostenol sodium) inhaled which was switched to Inhaled Nitro. His mechanical ventilation initially was on FiO2 100%, PEEP 16, then weaned his FiO2 with high requirement of oxygenation. He had an signifiacnt drop in his Hb (baseline ~ 12) dated one week prior presentation  to 9 and 8, required one pRBC prior arrival. Repeated CXR showed extensive bilateral infiltrates. Important laboratory investigation showed His , , pro calcitonin 4.4, C3 and C4 complement normal., Trop negative, C3 and C4 showed normal , Hep test is negative, Lactic acid 1.8 > 1.0, HIV negative, Procal 4.4, UA shoowed heamturia and trace leukocytes, Urine culture from 5/29 showed almost pansensitve Cedecea davisae.     Hospital/ICU Course:  06/11/2018 admitted to MICU from [facility] with ARDS  06/12/2018 No acute events overnight. Had PLEX with 4.5 L exchange with FFP, and CRRT started and he became hypotensive, Levophed started during the CRRT. He was weaning off Nitric Oxide and his ABGs showing worsening respi acidosis. Rheumatology, Transfusion medicine and Nephrology on board   06/13/2018 Stable on High ladder PEEP for ARDS and completely off NO, and still on Nimbex for paralytics and sedation with Propofol and Fentanyl. ID, Rheumatology and Nephrology on board.   06/15/2018, Still on high setting   06/15/2018 Started Rituxan after his PLEx session, and decrease in his Methylprednisone. Still on high PEEP ladder for ARDS, frequent clots in ETT suction   06/16/2018 Overnight, he had Rituxan induction for ANCA associated vasculitis and decrease his Methypredisone to 125 mg IV Q 6 Hours. Mild increase in his mechanical ventilation setting and he is grossly volume overload and still anuric.   06/17/2018 Palaryzed with Nimbex and better in his mechanical ventilation setting. Off Versed and on max Precedex and Profol. Increased in UF rate to 500 and better in his I/O. Repated CXR showed better air space.   06/18/2018 off paralytic. received 4th dose of plasma exchange today. Still anuric. Continue on dialysis.wean off sedation   06/19/2018 Patient remains intubated on A/C FiO2 55%/ PEEP 10. Patient on levo gtt titrated to keep MAP >65. Patient on precedex  gtt at 1.34 mg/kg/hr and fentanyl gtt at 200 mcg/hr. Patient on CRRT, UF at 400mL/hr with citrate and calcium chloride infusions running per MD order. Tube feeds continue to be held, NG tube to low int. suction with roughly 200 mL of green output over the night. Patient's Tmax was 102.6 degrees F. patient placed on cooling blanket. Patient's H/H this morning is 6.5/20. One unit of pRBCs ordered.   06/20-06/22 : continues to wean vent and pressor. Continues CRRT with increase fluid removal. He is on 500 cc/h UF. Start to follow commands and moves his head.  06/23/2018 spiked fever 102. Continues on vanc and Zosyn. Bcx NGTD. Increased Oxygen requirement to FiO2 80% and Levo increase to 0.12 mcg/kg/min. Continues on aggressive CRRT   06/24/2018 continues on Vent on same setting. Started on Propofol gtt. Continue on Fentanyl and try to wean off Precedex. Changed on pressure control last night     Interval History/Significant Events:  continues on Vent on same setting. Started on Propofol gtt. Continue on Fentanyl and try to wean off Precedex. Changed on pressure control last night     Review of Systems   Unable to perform ROS: Intubated     Objective:     Vital Signs (Most Recent):  Temp: 97.4 °F (36.3 °C) (06/24/18 0700)  Pulse: 64 (06/24/18 0855)  Resp: (!) 26 (06/24/18 0718)  BP: (!) 115/59 (06/22/18 2215)  SpO2: (!) 92 % (06/24/18 0855) Vital Signs (24h Range):  Temp:  [97.4 °F (36.3 °C)-99.7 °F (37.6 °C)] 97.4 °F (36.3 °C)  Pulse:  [] 64  Resp:  [20-33] 26  SpO2:  [87 %-98 %] 92 %  Arterial Line BP: ()/(42-76) 113/46   Weight: 89 kg (196 lb 3.4 oz)  Body mass index is 29.83 kg/m².      Intake/Output Summary (Last 24 hours) at 06/24/18 0918  Last data filed at 06/24/18 0800   Gross per 24 hour   Intake          6869.33 ml   Output             2847 ml   Net          4022.33 ml       Physical Exam   Constitutional: No distress. He is sedated and intubated.   Intubated    Cardiovascular: Normal rate,  regular rhythm, normal heart sounds and intact distal pulses.  Exam reveals no gallop and no friction rub.    No murmur heard.  Pulmonary/Chest: Effort normal. He is intubated. No respiratory distress. He has no wheezes. He has no rhonchi. He has rales.   Abdominal: Soft. Bowel sounds are normal. He exhibits no distension. There is no tenderness.   Musculoskeletal: He exhibits edema (2+ LE).   Neurological:   [id number]   Skin: Skin is warm and dry. No rash noted. No erythema.   R SC trialysis   Nursing note and vitals reviewed.      Vents:  Vent Mode: A/C (06/24/18 0855)  Ventilator Initiated: Yes (06/10/18 2217)  Set Rate: 20 bmp (06/24/18 0855)  Vt Set: 450 mL (06/24/18 0855)  Pressure Support: 12 cmH20 (06/21/18 1347)  PEEP/CPAP: 14 cmH20 (06/24/18 0855)  Oxygen Concentration (%): 80 (06/24/18 0855)  Peak Airway Pressure: 47 cmH2O (06/24/18 0855)  Plateau Pressure: 30 cmH20 (06/24/18 0855)  Total Ve: 15.1 mL (06/24/18 0855)  F/VT Ratio<105 (RSBI): (!) 46.93 (06/24/18 0718)  Lines/Drains/Airways     Central Venous Catheter Line                 Hemodialysis Catheter 06/10/18 2307 right internal jugular 13 days         Percutaneous Central Line Insertion/Assessment - triple lumen  06/10/18 2305 left internal jugular 13 days          Drain                 NG/OG Tube 06/10/18 2309 Raphine sump 18 Fr. Left nostril 13 days          Airway                 Airway - Non-Surgical Endotracheal Tube -- days          Arterial Line                 Arterial Line 06/10/18 2304 Right Radial 13 days          Pressure Ulcer                 Pressure Injury 06/20/18 0303 Right Buttocks Stage 2 4 days              Significant Labs:    CBC/Anemia Profile:    Recent Labs  Lab 06/23/18  1750 06/23/18  2356 06/24/18  0106   WBC 6.81 7.63 6.64   HGB 6.3* 8.4* 8.3*   HCT 18.7* 24.0* 24.1*   PLT 71* 77* 84*   MCV 85 84 85   RDW 17.2* 16.3* 16.7*        Chemistries:    Recent Labs  Lab 06/23/18  0400 06/23/18  1430 06/23/18  2150 06/24/18  0106      139 134* 136 137  137   K 4.8  4.8 4.9 4.9 5.1  5.1     109 106 105 107  107   CO2 23  23 20* 22* 20*  20*   BUN 12  12 25* 20 29*  29*   CREATININE 0.8  0.8 1.3 1.1 1.3  1.3   CALCIUM 8.0*  8.0* 7.3* 7.6* 7.5*  7.5*   ALBUMIN 3.4*  3.4* 2.7* 2.9* 2.5*  2.5*   PROT 5.1*  --   --  4.5*   BILITOT 1.9*  --   --  1.3*   ALKPHOS 88  --   --  121   ALT 25  --   --  35   AST 63*  --   --  74*   MG 1.9 2.4 2.2 2.1   PHOS 2.2* 4.2 2.6* 4.5       All pertinent labs within the past 24 hours have been reviewed.    Significant Imaging:  CXR: I have reviewed all pertinent results/findings within the past 24 hours and my personal findings are:  No significant interval change    Assessment/Plan:     Pulmonary   Acute hypoxemic respiratory failure    - initially was due to DAH based on bronch finding on 6/11. Pt showed initial improvement after starting PLEX and Rituxin but then started to show increase in O2 requirement   - repeated Bronch on 6/22/18 showed no DAH   - last CXR showed bilateral basal opacification likely to be due to pulmonary edema vs ARDS?   - continue aggressive CRRT and fluid removal. + 3 L in last 24 hs ?  - CTA negative for PE but showed Pneumomediastinum. This likely to be barotrauma. CTS evaluated pt and said not surgical intervention needed at this time  - repeated Echo with normal EF   - at this point, his hypoxia could be due to pulmonary edema but ARDS in the differential         ARDS (adult respiratory distress syndrome)    - initially etiology was Diffuse pulmonary alveolar hemorrhage, but improved after 6 cycles of PLEX and Rituxin   - on top of DAH but may has PNA and pulmonary edema.   - now, ARDS is in the differential.   - pt on low tidal volume Vent        Diffuse pulmonary alveolar hemorrhage    - Given his presentation with two weeks of progressive worsening of his SOB and cough, and associated with hematuria and CXR finding of bilateral infiltrate raised the  concern for DAH  - On 6/10 he underwent Flexible Bronchoscopy and showed no endobronchial lesion, significant signs of hemorrhage on all of the airways, confirming findings of pulmonary hemorrhage.   - Requires on pRBC on 6/12, 6/15 and another unit on 6/17  - repeated bronchoscopy on 6/22/18 showed no DAH        Renal/   ALEX (acute kidney injury)    - Differential Diagnoses include Pulmonary renal syndrome.  - Underwent US of kidney and showed Bilateral medical renal disease, Simple renal cysts bilaterally.  - CT abdomen/pelvis, mild interstitial changes in lung bases, 2 stones in the left kidney, 4 stones in the right kidney, no hydronephrosis, post cholecystectomy. No acute abnormality in the liver, spleen, pancreas or adrenals.   - Will continue trending urine output and renal function, avoid NSAIDs, contrast, nephrotoxins    - Monitor strict I/Os, daily weights, renally dose medications to current GFR  - Still anuric and will continue CRRT and adjust UF as tolerated   - once pt is stable, may consider renal biopsy         ID   Septic shock    - See principal problem for more elaboration.   - Currently on pressors, will continue to monitor and ensure MAP > 65 mmHg (during CRRT)  - continue on vacn and Zosyn  -Cultures (new) show NGTD  - ID on Board        Immunology/Multi System   ANCA-associated vasculitis    - See Pulmorenal syndrome for more elaboration         Pulmonary-renal syndrome    - Most likely the diagnosis in the setting of diffuse alveolar hemorrhage and glomerulonephritis with most likely etiology is underlying autoimmune disorder  - Elevated UPCR 1.28, DENIZ positive 1:320 speckled pattern, MPO+ 82, PR3 neg, Immunoglobulins wnl, Hep C and HIV neg  - Finished  5 days of Methylprednisolone 1000 mg IV daily, started on Rituxan (6/15) and Methylprednisone 125 mg IV Q 6 and Atovaquin for PPx   - received 4th dose of plasma exchange 06/18/18  - s/p 6 cycles of PLEX   - once pt is stable, may consider  renal biopsy         Hematology   DIC (disseminated intravascular coagulation)    - Likely due to be secondary to plasma exchange but infection can not be ruled out  - received one unit of FFP and Cryo 06/20/18  - received one unite of pRBC and Cryo  - improving, monitor           Thrombocytopenia, unspecified    - likely to be sepsis vs PLEX  induced   - Will continue to observe, currently continue to improve         Oncology   Acute posthemorrhagic anemia    - Most likely is diffuse pulmonary alveolar hemorrhage. Although sepsis induced Bone marrow suppression and hemolysis can not be ruled out  - Frequent check in his CBC, transfuse when Hb < 7  - transfuse one pRBC 06/19/18        Endocrine   Diabetes mellitus type 2 with complications    - Will keep his blood glucose on the target 140-180  - on Insulin infusion for Hyperglycemia and HyperTG   Recent Labs      06/23/18   0002  06/23/18   0404  06/23/18   0739  06/23/18   1201  06/23/18   2043  06/23/18   2359  06/24/18   0405  06/24/18   0742   POCTGLUCOSE  207*  142*  212*  276*  329*  242*  243*  197*                     Critical Care Daily Checklist:     A: Awake: RASS Goal/Actual Goal: RASS Goal: 0-->alert and calm  Actual: Conteh Agitation Sedation Scale (RASS): Drowsy   B: Spontaneous Breathing Trial Performed?     C: SAT & SBT Coordinated?  no   D: Delirium: CAM-ICU Overall CAM-ICU: Positive   E: Early Mobility Performed? Turning the pt q 2 hs   F: Feeding Goal: Goals: Patient to receive nutrition by RD follow-up  Status: Nutrition Goal Status: goal met         Current Diet Order   Procedures    Diet NPO. On Tube feed       AS: Analgesia/Sedation As above   T: Thromboembolic Prophylaxis Heparin   H: HOB > 300 Yes   U: Stress Ulcer Prophylaxis (if needed) Famotidine   G: Glucose Control As above   B: Bowel Function Stool Occurrence: 0   I: Indwelling Catheter (Lines & Liu) Necessity As above   D: De-escalation of Antimicrobials/Pharmacotherapies No      Plan for the day/ETD Increase fluid removal.      Code Status:  Family/Goals of Care: Full Code            Critical secondary to Patient has a condition that poses threat to life and bodily function: Acute Hypoxic      Bruce Larry MD  Critical Care Medicine  [facility]

## 2018-06-24 NOTE — ASSESSMENT & PLAN NOTE
- Likely due to be secondary to plasma exchange but infection can not be ruled out  - received one unit of FFP and Cryo 06/20/18  - received one unite of pRBC and Cryo  - improving, monitor

## 2018-06-24 NOTE — ASSESSMENT & PLAN NOTE
- Will keep his blood glucose on the target 140-180  - on Insulin infusion for Hyperglycemia and HyperTG   Recent Labs      06/23/18   0002  06/23/18   0404  06/23/18   0739  06/23/18   1201  06/23/18   2043  06/23/18   2359  06/24/18   0405  06/24/18   0742   POCTGLUCOSE  207*  142*  212*  276*  329*  242*  243*  197*

## 2018-06-24 NOTE — CONSULTS
[facility]  General Surgery  Consult Note    Inpatient consult to Cardiothoracic Surgery  Consult performed by: MIREYA RICHEY  Consult ordered by: SHIRLEY BHATTI  Reason for consult: Pneumomediastinum  Assessment/Recommendations: Likely 2/2 barotrauma in setting of ARDS and PPV.   No surgical intervention available.   Could consider change to volume control or VC+ to control patient's tidal volumes better as they are not within ARDS net protocol and the likely cause of his barotrauma.  Patient likely has some tracheomalacia which should be taken into consideration if tracheostomy is planned in the future.         Subjective:     Chief Complaint/Reason for Admission: Pneumomediastinum    History of Present Illness: Pt transferred from [place] with pneumonia, diffuse alveolar hemorrhage, pulmonary renal syndrome, ARDS. He has not improved his ventilator settings and remains in ARDS. Bronchoscopy performed yesterday, unrevealing. We are consulted after CT performed today showed anterior pneumomediastinum. Nursing and respiratory report needing to inflate cuff multiple times per day. Peak pressures on vent currently high 20's; unable to obtain plateau. Patient is not sedated. Also started on CRRT this admission. Levo on yesterday, off today. Does not move extremities.     He is currently on pressure control with tidal volumes very high 800-1000.     No current facility-administered medications on file prior to encounter.      No current outpatient prescriptions on file prior to encounter.       Review of patient's allergies indicates:   Allergen Reactions    Sulfa (sulfonamide antibiotics)        No past medical history on file.  No past surgical history on file.  Family History     None        Social History Main Topics    Smoking status: Not on file    Smokeless tobacco: Not on file    Alcohol use Not on file    Drug use: Unknown    Sexual activity: Not on file     Review of  Systems   Unable to perform ROS: Intubated     Objective:     Vital Signs (Most Recent):  Temp: 99.7 °F (37.6 °C) (06/23/18 1500)  Pulse: 98 (06/23/18 2054)  Resp: (!) 33 (06/23/18 1935)  BP: (!) 115/59 (06/22/18 2215)  SpO2: (!) 89 % (06/23/18 2054) Vital Signs (24h Range):  Temp:  [98.4 °F (36.9 °C)-102.6 °F (39.2 °C)] 99.7 °F (37.6 °C)  Pulse:  [] 98  Resp:  [20-33] 33  SpO2:  [80 %-100 %] 89 %  BP: (115)/(59) 115/59  Arterial Line BP: ()/(44-71) 146/48     Weight: 89 kg (196 lb 3.4 oz)  Body mass index is 29.83 kg/m².      Intake/Output Summary (Last 24 hours) at 06/23/18 2057  Last data filed at 06/23/18 1920   Gross per 24 hour   Intake             7376 ml   Output             6942 ml   Net              434 ml       Physical Exam   Constitutional: He appears well-developed and well-nourished. No distress.   Cardiovascular: Normal rate and regular rhythm.  Exam reveals no gallop and no friction rub.    No murmur heard.  Pulmonary/Chest:   Very coarse. No crepitus.   Abdominal: Soft. He exhibits no distension. There is no tenderness.   Neurological:   Opens eyes and shoulder shrugs a bit when asked to move his extremities.    Skin: Skin is warm and dry.   Psychiatric: He has a normal mood and affect. His behavior is normal.       Significant Labs:  CBC:   Recent Labs  Lab 06/23/18  1750   WBC 6.81   RBC 2.19*   HGB 6.3*   HCT 18.7*   PLT 71*   MCV 85   MCH 28.8   MCHC 33.7     CMP:   Recent Labs  Lab 06/23/18  0400 06/23/18  1430   *  170* 342*   CALCIUM 8.0*  8.0* 7.3*   ALBUMIN 3.4*  3.4* 2.7*   PROT 5.1*  --      139 134*   K 4.8  4.8 4.9   CO2 23  23 20*     109 106   BUN 12  12 25*   CREATININE 0.8  0.8 1.3   ALKPHOS 88  --    ALT 25  --    AST 63*  --    BILITOT 1.9*  --        Significant Diagnostics:  CT: I have reviewed all pertinent results/findings within the past 24 hours. ARDS, pneumomediastinum anteriorly.     Assessment/Plan:     Active Diagnoses:     Diagnosis Date Noted POA    DIC (disseminated intravascular coagulation) [D65] 06/21/2018 Yes    Acute renal failure with pathological lesion in kidney [N17.8]  Yes    Thrombocytopenia, unspecified [D69.6] 06/15/2018 No    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] 06/14/2018 Yes    Acute posthemorrhagic anemia [D62] 06/11/2018 Yes    Acute hypoxemic respiratory failure [J96.01] 06/11/2018 Yes    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] 06/10/2018 Yes    Septic shock [A41.9, R65.21] 06/10/2018 Yes    ARDS (adult respiratory distress syndrome) [J80] 06/10/2018 Yes    Diabetes mellitus type 2 with complications [E11.8] 06/10/2018 Yes    ALEX (acute kidney injury) [N17.9] 06/10/2018 Yes    Diffuse pulmonary alveolar hemorrhage [R04.2] 06/10/2018 Yes      Problems Resolved During this Admission:    Diagnosis Date Noted Date Resolved POA    Pure hyperglyceridemia [E78.1] 06/15/2018 06/23/2018 No    Severe sepsis with septic shock (CODE) [R65.21]  06/19/2018 Yes       Thank you for your consult. I will follow-up with patient. Please contact us if you have any additional questions.    Liliya Lee MD  General Surgery  [facility]

## 2018-06-24 NOTE — PROGRESS NOTES
Dr. Davies called and notified that pt has been off CRRT for 4 hours. Pt MAPs in the 80s with no vasopressors. O2 saturation >95%. CRRT will be restart at 0400. Dialysis notified. VSS. Will continue to monitor.

## 2018-06-24 NOTE — ASSESSMENT & PLAN NOTE
MPO-cANCA GPA, presenting with RPGN and pulmonary hemorrhage  S/p pulse steroids, now on scheduled dose 125mg IV solumedrol q6h, and PLEX 5/6, also has been started on rituxan 6/15 (repeat in 2 weeks 6/29), renal supportive therapy with RRT (SLED). Urine sediment showed RBC cast.  -Total hr intake 265 ml-TF, fent, precedex, ca cl, citrate   -s/p PLEX -treatment #6.     Plan:  - Will continue SLED for metabolic clearance and volume management. Decrease   - 400  ml attempting to keep slightly negative.   -Naphos IV prn  - RTF q 8 hrs

## 2018-06-24 NOTE — PROGRESS NOTES
Pt CRRT clotted off. Pt rinsed back per dialysis. Dr. Forde notified. Pt will be kept off of CRRT for 4 hours. Will reassess and restart at 0400 if needed per Dr. Forde. VSS. Will continue to monitor.

## 2018-06-24 NOTE — SUBJECTIVE & OBJECTIVE
Interval History/Significant Events:  continues on Vent on same setting. Started on Propofol gtt. Continue on Fentanyl and try to wean off Precedex. Changed on pressure control last night     Review of Systems   Unable to perform ROS: Intubated     Objective:     Vital Signs (Most Recent):  Temp: 97.4 °F (36.3 °C) (06/24/18 0700)  Pulse: 64 (06/24/18 0855)  Resp: (!) 26 (06/24/18 0718)  BP: (!) 115/59 (06/22/18 2215)  SpO2: (!) 92 % (06/24/18 0855) Vital Signs (24h Range):  Temp:  [97.4 °F (36.3 °C)-99.7 °F (37.6 °C)] 97.4 °F (36.3 °C)  Pulse:  [] 64  Resp:  [20-33] 26  SpO2:  [87 %-98 %] 92 %  Arterial Line BP: ()/(42-76) 113/46   Weight: 89 kg (196 lb 3.4 oz)  Body mass index is 29.83 kg/m².      Intake/Output Summary (Last 24 hours) at 06/24/18 0918  Last data filed at 06/24/18 0800   Gross per 24 hour   Intake          6869.33 ml   Output             2847 ml   Net          4022.33 ml       Physical Exam   Constitutional: No distress. He is sedated and intubated.   Intubated    Cardiovascular: Normal rate, regular rhythm, normal heart sounds and intact distal pulses.  Exam reveals no gallop and no friction rub.    No murmur heard.  Pulmonary/Chest: Effort normal. He is intubated. No respiratory distress. He has no wheezes. He has no rhonchi. He has rales.   Abdominal: Soft. Bowel sounds are normal. He exhibits no distension. There is no tenderness.   Musculoskeletal: He exhibits edema (2+ LE).   Neurological:   [id number]   Skin: Skin is warm and dry. No rash noted. No erythema.   R SC trialysis   Nursing note and vitals reviewed.      Vents:  Vent Mode: A/C (06/24/18 0855)  Ventilator Initiated: Yes (06/10/18 2217)  Set Rate: 20 bmp (06/24/18 0855)  Vt Set: 450 mL (06/24/18 0855)  Pressure Support: 12 cmH20 (06/21/18 1347)  PEEP/CPAP: 14 cmH20 (06/24/18 0855)  Oxygen Concentration (%): 80 (06/24/18 0855)  Peak Airway Pressure: 47 cmH2O (06/24/18 0855)  Plateau Pressure: 30 cmH20 (06/24/18 0855)  Total  Ve: 15.1 mL (06/24/18 0855)  F/VT Ratio<105 (RSBI): (!) 46.93 (06/24/18 0718)  Lines/Drains/Airways     Central Venous Catheter Line                 Hemodialysis Catheter 06/10/18 2307 right internal jugular 13 days         Percutaneous Central Line Insertion/Assessment - triple lumen  06/10/18 2305 left internal jugular 13 days          Drain                 NG/OG Tube 06/10/18 2309 [place] sump 18 Fr. Left nostril 13 days          Airway                 Airway - Non-Surgical Endotracheal Tube -- days          Arterial Line                 Arterial Line 06/10/18 2304 Right Radial 13 days          Pressure Ulcer                 Pressure Injury 06/20/18 0303 Right Buttocks Stage 2 4 days              Significant Labs:    CBC/Anemia Profile:    Recent Labs  Lab 06/23/18  1750 06/23/18  2356 06/24/18  0106   WBC 6.81 7.63 6.64   HGB 6.3* 8.4* 8.3*   HCT 18.7* 24.0* 24.1*   PLT 71* 77* 84*   MCV 85 84 85   RDW 17.2* 16.3* 16.7*        Chemistries:    Recent Labs  Lab 06/23/18  0400 06/23/18  1430 06/23/18  2150 06/24/18  0106     139 134* 136 137  137   K 4.8  4.8 4.9 4.9 5.1  5.1     109 106 105 107  107   CO2 23  23 20* 22* 20*  20*   BUN 12  12 25* 20 29*  29*   CREATININE 0.8  0.8 1.3 1.1 1.3  1.3   CALCIUM 8.0*  8.0* 7.3* 7.6* 7.5*  7.5*   ALBUMIN 3.4*  3.4* 2.7* 2.9* 2.5*  2.5*   PROT 5.1*  --   --  4.5*   BILITOT 1.9*  --   --  1.3*   ALKPHOS 88  --   --  121   ALT 25  --   --  35   AST 63*  --   --  74*   MG 1.9 2.4 2.2 2.1   PHOS 2.2* 4.2 2.6* 4.5       All pertinent labs within the past 24 hours have been reviewed.    Significant Imaging:  CXR: I have reviewed all pertinent results/findings within the past 24 hours and my personal findings are:  No significant interval change

## 2018-06-24 NOTE — ASSESSMENT & PLAN NOTE
- initially was due to DAH based on bronch finding on 6/11. Pt showed initial improvement after starting PLEX and Rituxin but then started to show increase in O2 requirement   - repeated Bronch on 6/22/18 showed no DAH   - last CXR showed bilateral basal opacification likely to be due to pulmonary edema vs ARDS?   - continue aggressive CRRT and fluid removal. + 3 L in last 24 hs ?  - CTA negative for PE but showed Pneumomediastinum. This likely to be barotrauma. CTS evaluated pt and said not surgical intervention needed at this time  - repeated Echo with normal EF   - at this point, his hypoxia could be due to pulmonary edema but ARDS in the differential

## 2018-06-24 NOTE — PLAN OF CARE
Problem: Patient Care Overview  Goal: Plan of Care Review  Outcome: Ongoing (interventions implemented as appropriate)  POC reviewed with pt and spouse. Pt opens eyes to voice. Pt does not withdrawal on extremities X 4. Pt does not follow commands. Pt on levo @ .02 mcg/kg/min to keep MAP >65 (titrated per protocol), Precedex @ 1.4 mcg/kg/hr, Propofol @ 30 mcg/kg/min, and fentanyl @ 300 mcg/hr for sedation and pain control. Pt on vent. Setting A/C 90% FiO2 and 14 of PEEP. Breath sounds course. Pt anuric and on CRRT. Pt had no bowel movements throughout shift. Pt on tube feed at 20 ml/hr. Pt has and stage 2 pressure injury on the sacrum. No new skin break down throughout shift. Pt VSS. Family to remain at bedside. See flow sheets for details. Will continue to monitor.

## 2018-06-25 PROBLEM — D65 DIC (DISSEMINATED INTRAVASCULAR COAGULATION): Status: RESOLVED | Noted: 2018-01-01 | Resolved: 2018-01-01

## 2018-06-25 PROBLEM — D62 ACUTE POSTHEMORRHAGIC ANEMIA: Status: RESOLVED | Noted: 2018-01-01 | Resolved: 2018-01-01

## 2018-06-25 NOTE — PLAN OF CARE
Problem: Patient Care Overview  Goal: Plan of Care Review  Outcome: Ongoing (interventions implemented as appropriate)  POC reviewed with pt, spouse and daughter. Pt paralyzed this am. Nimbex currently @ 2mcg/kg/min, titrated for a TOF response 2/4, BIS in place. Propofol @ 50 mcg/kg/min, versed @ 2 mh/hr, and fentanyl @ 300 mcg/hr for sedation and pain control. Pt does not withdrawal on extremities X 4 prior to starting paralytic. Pt does not follow commands. Pt with variable levo requirements, currently @ 0.16 mcg/kg/min to keep MAP >65 (increased to 16mg/250ml bag), Pt bronched this afternoon- removed biofilm from end of ET tube. Decision made to place new ET tube. Vent settings changed this am to A/C 80% FiO2 and 12 of PEEP with RR 28. Peak pressures remain in the 60s. Pt anuric and on CRRT. Nephrology with UF goals of 400-500 as tolerated, goals to keep 1-2L net negative, Bicarb 40 on CRRT. TF remain paused this shift per MD due to increasing pressor requirements. Family to remain at bedside, plans for family conference tomorrow when all children arrive per spouse. See flow sheets for full assessment details. Will continue to monitor closely.

## 2018-06-25 NOTE — PROGRESS NOTES
Audible cuff leak heard at pt bedside. Dr. William called to bedside. Respiratory at bedside. 2 ml of air added with no improvement to leak. STAT chest x-ray done. Tube advanced to 30 cm at the lip. Second chest X-ray done. Cuff inflated. Cuff leak not audiable. VSS. Will continue to monitor.

## 2018-06-25 NOTE — PLAN OF CARE
Problem: Patient Care Overview  Goal: Plan of Care Review  Outcome: Ongoing (interventions implemented as appropriate)  POC reviewed with pt and spouse. Pt does not withdrawal on extremities X 4. Pt does not follow commands. Pt on levo @ .2 mcg/kg/min to keep MAP >65 (titrated per protocol), Precedex @ 1.4 mcg/kg/hr, Propofol @ 30 mcg/kg/min, versed @ 1 mh/hr, and fentanyl @ 300 mcg/hr for sedation and pain control. Pt on vent. Setting A/C 100% FiO2 and 18 of PEEP. Breath sounds course. Pt anuric and on CRRT. Pt had no bowel movements throughout shift. Pt has and stage 2 pressure injury on the sacrum. No new skin break down throughout shift. Pt VSS. Family to remain at bedside. See flow sheets for details. Will continue to monitor.

## 2018-06-25 NOTE — PROGRESS NOTES
CRRT NOTES    Daily checks done. Negative chlorine/bleach test on ro machine. On Citrate 200ml/hr and Calcium gluc 15ml/hr. Lines secured and visible, alarms engaged. Machine settings as per CRRT orders. See flow sheet for details.

## 2018-06-25 NOTE — PROGRESS NOTES
Call per IC nursing staff reporting ABG. Patient with respiratory acidosis and maximal ventilatory  Support. Case discussed with Dr William CC fellow and agree to support with alkaline therapy. Goal Ph per CC is 7.2. Bicabonate bath increased to 35 and 2 hrs late PH was 7.2 down from 7.25 and Bicarb bath increased to 40.   Venkata De Oliveira MD  Nephrology Fellow   [phone]

## 2018-06-25 NOTE — SUBJECTIVE & OBJECTIVE
Interval History: Intubated, sedated and paralyzed. Afebrile.    6/25/18: no fevers overnight, pt now on paralytics due to resp status. Still on Vanc + zosyn    6/23/18 febrile 102. Blood cx NGTD.     6/22/18: Patient still with low grade fevers. Blood cultures from 6/19 and 6/21 still with NGTD. Patient remains on Vanc/Zosyn. Per ID patient should be continued on Vanc/Zosyn until 6/27/18. Given fluctuations in hemoglobin, critical care team performed BAL which showed no evidence of diffuse alveolar hemorrhage.      Patient febrile up to 102.6F on 6/19/18. Started on Vanc/zosyn. Cultures from 6/19/18 and 6/21/18 showing NGTD. resp cx : candida. . Seen by ID who recommended continuing broad spectrum antibiotics and re-culturing when patient febrile.             Current Facility-Administered Medications   Medication Frequency    0.9%  NaCl infusion (for blood administration) Q24H PRN    0.9%  NaCl infusion (for blood administration) Q24H PRN    albuterol-ipratropium 2.5 mg-0.5 mg/3 mL nebulizer solution 3 mL Q4H    atovaquone suspension 750 mg Q12H    calcium chloride 1 g in dextrose 5 % 100 mL IVPB Continuous    calcium chloride 1 g in dextrose 5 % 100 mL IVPB Continuous    chlorhexidine 0.12 % solution 15 mL BID    cisatracurium (NIMBEX) 100 mg in dextrose 5 % 100 mL infusion Continuous    dexmedetomidine (PRECEDEX) 400mcg/100mL 0.9% NaCL infusion Continuous    dextrose 50% injection 12.5 g PRN    DEXTROSE-SOD CITRATE-CITRIC AC 2.45-2.2 GRAM- 730 MG/100 ML MISC SOLN PRN    DEXTROSE-SOD CITRATE-CITRIC AC 2.45-2.2 GRAM- 730 MG/100 ML MISC SOLN Continuous    famotidine tablet 20 mg BID    fentaNYL 2500 mcg in 0.9% sodium chloride 250 mL infusion premix (titrating) Continuous    glucagon (human recombinant) injection 1 mg PRN    heparin (porcine) injection 5,000 Units Q8H    heparin, porcine (PF) 100 unit/mL injection flush 500 Units PRN    hepatitis B virus vacc.rec(PF) injection 40 mcg vaccine x 1  dose    hydrALAZINE injection 10 mg Q4H PRN    insulin aspart U-100 pen 1-10 Units Q4H PRN    labetalol injection 20 mg Q4H PRN    lorazepam injection 4 mg Q1H PRN    methylPREDNISolone sodium succinate injection 125 mg Q8H    midazolam (VERSED) 1 mg/mL in sodium chloride 0.9% 100 mL infusion (titrating) Continuous    norepinephrine 16 mg in dextrose 5 % 250 mL infusion Continuous    norepinephrine 4 mg in dextrose 5% 250 mL infusion (premix) (titrating) Continuous    piperacillin-tazobactam 4.5 g in sodium chloride 0.9% 100 mL IVPB (ready to mix system) Q8H    propofol (DIPRIVAN) 10 mg/mL infusion Continuous    sodium chloride 0.9% flush 10 mL PRN    white petrolatum-mineral oil (LUBIFRESH P.M.) ophthalmic ointment Q8H     Objective:     Vital Signs (Most Recent):  Temp: 97.5 °F (36.4 °C) (06/25/18 1100)  Pulse: (!) 131 (06/25/18 1815)  Resp: (!) 28 (06/25/18 1536)  BP: (!) 115/59 (06/22/18 2215)  SpO2: (!) 90 % (06/25/18 1815)  O2 Device (Oxygen Therapy): ventilator (06/25/18 1536) Vital Signs (24h Range):  Temp:  [97.4 °F (36.3 °C)-99.2 °F (37.3 °C)] 97.5 °F (36.4 °C)  Pulse:  [] 131  Resp:  [25-33] 28  SpO2:  [55 %-99 %] 90 %  Arterial Line BP: ()/(41-78) 102/49     Weight: 89 kg (196 lb 3.4 oz) (06/22/18 0300)  Body mass index is 29.83 kg/m².  Body surface area is 2.07 meters squared.      Intake/Output Summary (Last 24 hours) at 06/25/18 1819  Last data filed at 06/25/18 1800   Gross per 24 hour   Intake             8646 ml   Output             9349 ml   Net             -703 ml       Physical Exam   Constitutional:   intubated   HENT:   Poor dentition   Cardiovascular: Regular rhythm.    tachycardic   Pulmonary/Chest:   Coarse breath sounds   Abdominal: Soft. Bowel sounds are normal.   Lymphadenopathy:     He has no cervical adenopathy.   Neurological:   Intubated and sedated   Skin: Skin is warm and dry. No rash noted.     Musculoskeletal: He exhibits edema. He exhibits no tenderness.    B/l knee effusion, no increased warmth, or erythema.         Significant Labs:  CBC:   Recent Labs  Lab 06/25/18  0800 06/25/18  1800   WBC 9.31 15.45*   HGB 9.1* 10.9*   HCT 25.9* 32.2*   PLT 83* 117*     CMP:   Recent Labs  Lab 06/25/18  0306 06/25/18  1400   *  165* 198*  198*   CALCIUM 7.6*  7.6* 7.6*  7.6*   ALBUMIN 2.8*  2.8* 2.7*  2.7*   PROT 6.4  --      137 131*  131*   K 5.2*  5.2* 5.2*  5.2*   CO2 20*  20* 22*  22*     107 99  99   BUN 9  9 7*  7*   CREATININE 0.7  0.7 0.8  0.8   ALKPHOS 200*  --    ALT 40  --    AST 95*  --    BILITOT 1.1*  --      Liver Function Test:   Recent Labs  Lab 06/24/18  2140 06/25/18  0306 06/25/18  1400   ALT  --  40  --    AST  --  95*  --    ALKPHOS  --  200*  --    BILITOT  --  1.1*  --    PROT  --  6.4  --    ALBUMIN 2.5* 2.8*  2.8* 2.7*  2.7*     Results for FELIPE BERNAL (MRN [id number]) as of 6/25/2018 18:23   Ref. Range 6/10/2018 22:49 6/10/2018 22:51 6/11/2018 08:10   DENIZ HEP-2 Titer Unknown  Positive 1:320 Sp...    Anti-SSA Antibody Latest Ref Range: 0.00 - 19.99 EU  178.40 (H)    Anti-SSA Interpretation Latest Ref Range: Negative   Positive (A)    Anti-SSB Antibody Latest Ref Range: 0.00 - 19.99 EU  0.87    Anti-SSB Interpretation Latest Ref Range: Negative   Negative    ds DNA Ab Latest Ref Range: Negative 1:10   Negative 1:10    Anti Sm Antibody Latest Ref Range: 0.00 - 19.99 EU  0.60    Anti-Sm Interpretation Latest Ref Range: Negative   Negative    Anti Sm/RNP Antibody Latest Ref Range: 0.00 - 19.99 EU  2.83    Anti-Sm/RNP Interpretation Latest Ref Range: Negative   Negative    Cytoplasmic Neutrophilic Ab Latest Ref Range: <1:20 Titer 1:320 (A)     Perinuclear (P-ANCA) Latest Ref Range: <1:20 Titer <1:20     ANCA Proteinase 3 Latest Ref Range: <0.4 (Negative) U   <0.2   MPO Latest Ref Range: <=20 UNITS   82 (H)       Results for FELIPE BERNAL (MRN [id number]) as of 6/25/2018 18:23   Ref. Range 6/14/2018 03:27   Complement  (C-3) Latest Ref Range: 50 - 180 mg/dL 78   Complement (C-4) Latest Ref Range: 11 - 44 mg/dL 13   Complement,Total, Serum Latest Ref Range: 42 - 95 U/mL 60     Results for FELIPE BERNAL (MRN [id number]) as of 6/25/2018 18:23   Ref. Range 6/11/2018 08:10   IgG - Serum Latest Ref Range: 650 - 1600 mg/dL 972   IgM Latest Ref Range: 50 - 300 mg/dL 57   IgA Latest Ref Range: 40 - 350 mg/dL 154     Results for FELIPE BERNAL (MRN [id number]) as of 6/25/2018 18:23   Ref. Range 6/12/2018 04:00   Cryoglobulin, Qualitative Latest Ref Range: Absent  Absent     Results for FELIPE BERNAL (MRN [id number]) as of 6/25/2018 18:23   Ref. Range 6/11/2018 06:01   GBM Ab Latest Ref Range: <1.0 (Negative) U <0.2     Results for FELIPE BERNAL (MRN [id number]) as of 6/25/2018 18:23   Ref. Range 6/11/2018 08:10   Hep B Core Total Ab Unknown Negative   Hep B S Ab Unknown Positive (A)   Hepatitis B Surface Ag Unknown Negative     Results for FELIPE BERNAL (MRN [id number]) as of 6/25/2018 18:23   Ref. Range 6/11/2018 08:10   Hepatitis C Ab Unknown Negative     Results for FELIPE BERNAL (MRN [id number]) as of 6/25/2018 18:23   Ref. Range 6/11/2018 08:10   HIV 1/2 Ag/Ab Latest Ref Range: Negative  Negative     Results for FELIPE BERNAL (MRN [id number]) as of 6/25/2018 18:23   Ref. Range 6/13/2018 04:50   RPR Latest Ref Range: Non-reactive  Non-reactive     Results for FELIPE BERNAL (MRN [id number]) as of 6/25/2018 18:23   Ref. Range 6/11/2018 01:00   Specimen UA Unknown Urine, Catheterized   Color, UA Latest Ref Range: Yellow, Straw, Betty  Yellow   pH, UA Latest Ref Range: 5.0 - 8.0  5.0   Specific Gravity, UA Latest Ref Range: 1.005 - 1.030  1.010   Appearance, UA Latest Ref Range: Clear  Cloudy (A)   Protein, UA Latest Ref Range: Negative  2+ (A)   Glucose, UA Latest Ref Range: Negative  3+ (A)   Ketones, UA Latest Ref Range: Negative  Negative   Occult Blood UA Latest Ref Range: Negative  3+ (A)   Nitrite, UA Latest Ref Range: Negative  Negative    Urobilinogen, UA Latest Ref Range: <2.0 EU/dL Negative   Bilirubin (UA) Latest Ref Range: Negative  Negative   Leukocytes, UA Latest Ref Range: Negative  3+ (A)   RBC, UA Latest Ref Range: 0 - 4 /hpf 41 (H)   WBC, UA Latest Ref Range: 0 - 5 /hpf 38 (H)   Bacteria, UA Latest Ref Range: None-Occ /hpf Many (A)   Yeast, UA Latest Ref Range: None  None   Squam Epithel, UA Latest Units: /hpf 5   Hyaline Casts, UA Latest Ref Range: 0-1/lpf /lpf 7 (A)   Microscopic Comment Unknown SEE COMMENT     Results for FELIPE BERNAL (MRN [id number]) as of 6/25/2018 18:23   Ref. Range 6/11/2018 08:44   Prot/Creat Ratio, Ur Latest Ref Range: 0.00 - 0.20  1.28 (H)     Results for FELIPE BERNAL (MRN [id number]) as of 6/25/2018 18:23   Ref. Range 6/12/2018 04:00   APA Isotype IgG Latest Ref Range: 0.00 - 14.99 GPL <9.40   APA Isotype IgM Latest Ref Range: 0.00 - 12.49 MPL <9.40   Beta-2 Glyco 1 IgG Latest Ref Range: <=20 SGU <9   Beta-2 Glyco 1 IgM Latest Ref Range: <=20 SMU <9   Beta-2 Glyco 1 IgA Latest Ref Range: <=20 SOPHIE <9   DRVVT, Lupus Anticoagulant Latest Ref Range: Negative  Negative       BOB 06/11/18  Electronically reviewed and signed by:   Darya Sanchez MD   Signed on 06/12/18 at 14:23   No monoclonal peaks identified.     SPEP 06/11/18  Electronically reviewed and signed by:   Darya Sanchez MD   Signed on 06/12/18 at 14:23   Decreased total protein.   No paraprotein bands identified.     Significant Imaging:  Imaging results within the past 24 hours have been reviewed.

## 2018-06-25 NOTE — PLAN OF CARE
Patient remains intubated, plan for possible repeat bronch today.  Nephrology following, patient remains on CRRT.  ID following.  Family meeting this evening.  Patient remains inappropriate for disposition planning. CM will continue to follow.     06/25/18 4957   Right Care Assessment   Can the patient answer the patient profile reliably? No, cognitively impaired   How often would a person be available to care for the patient? Often   Describe the patient's ability to walk at the present time. Does not walk or unable to take any steps at all   How does the patient rate their overall health at the present time? (ROMA)   Number of comorbid conditions (as recorded on the chart) Three   During the past month, has the patient often been bothered by feeling down, depressed or hopeless? (ROMA)   During the past month, has the patient often been bothered by little interest or pleasure in doing things? (ROMA)   Zahira Nava RN, BSN, [facility]  Case Management  [facility]  Ext. [phone]

## 2018-06-25 NOTE — SUBJECTIVE & OBJECTIVE
Interval History: CVP 4 this am, CXR with significant fluid noted. Increase FIO 70 % this am. Clotted today Net positive balance 3.5 lts. Of vasopressors.     Review of patient's allergies indicates:   Allergen Reactions    Sulfa (sulfonamide antibiotics)      Current Facility-Administered Medications   Medication Frequency    0.9%  NaCl infusion (for blood administration) Q24H PRN    0.9%  NaCl infusion (for blood administration) Q24H PRN    albuterol-ipratropium 2.5 mg-0.5 mg/3 mL nebulizer solution 3 mL Q4H    atovaquone suspension 750 mg Q12H    calcium chloride 1 g in dextrose 5 % 100 mL IVPB Continuous    calcium chloride 1 g in dextrose 5 % 100 mL IVPB Continuous    chlorhexidine 0.12 % solution 15 mL BID    dexmedetomidine (PRECEDEX) 400mcg/100mL 0.9% NaCL infusion Continuous    dextrose 50% injection 12.5 g PRN    DEXTROSE-SOD CITRATE-CITRIC AC 2.45-2.2 GRAM- 730 MG/100 ML MISC SOLN PRN    DEXTROSE-SOD CITRATE-CITRIC AC 2.45-2.2 GRAM- 730 MG/100 ML MISC SOLN Continuous    famotidine tablet 20 mg BID    fentaNYL 2500 mcg in 0.9% sodium chloride 250 mL infusion premix (titrating) Continuous    glucagon (human recombinant) injection 1 mg PRN    heparin (porcine) injection 5,000 Units Q8H    heparin, porcine (PF) 100 unit/mL injection flush 500 Units PRN    hepatitis B virus vacc.rec(PF) injection 40 mcg vaccine x 1 dose    hydrALAZINE injection 10 mg Q4H PRN    insulin aspart U-100 pen 1-10 Units Q4H PRN    labetalol injection 20 mg Q4H PRN    lorazepam injection 4 mg Q1H PRN    magnesium sulfate 2g in water 50mL IVPB (premix) PRN    methylPREDNISolone sodium succinate injection 125 mg Q8H    metoclopramide HCl tablet 5 mg Q6H    midazolam (VERSED) 1 mg/mL in sodium chloride 0.9% 100 mL infusion (titrating) Continuous    niCARdipine 40 mg/200 mL infusion Continuous    norepinephrine 4 mg in dextrose 5% 250 mL infusion (premix) (titrating) Continuous    piperacillin-tazobactam 4.5 g  in sodium chloride 0.9% 100 mL IVPB (ready to mix system) Q8H    propofol (DIPRIVAN) 10 mg/mL infusion Continuous    sodium chloride 0.9% flush 10 mL PRN    sodium phosphate 20.01 mmol in dextrose 5 % 250 mL IVPB PRN    sodium phosphate 30 mmol in dextrose 5 % 250 mL IVPB PRN    sodium phosphate 39.99 mmol in dextrose 5 % 250 mL IVPB PRN    white petrolatum-mineral oil (LUBIFRESH P.M.) ophthalmic ointment Q8H       Objective:     Vital Signs (Most Recent):  Temp: 98 °F (36.7 °C) (06/24/18 1100)  Pulse: 82 (06/24/18 2230)  Resp: (!) 25 (06/24/18 1926)  BP: (!) 115/59 (06/22/18 2215)  SpO2: (!) 90 % (06/24/18 2230)  O2 Device (Oxygen Therapy): ventilator (06/24/18 2200) Vital Signs (24h Range):  Temp:  [97.4 °F (36.3 °C)-98 °F (36.7 °C)] 98 °F (36.7 °C)  Pulse:  [] 82  Resp:  [24-28] 25  SpO2:  [55 %-99 %] 90 %  Arterial Line BP: ()/(41-69) 127/57     Weight: 89 kg (196 lb 3.4 oz) (06/22/18 0300)  Body mass index is 29.83 kg/m².  Body surface area is 2.07 meters squared.    I/O last 3 completed shifts:  In: 83331.3 [I.V.:5836; Blood:933.3; NG/GT:670; IV Piggyback:2635]  Out: 6805 [Other:6805]    Physical Exam   Constitutional: He appears well-developed and well-nourished.   Intubated FIO2 70 % , sedated.    HENT:   Head: Normocephalic.   Cardiovascular: Normal heart sounds.  Exam reveals no gallop and no friction rub.    No murmur heard.  Tachycardic, no murmur appreciated.   No JVD     Pulmonary/Chest:   Intubated. Clear anteriorly   Abdominal: Soft. He exhibits no distension.   Musculoskeletal: He exhibits no deformity. Edema: BUE, Blisters on left arm.    Neurological:   Intubated and sedated   Skin: Skin is warm and dry.   R SC trialysis   Nursing note and vitals reviewed.      Significant Labs:  All labs within the past 24 hours have been reviewed.     Significant Imaging:  Labs: Reviewed

## 2018-06-25 NOTE — ASSESSMENT & PLAN NOTE
ANCA associated vasculitis  62 yo w/PMH of HTN and NIDDM, OA admitted for acute hypoxic respiratory failure, renal failure, and pulmonary renal syndrome receiving plasmapheresis. Rheumatology consulted for pulmonary renal syndrome, recs on steroids and further investigation.   - Given methylprednisolone 1000 mg IV daily x 5 doses   - Nephrology consulted: renal biopsy when stable  --> elevated UPCR 1.28, DENIZ positive 1:320 speckled pattern, MPO+ 82, c-ANCA +, p-anca neg,  SSA + 178, SSB neg, dsDNA neg, Anti-Sm/RNP neg, Anti-Sm neg, PR3 neg, Immunoglobulins wnl, Hep C and HIV neg, APL labs neg, GBM neg, MARTHA neg, C3/C4 wnl, ACE neg, quant gold indeterminate however T-spot negative.  - Concern for vasculitis given DAH and hematuria. The nini-typical pulmonary-renal syndromes are GPA/MPA, Goodpasture disease, SLE. Immunofluorescence studies can help differentiate. MPO+ can suggest MPA    TB-spot negative, negative drug screen    - Given methylprednisolone 1000 mg IV daily x 5 doses (completed on 6/14/18). Started on Solumedrol 125 mg q6h on 6/15/18 now on solumedrol 125mg q 8 since 06/22/18  - Rituximab 1 g IV given on 6/15/18. Next dose on 6/29/18.  - PLEX since 6/11/18 X6 last cycle on 06/24/18  - on CRRT    There was concern for DIC given low fibrinogen and elevated aPPT, seen by Hem/Onc who felt that coagulopathy secondary to PLEX. Recommended cryoprecipitate and FFP as needed.     Pt is currently on Vanc + Zosyn. Blood cultures so far remain negative. Labs with leucocytosis( WBC 8-->15), thrombocytopenia (74-->117), normocytic anemia (Hgb 10.9), elevated alk phos 200, and AST 95.     Plan:   - continue solumedrol 125 mg q8h IV. Will consider lowering dose to q12h after next Rituxan dose  - Will need ID clearance prior to next dose of Rituximab which is scheduled for 6/29/18.   - transfusion medicine: PLEX every other day   - Received first dose Rituximab 6/15/18 and now on Atovaquone. Repeat Rituximab in 2 weeks  on 6/29/18 if no infection present at that time or if infection treated.   - will f/u aldolase, RNA poly, scleroderma, ESR,CRP  - tissue biopsy will be helpful with making accurate diagnosis if able to obtain  - should LFTs continue upward trend, may consider US abd for further evaluation as pt at risk for acalculous cholecystitis.

## 2018-06-25 NOTE — PROGRESS NOTES
Called to bedside by RN due to large audible cuff leak in ETT. 2mLs of air added by RN with no change in leak. Critical care attending called to bedside and stat CXR ordered. Pt not receiving volumes by ventilator. Per MD Stewart tube advanced to 30 cm after CXR. O2 sats remained >95% throughout. Tube boudreaux changed and cuff leak no longer audible. Will continue to monitor pt closely.

## 2018-06-25 NOTE — NURSING
Dr. Vera and Dr. Fernández at bedside. Bronchoscopy performed. RT at bedside. S/p bronch, the decision was made per Dr. Vera and Dr. Fernández to exchange the current ET tube. New ET 8.0 placed 26 at the lip. Orders placed for chest x ray. Bilateral BS heard. ET tube secured. Consents obtained. Time out performed. All VSS, pt tolerated well. Will continue to monitor closely.

## 2018-06-25 NOTE — NURSING
MD at bedside, new orders being placed to start nimbex on pt. Pt currently sedated on propofol 50mcg/kg/min, fentanyl 300mcg/hr, precedex 1.4mcg/kg/hr, versed 1mg/hr. Levo on 0.12mcg/kg/min. Vent A/C 100%/18PEEP RR 30

## 2018-06-25 NOTE — PROGRESS NOTES
Dr. Abdi notified that Pt Ph was 2.03 on last ABG. Bicarb bath increased from 35 to 40. VSS. Will continue to monitor.

## 2018-06-25 NOTE — PROGRESS NOTES
Dr. William called to bedside for pt O2 sat reading 70-80% on monitor and increased work of breathing. ABG and chest X-Ray ordered. O2 saturation continued to drop into the 50s. Respiratory at the bedside bagging pt. Pt O2 sats increased into the 70s. Pt placed back on vent. Vent setting adjusted. Pt now on 100% FiO2 and 18 of PEEP. Pt given 4 mg ativan and versed drip ordered. Pt O2 sats increased into the high 80's. Will get ABG @ 2130. Will continue to monitor closely.

## 2018-06-25 NOTE — PROCEDURES
[facility]  Bronchoscopy   Procedure Note    SUMMARY     Date of Procedure: 6/25/2018    Procedure: Bronchoscopy, Diagnostic    Provider: Tomer Fernández MD    Pre-Procedure Diagnosis: Elevated airway pressures    Post-Procedure Diagnosis: Same + airway obstruction due to biofilm on endotracheal tube    Indication: ARSD    Description of the Findings of the Procedure:     Patient's wife was consented for the procedure with all risk and benefit of the procedure explained in detail.  Patient's wife was given the opportunity to ask questions and all concerns were answered. The bronchocope was inserted into the main airway via the endotracheal tube. An anatomical survey was done of the main airways and the subsegmental bronchus - all normal. Biofilm found adherent to interior of ETT causing partial obstruction.    Complications: None; patient tolerated the procedure well.    Estimated Blood Loss (EBL): Minimal       Condition: Critical        Disposition: ICU - intubated and critically ill.    Attestation: I performed the procedure.     Toby Fernández MD  Fellow, [facility]  Cell [phone]    I independently evaluated the patient. I was present for the procedure & directly supervised the resident performing the procedure. I have reviewed the resident's procedure note & agree with the findings.    Jonathan Vera MD

## 2018-06-25 NOTE — SUBJECTIVE & OBJECTIVE
Interval History: This morning, pt was paralyzed. Remains on high vent setting 70% FiO2 12 PEEP   Afebrile overnight.     Review of Systems   Unable to perform ROS: Intubated     Objective:     Vital Signs (Most Recent):  Temp: 97.5 °F (36.4 °C) (06/25/18 1100)  Pulse: (!) 128 (06/25/18 1536)  Resp: (!) 28 (06/25/18 1536)  BP: (!) 115/59 (06/22/18 2215)  SpO2: (!) 90 % (06/25/18 1536) Vital Signs (24h Range):  Temp:  [97.4 °F (36.3 °C)-99.2 °F (37.3 °C)] 97.5 °F (36.4 °C)  Pulse:  [] 128  Resp:  [25-33] 28  SpO2:  [55 %-99 %] 90 %  Arterial Line BP: ()/(41-78) 103/52     Weight: 89 kg (196 lb 3.4 oz)  Body mass index is 29.83 kg/m².    Estimated Creatinine Clearance: 114 mL/min (based on SCr of 0.7 mg/dL).    Physical Exam   Constitutional:   Intubated, sedated and paralyzed   HENT:   Head: Normocephalic.   Cardiovascular: Normal heart sounds.  Exam reveals no gallop and no friction rub.    No murmur heard.  Tachycardic, no murmur appreciated.   No JVD     Pulmonary/Chest:   Intubated. Clear anteriorly   Abdominal: Soft. He exhibits no distension.   Musculoskeletal: He exhibits no deformity. Edema: BUE, Blisters on left arm.    Neurological:   Intubated and sedated   Skin: Skin is warm and dry.   R SC trialysis   Nursing note and vitals reviewed.    Significant Labs: All pertinent labs within the past 24 hours have been reviewed.    Significant Imaging: I have reviewed all pertinent imaging results/findings within the past 24 hours.

## 2018-06-25 NOTE — PROGRESS NOTES
[facility]  Nephrology  Progress Note    Patient Name: Jasmeet Raymond  MRN: [id number]  Admission Date: 6/10/2018  Hospital Length of Stay: 14 days  Attending Provider: Neto Wong MD   Primary Care Physician: Provider Notinsystem  Principal Problem:<principal problem not specified>    Subjective:     HPI: 64 yo AAm with HTN, NIDDM, arthritis on metformin and meloxicam  admitted to [facility] for worsening SOB and ALEX.   Patiend had been treated as an out;patient for 2 weeks for Shortness of breath and bronchitis and UTI. In the hospital he was found to be acidotic, hyperkalemic, serum creatinine 8, and diffuse bilateral pulmonary infiltrates, requiring intubation. UA with proteinuria, hematuria and wbc's, Bronchoscopy revealed hemorrahage. Patient recieved one dialysis as per notes and 1 gm solumedrol, and [place]  [place] [facility] [place]  And further evaluation.  Family is not available at the time of exam and patient is intubated.   HIV, Hepatitis B, C, complements  All within normal limits. DENIZ, ANCA and AntiGBM, results Nnot available    Interval History: CVP 4 this am, CXR with significant fluid noted. Increase FIO 70 % this am. Clotted today Net positive balance 3.5 lts. Of vasopressors.     Review of patient's allergies indicates:   Allergen Reactions    Sulfa (sulfonamide antibiotics)      Current Facility-Administered Medications   Medication Frequency    0.9%  NaCl infusion (for blood administration) Q24H PRN    0.9%  NaCl infusion (for blood administration) Q24H PRN    albuterol-ipratropium 2.5 mg-0.5 mg/3 mL nebulizer solution 3 mL Q4H    atovaquone suspension 750 mg Q12H    calcium chloride 1 g in dextrose 5 % 100 mL IVPB Continuous    calcium chloride 1 g in dextrose 5 % 100 mL IVPB Continuous    chlorhexidine 0.12 % solution 15 mL BID    dexmedetomidine (PRECEDEX) 400mcg/100mL 0.9% NaCL infusion Continuous    dextrose 50% injection 12.5 g PRN     DEXTROSE-SOD CITRATE-CITRIC AC 2.45-2.2 GRAM- 730 MG/100 ML MISC SOLN PRN    DEXTROSE-SOD CITRATE-CITRIC AC 2.45-2.2 GRAM- 730 MG/100 ML MISC SOLN Continuous    famotidine tablet 20 mg BID    fentaNYL 2500 mcg in 0.9% sodium chloride 250 mL infusion premix (titrating) Continuous    glucagon (human recombinant) injection 1 mg PRN    heparin (porcine) injection 5,000 Units Q8H    heparin, porcine (PF) 100 unit/mL injection flush 500 Units PRN    hepatitis B virus vacc.rec(PF) injection 40 mcg vaccine x 1 dose    hydrALAZINE injection 10 mg Q4H PRN    insulin aspart U-100 pen 1-10 Units Q4H PRN    labetalol injection 20 mg Q4H PRN    lorazepam injection 4 mg Q1H PRN    magnesium sulfate 2g in water 50mL IVPB (premix) PRN    methylPREDNISolone sodium succinate injection 125 mg Q8H    metoclopramide HCl tablet 5 mg Q6H    midazolam (VERSED) 1 mg/mL in sodium chloride 0.9% 100 mL infusion (titrating) Continuous    niCARdipine 40 mg/200 mL infusion Continuous    norepinephrine 4 mg in dextrose 5% 250 mL infusion (premix) (titrating) Continuous    piperacillin-tazobactam 4.5 g in sodium chloride 0.9% 100 mL IVPB (ready to mix system) Q8H    propofol (DIPRIVAN) 10 mg/mL infusion Continuous    sodium chloride 0.9% flush 10 mL PRN    sodium phosphate 20.01 mmol in dextrose 5 % 250 mL IVPB PRN    sodium phosphate 30 mmol in dextrose 5 % 250 mL IVPB PRN    sodium phosphate 39.99 mmol in dextrose 5 % 250 mL IVPB PRN    white petrolatum-mineral oil (LUBIFRESH P.M.) ophthalmic ointment Q8H       Objective:     Vital Signs (Most Recent):  Temp: 98 °F (36.7 °C) (06/24/18 1100)  Pulse: 82 (06/24/18 2230)  Resp: (!) 25 (06/24/18 1926)  BP: (!) 115/59 (06/22/18 2215)  SpO2: (!) 90 % (06/24/18 2230)  O2 Device (Oxygen Therapy): ventilator (06/24/18 2200) Vital Signs (24h Range):  Temp:  [97.4 °F (36.3 °C)-98 °F (36.7 °C)] 98 °F (36.7 °C)  Pulse:  [] 82  Resp:  [24-28] 25  SpO2:  [55 %-99 %] 90 %  Arterial  Line BP: ()/(41-69) 127/57     Weight: 89 kg (196 lb 3.4 oz) (06/22/18 0300)  Body mass index is 29.83 kg/m².  Body surface area is 2.07 meters squared.    I/O last 3 completed shifts:  In: 32179.3 [I.V.:5836; Blood:933.3; NG/GT:670; IV Piggyback:2635]  Out: 6805 [Other:6805]    Physical Exam   Constitutional: He appears well-developed and well-nourished.   Intubated FIO2 70 % , sedated.    HENT:   Head: Normocephalic.   Cardiovascular: Normal heart sounds.  Exam reveals no gallop and no friction rub.    No murmur heard.  Tachycardic, no murmur appreciated.   No JVD     Pulmonary/Chest:   Intubated. Clear anteriorly   Abdominal: Soft. He exhibits no distension.   Musculoskeletal: He exhibits no deformity. Edema: BUE, Blisters on left arm.    Neurological:   Intubated and sedated   Skin: Skin is warm and dry.   R SC trialysis   Nursing note and vitals reviewed.      Significant Labs:  All labs within the past 24 hours have been reviewed.     Significant Imaging:  Labs: Reviewed    Assessment/Plan:     ALEX (acute kidney injury)    MPO-cANCA GPA, presenting with RPGN and pulmonary hemorrhage  S/p pulse steroids, now on scheduled dose 125mg IV solumedrol q6h, and PLEX 5/6, also has been started on rituxan 6/15 (repeat in 2 weeks 6/29), renal supportive therapy with RRT (SLED). Urine sediment showed RBC cast.  -Total hr intake 265 ml-TF, fent, precedex, ca cl, citrate   -s/p PLEX -treatment #6.     Plan:  - Will continue SLED for metabolic clearance and volume management. Increase - 350 ml/hr  negative.   -Naphos IV prn  - RTF q 8 hrs                      Thank you for your consult. I will follow-up with patient. Please contact us if you have any additional questions.    Venkata De Oliveira MD  Nephrology  [facility]

## 2018-06-25 NOTE — SUBJECTIVE & OBJECTIVE
Interval History/Significant Events:  pt desated last night. Audible cuff leak. ETT adjusted. Sat improved. Continue CRRT. Family updated about the plan. Pt re-started on NIMBEX 06/25/18. Continue Propofol, verset, precedex and Fentanyl gtt     Review of Systems   Unable to perform ROS: Intubated     Objective:     Vital Signs (Most Recent):  Temp: 97.5 °F (36.4 °C) (06/25/18 1100)  Pulse: (!) 138 (06/25/18 1200)  Resp: (!) 28 (06/25/18 1122)  BP: (!) 115/59 (06/22/18 2215)  SpO2: (!) 84 % (06/25/18 1200) Vital Signs (24h Range):  Temp:  [97.4 °F (36.3 °C)-99.2 °F (37.3 °C)] 97.5 °F (36.4 °C)  Pulse:  [] 138  Resp:  [25-33] 28  SpO2:  [55 %-99 %] 84 %  Arterial Line BP: ()/(41-78) 107/51   Weight: 89 kg (196 lb 3.4 oz)  Body mass index is 29.83 kg/m².      Intake/Output Summary (Last 24 hours) at 06/25/18 1230  Last data filed at 06/25/18 1200   Gross per 24 hour   Intake             7087 ml   Output             8618 ml   Net            -1531 ml       Physical Exam   Constitutional: No distress. He is sedated and intubated.   Intubated    Cardiovascular: Normal rate, regular rhythm, normal heart sounds and intact distal pulses.  Exam reveals no gallop and no friction rub.    No murmur heard.  Pulmonary/Chest: Effort normal. He is intubated. No respiratory distress. He has no wheezes. He has no rhonchi. He has rales.   Abdominal: Soft. Bowel sounds are normal. He exhibits no distension. There is no tenderness.   Musculoskeletal: He exhibits edema (2+ LE).   Neurological:   [id number]   Skin: Skin is warm and dry. No rash noted. No erythema.   R SC trialysis   Nursing note and vitals reviewed.      Vents:  Vent Mode: A/C (06/25/18 0913)  Ventilator Initiated: Yes (06/10/18 2217)  Set Rate: 22 bmp (06/25/18 0913)  Vt Set: 510 mL (06/25/18 0913)  Pressure Support: 12 cmH20 (06/21/18 1347)  PEEP/CPAP: 14 cmH20 (06/25/18 0913)  Oxygen Concentration (%): 74 (06/25/18 1200)  Peak Airway Pressure: 65 cmH2O (06/25/18  0913)  Plateau Pressure: 45 cmH20 (06/25/18 0913)  Total Ve: 12.2 mL (06/25/18 0913)  F/VT Ratio<105 (RSBI): (!) 76.39 (06/25/18 0717)  Lines/Drains/Airways     Central Venous Catheter Line                 Hemodialysis Catheter 06/10/18 2307 right internal jugular 14 days         Percutaneous Central Line Insertion/Assessment - triple lumen  06/10/18 2305 left internal jugular 14 days          Drain                 NG/OG Tube 06/10/18 2309 [place] sump 18 Fr. Left nostril 14 days          Airway                 Airway - Non-Surgical Endotracheal Tube -- days          Arterial Line                 Arterial Line 06/10/18 2304 Right Radial 14 days          Pressure Ulcer                 Pressure Injury 06/20/18 0303 Right Buttocks Stage 2 5 days              Significant Labs:    CBC/Anemia Profile:    Recent Labs  Lab 06/24/18  0106 06/24/18  1820 06/25/18  0800   WBC 6.64 8.08 9.31   HGB 8.3* 9.8* 9.1*   HCT 24.1* 27.0* 25.9*   PLT 84* 74* 83*   MCV 85 88 89   RDW 16.7* 17.1* 17.2*        Chemistries:    Recent Labs  Lab 06/24/18  0106 06/24/18  1430 06/24/18  2140 06/25/18  0306     137 138 137 137  137   K 5.1  5.1 4.5 4.8 5.2*  5.2*     107 108 108 107  107   CO2 20*  20* 21* 19* 20*  20*   BUN 29*  29* 13 13 9  9   CREATININE 1.3  1.3 0.8 0.8 0.7  0.7   CALCIUM 7.5*  7.5* 7.6* 7.6* 7.6*  7.6*   ALBUMIN 2.5*  2.5* 2.6* 2.5* 2.8*  2.8*   PROT 4.5*  --   --  6.4   BILITOT 1.3*  --   --  1.1*   ALKPHOS 121  --   --  200*   ALT 35  --   --  40   AST 74*  --   --  95*   MG 2.1 2.1 2.6 2.7*   PHOS 4.5 2.0* 3.1 2.2*       All pertinent labs within the past 24 hours have been reviewed.    Significant Imaging:  CXR: I have reviewed all pertinent results/findings within the past 24 hours and my personal findings are:  No significant interval change

## 2018-06-25 NOTE — PROGRESS NOTES
Unable to obtain O2 sat on pt. Last sat to  on monitor was in mid 70s. Pt tachypnic in the mid 30s. ABG drawn. Critical care attending Stewart called to bedside. Pt being ventilated via ambu with peep valve set at +14. Awaiting instructions from MD. Will continue to monitor pt closely.

## 2018-06-25 NOTE — PROGRESS NOTES
Bronchoscopy done per Dr. Fernández and Dr. Vera.  Biofilm was seen inside of ETT.  Dr. Fernández met resistance going down due to the build up.  Decision was then made to switch out the ETT.  Bougie used to switch out #8.0 ETT.  Positive color change on ETCO2.  BBS noted.  Secured tube with tube boudreaux and placed back on  on documented settings.NARN.  CXR pending. Will continue to monitor.

## 2018-06-25 NOTE — ASSESSMENT & PLAN NOTE
- initially was due to DAH based on bronch finding on 6/11. Pt showed initial improvement after starting PLEX and Rituxin but then started to show increase in O2 requirement   - repeated Bronch on 6/22/18 showed no DAH   - last CXR showed bilateral basal opacification likely to be due to pulmonary edema vs ARDS?   - continue aggressive CRRT and fluid removal. + 3 L in last 24 hs ?  - CTA negative for PE but showed Pneumomediastinum. This likely to be barotrauma. CTS evaluated pt and said not surgical intervention needed at this time  - repeated Echo with normal EF   - at this point, his hypoxia likely to be due to ARDS   - continue supportive treatment at this time

## 2018-06-25 NOTE — ASSESSMENT & PLAN NOTE
63 year old male with PMHx significant for HTN, DM and arthirtis on whom ID is consulted for possible infection. Infectious work up has been negative so far.    -karlee is resp culture is normal and does not warrant therapy  -all other cultures negative  -reculture if he is febrile again  -leukocytosis is likely elevated due to steroids   -would continue vanco and zosyn until 6/27 and then stop unless source of infection is found

## 2018-06-25 NOTE — PROCEDURES
"Jasmeet Raymond is a 63 y.o. male patient.    Temp: 98.4 °F (36.9 °C) (06/25/18 1900)  Pulse: (!) 134 (06/25/18 2230)  Resp: (!) 28 (06/25/18 2125)  BP: (!) 115/59 (06/22/18 2215)  SpO2: (!) 92 % (06/25/18 2230)  Weight: 89 kg (196 lb 3.4 oz) (06/22/18 0300)  Height: 5' 8" (172.7 cm) (06/22/18 0300)       Intubation  Date/Time: 6/25/2018 4:44 PM  Performed by: SAROJ JOHNSON  Authorized by: SAROJ JOHNSON   Pre-operative diagnosis: acute hypoxemic respiratory failure, airway obstruction (ETT)  Post-operative diagnosis: acute hypoxemic respiratory failure, airway obstruction (ETT)  Consent Done: Emergent Situation  Indications: respiratory failure  Description of findings: Tube exchanged   Intubation method: direct  Patient status: paralyzed (RSI)  Preoxygenation: mask  Sedatives: fentanyl  Laryngoscope size: Mac 4  Tube size: 8.0 mm  Tube type: cuffed  Number of attempts: 1  Cords visualized: yes  Post-procedure assessment: chest rise and CO2 detector  Breath sounds: clear  Cuff inflated: yes  ETT to lip: 25 cm  Tube secured with: ETT boudreaux  Chest x-ray findings: endotracheal tube in appropriate position  Patient tolerance: Patient tolerated the procedure well with no immediate complications  Complications: No  Specimens: No  Implants: No  Comments: Exchanged with dong.          Jonathan Vera  6/25/2018     I independently evaluated the patient. I was present for the procedure & directly supervised the resident performing the procedure. I have reviewed the resident's procedure note & agree with the findings.    Jonathan Vera MD    "

## 2018-06-25 NOTE — PROGRESS NOTES
[facility]  Infectious Disease  Progress Note    Patient Name: Jasmeet Raymond  MRN: [id number]  Admission Date: 6/10/2018  Length of Stay: 15 days  Attending Physician: Jonathan Vera MD  Primary Care Provider: Provider Not[facility]    Isolation Status: No active isolations  Assessment/Plan:      Septic shock    63 year old male with PMHx significant for HTN, DM and arthirtis on whom ID is consulted for possible infection. Infectious work up has been negative so far.    -karlee is resp culture is normal and does not warrant therapy  -all other cultures negative  -reculture if he is febrile again  -leukocytosis is likely elevated due to steroids   -would continue vanco and zosyn until 6/27 and then stop unless source of infection is found              Thank you for your consult. ID will sign off.     Please contact us if you have any additional questions.    Cleopatra Cao MD  Infectious Disease  [facility]    Subjective:     Principal Problem:<principal problem not specified>    HPI: Patient is intubated. History is from primary team    63 year old male with PMHx significant for HTN, DM and arthirtis presented to [facility] with symptoms of pneumonia and shortness of breath. On 5/29/2018, he presented with symptoms of bronchitis with denies chills, ear pain, sore throat, chest pain, shortness of breath at that time, received antibiotics. Then on 6/8/2018 he presented again to [facility] and [facility] with shortness of breath and cough, and his CXR showed concern for airway disease. However on the first day of admission at [facility] and [facility], his respiratory symptoms worsen and had acute hypoxic respiratory failure that required intubation. Pulmonary consulted and he underwent Bronchoscopy and  revealed alveolar hemorrhage, with the setting of having hematuria and diffuse alveloar  hemorrhage the concern was for presence of pulmonary renal syndrome and he received pulsed dose of Methylprednisolone and nephrology consulted for plasmapheresis. However at [facility], plasmapheresis machine is broken and they recommend to transfer to [facility] main [place] for plasmapheresis.      On arrival he was on Nibmex for paralytics, Propofol for sedation, Levophed (eventaully turned off) for pressure support, Flolan (Epoprostenol sodium) inhaled which was switched to Inhaled Nitro. His mechanical ventilation initially was on FiO2 100%, PEEP 16, then weaned his FiO2 with high requirement of oxygenation. He had an signifiacnt drop in his Hb (baseline ~ 12) dated one week prior presentation to 9 and 8, required one pRBC prior arrival. Repeated CXR showed extensive bilateral infiltrates. Important laboratory investigation showed His , , pro calcitonin 4.4, C3 and C4 complement normal., Trop negative, C3 and C4 showed normal , Hep test is negative, Lactic acid 1.8 > 1.0, HIV negative, Procal 4.4, UA shoowed heamturia and trace leukocytes, Urine culture from 5/29 showed almost pansensitve Cedecea davisae.      Hospital/ICU Course:  06/11/2018 admitted to MICU from [facility] and [facility] with ARDS  06/12/2018 No acute events overnight. Had PLEX with 4.5 L exchange with FFP, and CRRT started and he became hypotensive, Levophed started during the CRRT. He was weaning off Nitric Oxide and his ABGs showing worsening respi acidosis. Rheumatology, Transfusion medicine and Nephrology on board   06/13/2018 Stable on High ladder PEEP for ARDS and completely off NO, and still on Nimbex for paralytics and sedation with Propofol and Fentanyl. ID, Rheumatology and Nephrology on board.   06/15/2018, Still on high vent setting   06/15/2018 Started Rituxan after his PLEx session, and decrease in his Methylprednisone. Still on high PEEP ladder for ARDS, frequent clots in ETT  suction   06/16/2018 Overnight, he had Rituxan induction for ANCA associated vasculitis and decrease his Methypredisone to 125 mg IV Q 6 Hours. Mild increase in his mechanical ventilation setting and he is grossly volume overload and still anuric.   06/17/2018 Palaryzed with Nimbex and better in his mechanical ventilation setting. Off Versed and on max Precedex and Profol. Increased in UF rate to 500 and better in his I/O. Repated CXR showed better air space.   06/18/2018 off paralytic. received 4th dose of plasma exchange today. Still anuric. Continue on dialysis.wean off sedation   06/19/2018 Patient remains intubated on A/C FiO2 55%/ PEEP 10. Patient on levo gtt titrated to keep MAP >65. Patient on precedex gtt at 1.34 mg/kg/hr and fentanyl gtt at 200 mcg/hr. Patient on CRRT, UF at 400mL/hr with citrate and calcium chloride infusions running per MD order. Tube feeds continue to be held, NG tube to low int. suction with roughly 200 mL of green output over the night. Patient's Tmax was 102.6 degrees F. patient placed on cooling blanket. Patient's H/H this morning is 6.5/20. One unit of pRBCs ordered.   6/25/18: Pt sedated and paralyzed for severe ARDS    Interval History: This morning, pt was paralyzed. Remains on high vent setting 70% FiO2 12 PEEP   Afebrile overnight.     Review of Systems   Unable to perform ROS: Intubated     Objective:     Vital Signs (Most Recent):  Temp: 97.5 °F (36.4 °C) (06/25/18 1100)  Pulse: (!) 128 (06/25/18 1536)  Resp: (!) 28 (06/25/18 1536)  BP: (!) 115/59 (06/22/18 2215)  SpO2: (!) 90 % (06/25/18 1536) Vital Signs (24h Range):  Temp:  [97.4 °F (36.3 °C)-99.2 °F (37.3 °C)] 97.5 °F (36.4 °C)  Pulse:  [] 128  Resp:  [25-33] 28  SpO2:  [55 %-99 %] 90 %  Arterial Line BP: ()/(41-78) 103/52     Weight: 89 kg (196 lb 3.4 oz)  Body mass index is 29.83 kg/m².    Estimated Creatinine Clearance: 114 mL/min (based on SCr of 0.7 mg/dL).    Physical Exam   Constitutional:    Intubated, sedated and paralyzed   HENT:   Head: Normocephalic.   Cardiovascular: Normal heart sounds.  Exam reveals no gallop and no friction rub. Tachycardic, no murmur appreciated.   Pulmonary/Chest: Intubated. Clear anteriorly   Abdominal: Soft. He exhibits no distension.   Musculoskeletal: He exhibits no deformity. Edema: BUE, Blisters on left arm.    Neurological:   Intubated and sedated   Skin: Skin is warm and dry.   R SC trialysis   Nursing note and vitals reviewed.    Vent Mode: A/C  Oxygen Concentration (%):  [] 80  Resp Rate Total:  [14 br/min-35 br/min] 28 br/min  Vt Set:  [400 mL-510 mL] 420 mL  PEEP/CPAP:  [12 [id number] cmH20] 12 cmH20  Mean Airway Pressure:  [16 [id number] cmH20] 27 cmH20      Significant Labs: All pertinent labs within the past 24 hours have been reviewed.    Significant Imaging: I have reviewed all pertinent imaging results/findings within the past 24 hours.

## 2018-06-25 NOTE — PROGRESS NOTES
Dr. Abdi with nephrology paged and notified that pt pH was 7.23 on last ABG. Pt Bicarb bath increase to 35 on CRRT. Will call with next ABG results.  VSS. Will continue to monitor.

## 2018-06-25 NOTE — PROGRESS NOTES
Dr. William notified that of pH of 7.203 and CO2 of 58 pm last ABG. RR rate increased on the ventilator to 30 breaths/min. VSS. Will continue to monitor.

## 2018-06-25 NOTE — ASSESSMENT & PLAN NOTE
MPO-cANCA GPA, presenting with RPGN and pulmonary hemorrhage  S/p pulse steroids, now on scheduled dose 125mg IV solumedrol q6h, and PLEX 5/6, also has been started on rituxan 6/15 (repeat in 2 weeks 6/29), renal supportive therapy with RRT (SLED). Urine sediment showed RBC cast.  -Total hr intake 265 ml-TF, fent, precedex, ca cl, citrate   -s/p PLEX -treatment #6.     Plan:  - Will continue SLED for metabolic clearance and volume management. Increase - 350 ml/hr  negative.   -Naphos IV prn  - RTF q 8 hrs

## 2018-06-25 NOTE — ASSESSMENT & PLAN NOTE
MPO-cANCA GPA, presenting with RPGN and pulmonary hemorrhage  S/p pulse steroids, now on scheduled dose 125mg IV solumedrol q6h, and PLEX 5/6, also has been started on rituxan 6/15 (repeat in 2 weeks 6/29), renal supportive therapy with RRT (SLED). Urine sediment showed RBC cast.  -s/p PLEX -treatment #6.     Plan:  - Continue SLED for metabolic clearance and volume management. Increase -500 negative. Bicarb 40. Follow up 2 pm labs-Will adjust dialysate to current labs.   -Naphos IV prn

## 2018-06-25 NOTE — NURSING
Nimbex gtt started @ 3mcg/kg/min with a 0.02mg initial bolus. BIS and TOF in place propofol, versed, fentanyl and precedex infusing. Vent settings changed to A/C 60%/ 12 PEEP RR 22. Will repeat ABG as ordered. Will continue to monitor closely. Family at bedside, updated on current plan of care per RN and MD. All questions answered.

## 2018-06-25 NOTE — PROGRESS NOTES
[facility]  Rheumatology  Progress Note    Patient Name: Jasmeet Raymond  MRN: [id number]  Admission Date: 6/10/2018  Hospital Length of Stay: 15 days  Code Status: Full Code   Attending Provider: Jonathan Vera MD  Primary Care Physician: Provider Notinsystem  Principal Problem: <principal problem not specified>    Subjective:     HPI:  Mr. Jasmeet Raymond, 63 year old male with PMHx significant for HTN, DM and osteoarthirtis presented to [facility] with symptoms of pneumonia and shortness of breath. On 5/29/2018, he presented with symptoms of bronchitis with denies chills, ear pain, sore throat, chest pain, shortness of breath at that time, received antibiotics. Then on 6/8/2018 he presented again to [facility] with shortness of breath and cough, and his CXR showed concern for airway disease. However on the first day of admission at [facility], his respiratory symptoms worsen and had acute hypoxic respiratory failure that required intubation. Pulmonary consulted and he underwent Bronchoscopy and  revealed alveolar hemorrhage, with the setting of having hematuria and diffuse alveloar hemorrhage the concern was for presence of pulmonary renal syndrome and he received pulsed dose of Methylprednisolone and nephrology consulted for plasmapheresis. However at [facility], plasmapheresis machine is broken and they recommend to transfer to [facility] for plasmapheresis. Admitted here 6/11 for ARDS.     On arrival he was on Nibmex for paralytics, Propofol for sedation, Levophed (eventaully turned off) for pressure support, Flolan (Epoprostenol sodium) inhaled which was switched to Inhaled Nitro. His mechanical ventilation initially was on FiO2 100%, PEEP 16, then weaned his FiO2 with high requirement of oxygenation. He had an signifiacnt drop in his Hb (baseline ~ 12) dated one  week prior presentation to 9 and 8, required one pRBC prior arrival. Repeated CXR showed extensive bilateral infiltrates. Important laboratory investigation showed His , , pro calcitonin 4.4, C3 and C4 complement normal., Trop negative, C3 and C4 showed normal , Hep test is negative, Lactic acid 1.8 > 1.0, HIV negative, Procal 4.4, UA shoowed heamturia and trace leukocytes, Urine culture from 5/29 showed almost pansensitve Cedecea davisae.    Rheumatology consulted for pulmonary-renal syndrome, recs for steroids and further investigation.     Interval History: Intubated, sedated and paralyzed. Afebrile.    6/25/18: no fevers overnight, pt now on paralytics due to resp status. Still on Vanc + zosyn    6/23/18 febrile 102. Blood cx NGTD.     6/22/18: Patient still with low grade fevers. Blood cultures from 6/19 and 6/21 still with NGTD. Patient remains on Vanc/Zosyn. Per ID patient should be continued on Vanc/Zosyn until 6/27/18. Given fluctuations in hemoglobin, critical care team performed BAL which showed no evidence of diffuse alveolar hemorrhage.      Patient febrile up to 102.6F on 6/19/18. Started on Vanc/zosyn. Cultures from 6/19/18 and 6/21/18 showing NGTD. resp cx : candida. . Seen by ID who recommended continuing broad spectrum antibiotics and re-culturing when patient febrile.             Current Facility-Administered Medications   Medication Frequency    0.9%  NaCl infusion (for blood administration) Q24H PRN    0.9%  NaCl infusion (for blood administration) Q24H PRN    albuterol-ipratropium 2.5 mg-0.5 mg/3 mL nebulizer solution 3 mL Q4H    atovaquone suspension 750 mg Q12H    calcium chloride 1 g in dextrose 5 % 100 mL IVPB Continuous    calcium chloride 1 g in dextrose 5 % 100 mL IVPB Continuous    chlorhexidine 0.12 % solution 15 mL BID    cisatracurium (NIMBEX) 100 mg in dextrose 5 % 100 mL infusion Continuous    dexmedetomidine (PRECEDEX) 400mcg/100mL 0.9% NaCL infusion  Continuous    dextrose 50% injection 12.5 g PRN    DEXTROSE-SOD CITRATE-CITRIC AC 2.45-2.2 GRAM- 730 MG/100 ML MISC SOLN PRN    DEXTROSE-SOD CITRATE-CITRIC AC 2.45-2.2 GRAM- 730 MG/100 ML MISC SOLN Continuous    famotidine tablet 20 mg BID    fentaNYL 2500 mcg in 0.9% sodium chloride 250 mL infusion premix (titrating) Continuous    glucagon (human recombinant) injection 1 mg PRN    heparin (porcine) injection 5,000 Units Q8H    heparin, porcine (PF) 100 unit/mL injection flush 500 Units PRN    hepatitis B virus vacc.rec(PF) injection 40 mcg vaccine x 1 dose    hydrALAZINE injection 10 mg Q4H PRN    insulin aspart U-100 pen 1-10 Units Q4H PRN    labetalol injection 20 mg Q4H PRN    lorazepam injection 4 mg Q1H PRN    methylPREDNISolone sodium succinate injection 125 mg Q8H    midazolam (VERSED) 1 mg/mL in sodium chloride 0.9% 100 mL infusion (titrating) Continuous    norepinephrine 16 mg in dextrose 5 % 250 mL infusion Continuous    norepinephrine 4 mg in dextrose 5% 250 mL infusion (premix) (titrating) Continuous    piperacillin-tazobactam 4.5 g in sodium chloride 0.9% 100 mL IVPB (ready to mix system) Q8H    propofol (DIPRIVAN) 10 mg/mL infusion Continuous    sodium chloride 0.9% flush 10 mL PRN    white petrolatum-mineral oil (LUBIFRESH P.M.) ophthalmic ointment Q8H     Objective:     Vital Signs (Most Recent):  Temp: 97.5 °F (36.4 °C) (06/25/18 1100)  Pulse: (!) 131 (06/25/18 1815)  Resp: (!) 28 (06/25/18 1536)  BP: (!) 115/59 (06/22/18 2215)  SpO2: (!) 90 % (06/25/18 1815)  O2 Device (Oxygen Therapy): ventilator (06/25/18 1536) Vital Signs (24h Range):  Temp:  [97.4 °F (36.3 °C)-99.2 °F (37.3 °C)] 97.5 °F (36.4 °C)  Pulse:  [] 131  Resp:  [25-33] 28  SpO2:  [55 %-99 %] 90 %  Arterial Line BP: ()/(41-78) 102/49     Weight: 89 kg (196 lb 3.4 oz) (06/22/18 0300)  Body mass index is 29.83 kg/m².  Body surface area is 2.07 meters squared.      Intake/Output Summary (Last 24 hours) at  06/25/18 1819  Last data filed at 06/25/18 1800   Gross per 24 hour   Intake             8646 ml   Output             9349 ml   Net             -703 ml       Physical Exam   Constitutional:   intubated   HENT:   Poor dentition   Cardiovascular: Regular rhythm.    tachycardic   Pulmonary/Chest:   Coarse breath sounds   Abdominal: Soft. Bowel sounds are normal.   Lymphadenopathy:     He has no cervical adenopathy.   Neurological:   Intubated and sedated   Skin: Skin is warm and dry. No rash noted.     Musculoskeletal: He exhibits edema. He exhibits no tenderness.   B/l knee effusion, no increased warmth, or erythema.         Significant Labs:  CBC:   Recent Labs  Lab 06/25/18  0800 06/25/18  1800   WBC 9.31 15.45*   HGB 9.1* 10.9*   HCT 25.9* 32.2*   PLT 83* 117*     CMP:   Recent Labs  Lab 06/25/18  0306 06/25/18  1400   *  165* 198*  198*   CALCIUM 7.6*  7.6* 7.6*  7.6*   ALBUMIN 2.8*  2.8* 2.7*  2.7*   PROT 6.4  --      137 131*  131*   K 5.2*  5.2* 5.2*  5.2*   CO2 20*  20* 22*  22*     107 99  99   BUN 9  9 7*  7*   CREATININE 0.7  0.7 0.8  0.8   ALKPHOS 200*  --    ALT 40  --    AST 95*  --    BILITOT 1.1*  --      Liver Function Test:   Recent Labs  Lab 06/24/18  2140 06/25/18  0306 06/25/18  1400   ALT  --  40  --    AST  --  95*  --    ALKPHOS  --  200*  --    BILITOT  --  1.1*  --    PROT  --  6.4  --    ALBUMIN 2.5* 2.8*  2.8* 2.7*  2.7*     Results for FELIPE BERNAL (MRN [id number]) as of 6/25/2018 18:23   Ref. Range 6/10/2018 22:49 6/10/2018 22:51 6/11/2018 08:10   DENIZ HEP-2 Titer Unknown  Positive 1:320 Sp...    Anti-SSA Antibody Latest Ref Range: 0.00 - 19.99 EU  178.40 (H)    Anti-SSA Interpretation Latest Ref Range: Negative   Positive (A)    Anti-SSB Antibody Latest Ref Range: 0.00 - 19.99 EU  0.87    Anti-SSB Interpretation Latest Ref Range: Negative   Negative    ds DNA Ab Latest Ref Range: Negative 1:10   Negative 1:10    Anti Sm Antibody Latest Ref Range: 0.00  - 19.99 EU  0.60    Anti-Sm Interpretation Latest Ref Range: Negative   Negative    Anti Sm/RNP Antibody Latest Ref Range: 0.00 - 19.99 EU  2.83    Anti-Sm/RNP Interpretation Latest Ref Range: Negative   Negative    Cytoplasmic Neutrophilic Ab Latest Ref Range: <1:20 Titer 1:320 (A)     Perinuclear (P-ANCA) Latest Ref Range: <1:20 Titer <1:20     ANCA Proteinase 3 Latest Ref Range: <0.4 (Negative) U   <0.2   MPO Latest Ref Range: <=20 UNITS   82 (H)       Results for FELIPE BERNAL (MRN [id number]) as of 6/25/2018 18:23   Ref. Range 6/14/2018 03:27   Complement (C-3) Latest Ref Range: 50 - 180 mg/dL 78   Complement (C-4) Latest Ref Range: 11 - 44 mg/dL 13   Complement,Total, Serum Latest Ref Range: 42 - 95 U/mL 60     Results for FELIPE BERNAL (MRN [id number]) as of 6/25/2018 18:23   Ref. Range 6/11/2018 08:10   IgG - Serum Latest Ref Range: 650 - 1600 mg/dL 972   IgM Latest Ref Range: 50 - 300 mg/dL 57   IgA Latest Ref Range: 40 - 350 mg/dL 154     Results for FELIPE BERNAL (MRN [id number]) as of 6/25/2018 18:23   Ref. Range 6/12/2018 04:00   Cryoglobulin, Qualitative Latest Ref Range: Absent  Absent     Results for FELIPE BERNAL (MRN [id number]) as of 6/25/2018 18:23   Ref. Range 6/11/2018 06:01   GBM Ab Latest Ref Range: <1.0 (Negative) U <0.2     Results for FELIPE BERNAL (MRN [id number]) as of 6/25/2018 18:23   Ref. Range 6/11/2018 08:10   Hep B Core Total Ab Unknown Negative   Hep B S Ab Unknown Positive (A)   Hepatitis B Surface Ag Unknown Negative     Results for FELIPE BERNAL (MRN [id number]) as of 6/25/2018 18:23   Ref. Range 6/11/2018 08:10   Hepatitis C Ab Unknown Negative     Results for FELIPE BERNAL (MRN [id number]) as of 6/25/2018 18:23   Ref. Range 6/11/2018 08:10   HIV 1/2 Ag/Ab Latest Ref Range: Negative  Negative     Results for DOLORES, FELIPE (MRN [id number]) as of 6/25/2018 18:23   Ref. Range 6/13/2018 04:50   RPR Latest Ref Range: Non-reactive  Non-reactive     Results for DOLORES, FELIPE (MRN [id number]) as of  6/25/2018 18:23   Ref. Range 6/11/2018 01:00   Specimen UA Unknown Urine, Catheterized   Color, UA Latest Ref Range: Yellow, Straw, Betty  Yellow   pH, UA Latest Ref Range: 5.0 - 8.0  5.0   Specific Gravity, UA Latest Ref Range: 1.005 - 1.030  1.010   Appearance, UA Latest Ref Range: Clear  Cloudy (A)   Protein, UA Latest Ref Range: Negative  2+ (A)   Glucose, UA Latest Ref Range: Negative  3+ (A)   Ketones, UA Latest Ref Range: Negative  Negative   Occult Blood UA Latest Ref Range: Negative  3+ (A)   Nitrite, UA Latest Ref Range: Negative  Negative   Urobilinogen, UA Latest Ref Range: <2.0 EU/dL Negative   Bilirubin (UA) Latest Ref Range: Negative  Negative   Leukocytes, UA Latest Ref Range: Negative  3+ (A)   RBC, UA Latest Ref Range: 0 - 4 /hpf 41 (H)   WBC, UA Latest Ref Range: 0 - 5 /hpf 38 (H)   Bacteria, UA Latest Ref Range: None-Occ /hpf Many (A)   Yeast, UA Latest Ref Range: None  None   Squam Epithel, UA Latest Units: /hpf 5   Hyaline Casts, UA Latest Ref Range: 0-1/lpf /lpf 7 (A)   Microscopic Comment Unknown SEE COMMENT     Results for FELIPE BERNAL (MRN [id number]) as of 6/25/2018 18:23   Ref. Range 6/11/2018 08:44   Prot/Creat Ratio, Ur Latest Ref Range: 0.00 - 0.20  1.28 (H)     Results for FELIPE BERNAL (MRN [id number]) as of 6/25/2018 18:23   Ref. Range 6/12/2018 04:00   APA Isotype IgG Latest Ref Range: 0.00 - 14.99 GPL <9.40   APA Isotype IgM Latest Ref Range: 0.00 - 12.49 MPL <9.40   Beta-2 Glyco 1 IgG Latest Ref Range: <=20 SGU <9   Beta-2 Glyco 1 IgM Latest Ref Range: <=20 SMU <9   Beta-2 Glyco 1 IgA Latest Ref Range: <=20 SOPHIE <9   DRVVT, Lupus Anticoagulant Latest Ref Range: Negative  Negative       BOB 06/11/18  Electronically reviewed and signed by:   Darya Sanchez MD   Signed on 06/12/18 at 14:23   No monoclonal peaks identified.     SPEP 06/11/18  Electronically reviewed and signed by:   Darya Sanchez MD   Signed on 06/12/18 at 14:23   Decreased total protein.   No paraprotein  bands identified.     Significant Imaging:  Imaging results within the past 24 hours have been reviewed.    Assessment/Plan:     Pulmonary-renal syndrome    ANCA associated vasculitis  64 yo w/PMH of HTN and NIDDM, OA admitted for acute hypoxic respiratory failure, renal failure, and pulmonary renal syndrome receiving plasmapheresis. Rheumatology consulted for pulmonary renal syndrome, recs on steroids and further investigation.   - Given methylprednisolone 1000 mg IV daily x 5 doses   - Nephrology consulted: renal biopsy when stable  --> elevated UPCR 1.28, DENIZ positive 1:320 speckled pattern, MPO+ 82, c-ANCA +, p-anca neg,  SSA + 178, SSB neg, dsDNA neg, Anti-Sm/RNP neg, Anti-Sm neg, PR3 neg, Immunoglobulins wnl, Hep C and HIV neg, APL labs neg, GBM neg, MARTHA neg, C3/C4 wnl, ACE neg, quant gold indeterminate however T-spot negative.  - Concern for vasculitis given DAH and hematuria. The nini-typical pulmonary-renal syndromes are GPA/MPA, Goodpasture disease, SLE. Immunofluorescence studies can help differentiate. MPO+ can suggest MPA    TB-spot negative, negative drug screen    - Given methylprednisolone 1000 mg IV daily x 5 doses (completed on 6/14/18). Started on Solumedrol 125 mg q6h on 6/15/18 now on solumedrol 125mg q 8 since 06/22/18  - Rituximab 1 g IV given on 6/15/18. Next dose on 6/29/18.  - PLEX since 6/11/18 X6 last cycle on 06/24/18  - on CRRT    There was concern for DIC given low fibrinogen and elevated aPPT, seen by Hem/Onc who felt that coagulopathy secondary to PLEX. Recommended cryoprecipitate and FFP as needed.     Pt is currently on Vanc + Zosyn. Blood cultures so far remain negative. Labs with leucocytosis( WBC 8-->15), thrombocytopenia (74-->117), normocytic anemia (Hgb 10.9), elevated alk phos 200, and AST 95.     Plan:   - continue solumedrol 125 mg q8h IV. Will consider lowering dose to q12h after next Rituxan dose  - Will need ID clearance prior to next dose of Rituximab which is  scheduled for 6/29/18.   - transfusion medicine: PLEX every other day   - Received first dose Rituximab 6/15/18 and now on Atovaquone. Repeat Rituximab in 2 weeks on 6/29/18 if no infection present at that time or if infection treated.   - will f/u aldolase, RNA poly, scleroderma, ESR,CRP  - tissue biopsy will be helpful with making accurate diagnosis if able to obtain  - should LFTs continue upward trend, may consider US abd for further evaluation as pt at risk for acalculous cholecystitis.                 Flaquito Morris MD  Rheumatology  [facility]

## 2018-06-25 NOTE — PROGRESS NOTES
Critical care resident notified that pressure requirements have increased over the last hour. Pt currently on levo @ 0.26 mcg/kg/min. Tube feeds held. Will continue to monitor.

## 2018-06-25 NOTE — NURSING
MD notified of HR 130s (was previously stable 70s-80s). MAPs 90s- levo currently paused. Updated on gtts. New orders to increase sedation as tolerated. Will continue to monitor closely

## 2018-06-25 NOTE — NURSING
Dr. Vera at bedside. Updated on current VS and gtts. Peak pressures remain increased at 60. New orders placed for STAT chest x ray. ABG drawn, vent support increased to A/C 80%/12 PEEP RR 28. Pt's spouse and daughter at bedside. Dr. Vera explained severity of pt's current condition with family. All questions and concerns answered per Dr. Vera.

## 2018-06-25 NOTE — PROGRESS NOTES
[facility]  Nephrology  Progress Note    Patient Name: Jasmeet Raymond  MRN: [id number]  Admission Date: 6/10/2018  Hospital Length of Stay: 15 days  Attending Provider: Jonathan Vera MD   Primary Care Physician: Provider Notinsystem  Principal Problem:<principal problem not specified>    Subjective:     HPI: 62 yo AAm with HTN, NIDDM, arthritis on metformin and meloxicam  admitted to [facility] for worsening SOB and ALEX.   Patiend had been treated as an out;patient for 2 weeks for Shortness of breath and bronchitis and UTI. In the hospital he was found to be acidotic, hyperkalemic, serum creatinine 8, and diffuse bilateral pulmonary infiltrates, requiring intubation. UA with proteinuria, hematuria and wbc's, Bronchoscopy revealed hemorrahage. Patient recieved one dialysis as per notes and 1 gm solumedrol, and transferred  to [facility] for plasmapheresis  And further evaluation.  Family is not available at the time of exam and patient is intubated.   HIV, Hepatitis B, C, complements  All within normal limits. DENIZ, ANCA and AntiGBM, results Nnot available    Interval History: Remain intubated FIO 70% from 100% and sedated. SLED net neg 10 ml. Repeat CXR showed improvement ? bilateral opacification.     Review of patient's allergies indicates:   Allergen Reactions    Sulfa (sulfonamide antibiotics)      Current Facility-Administered Medications   Medication Frequency    0.9%  NaCl infusion (for blood administration) Q24H PRN    0.9%  NaCl infusion (for blood administration) Q24H PRN    albuterol-ipratropium 2.5 mg-0.5 mg/3 mL nebulizer solution 3 mL Q4H    atovaquone suspension 750 mg Q12H    calcium chloride 1 g in dextrose 5 % 100 mL IVPB Continuous    calcium chloride 1 g in dextrose 5 % 100 mL IVPB Continuous    chlorhexidine 0.12 % solution 15 mL BID    cisatracurium (NIMBEX) 100 mg in dextrose 5 % 100 mL infusion Continuous    dexmedetomidine (PRECEDEX) 400mcg/100mL 0.9% NaCL  infusion Continuous    dextrose 50% injection 12.5 g PRN    DEXTROSE-SOD CITRATE-CITRIC AC 2.45-2.2 GRAM- 730 MG/100 ML MISC SOLN PRN    DEXTROSE-SOD CITRATE-CITRIC AC 2.45-2.2 GRAM- 730 MG/100 ML MISC SOLN Continuous    famotidine tablet 20 mg BID    fentaNYL 2500 mcg in 0.9% sodium chloride 250 mL infusion premix (titrating) Continuous    glucagon (human recombinant) injection 1 mg PRN    heparin (porcine) injection 5,000 Units Q8H    heparin, porcine (PF) 100 unit/mL injection flush 500 Units PRN    hepatitis B virus vacc.rec(PF) injection 40 mcg vaccine x 1 dose    hydrALAZINE injection 10 mg Q4H PRN    insulin aspart U-100 pen 1-10 Units Q4H PRN    labetalol injection 20 mg Q4H PRN    lorazepam injection 4 mg Q1H PRN    methylPREDNISolone sodium succinate injection 125 mg Q8H    metoclopramide HCl tablet 5 mg Q6H    midazolam (VERSED) 1 mg/mL in sodium chloride 0.9% 100 mL infusion (titrating) Continuous    norepinephrine 16 mg in dextrose 5 % 250 mL infusion Continuous    norepinephrine 4 mg in dextrose 5% 250 mL infusion (premix) (titrating) Continuous    piperacillin-tazobactam 4.5 g in sodium chloride 0.9% 100 mL IVPB (ready to mix system) Q8H    propofol (DIPRIVAN) 10 mg/mL infusion Continuous    sodium chloride 0.9% flush 10 mL PRN    white petrolatum-mineral oil (LUBIFRESH P.M.) ophthalmic ointment Q8H       Objective:     Vital Signs (Most Recent):  Temp: 97.5 °F (36.4 °C) (06/25/18 1100)  Pulse: (!) 132 (06/25/18 1500)  Resp: (!) 28 (06/25/18 1122)  BP: (!) 115/59 (06/22/18 2215)  SpO2: (!) 90 % (06/25/18 1500)  O2 Device (Oxygen Therapy): ventilator (06/25/18 1500) Vital Signs (24h Range):  Temp:  [97.4 °F (36.3 °C)-99.2 °F (37.3 °C)] 97.5 °F (36.4 °C)  Pulse:  [] 132  Resp:  [25-33] 28  SpO2:  [55 %-99 %] 90 %  Arterial Line BP: ()/(41-78) 103/52     Weight: 89 kg (196 lb 3.4 oz) (06/22/18 0300)  Body mass index is 29.83 kg/m².  Body surface area is 2.07 meters  squared.    I/O last 3 completed shifts:  In: 31290.3 [I.V.:5508; Blood:933.3; NG/GT:340; IV Piggyback:3987]  Out: 8587 [Other:8587]    Physical Exam   Constitutional: He appears well-developed and well-nourished.   Intubated FIO2 70 % , sedated.    HENT:   Head: Normocephalic.   Cardiovascular: Normal heart sounds.  Exam reveals no gallop and no friction rub.    No murmur heard.  Tachycardic, no murmur appreciated.   No JVD     Pulmonary/Chest:   Intubated. Clear anteriorly   Abdominal: Soft. He exhibits no distension.   Musculoskeletal: He exhibits edema (BUE-dressing to RFA). He exhibits no deformity.   Neurological:   Intubated and sedated   Skin: Skin is warm and dry.   R SC trialysis   Nursing note and vitals reviewed.      Significant Labs:  All labs within the past 24 hours have been reviewed.     Significant Imaging:  Labs: Reviewed    Assessment/Plan:     ALEX (acute kidney injury)    MPO-cANCA GPA, presenting with RPGN and pulmonary hemorrhage  S/p pulse steroids, now on scheduled dose 125mg IV solumedrol q6h, and PLEX 5/6, also has been started on rituxan 6/15 (repeat in 2 weeks 6/29), renal supportive therapy with RRT (SLED). Urine sediment showed RBC cast.  -s/p PLEX -treatment #6.     Plan:  - Continue SLED for metabolic clearance and volume management. Increase -500 negative. Bicarb 40. Follow up 2 pm labs-Will adjust dialysate to current labs.   -Naphos IV prn                      Thank you for your consult. I will follow-up with patient. Please contact us if you have any additional questions.    Kandice Hernandez NP  Nephrology  [facility]

## 2018-06-25 NOTE — SUBJECTIVE & OBJECTIVE
Interval History: Remain intubated FIO 70% from 100% and sedated. SLED net neg 10 ml. Repeat CXR showed improvement ? bilateral opacification.     Review of patient's allergies indicates:   Allergen Reactions    Sulfa (sulfonamide antibiotics)      Current Facility-Administered Medications   Medication Frequency    0.9%  NaCl infusion (for blood administration) Q24H PRN    0.9%  NaCl infusion (for blood administration) Q24H PRN    albuterol-ipratropium 2.5 mg-0.5 mg/3 mL nebulizer solution 3 mL Q4H    atovaquone suspension 750 mg Q12H    calcium chloride 1 g in dextrose 5 % 100 mL IVPB Continuous    calcium chloride 1 g in dextrose 5 % 100 mL IVPB Continuous    chlorhexidine 0.12 % solution 15 mL BID    cisatracurium (NIMBEX) 100 mg in dextrose 5 % 100 mL infusion Continuous    dexmedetomidine (PRECEDEX) 400mcg/100mL 0.9% NaCL infusion Continuous    dextrose 50% injection 12.5 g PRN    DEXTROSE-SOD CITRATE-CITRIC AC 2.45-2.2 GRAM- 730 MG/100 ML MISC SOLN PRN    DEXTROSE-SOD CITRATE-CITRIC AC 2.45-2.2 GRAM- 730 MG/100 ML MISC SOLN Continuous    famotidine tablet 20 mg BID    fentaNYL 2500 mcg in 0.9% sodium chloride 250 mL infusion premix (titrating) Continuous    glucagon (human recombinant) injection 1 mg PRN    heparin (porcine) injection 5,000 Units Q8H    heparin, porcine (PF) 100 unit/mL injection flush 500 Units PRN    hepatitis B virus vacc.rec(PF) injection 40 mcg vaccine x 1 dose    hydrALAZINE injection 10 mg Q4H PRN    insulin aspart U-100 pen 1-10 Units Q4H PRN    labetalol injection 20 mg Q4H PRN    lorazepam injection 4 mg Q1H PRN    methylPREDNISolone sodium succinate injection 125 mg Q8H    metoclopramide HCl tablet 5 mg Q6H    midazolam (VERSED) 1 mg/mL in sodium chloride 0.9% 100 mL infusion (titrating) Continuous    norepinephrine 16 mg in dextrose 5 % 250 mL infusion Continuous    norepinephrine 4 mg in dextrose 5% 250 mL infusion (premix) (titrating) Continuous     piperacillin-tazobactam 4.5 g in sodium chloride 0.9% 100 mL IVPB (ready to mix system) Q8H    propofol (DIPRIVAN) 10 mg/mL infusion Continuous    sodium chloride 0.9% flush 10 mL PRN    white petrolatum-mineral oil (LUBIFRESH P.M.) ophthalmic ointment Q8H       Objective:     Vital Signs (Most Recent):  Temp: 97.5 °F (36.4 °C) (06/25/18 1100)  Pulse: (!) 132 (06/25/18 1500)  Resp: (!) 28 (06/25/18 1122)  BP: (!) 115/59 (06/22/18 2215)  SpO2: (!) 90 % (06/25/18 1500)  O2 Device (Oxygen Therapy): ventilator (06/25/18 1500) Vital Signs (24h Range):  Temp:  [97.4 °F (36.3 °C)-99.2 °F (37.3 °C)] 97.5 °F (36.4 °C)  Pulse:  [] 132  Resp:  [25-33] 28  SpO2:  [55 %-99 %] 90 %  Arterial Line BP: ()/(41-78) 103/52     Weight: 89 kg (196 lb 3.4 oz) (06/22/18 0300)  Body mass index is 29.83 kg/m².  Body surface area is 2.07 meters squared.    I/O last 3 completed shifts:  In: 44637.3 [I.V.:5508; Blood:933.3; NG/GT:340; IV Piggyback:3987]  Out: 8587 [Other:8587]    Physical Exam   Constitutional: He appears well-developed and well-nourished.   Intubated FIO2 70 % , sedated.    HENT:   Head: Normocephalic.   Cardiovascular: Normal heart sounds.  Exam reveals no gallop and no friction rub.    No murmur heard.  Tachycardic, no murmur appreciated.   No JVD     Pulmonary/Chest:   Intubated. Clear anteriorly   Abdominal: Soft. He exhibits no distension.   Musculoskeletal: He exhibits edema (BUE-dressing to RFA). He exhibits no deformity.   Neurological:   Intubated and sedated   Skin: Skin is warm and dry.   R SC trialysis   Nursing note and vitals reviewed.      Significant Labs:  All labs within the past 24 hours have been reviewed.     Significant Imaging:  Labs: Reviewed

## 2018-06-25 NOTE — PROGRESS NOTES
[facility]  Critical Care Medicine  Progress Note    Patient Name: Jasmeet Raymond  MRN: [id number]  Admission Date: 6/10/2018  Hospital Length of Stay: 15 days  Code Status: Full Code  Attending Provider: Jonathan Vera MD  Primary Care Provider: Provider Notinsystem   Principal Problem: <principal problem not specified>    Subjective:     HPI:  This is Mr. Jasmeet Raymond, 63 year old male with PMHx significant for HTN, DM and arthirtis presented to [facility] and [facility] with symptoms of pneumonia and shortness of breath. On 5/29/2018, he presented with symptoms of bronchitis with denies chills, ear pain, sore throat, chest pain, shortness of breath at that time, received antibiotics. Then on 6/8/2018 he presented again to [facility] and [facility] with shortness of breath and cough, and his CXR showed concern for airway disease. However on the first day of admission at [facility] and [facility], his respiratory symptoms worsen and had acute hypoxic respiratory failure that required intubation. Pulmonary consulted and he underwent Bronchoscopy and  revealed alveolar hemorrhage, with the setting of having hematuria and diffuse alveloar hemorrhage the concern was for presence of pulmonary renal syndrome and he received pulsed dose of Methylprednisolone and nephrology consulted for plasmapheresis. However at [facility], plasmapheresis machine is broken and they recommend to transfer to [facility] [place] for [place]mapheresis.     On arrival he was on Nibmex for paralytics, Propofol for sedation, Levophed (eventaully turned off) for pressure support, Flolan (Epoprostenol sodium) inhaled which was switched to Inhaled Nitro. His mechanical ventilation initially was on FiO2 100%, PEEP 16, then weaned his FiO2 with high requirement of oxygenation. He had an signifiacnt drop in his Hb (baseline ~ 12) dated one week prior  presentation to 9 and 8, required one pRBC prior arrival. Repeated CXR showed extensive bilateral infiltrates. Important laboratory investigation showed His , , pro calcitonin 4.4, C3 and C4 complement normal., Trop negative, C3 and C4 showed normal , Hep test is negative, Lactic acid 1.8 > 1.0, HIV negative, Procal 4.4, UA shoowed heamturia and trace leukocytes, Urine culture from 5/29 showed almost pansensitve Cedecea davisae.     Hospital/ICU Course:  06/11/2018 admitted to MICU from [facility] with ARDS  06/12/2018 No acute events overnight. Had PLEX with 4.5 L exchange with FFP, and CRRT started and he became hypotensive, Levophed started during the CRRT. He was weaning off Nitric Oxide and his ABGs showing worsening respi acidosis. Rheumatology, Transfusion medicine and Nephrology on board   06/13/2018 Stable on High ladder PEEP for ARDS and completely off NO, and still on Nimbex for paralytics and sedation with Propofol and Fentanyl. ID, Rheumatology and Nephrology on board.   06/15/2018, Still on high setting   06/15/2018 Started Rituxan after his PLEx session, and decrease in his Methylprednisone. Still on high PEEP ladder for ARDS, frequent clots in ETT suction   06/16/2018 Overnight, he had Rituxan induction for ANCA associated vasculitis and decrease his Methypredisone to 125 mg IV Q 6 Hours. Mild increase in his mechanical ventilation setting and he is grossly volume overload and still anuric.   06/17/2018 Palaryzed with Nimbex and better in his mechanical ventilation setting. Off Versed and on max Precedex and Profol. Increased in UF rate to 500 and better in his I/O. Repated CXR showed better air space.   06/18/2018 off paralytic. received 4th dose of plasma exchange today. Still anuric. Continue on dialysis.wean off sedation   06/19/2018 Patient remains intubated on A/C FiO2 55%/ PEEP 10. Patient on levo gtt titrated to keep MAP >65. Patient  on precedex gtt at 1.34 mg/kg/hr and fentanyl gtt at 200 mcg/hr. Patient on CRRT, UF at 400mL/hr with citrate and calcium chloride infusions running per MD order. Tube feeds continue to be held, NG tube to low int. suction with roughly 200 mL of green output over the night. Patient's Tmax was 102.6 degrees F. patient placed on cooling blanket. Patient's H/H this morning is 6.5/20. One unit of pRBCs ordered.   06/20-06/22 : continues to wean vent and pressor. Continues CRRT with increase fluid removal. He is on 500 cc/h UF. Start to follow commands and moves his head.  06/23/2018 spiked fever 102. Continues on vanc and Zosyn. Bcx NGTD. Increased Oxygen requirement to FiO2 80% and Levo increase to 0.12 mcg/kg/min. Continues on aggressive CRRT   06/24/2018 continues on Vent on same setting. Started on Propofol gtt. Continue on Fentanyl and try to wean off Precedex. Changed on pressure control last night   06/25/2018 pt desated last night. Audible cuff leak. ETT adjusted. Sat improved. Continue CRRT. Family updated about the plan. Pt started on NIMBEX. Continue Propofol, verset, precedex and Fentanyl gtt       Interval History/Significant Events:  pt desated last night. Audible cuff leak. ETT adjusted. Sat improved. Continue CRRT. Family updated about the plan. Pt re-started on NIMBEX 06/25/18. Continue Propofol, verset, precedex and Fentanyl gtt     Review of Systems   Unable to perform ROS: Intubated     Objective:     Vital Signs (Most Recent):  Temp: 97.5 °F (36.4 °C) (06/25/18 1100)  Pulse: (!) 138 (06/25/18 1200)  Resp: (!) 28 (06/25/18 1122)  BP: (!) 115/59 (06/22/18 2215)  SpO2: (!) 84 % (06/25/18 1200) Vital Signs (24h Range):  Temp:  [97.4 °F (36.3 °C)-99.2 °F (37.3 °C)] 97.5 °F (36.4 °C)  Pulse:  [] 138  Resp:  [25-33] 28  SpO2:  [55 %-99 %] 84 %  Arterial Line BP: ()/(41-78) 107/51   Weight: 89 kg (196 lb 3.4 oz)  Body mass index is 29.83 kg/m².      Intake/Output Summary (Last 24 hours) at  06/25/18 1230  Last data filed at 06/25/18 1200   Gross per 24 hour   Intake             7087 ml   Output             8618 ml   Net            -1531 ml       Physical Exam   Constitutional: No distress. He is sedated and intubated.   Intubated    Cardiovascular: Normal rate, regular rhythm, normal heart sounds and intact distal pulses.  Exam reveals no gallop and no friction rub.    No murmur heard.  Pulmonary/Chest: Effort normal. He is intubated. No respiratory distress. He has no wheezes. He has no rhonchi. He has rales.   Abdominal: Soft. Bowel sounds are normal. He exhibits no distension. There is no tenderness.   Musculoskeletal: He exhibits edema (2+ LE).   Neurological:   [id number]   Skin: Skin is warm and dry. No rash noted. No erythema.   R SC trialysis   Nursing note and vitals reviewed.      Vents:  Vent Mode: A/C (06/25/18 0913)  Ventilator Initiated: Yes (06/10/18 2217)  Set Rate: 22 bmp (06/25/18 0913)  Vt Set: 510 mL (06/25/18 0913)  Pressure Support: 12 cmH20 (06/21/18 1347)  PEEP/CPAP: 14 cmH20 (06/25/18 0913)  Oxygen Concentration (%): 74 (06/25/18 1200)  Peak Airway Pressure: 65 cmH2O (06/25/18 0913)  Plateau Pressure: 45 cmH20 (06/25/18 0913)  Total Ve: 12.2 mL (06/25/18 0913)  F/VT Ratio<105 (RSBI): (!) 76.39 (06/25/18 0717)  Lines/Drains/Airways     Central Venous Catheter Line                 Hemodialysis Catheter 06/10/18 2307 right internal jugular 14 days         Percutaneous Central Line Insertion/Assessment - triple lumen  06/10/18 2305 left internal jugular 14 days          Drain                 NG/OG Tube 06/10/18 2309 Summers sump 18 Fr. Left nostril 14 days          Airway                 Airway - Non-Surgical Endotracheal Tube -- days          Arterial Line                 Arterial Line 06/10/18 2304 Right Radial 14 days          Pressure Ulcer                 Pressure Injury 06/20/18 0303 Right Buttocks Stage 2 5 days              Significant Labs:    CBC/Anemia Profile:    Recent  Labs  Lab 06/24/18  0106 06/24/18  1820 06/25/18  0800   WBC 6.64 8.08 9.31   HGB 8.3* 9.8* 9.1*   HCT 24.1* 27.0* 25.9*   PLT 84* 74* 83*   MCV 85 88 89   RDW 16.7* 17.1* 17.2*        Chemistries:    Recent Labs  Lab 06/24/18  0106 06/24/18  1430 06/24/18  2140 06/25/18  0306     137 138 137 137  137   K 5.1  5.1 4.5 4.8 5.2*  5.2*     107 108 108 107  107   CO2 20*  20* 21* 19* 20*  20*   BUN 29*  29* 13 13 9  9   CREATININE 1.3  1.3 0.8 0.8 0.7  0.7   CALCIUM 7.5*  7.5* 7.6* 7.6* 7.6*  7.6*   ALBUMIN 2.5*  2.5* 2.6* 2.5* 2.8*  2.8*   PROT 4.5*  --   --  6.4   BILITOT 1.3*  --   --  1.1*   ALKPHOS 121  --   --  200*   ALT 35  --   --  40   AST 74*  --   --  95*   MG 2.1 2.1 2.6 2.7*   PHOS 4.5 2.0* 3.1 2.2*       All pertinent labs within the past 24 hours have been reviewed.    Significant Imaging:  CXR: I have reviewed all pertinent results/findings within the past 24 hours and my personal findings are:  No significant interval change    Assessment/Plan:     Pulmonary   Acute hypoxemic respiratory failure    - initially was due to DAH based on bronch finding on 6/11. Pt showed initial improvement after starting PLEX and Rituxin but then started to show increase in O2 requirement   - repeated Bronch on 6/22/18 showed no DAH   - last CXR showed bilateral basal opacification likely to be due to pulmonary edema vs ARDS?   - continue aggressive CRRT and fluid removal. + 3 L in last 24 hs ?  - CTA negative for PE but showed Pneumomediastinum. This likely to be barotrauma. CTS evaluated pt and said not surgical intervention needed at this time  - repeated Echo with normal EF   - at this point, his hypoxia likely to be due to ARDS   - continue supportive treatment at this time         ARDS (adult respiratory distress syndrome)    - initially etiology was Diffuse pulmonary alveolar hemorrhage, but improved after 6 cycles of PLEX and Rituxin   - on top of DAH but may has PNA and pulmonary  edema.   - now, ARDS is in the differential.   - pt on low tidal volume Vent        Diffuse pulmonary alveolar hemorrhage    - Given his presentation with two weeks of progressive worsening of his SOB and cough, and associated with hematuria and CXR finding of bilateral infiltrate raised the concern for DAH  - On 6/10 he underwent Flexible Bronchoscopy and showed no endobronchial lesion, significant signs of hemorrhage on all of the airways, confirming findings of pulmonary hemorrhage.   - Requires on pRBC on 6/12, 6/15 and another unit on 6/17  - repeated bronchoscopy on 6/22/18 showed no DAH        Renal/   ALEX (acute kidney injury)    - Differential Diagnoses include Pulmonary renal syndrome.  - Underwent US of kidney and showed Bilateral medical renal disease, Simple renal cysts bilaterally.  - CT abdomen/pelvis, mild interstitial changes in lung bases, 2 stones in the left kidney, 4 stones in the right kidney, no hydronephrosis, post cholecystectomy. No acute abnormality in the liver, spleen, pancreas or adrenals.   - Will continue trending urine output and renal function, avoid NSAIDs, contrast, nephrotoxins    - Monitor strict I/Os, daily weights, renally dose medications to current GFR  - Still anuric and will continue CRRT and adjust UF as tolerated   - once pt is stable, may consider renal biopsy         ID   Septic shock    - See principal problem for more elaboration.   - Currently on pressors, will continue to monitor and ensure MAP > 65 mmHg (during CRRT)  - continue on vacn and Zosyn  -Cultures (new) show NGTD  - ID on Board        Immunology/Multi System   ANCA-associated vasculitis    - See Pulmorenal syndrome for more elaboration         Pulmonary-renal syndrome    - Most likely the diagnosis in the setting of diffuse alveolar hemorrhage and glomerulonephritis with most likely etiology is underlying autoimmune disorder  - Elevated UPCR 1.28, DENIZ positive 1:320 speckled pattern, MPO+ 82, PR3 neg,  Immunoglobulins wnl, Hep C and HIV neg  - Finished  5 days of Methylprednisolone 1000 mg IV daily, started on Rituxan (6/15) and Methylprednisone 125 mg IV Q 6 and Atovaquin for PPx   - received 4th dose of plasma exchange 06/18/18  - s/p 6 cycles of PLEX   - once pt is stable, may consider renal biopsy         Hematology   Thrombocytopenia, unspecified    - likely to be sepsis vs PLEX  induced   - Will continue to observe, currently continue to improve         Endocrine   Diabetes mellitus type 2 with complications    - Will keep his blood glucose on the target 140-180  - ISS             Critical Care Daily Checklist:     A: Awake: RASS Goal/Actual Goal: RASS Goal: 0-->alert and calm  Actual: Conteh Agitation Sedation Scale (RASS): Drowsy   B: Spontaneous Breathing Trial Performed?     C: SAT & SBT Coordinated?  no   D: Delirium: CAM-ICU Overall CAM-ICU: Positive   E: Early Mobility Performed? Turning the pt q 2 hs   F: Feeding Goal: Goals: Patient to receive nutrition by RD follow-up  Status: Nutrition Goal Status: goal met         Current Diet Order   Procedures    Diet NPO. On Tube feed       AS: Analgesia/Sedation As above   T: Thromboembolic Prophylaxis Heparin   H: HOB > 300 Yes   U: Stress Ulcer Prophylaxis (if needed) Famotidine   G: Glucose Control As above   B: Bowel Function Stool Occurrence: 0   I: Indwelling Catheter (Lines & Liu) Necessity As above   D: De-escalation of Antimicrobials/Pharmacotherapies No     Plan for the day/ETD Increase fluid removal. Start NIMBEX      Code Status:  Family/Goals of Care: Full Code            Critical secondary to Patient has a condition that poses threat to life and bodily function: Acute Hypoxic        Bruce Larry MD  Critical Care Medicine  [facility]

## 2018-06-26 PROBLEM — J98.8 AIRWAY OBSTRUCTION: Status: ACTIVE | Noted: 2018-01-01

## 2018-06-26 NOTE — PROGRESS NOTES
Siria Medina, NP at bedside. Patient with acute hypoxia, hypotension, and increasing tachycardia, large increase to Levophed. Vent adjustments made, ABG, EKG, CXR obtained.Versed 2mg IV bolus from bag given, Duoneb given, UF decreased to 300,  ml IV bolus given, Vasopression started. Will continue to monitor.

## 2018-06-26 NOTE — PROGRESS NOTES
[facility]  Nephrology  Progress Note    Patient Name: Jasmeet Raymond  MRN: [id number]  Admission Date: 6/10/2018  Hospital Length of Stay: 16 days  Attending Provider: Wilson Ji MD   Primary Care Physician: Provider Notinsystem  Principal Problem:<principal problem not specified>    Subjective:     HPI: 64 yo AAm with HTN, NIDDM, arthritis on metformin and meloxicam  admitted to [facility] for worsening SOB and ALEX.   Patiend had been treated as an out;patient for 2 weeks for Shortness of breath and bronchitis and UTI. In the hospital he was found to be acidotic, hyperkalemic, serum creatinine 8, and diffuse bilateral pulmonary infiltrates, requiring intubation. UA with proteinuria, hematuria and wbc's, Bronchoscopy revealed hemorrahage. Patient recieved one dialysis as per notes and 1 gm solumedrol, and transferred  to [facility] for plasmapheresis  And further evaluation.  Family is not available at the time of exam and patient is intubated.   HIV, Hepatitis B, C, complements  All within normal limits. DENIZ, ANCA and AntiGBM, results Nnot available    Interval History: Remains intubated FIO 85%, sedated. Decrease  overnight due to increase pressors requirement and tachycardia. Vaso started last night. Net pos 200 ml. Total hr intake 125 ml -nimbex, fentanyl, versed, vaso, ca cl. Low iCa and phos. TF off. ABG pH 7.35/69/61/38.9.     Review of patient's allergies indicates:   Allergen Reactions    Sulfa (sulfonamide antibiotics)      Current Facility-Administered Medications   Medication Frequency    albuterol-ipratropium 2.5 mg-0.5 mg/3 mL nebulizer solution 3 mL Q4H    atovaquone suspension 750 mg Q12H    calcium chloride 1 g in dextrose 5 % 100 mL IVPB Continuous    calcium chloride 1 g in dextrose 5 % 100 mL IVPB Continuous    chlorhexidine 0.12 % solution 15 mL BID    cisatracurium (NIMBEX) 100 mg in dextrose 5 % 100 mL infusion Continuous    dexmedetomidine (PRECEDEX)  400mcg/100mL 0.9% NaCL infusion Continuous    dextrose 50% injection 12.5 g PRN    DEXTROSE-SOD CITRATE-CITRIC AC 2.45-2.2 GRAM- 730 MG/100 ML MISC SOLN PRN    DEXTROSE-SOD CITRATE-CITRIC AC 2.45-2.2 GRAM- 730 MG/100 ML MISC SOLN Continuous    famotidine tablet 20 mg BID    fentaNYL 2500 mcg in 0.9% sodium chloride 250 mL infusion premix (titrating) Continuous    glucagon (human recombinant) injection 1 mg PRN    heparin (porcine) injection 5,000 Units Q8H    heparin, porcine (PF) 100 unit/mL injection flush 500 Units PRN    hepatitis B virus vacc.rec(PF) injection 40 mcg vaccine x 1 dose    hydrALAZINE injection 10 mg Q4H PRN    insulin aspart U-100 pen 1-10 Units Q4H PRN    labetalol injection 20 mg Q4H PRN    lorazepam injection 4 mg Q1H PRN    methylPREDNISolone sodium succinate injection 125 mg Q8H    midazolam (VERSED) 1 mg/mL in sodium chloride 0.9% 100 mL infusion (titrating) Continuous    norepinephrine 16 mg in dextrose 5 % 250 mL infusion Continuous    piperacillin-tazobactam 4.5 g in sodium chloride 0.9% 100 mL IVPB (ready to mix system) Q8H    propofol (DIPRIVAN) 10 mg/mL infusion Continuous    sodium chloride 0.9% flush 10 mL PRN    sodium phosphate 39.99 mmol in dextrose 5 % 250 mL IVPB Once    vasopressin (PITRESSIN) 0.2 Units/mL in dextrose 5 % 100 mL infusion Continuous    white petrolatum-mineral oil (LUBIFRESH P.M.) ophthalmic ointment Q8H       Objective:     Vital Signs (Most Recent):  Temp: 98.4 °F (36.9 °C) (06/26/18 0700)  Pulse: (!) 115 (06/26/18 0915)  Resp: (!) 28 (06/26/18 0831)  BP: (!) 115/59 (06/22/18 2215)  SpO2: (!) 92 % (06/26/18 0915)  O2 Device (Oxygen Therapy): ventilator (06/26/18 0745) Vital Signs (24h Range):  Temp:  [97.5 °F (36.4 °C)-99.2 °F (37.3 °C)] 98.4 °F (36.9 °C)  Pulse:  [] 115  Resp:  [28] 28  SpO2:  [84 %-97 %] 92 %  Arterial Line BP: ()/(45-80) 102/54     Weight: 77.7 kg (171 lb 4.8 oz) (06/26/18 0530)  Body mass index is 26.05  kg/m².  Body surface area is 1.93 meters squared.    I/O last 3 completed shifts:  In: 18291 [I.V.:5070; NG/GT:150; IV Piggyback:8481]  Out: 36133 [Drains:450; Other:93701]    Physical Exam   Constitutional: He appears well-developed and well-nourished.   Intubated FIO2 85 % , sedated.    HENT:   Head: Normocephalic.   Cardiovascular: Normal heart sounds.  Exam reveals no gallop and no friction rub.    No murmur heard.  Tachycardic, no murmur appreciated.   No JVD     Pulmonary/Chest:   Intubated. Clear anteriorly   Abdominal: Soft. He exhibits no distension.   Musculoskeletal: He exhibits edema (BUE-dressing to RFA). He exhibits no deformity.   Neurological:   Intubated and sedated   Skin: Skin is warm and dry.   R SC trialysis   Nursing note and vitals reviewed.      Significant Labs:  All labs within the past 24 hours have been reviewed.     Significant Imaging:  Labs: Reviewed    Assessment/Plan:     ALEX (acute kidney injury)    MPO-cANCA GPA, presenting with RPGN and pulmonary hemorrhage  S/p pulse steroids, now on scheduled dose 125mg IV solumedrol q6h, and PLEX 5/6, also has been started on rituxan 6/15 (repeat in 2 weeks 6/29), renal supportive therapy with RRT (SLED). Urine sediment showed RBC cast.  -s/p PLEX -treatment #6.   -Labs reviewed. See interval hx.     Plan:  - Continue SLED -400 to keep even for now. Bicarb 40. Increase ca cl post filter 25 cc/hr.   -Naphos IV prn                      Thank you for your consult. I will follow-up with patient. Please contact us if you have any additional questions.    Kandice Hernandez NP  Nephrology  [facility]

## 2018-06-26 NOTE — ASSESSMENT & PLAN NOTE
- Most likely from Propofol and plan to wean off Propofol and started on Insulin infusion   - monitor TG

## 2018-06-26 NOTE — NURSING
MD pronounced pt asystole on 2/26/2018 @ 1634. Family remains at bedside. ET removed per family request.  remains at bedside. Awaiting note from MD. JOVANNA notified. Post mortem  care performed.

## 2018-06-26 NOTE — DISCHARGE SUMMARY
[facility]  Critical Care Medicine  Discharge Summary      Patient Name: Jasmeet Raymond  MRN: [id number]  Admission Date: 6/10/2018  Hospital Length of Stay: 16 days  Discharge Date and Time:  06/26/2018 5:37 PM  Attending Physician: Wilson Ji MD   Discharging Provider: Bruce Larry MD  Primary Care Provider: Provider Notinsystem  Reason for Admission: acute hypoxic respiratory failure. Diffuse alveolar Hemorrhage     HPI:   This is Mr. Jasmeet Raymond, 63 year old male with PMHx significant for HTN, DM and arthirtis presented to [facility] with symptoms of pneumonia and shortness of breath. On 5/29/2018, he presented with symptoms of bronchitis with denies chills, ear pain, sore throat, chest pain, shortness of breath at that time, received antibiotics. Then on 6/8/2018 he presented again to [facility] and [facility] with shortness of breath and cough, and his CXR showed concern for airway disease. However on the first day of admission at [facility] and [facility], his respiratory symptoms worsen and had acute hypoxic respiratory failure that required intubation. Pulmonary consulted and he underwent Bronchoscopy and  revealed alveolar hemorrhage, with the setting of having hematuria and diffuse alveloar hemorrhage the concern was for presence of pulmonary renal syndrome and he received pulsed dose of Methylprednisolone and nephrology consulted for plasmapheresis. However at [facility], plasmapheresis machine is broken and they recommend to transfer to [facility] [place] for plasmapheresis.     On arrival he was on Nibmex for paralytics, Propofol for sedation, Levophed (eventaully turned off) for pressure support, Flolan (Epoprostenol sodium) inhaled which was switched to Inhaled Nitro. His mechanical ventilation initially was on FiO2 100%, PEEP 16, then weaned his FiO2 with high requirement of  oxygenation. He had an signifiacnt drop in his Hb (baseline ~ 12) dated one week prior presentation to 9 and 8, required one pRBC prior arrival. Repeated CXR showed extensive bilateral infiltrates. Important laboratory investigation showed His , , pro calcitonin 4.4, C3 and C4 complement normal., Trop negative, C3 and C4 showed normal , Hep test is negative, Lactic acid 1.8 > 1.0, HIV negative, Procal 4.4, UA shoowed heamturia and trace leukocytes, Urine culture from 5/29 showed almost pansensitve Cedecea davisae.     * No surgery found *    Indwelling Lines/Drains at Time of Discharge:   Lines/Drains/Airways     Central Venous Catheter Line                 Hemodialysis Catheter 06/10/18 2307 right internal jugular 15 days         Percutaneous Central Line Insertion/Assessment - triple lumen  06/10/18 2305 left internal jugular 15 days          Arterial Line                 Arterial Line 06/10/18 2304 Right Radial 15 days          Pressure Ulcer                 Pressure Injury 06/20/18 0303 Right Buttocks Stage 2 6 days              Hospital Course:   06/11/2018 admitted to MICU from [facility] with ARDS  S/p two bronchoscopy. First one showed DAH and second one showed no bleed. S/p one dose of Rituxin and 6 cycle of PLEX. Pt was intubated during whole his hospitalization and was not be able to wean off pressor. Needed to be be paralyzed with NIMBEX. Also needed pressor and sedation. Pt passed 06/26/2018 at 04:34 pm     Consults         Status Ordering Provider     Inpatient consult to Cardiothoracic Surgery  Once     Provider:  (Not yet assigned)    Completed SHIRLEY BHATTI     Inpatient consult to Hematology/Oncology  Once     Provider:  (Not yet assigned)    Completed KATHERINE GARZON     Inpatient consult to Infectious Diseases  Once     Provider:  (Not yet assigned)    Completed SNOW ARRIAZA     Inpatient consult to  Infectious Diseases  Once     Provider:  (Not yet assigned)    Completed KATHERINE GARZON     Inpatient consult to Nephrology  Once     Provider:  (Not yet assigned)    Completed SNOW ARRIAZA     Inpatient consult to [facility]  Once     Provider:  (Not yet assigned)    Completed SNOW ARRIAZA     Inpatient consult to Registered Dietitian/Nutritionist  Once     Provider:  (Not yet assigned)    Completed SNOW ARRIAZA     Inpatient consult to Rheumatology  Once     Provider:  (Not yet assigned)    Completed SNOW ARRIAZA        Significant Labs:  ABGs:   Recent Labs  Lab 06/26/18  1050   PH 7.357   PCO2 69.3*   HCO3 38.9*   POCSATURATED 89*   BE 13     BMP:   Recent Labs  Lab 06/26/18  0420   *  158*     139   K 4.4  4.4   CL 98  98   CO2 33*  33*   BUN 4*  4*   CREATININE 0.6  0.6   CALCIUM 7.5*  7.5*   MG 2.2     CMP:   Recent Labs  Lab 06/25/18  0306 06/25/18  1400 06/25/18  2210 06/26/18  0420     137 131*  131* 136 139  139   K 5.2*  5.2* 5.2*  5.2* 4.1 4.4  4.4     107 99  99 101 98  98   CO2 20*  20* 22*  22* 27 33*  33*   *  165* 198*  198* 136* 158*  158*   BUN 9  9 7*  7* 5* 4*  4*   CREATININE 0.7  0.7 0.8  0.8 0.6 0.6  0.6   CALCIUM 7.6*  7.6* 7.6*  7.6* 7.1* 7.5*  7.5*   PROT 6.4  --   --  5.5*   ALBUMIN 2.8*  2.8* 2.7*  2.7* 2.1* 2.1*  2.1*   BILITOT 1.1*  --   --  1.0   ALKPHOS 200*  --   --  169*   AST 95*  --   --  95*   ALT 40  --   --  56*   ANIONGAP 10  10 10  10 8 8  8   EGFRNONAA >60.0  >60.0 >60.0  >60.0 >60.0 >60.0  >60.0       Significant Imaging:    Pending Diagnostic Studies:     Procedure Component Value Units Date/Time    Aldolase [[id number]] Collected:  06/26/18 0420    Order Status:  Sent Lab Status:  In process Updated:  06/26/18 0428    Specimen:  Blood from Blood     Anti-scleroderma antibody [[id number]] Collected:  06/26/18 0420    Order  Status:  Sent Lab Status:  In process Updated:  06/26/18 0428    Specimen:  Blood from Blood     CBC auto differential [[id number]] Collected:  06/14/18 0748    Order Status:  Sent Lab Status:  In process Updated:  06/14/18 0749    Specimen:  Blood from Blood     MyoMarker Panel 3 [[id number]] Collected:  06/26/18 0420    Order Status:  Sent Lab Status:  In process Updated:  06/26/18 0429    Specimen:  Blood from Blood     RNA polymerase III Ab, IgG [[id number]] Collected:  06/26/18 0420    Order Status:  Sent Lab Status:  In process Updated:  06/26/18 0429    Specimen:  Blood from Blood     Renal function panel [[id number]] Collected:  06/13/18 2138    Order Status:  Sent Lab Status:  In process Updated:  06/13/18 2139    Specimen:  Blood from Blood         Final Active Diagnoses:    Diagnosis Date Noted POA    PRINCIPAL PROBLEM:  Acute hypoxemic respiratory failure [J96.01] 06/11/2018 Yes    Diffuse pulmonary alveolar hemorrhage [R04.2] 06/10/2018 Yes    Airway obstruction [J98.8]  Unknown    Goals of care, counseling/discussion [Z71.89]  Not Applicable    Acute renal failure with pathological lesion in kidney [N17.8]  Yes    Hypertriglyceridemia [E78.1] 06/15/2018 No    Thrombocytopenia, unspecified [D69.6] 06/15/2018 No    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] 06/14/2018 Yes    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] 06/10/2018 Yes    Septic shock [A41.9, R65.21] 06/10/2018 Yes    ARDS (adult respiratory distress syndrome) [J80] 06/10/2018 Yes    Diabetes mellitus type 2 with complications [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 Pt showed initial improvement after starting PLEX and Rituxin but then started to show increase in O2 requirement   - repeated Bronch on 6/22/18 showed no DAH   - last CXR showed bilateral basal opacification likely to be due to pulmonary edema vs ARDS?   - continue aggressive CRRT and fluid removal. + 3 L in last 24 hs ?  - CTA negative for PE but showed Pneumomediastinum. This likely to be barotrauma. CTS evaluated pt and said not surgical intervention needed at this time  - repeated Echo with normal EF   - his hypoxia likely to be due to ARDS         ARDS (adult respiratory distress syndrome)    - initially etiology was Diffuse pulmonary alveolar hemorrhage, but improved after 6 cycles of PLEX and Rituxin   - on top of DAH but may has PNA and pulmonary edema.   - now , his acute hypoxic respiratory failure is mainly due to sever ARDS. His peak plateau pressure is elevated        Diffuse pulmonary alveolar hemorrhage    - Given his presentation with two weeks of progressive worsening of his SOB and cough, and associated with hematuria and CXR finding of bilateral infiltrate raised the concern for DAH  - On 6/10 he underwent Flexible Bronchoscopy and showed no endobronchial lesion, significant signs of hemorrhage on all of the airways, confirming findings of pulmonary hemorrhage.   - Requires on pRBC on 6/12, 6/15 and another unit on 6/17  - repeated bronchoscopy on 6/22/18 showed no DAH           Renal/   ALEX (acute kidney injury)    - Differential Diagnoses include Pulmonary renal syndrome.  - Underwent US of kidney and showed Bilateral medical renal disease, Simple renal cysts bilaterally.  - CT abdomen/pelvis, mild interstitial changes in lung bases, 2 stones in the left kidney, 4 stones in the right kidney, no hydronephrosis, post cholecystectomy. No acute abnormality in the liver, spleen, pancreas or adrenals.   - was on CRRT          Immunology/Multi System   ANCA-associated vasculitis    - See Pulmorenal syndrome for more elaboration         Pulmonary-renal syndrome    - Most likely the diagnosis in the setting of diffuse alveolar hemorrhage and glomerulonephritis with most likely etiology is underlying autoimmune disorder  - Elevated UPCR 1.28, DENIZ positive 1:320 speckled pattern, MPO+ 82, PR3 neg, Immunoglobulins wnl, Hep C and HIV neg  - Finished  5 days of Methylprednisolone 1000 mg IV daily, started on Rituxan (6/15) and Methylprednisone 125 mg IV Q 6 and Atovaquin for PPx   - s/p 6 cycles of PLEX          Discharged Condition:     Disposition:       Bruce Larry MD  Critical Care Medicine  [facility]

## 2018-06-26 NOTE — NURSING
MD notified of new subq emphysema noted to neck bilaterally. MD at bedside, updating spouse on current plan of care and pt status. All questions answered. Will continue to monitor

## 2018-06-26 NOTE — PROGRESS NOTES
Notified Dr Stoll that Levophed gtt restarted r/t Map <65 at end of plasmapheresis tx. Will continue to monitor.   Post-Care Instructions: I reviewed with the patient in detail post-care instructions. Patient is to wear sunprotection, and avoid picking at any of the treated lesions. Pt may apply Vaseline to crusted or scabbing areas. Consent: The patient's consent was obtained including but not limited to risks of crusting, scabbing, blistering, scarring, darker or lighter pigmentary change, recurrence, incomplete removal and infection. Detail Level: Zone Duration Of Freeze Thaw-Cycle (Seconds): 0 Render Post-Care Instructions In Note?: no

## 2018-06-26 NOTE — PROGRESS NOTES
[facility]  Adult Nutrition  Progress Note    SUMMARY       Recommendations  Recommendation/Intervention:   Patient now on comfort care. If patient's situation changes & further nutritional intervention desired, please reconsult RD.  Continue current TF + 3 packets/day Beneprotein - meets EEN and EPN.    -If renal formula no longer needed, recommend Glucerna 1.5 at a goal rate of 55 mL/hr - to provide 1980 kcal/day, 109g protein/day, and 1002 mL free fluid/day.   RD to monitor.    Goals: Patient to receive nutrition by RD follow-up  Nutrition Goal Status: goal met  Communication of RD Recs:  (POC)    Reason for Assessment  Reason for Assessment: RD follow-up  Diagnosis:  (pulmonary-renal syndrome)  Relevant Medical History: DM2, HTN  General Information Comments: Patient still intubated, sedated & now paralyzed. On CRRT-SLED. Family meeting today, now on comfort care.  Nutrition Discharge Planning: Unable to determine at this time.

## 2018-06-26 NOTE — PROGRESS NOTES
[facility]  Critical Care Medicine  Progress Note    Patient Name: Jasmeet Raymond  MRN: [id number]  Admission Date: 6/10/2018  Hospital Length of Stay: 16 days  Code Status: Full Code  Attending Provider: Wilson Ji MD  Primary Care Provider: Provider Notinsystem   Principal Problem: <principal problem not specified>    Subjective:     HPI:  This is Mr. Jasmeet Raymond, 63 year old male with PMHx significant for HTN, DM and arthirtis presented to [facility] with symptoms of pneumonia and shortness of breath. On 5/29/2018, he presented with symptoms of bronchitis with denies chills, ear pain, sore throat, chest pain, shortness of breath at that time, received antibiotics. Then on 6/8/2018 he presented again to [facility] with shortness of breath and cough, and his CXR showed concern for airway disease. However on the first day of admission at [facility], his respiratory symptoms worsen and had acute hypoxic respiratory failure that required intubation. Pulmonary consulted and he underwent Bronchoscopy and  revealed alveolar hemorrhage, with the setting of having hematuria and diffuse alveloar hemorrhage the concern was for presence of pulmonary renal syndrome and he received pulsed dose of Methylprednisolone and nephrology consulted for plasmapheresis. However at [facility], plasmapheresis machine is broken and they recommend to [facility]mapheresis.     On arrival he was on Nibmex for paralytics, Propofol for sedation, Levophed (eventaully turned off) for pressure support, Flolan (Epoprostenol sodium) inhaled which was switched to Inhaled Nitro. His mechanical ventilation initially was on FiO2 100%, PEEP 16, then weaned his FiO2 with high requirement of oxygenation. He had an signifiacnt drop in his Hb (baseline ~ 12) dated one week prior  presentation to 9 and 8, required one pRBC prior arrival. Repeated CXR showed extensive bilateral infiltrates. Important laboratory investigation showed His , , pro calcitonin 4.4, C3 and C4 complement normal., Trop negative, C3 and C4 showed normal , Hep test is negative, Lactic acid 1.8 > 1.0, HIV negative, Procal 4.4, UA shoowed heamturia and trace leukocytes, Urine culture from 5/29 showed almost pansensitve Cedecea davisae.     Hospital/ICU Course:  06/11/2018 admitted to MICU from [facility] with ARDS  06/12/2018 No acute events overnight. Had PLEX with 4.5 L exchange with FFP, and CRRT started and he became hypotensive, Levophed started during the CRRT. He was weaning off Nitric Oxide and his ABGs showing worsening respi acidosis. Rheumatology, Transfusion medicine and Nephrology on board   06/13/2018 Stable on High ladder PEEP for ARDS and completely off NO, and still on Nimbex for paralytics and sedation with Propofol and Fentanyl. ID, Rheumatology and Nephrology on board.   06/15/2018, Still on high setting   06/15/2018 Started Rituxan after his PLEx session, and decrease in his Methylprednisone. Still on high PEEP ladder for ARDS, frequent clots in ETT suction   06/16/2018 Overnight, he had Rituxan induction for ANCA associated vasculitis and decrease his Methypredisone to 125 mg IV Q 6 Hours. Mild increase in his mechanical ventilation setting and he is grossly volume overload and still anuric.   06/17/2018 Palaryzed with Nimbex and better in his mechanical ventilation setting. Off Versed and on max Precedex and Profol. Increased in UF rate to 500 and better in his I/O. Repated CXR showed better air space.   06/18/2018 off paralytic. received 4th dose of plasma exchange today. Still anuric. Continue on dialysis.wean off sedation   06/19/2018 Patient remains intubated on A/C FiO2 55%/ PEEP 10. Patient on levo gtt titrated to keep MAP >65. Patient  on precedex gtt at 1.34 mg/kg/hr and fentanyl gtt at 200 mcg/hr. Patient on CRRT, UF at 400mL/hr with citrate and calcium chloride infusions running per MD order. Tube feeds continue to be held, NG tube to low int. suction with roughly 200 mL of green output over the night. Patient's Tmax was 102.6 degrees F. patient placed on cooling blanket. Patient's H/H this morning is 6.5/20. One unit of pRBCs ordered.   06/20-06/22 : continues to wean vent and pressor. Continues CRRT with increase fluid removal. He is on 500 cc/h UF. Start to follow commands and moves his head.  06/23/2018 spiked fever 102. Continues on vanc and Zosyn. Bcx NGTD. Increased Oxygen requirement to FiO2 80% and Levo increase to 0.12 mcg/kg/min. Continues on aggressive CRRT   06/24/2018 continues on Vent on same setting. Started on Propofol gtt. Continue on Fentanyl and try to wean off Precedex. Changed on pressure control last night   06/25/2018 pt desated last night. Audible cuff leak. ETT adjusted. Sat improved. Continue CRRT. Family updated about the plan. Pt started on NIMBEX. Continue Propofol, verset, precedex and Fentanyl gtt   06/26/2018 pt had eventful night with desating and increase in pressor requirement. Added vaso after max Levo. Continue NIMBEX, Propofol, verset, and Fentanyl gtt     Interval History/Significant Events:  pt had eventful night with desating and increase in pressor requirement. Added vaso after max Levo. Continue NIMBEX, Propofol, verset, and Fentanyl gtt   His peak plateau pressure is elevated.     Review of Systems   Unable to perform ROS: Intubated     Objective:     Vital Signs (Most Recent):  Temp: 98.3 °F (36.8 °C) (06/26/18 1100)  Pulse: (!) 112 (06/26/18 1230)  Resp: (!) 28 (06/26/18 1157)  BP: (!) 115/59 (06/22/18 2215)  SpO2: (!) 93 % (06/26/18 1230) Vital Signs (24h Range):  Temp:  [98.3 °F (36.8 °C)-99.2 °F (37.3 °C)] 98.3 °F (36.8 °C)  Pulse:  [110-146] 112  Resp:  [28] 28  SpO2:  [84 %-97 %] 93  %  Arterial Line BP: ()/(45-80) 112/55   Weight: 77.7 kg (171 lb 4.8 oz)  Body mass index is 26.05 kg/m².      Intake/Output Summary (Last 24 hours) at 06/26/18 1308  Last data filed at 06/26/18 1200   Gross per 24 hour   Intake             9589 ml   Output             8764 ml   Net              825 ml       Physical Exam   Constitutional: No distress. He is sedated and intubated.   Intubated    Cardiovascular: Normal rate, regular rhythm, normal heart sounds and intact distal pulses.  Exam reveals no gallop and no friction rub.    No murmur heard.  Pulmonary/Chest: Effort normal. He is intubated. No respiratory distress. He has no wheezes. He has no rhonchi. He has rales.   Abdominal: Soft. Bowel sounds are normal. He exhibits no distension. There is no tenderness.   Musculoskeletal: He exhibits edema (2+ LE).   Neurological:   Sedated    Skin: Skin is warm and dry. No rash noted. No erythema.   R SC trialysis   Nursing note and vitals reviewed.      Vents:  Vent Mode: A/C (06/26/18 1130)  Ventilator Initiated: Yes (06/10/18 2217)  Set Rate: 28 bmp (06/26/18 1130)  Vt Set: 420 mL (06/26/18 1130)  Pressure Support: 12 cmH20 (06/21/18 1347)  PEEP/CPAP: 12 cmH20 (06/26/18 1130)  Oxygen Concentration (%): 85 (06/26/18 1230)  Peak Airway Pressure: 50 cmH2O (06/26/18 1130)  Plateau Pressure: 42 cmH20 (06/26/18 1130)  Total Ve: 12 mL (06/26/18 1130)  F/VT Ratio<105 (RSBI): (!) 64.52 (06/26/18 0517)  Lines/Drains/Airways     Central Venous Catheter Line                 Hemodialysis Catheter 06/10/18 2307 right internal jugular 15 days         Percutaneous Central Line Insertion/Assessment - triple lumen  06/10/18 2305 left internal jugular 15 days          Drain                 NG/OG Tube 06/10/18 2309 Park sump 18 Fr. Left nostril 15 days          Airway                 Airway - Non-Surgical 06/25/18 1536 Endotracheal Tube less than 1 day          Arterial Line                 Arterial Line 06/10/18 2304 Right  Radial 15 days          Pressure Ulcer                 Pressure Injury 06/20/18 0303 Right Buttocks Stage 2 6 days              Significant Labs:    CBC/Anemia Profile:    Recent Labs  Lab 06/25/18  0800 06/25/18  1800 06/26/18  0420   WBC 9.31 15.45* 8.45   HGB 9.1* 10.9* 9.3*   HCT 25.9* 32.2* 26.3*   PLT 83* 117* 73*   MCV 89 89 88   RDW 17.2* 17.0* 16.8*        Chemistries:    Recent Labs  Lab 06/25/18  0306 06/25/18  1400 06/25/18  2210 06/26/18  0420     137 131*  131* 136 139  139   K 5.2*  5.2* 5.2*  5.2* 4.1 4.4  4.4     107 99  99 101 98  98   CO2 20*  20* 22*  22* 27 33*  33*   BUN 9  9 7*  7* 5* 4*  4*   CREATININE 0.7  0.7 0.8  0.8 0.6 0.6  0.6   CALCIUM 7.6*  7.6* 7.6*  7.6* 7.1* 7.5*  7.5*   ALBUMIN 2.8*  2.8* 2.7*  2.7* 2.1* 2.1*  2.1*   PROT 6.4  --   --  5.5*   BILITOT 1.1*  --   --  1.0   ALKPHOS 200*  --   --  169*   ALT 40  --   --  56*   AST 95*  --   --  95*   MG 2.7* 2.5 1.6 2.2   PHOS 2.2* 2.8  2.8 2.0* 2.1*       All pertinent labs within the past 24 hours have been reviewed.    Significant Imaging:  CXR: I have reviewed all pertinent results/findings within the past 24 hours and my personal findings are:  No significant interval change    Assessment/Plan:     Pulmonary   Acute hypoxemic respiratory failure    - initially was due to DAH based on bronch finding on 6/11. Pt showed initial improvement after starting PLEX and Rituxin but then started to show increase in O2 requirement   - repeated Bronch on 6/22/18 showed no DAH   - last CXR showed bilateral basal opacification likely to be due to pulmonary edema vs ARDS?   - continue aggressive CRRT and fluid removal. + 3 L in last 24 hs ?  - CTA negative for PE but showed Pneumomediastinum. This likely to be barotrauma. CTS evaluated pt and said not surgical intervention needed at this time  - repeated Echo with normal EF   - at this point, his hypoxia likely to be due to ARDS   - continue supportive  treatment at this time         ARDS (adult respiratory distress syndrome)    - initially etiology was Diffuse pulmonary alveolar hemorrhage, but improved after 6 cycles of PLEX and Rituxin   - on top of DAH but may has PNA and pulmonary edema.   - now , his acute hypoxic respiratory failure is mainly due to sever ARDS. His peak plateau pressure is elevated  - continue with low tidal volume on vent   - continue goal of care discussion with family given anticipating poor prognosis           Diffuse pulmonary alveolar hemorrhage    - Given his presentation with two weeks of progressive worsening of his SOB and cough, and associated with hematuria and CXR finding of bilateral infiltrate raised the concern for DAH  - On 6/10 he underwent Flexible Bronchoscopy and showed no endobronchial lesion, significant signs of hemorrhage on all of the airways, confirming findings of pulmonary hemorrhage.   - Requires on pRBC on 6/12, 6/15 and another unit on 6/17  - repeated bronchoscopy on 6/22/18 showed no DAH        Cardiac/Vascular   Hypertriglyceridemia    - Most likely from Propofol and plan to wean off Propofol and started on Insulin infusion   - monitor TG         Renal/   ALEX (acute kidney injury)    - Differential Diagnoses include Pulmonary renal syndrome.  - Underwent US of kidney and showed Bilateral medical renal disease, Simple renal cysts bilaterally.  - CT abdomen/pelvis, mild interstitial changes in lung bases, 2 stones in the left kidney, 4 stones in the right kidney, no hydronephrosis, post cholecystectomy. No acute abnormality in the liver, spleen, pancreas or adrenals.   - Will continue trending urine output and renal function, avoid NSAIDs, contrast, nephrotoxins    - Monitor strict I/Os, daily weights, renally dose medications to current GFR  - Still anuric and will continue CRRT and adjust UF as tolerated   - continue supportive therapy at this time and continue GOC discussion with family         ID    Septic shock    - See principal problem for more elaboration.   - Currently on pressors, will continue to monitor and ensure MAP > 65 mmHg (during CRRT)  - continue on vacn and Zosyn  -Cultures (new) show NGTD  - ID on Board        Immunology/Multi System   ANCA-associated vasculitis    - See Pulmorenal syndrome for more elaboration         Pulmonary-renal syndrome    - Most likely the diagnosis in the setting of diffuse alveolar hemorrhage and glomerulonephritis with most likely etiology is underlying autoimmune disorder  - Elevated UPCR 1.28, DENIZ positive 1:320 speckled pattern, MPO+ 82, PR3 neg, Immunoglobulins wnl, Hep C and HIV neg  - Finished  5 days of Methylprednisolone 1000 mg IV daily, started on Rituxan (6/15) and Methylprednisone 125 mg IV Q 6 and Atovaquin for PPx   - received 4th dose of plasma exchange 06/18/18  - s/p 6 cycles of PLEX   - once pt is stable, may consider renal biopsy         Hematology   Thrombocytopenia, unspecified    - likely to be sepsis vs PLEX  induced   - Will continue to observe, currently continue to improve         Endocrine   Diabetes mellitus type 2 with complications    - Will keep his blood glucose on the target 140-180  - ISS                        Critical Care Daily Checklist:     A: Awake: RASS Goal/Actual Goal: RASS Goal: 0-->alert and calm  Actual: Conteh Agitation Sedation Scale (RASS): Drowsy   B: Spontaneous Breathing Trial Performed?     C: SAT & SBT Coordinated?  no   D: Delirium: CAM-ICU Overall CAM-ICU: Positive   E: Early Mobility Performed? Turning the pt q 2 hs   F: Feeding Goal: Goals: Patient to receive nutrition by RD follow-up  Status: Nutrition Goal Status: goal met         Current Diet Order   Procedures    Diet NPO. On Tube feed       AS: Analgesia/Sedation As above   T: Thromboembolic Prophylaxis Heparin   H: HOB > 300 Yes   U: Stress Ulcer Prophylaxis (if needed) Famotidine   G: Glucose Control As above   B: Bowel Function Stool Occurrence: 0    I: Indwelling Catheter (Lines & Liu) Necessity As above   D: De-escalation of Antimicrobials/Pharmacotherapies No     Plan for the day/ETD Increase fluid removal. Start NIMBEX      Code Status:  Family/Goals of Care: Full Code            Critical secondary to Patient has a condition that poses threat to life and bodily function: Acute Hypoxic        Bruce Larry MD  Critical Care Medicine  [facility]

## 2018-06-26 NOTE — CARE UPDATE
2018      Jasmeet Raymond  [address] [facility] [place] [address]          Hospital Medicine Dept.  [facility] [address] [place] [address]  [phone] after hours  [phone] fax Jasmeet Raymond has been hospitalized at the [facility] since 6/10/2018.  Ms. Hilario Haji has been at bedside with her father who  today. Please excuse her from duties.      Please contact me if you have any questions.                  __________________________  Tierney Swain MD  2018

## 2018-06-26 NOTE — ASSESSMENT & PLAN NOTE
- initially etiology was Diffuse pulmonary alveolar hemorrhage, but improved after 6 cycles of PLEX and Rituxin   - on top of DAH but may has PNA and pulmonary edema.   - now , his acute hypoxic respiratory failure is mainly due to sever ARDS. His peak plateau pressure is elevated  - continue with low tidal volume on vent   - continue goal of care discussion with family given anticipating poor prognosis

## 2018-06-26 NOTE — NURSING
Pt asystole on the monitor. MD notified and requested at bedside. Family at bedside.  notified and requested at bedside.

## 2018-06-26 NOTE — PLAN OF CARE
"Problem: Spiritual Distress, Risk/Actual (Adult,Obstetrics,Pediatric)  Intervention: Facilitate Personal Exploration/Expression of Spirituality  Provided f/u visit during and after family meeting. Offered pastoral support with compassionate presence and spiritual assessment. Pt's wife mentioned pt recently found peace with God. "On Thursday he gave his life to Wade. On Friday he got sick and eventually came into the hospital," pt's wife shared. No direct spiritual needs expressed. Spiritual Care will continue to follow.         "

## 2018-06-26 NOTE — PLAN OF CARE
Long discussion at the family at bedside with both pt's family (wife's stepchildren) and wife's family. They would like to proceed with comfort measures at this time. DNR order placed.  at bedside. Withdrawal of care orders entered with a slow O2 wean (discussed with RT).    Toby Fernández MD  Fellow, [facility] Pulmonary & Critical Care Medicine  Cell [phone]

## 2018-06-26 NOTE — SIGNIFICANT EVENT
Significant Event  Critical Care Medicine    CC: <principal problem not specified>  Date: 06/25/2018  Admit Date: 6/10/2018  Hospital Length of Stay: 15    Code Status: Full Code     SUBJECTIVE:     Significant Events: Called urgently to the bedside by nurse to evaluate patient for worsening hypotension and hypoxemia.     Patient with progressively worsening tachycardia throughout the evening with sudden increase in pressor requirements (levo dose doubled) and acute desaturation to low 80s on current ventilator settings. Plateau pressures in high 50s since 1430. Patient currently on CRRT with . CXRY with improved aeration compared to prior.        OBJECTIVE:     Physical Exam:  Last Vitals:  Vitals:    06/25/18 2345   BP:    Pulse: (!) 129   Resp:    Temp:      GA: intubated, sedated, paralyzed  Pulmonary: Clear/diminished to auscultation anteriorly. No wheezing, crackles, or rhonchi.  Cardiac: Sinus tach on monitor, HR 140s  Abdominal: Bowel sounds present x 4. No appreciable hepatosplenomegaly.  Neuro:  --GCS: E1 V1T M1   --Mental Status:  Sedated and paralyzed. BIS 40      ASSESSMENT AND PLAN/INTERVENTIONS:     1) acute worsening of hypoxemic respiratory failure  Plan/Interventions:  --CXRY shows improved aeration compared to previous, ETT in good position  --ABG with hypercapnea (slightly worse than previous), hypoxemia (grossly unchanged from prior)  --Sats remained mid-high 80s on 100% FiO2. Attempted in increase PEEP from 12 to 14, however plateau pressures went from high 50s to high 60s following increase in PEEP.     2) acute worsening of shock  --levo requirements doubled, vasopressin added to help decrease levo requirements given worsening tachycardia    Following administration of 500cc bolus, levo requirements significantly decreased, oxygenation improved, and was able to titrate FiO2 down to 85% with plateau pressures improved to 43. Suspect that patient has had too much volume pulled with  aggressive UF rate on CRRT today. UF changed to 300 to run patient net even. Will continue to monitor closely overnight.    Case discussed with [facility] Fellow Dr. Yobany William.     Uninterrupted Critical Care/Counseling Time (not including procedures): 45 minutes    Siria Medina NP  Critical Care Medicine

## 2018-06-26 NOTE — SIGNIFICANT EVENT
Death Note    Called to bedside by patient's nurse. Nursing supervisor notified. Family at bedside.  has been called and is also at bedside.    Patient is not responding to verbal or tactile stimuli. Patient does not have a papillary or corneal reflex. His pupils are fixed and dilated. No heart or breath sounds on auscultation. No respirations. No palpable pulses.     Time of death: 06/26/2018  At 04:34 pm     Cause of Death: acute hypoxic respiratory failure       Bruce Larry MD  Internal Medicine  PGY2  Pager:  [phone]  [email or web address]

## 2018-06-26 NOTE — NURSING
Critical Care fellow at bedside. All Family present. Detailed plan of care discussion held between family, MD, , and RN at bedside. Comfort care goals now in place. Withdrawal process initiated with slow wean of O2. Pressors stopped, CRRT stopped. DNR order placed at this time. Family and  remain at bedside.

## 2018-06-26 NOTE — ASSESSMENT & PLAN NOTE
- Differential Diagnoses include Pulmonary renal syndrome.  - Underwent US of kidney and showed Bilateral medical renal disease, Simple renal cysts bilaterally.  - CT abdomen/pelvis, mild interstitial changes in lung bases, 2 stones in the left kidney, 4 stones in the right kidney, no hydronephrosis, post cholecystectomy. No acute abnormality in the liver, spleen, pancreas or adrenals.   - Will continue trending urine output and renal function, avoid NSAIDs, contrast, nephrotoxins    - Monitor strict I/Os, daily weights, renally dose medications to current GFR  - Still anuric and will continue CRRT and adjust UF as tolerated   - continue supportive therapy at this time and continue GOC discussion with family

## 2018-06-26 NOTE — SUBJECTIVE & OBJECTIVE
Interval History/Significant Events:  pt had eventful night with desating and increase in pressor requirement. Added vaso after max Levo. Continue NIMBEX, Propofol, verset, and Fentanyl gtt   His peak plateau pressure is elevated.     Review of Systems   Unable to perform ROS: Intubated     Objective:     Vital Signs (Most Recent):  Temp: 98.3 °F (36.8 °C) (06/26/18 1100)  Pulse: (!) 112 (06/26/18 1230)  Resp: (!) 28 (06/26/18 1157)  BP: (!) 115/59 (06/22/18 2215)  SpO2: (!) 93 % (06/26/18 1230) Vital Signs (24h Range):  Temp:  [98.3 °F (36.8 °C)-99.2 °F (37.3 °C)] 98.3 °F (36.8 °C)  Pulse:  [110-146] 112  Resp:  [28] 28  SpO2:  [84 %-97 %] 93 %  Arterial Line BP: ()/(45-80) 112/55   Weight: 77.7 kg (171 lb 4.8 oz)  Body mass index is 26.05 kg/m².      Intake/Output Summary (Last 24 hours) at 06/26/18 1308  Last data filed at 06/26/18 1200   Gross per 24 hour   Intake             9589 ml   Output             8764 ml   Net              825 ml       Physical Exam   Constitutional: No distress. He is sedated and intubated.   Intubated    Cardiovascular: Normal rate, regular rhythm, normal heart sounds and intact distal pulses.  Exam reveals no gallop and no friction rub.    No murmur heard.  Pulmonary/Chest: Effort normal. He is intubated. No respiratory distress. He has no wheezes. He has no rhonchi. He has rales.   Abdominal: Soft. Bowel sounds are normal. He exhibits no distension. There is no tenderness.   Musculoskeletal: He exhibits edema (2+ LE).   Neurological:   Sedated    Skin: Skin is warm and dry. No rash noted. No erythema.   R SC trialysis   Nursing note and vitals reviewed.      Vents:  Vent Mode: A/C (06/26/18 1130)  Ventilator Initiated: Yes (06/10/18 2217)  Set Rate: 28 bmp (06/26/18 1130)  Vt Set: 420 mL (06/26/18 1130)  Pressure Support: 12 cmH20 (06/21/18 1347)  PEEP/CPAP: 12 cmH20 (06/26/18 1130)  Oxygen Concentration (%): 85 (06/26/18 1230)  Peak Airway Pressure: 50 cmH2O (06/26/18  1130)  Plateau Pressure: 42 cmH20 (06/26/18 1130)  Total Ve: 12 mL (06/26/18 1130)  F/VT Ratio<105 (RSBI): (!) 64.52 (06/26/18 0517)  Lines/Drains/Airways     Central Venous Catheter Line                 Hemodialysis Catheter 06/10/18 2307 right internal jugular 15 days         Percutaneous Central Line Insertion/Assessment - triple lumen  06/10/18 2305 left internal jugular 15 days          Drain                 NG/OG Tube 06/10/18 2309 [place] sump 18 Fr. Left nostril 15 days          Airway                 Airway - Non-Surgical 06/25/18 1536 Endotracheal Tube less than 1 day          Arterial Line                 Arterial Line 06/10/18 2304 Right Radial 15 days          Pressure Ulcer                 Pressure Injury 06/20/18 0303 Right Buttocks Stage 2 6 days              Significant Labs:    CBC/Anemia Profile:    Recent Labs  Lab 06/25/18  0800 06/25/18  1800 06/26/18  0420   WBC 9.31 15.45* 8.45   HGB 9.1* 10.9* 9.3*   HCT 25.9* 32.2* 26.3*   PLT 83* 117* 73*   MCV 89 89 88   RDW 17.2* 17.0* 16.8*        Chemistries:    Recent Labs  Lab 06/25/18  0306 06/25/18  1400 06/25/18  2210 06/26/18  0420     137 131*  131* 136 139  139   K 5.2*  5.2* 5.2*  5.2* 4.1 4.4  4.4     107 99  99 101 98  98   CO2 20*  20* 22*  22* 27 33*  33*   BUN 9  9 7*  7* 5* 4*  4*   CREATININE 0.7  0.7 0.8  0.8 0.6 0.6  0.6   CALCIUM 7.6*  7.6* 7.6*  7.6* 7.1* 7.5*  7.5*   ALBUMIN 2.8*  2.8* 2.7*  2.7* 2.1* 2.1*  2.1*   PROT 6.4  --   --  5.5*   BILITOT 1.1*  --   --  1.0   ALKPHOS 200*  --   --  169*   ALT 40  --   --  56*   AST 95*  --   --  95*   MG 2.7* 2.5 1.6 2.2   PHOS 2.2* 2.8  2.8 2.0* 2.1*       All pertinent labs within the past 24 hours have been reviewed.    Significant Imaging:  CXR: I have reviewed all pertinent results/findings within the past 24 hours and my personal findings are:  No significant interval change

## 2018-06-26 NOTE — ASSESSMENT & PLAN NOTE
MPO-cANCA GPA, presenting with RPGN and pulmonary hemorrhage  S/p pulse steroids, now on scheduled dose 125mg IV solumedrol q6h, and PLEX 5/6, also has been started on rituxan 6/15 (repeat in 2 weeks 6/29), renal supportive therapy with RRT (SLED). Urine sediment showed RBC cast.  -s/p PLEX -treatment #6.   -Labs reviewed. See interval hx.     Plan:  - Continue SLED -400 to keep even for now. Bicarb 40. Increase ca cl post filter 25 cc/hr.   -Naphos IV prn

## 2018-06-27 LAB
ALDOLASE SERPL-CCNC: 19.1 U/L
BACTERIA BLD CULT: NORMAL
ENA SCL70 AB SER-ACNC: 1 UNITS
RNA POLYMERASE III ANTIBODIES, IGG, SERUM: 24.8 U

## 2018-06-27 NOTE — PLAN OF CARE
18 0929   Final Note   Assessment Type Final Discharge Note   Discharge Disposition    Hospital Follow Up  Appt(s) scheduled? No   Discharge plans and expectations educations in teach back method with documentation complete? No   Right Care Referral Info   Post Acute Recommendation Other  ( in medical facility - withdrawal of care)   Zahira Nava RN, BSN, CCM  Case Management  [facility]  Ext. [phone]

## 2018-06-28 LAB — POCT GLUCOSE: 171 MG/DL (ref 70–110)

## 2018-06-29 LAB
BACTERIA BLD CULT: NORMAL
BACTERIA BLD CULT: NORMAL

## 2018-07-10 LAB
ANTI-PM/SCL AB: <20 UNITS
ANTI-SS-A 52 KD AB, IGG: <20 UNITS
EJ AB SER QL: NEGATIVE
ENA JO1 AB SER IA-ACNC: <20 UNITS
ENA SM+RNP AB SER IA-ACNC: <20 UNITS
FIBRILLARIN (U3 RNP): NEGATIVE
KU AB SER QL: NEGATIVE
MDA-5 (P140): <20 UNITS
MI2 AB SER QL: NEGATIVE
NXP-2 (P140): <20 UNITS
OJ AB SER QL: NEGATIVE
PL12 AB SER QL: NEGATIVE
PL7 AB SER QL: NEGATIVE
SRP AB SERPL QL: NEGATIVE
TIF1 GAMMA (P155/140): <20 UNITS
U2 SNRNP: NEGATIVE

## 2018-07-17 LAB — FUNGUS SPEC CULT: NORMAL

## 2018-08-15 LAB
ACID FAST MOD KINY STN SPEC: NORMAL
MYCOBACTERIUM SPEC QL CULT: NORMAL

## 2019-04-28 NOTE — ASSESSMENT & PLAN NOTE
- Differential Diagnoses include Pulmonary renal syndrome.  - Underwent US of kidney and showed Bilateral medical renal disease, Simple renal cysts bilaterally.  - CT abdomen/pelvis, mild interstitial changes in lung bases, 2 stones in the left kidney, 4 stones in the right kidney, no hydronephrosis, post cholecystectomy. No acute abnormality in the liver, spleen, pancreas or adrenals.   - Will continue trending urine output and renal function, avoid NSAIDs, contrast, nephrotoxins    - Monitor strict I/Os, daily weights, renally dose medications to current GFR  - Still anuric and will continue CRRT and adjust UF as tolerated   - once pt is stable, may consider renal biopsy    100

## 2020-01-17 NOTE — ASSESSMENT & PLAN NOTE
64 yo AAM with NIDDM, HTN, arthritis with a 2 week history of shortness of breath being treated for URI and UTI, admitted for worsening SOB, oliguric ALEX with proteinuria, hematuria, pyuria serum crt 8, acidotic and hyperkalemia. Unknown baseline renal function.  He received one dose solumedrol and RRT. X 1 episode PTT.   Renal US demonstrating bilateral renal cysts.  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    MPO-cANCA positive RPGN  Initially oliguric now anuric. Pulmonary hemorrhage and pulmonary renal syndrome with RPGN. 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 ANCA pending.   -Urine sediment showed red blood cell cast.   -6/11  2 D echo EF 75% and normal diastolic function and small pericardial effusion.  -6/11 plasmapheresis  -6/11 Repeat renal US showed normal size kidneys with marked cortical thinning with increase cortical echogenicity and loss of corticomedullary distinction with no hydronephrosis, mass or stone.   -C3C4 , AntiGBM and Proteinase 3 normal .  -6/15 total hr intake 120 ml-TF, precedex, fentanyl, proprofol. CVP 5  -iCa 0.8 from 1.08 due to not using calcium chloride with AC during plasmapheresis today. Hgb 6.3<7.3. Plt decrease over past 4 days.     Plan:  -Appreciate Rheumatology following- Continue plasmapheresis (3/6 tx today 6/15), completed pulse dose steroid x 5 days (last dose 6/14) now started today 4/15 solumedrol 125 mg q 6, start Rituximab and atovaquone today 6/15.   -Infectious work up negative so far.   -When hemodynamcially stable, then renal biopsy.  -Continue SLED for clearance and volume management -400 ml as tolerated. Citrate for anticoagulation/calcium chloride 15 cc/hr.  -Blood transfusion and evaluation for thrombocytopenia per primary team.               normal (ped)...

## 2020-11-02 NOTE — SUBJECTIVE & OBJECTIVE
Interval History: Remains intubated FIO 85%, sedated. Decrease  overnight due to increase pressors requirement and tachycardia. Vaso started last night. Net pos 200 ml. Total hr intake 125 ml -nimbex, fentanyl, versed, vaso, ca cl. Low iCa and phos. TF off. ABG pH 7.35/69/61/38.9.     Review of patient's allergies indicates:   Allergen Reactions    Sulfa (sulfonamide antibiotics)      Current Facility-Administered Medications   Medication Frequency    albuterol-ipratropium 2.5 mg-0.5 mg/3 mL nebulizer solution 3 mL Q4H    atovaquone suspension 750 mg Q12H    calcium chloride 1 g in dextrose 5 % 100 mL IVPB Continuous    calcium chloride 1 g in dextrose 5 % 100 mL IVPB Continuous    chlorhexidine 0.12 % solution 15 mL BID    cisatracurium (NIMBEX) 100 mg in dextrose 5 % 100 mL infusion Continuous    dexmedetomidine (PRECEDEX) 400mcg/100mL 0.9% NaCL infusion Continuous    dextrose 50% injection 12.5 g PRN    DEXTROSE-SOD CITRATE-CITRIC AC 2.45-2.2 GRAM- 730 MG/100 ML MISC SOLN PRN    DEXTROSE-SOD CITRATE-CITRIC AC 2.45-2.2 GRAM- 730 MG/100 ML MISC SOLN Continuous    famotidine tablet 20 mg BID    fentaNYL 2500 mcg in 0.9% sodium chloride 250 mL infusion premix (titrating) Continuous    glucagon (human recombinant) injection 1 mg PRN    heparin (porcine) injection 5,000 Units Q8H    heparin, porcine (PF) 100 unit/mL injection flush 500 Units PRN    hepatitis B virus vacc.rec(PF) injection 40 mcg vaccine x 1 dose    hydrALAZINE injection 10 mg Q4H PRN    insulin aspart U-100 pen 1-10 Units Q4H PRN    labetalol injection 20 mg Q4H PRN    lorazepam injection 4 mg Q1H PRN    methylPREDNISolone sodium succinate injection 125 mg Q8H    midazolam (VERSED) 1 mg/mL in sodium chloride 0.9% 100 mL infusion (titrating) Continuous    norepinephrine 16 mg in dextrose 5 % 250 mL infusion Continuous    piperacillin-tazobactam 4.5 g in sodium chloride 0.9% 100 mL IVPB (ready to mix system) Q8H     propofol (DIPRIVAN) 10 mg/mL infusion Continuous    sodium chloride 0.9% flush 10 mL PRN    sodium phosphate 39.99 mmol in dextrose 5 % 250 mL IVPB Once    vasopressin (PITRESSIN) 0.2 Units/mL in dextrose 5 % 100 mL infusion Continuous    white petrolatum-mineral oil (LUBIFRESH P.M.) ophthalmic ointment Q8H       Objective:     Vital Signs (Most Recent):  Temp: 98.4 °F (36.9 °C) (06/26/18 0700)  Pulse: (!) 115 (06/26/18 0915)  Resp: (!) 28 (06/26/18 0831)  BP: (!) 115/59 (06/22/18 2215)  SpO2: (!) 92 % (06/26/18 0915)  O2 Device (Oxygen Therapy): ventilator (06/26/18 0745) Vital Signs (24h Range):  Temp:  [97.5 °F (36.4 °C)-99.2 °F (37.3 °C)] 98.4 °F (36.9 °C)  Pulse:  [] 115  Resp:  [28] 28  SpO2:  [84 %-97 %] 92 %  Arterial Line BP: ()/(45-80) 102/54     Weight: 77.7 kg (171 lb 4.8 oz) (06/26/18 0530)  Body mass index is 26.05 kg/m².  Body surface area is 1.93 meters squared.    I/O last 3 completed shifts:  In: 48370 [I.V.:5070; NG/GT:150; IV Piggyback:8481]  Out: 81534 [Drains:450; Other:14392]    Physical Exam   Constitutional: He appears well-developed and well-nourished.   Intubated FIO2 85 % , sedated.    HENT:   Head: Normocephalic.   Cardiovascular: Normal heart sounds.  Exam reveals no gallop and no friction rub.    No murmur heard.  Tachycardic, no murmur appreciated.   No JVD     Pulmonary/Chest:   Intubated. Clear anteriorly   Abdominal: Soft. He exhibits no distension.   Musculoskeletal: He exhibits edema (BUE-dressing to RFA). He exhibits no deformity.   Neurological:   Intubated and sedated   Skin: Skin is warm and dry.   R SC trialysis   Nursing note and vitals reviewed.      Significant Labs:  All labs within the past 24 hours have been reviewed.     Significant Imaging:  Labs: Reviewed   Statement Selected

## 2023-12-11 NOTE — ASSESSMENT & PLAN NOTE
- Most likely is diffuse pulmonary alveolar hemorrhage  - Frequent check in his CBC, transfuse when Hb < 7   Yes